# Patient Record
Sex: MALE | Race: WHITE | NOT HISPANIC OR LATINO | URBAN - METROPOLITAN AREA
[De-identification: names, ages, dates, MRNs, and addresses within clinical notes are randomized per-mention and may not be internally consistent; named-entity substitution may affect disease eponyms.]

---

## 2018-05-14 ENCOUNTER — EMERGENCY (EMERGENCY)
Facility: HOSPITAL | Age: 73
LOS: 1 days | Discharge: HOME | End: 2018-05-14
Attending: EMERGENCY MEDICINE | Admitting: EMERGENCY MEDICINE
Payer: MEDICARE

## 2018-05-14 VITALS
TEMPERATURE: 98 F | SYSTOLIC BLOOD PRESSURE: 163 MMHG | OXYGEN SATURATION: 96 % | DIASTOLIC BLOOD PRESSURE: 82 MMHG | WEIGHT: 156.09 LBS | RESPIRATION RATE: 18 BRPM | HEART RATE: 96 BPM

## 2018-05-14 VITALS
HEART RATE: 87 BPM | RESPIRATION RATE: 18 BRPM | OXYGEN SATURATION: 96 % | SYSTOLIC BLOOD PRESSURE: 144 MMHG | DIASTOLIC BLOOD PRESSURE: 81 MMHG | TEMPERATURE: 98 F

## 2018-05-14 DIAGNOSIS — Z90.49 ACQUIRED ABSENCE OF OTHER SPECIFIED PARTS OF DIGESTIVE TRACT: Chronic | ICD-10-CM

## 2018-05-14 LAB
ALBUMIN SERPL ELPH-MCNC: 3.4 G/DL — SIGNIFICANT CHANGE UP (ref 3.4–5)
ALP SERPL-CCNC: 74 U/L — SIGNIFICANT CHANGE UP (ref 40–120)
ALT FLD-CCNC: 19 U/L — SIGNIFICANT CHANGE UP (ref 12–42)
ANION GAP SERPL CALC-SCNC: 8 MMOL/L — LOW (ref 9–16)
APPEARANCE UR: CLEAR — SIGNIFICANT CHANGE UP
AST SERPL-CCNC: 18 U/L — SIGNIFICANT CHANGE UP (ref 15–37)
BASOPHILS NFR BLD AUTO: 0.2 % — SIGNIFICANT CHANGE UP (ref 0–2)
BILIRUB SERPL-MCNC: 1.1 MG/DL — SIGNIFICANT CHANGE UP (ref 0.2–1.2)
BILIRUB UR-MCNC: (no result)
BUN SERPL-MCNC: 20 MG/DL — SIGNIFICANT CHANGE UP (ref 7–23)
CALCIUM SERPL-MCNC: 9.4 MG/DL — SIGNIFICANT CHANGE UP (ref 8.5–10.5)
CHLORIDE SERPL-SCNC: 99 MMOL/L — SIGNIFICANT CHANGE UP (ref 96–108)
CO2 SERPL-SCNC: 26 MMOL/L — SIGNIFICANT CHANGE UP (ref 22–31)
COLOR SPEC: YELLOW — SIGNIFICANT CHANGE UP
CREAT SERPL-MCNC: 1.35 MG/DL — HIGH (ref 0.5–1.3)
DIFF PNL FLD: (no result)
EOSINOPHIL NFR BLD AUTO: 0.3 % — SIGNIFICANT CHANGE UP (ref 0–6)
GLUCOSE SERPL-MCNC: 118 MG/DL — HIGH (ref 70–99)
GLUCOSE UR QL: NEGATIVE — SIGNIFICANT CHANGE UP
HCT VFR BLD CALC: 41.2 % — SIGNIFICANT CHANGE UP (ref 39–50)
HGB BLD-MCNC: 14.4 G/DL — SIGNIFICANT CHANGE UP (ref 13–17)
IMM GRANULOCYTES NFR BLD AUTO: 0.3 % — SIGNIFICANT CHANGE UP (ref 0–1.5)
KETONES UR-MCNC: 15 MG/DL
LEUKOCYTE ESTERASE UR-ACNC: (no result)
LYMPHOCYTES # BLD AUTO: 11.6 % — LOW (ref 13–44)
MCHC RBC-ENTMCNC: 32.8 PG — SIGNIFICANT CHANGE UP (ref 27–34)
MCHC RBC-ENTMCNC: 35 G/DL — SIGNIFICANT CHANGE UP (ref 32–36)
MCV RBC AUTO: 93.8 FL — SIGNIFICANT CHANGE UP (ref 80–100)
MONOCYTES NFR BLD AUTO: 10 % — SIGNIFICANT CHANGE UP (ref 2–14)
NEUTROPHILS NFR BLD AUTO: 77.6 % — HIGH (ref 43–77)
NITRITE UR-MCNC: NEGATIVE — SIGNIFICANT CHANGE UP
PH UR: 5.5 — SIGNIFICANT CHANGE UP (ref 5–8)
PLATELET # BLD AUTO: 156 K/UL — SIGNIFICANT CHANGE UP (ref 150–400)
POTASSIUM SERPL-MCNC: 4.1 MMOL/L — SIGNIFICANT CHANGE UP (ref 3.5–5.3)
POTASSIUM SERPL-SCNC: 4.1 MMOL/L — SIGNIFICANT CHANGE UP (ref 3.5–5.3)
PROT SERPL-MCNC: 7.4 G/DL — SIGNIFICANT CHANGE UP (ref 6.4–8.2)
PROT UR-MCNC: 30 MG/DL
RBC # BLD: 4.39 M/UL — SIGNIFICANT CHANGE UP (ref 4.2–5.8)
RBC # FLD: 12.7 % — SIGNIFICANT CHANGE UP (ref 10.3–16.9)
SODIUM SERPL-SCNC: 133 MMOL/L — SIGNIFICANT CHANGE UP (ref 132–145)
SP GR SPEC: 1.02 — SIGNIFICANT CHANGE UP (ref 1–1.03)
UROBILINOGEN FLD QL: 0.2 E.U./DL — SIGNIFICANT CHANGE UP
WBC # BLD: 10.8 K/UL — HIGH (ref 3.8–10.5)
WBC # FLD AUTO: 10.8 K/UL — HIGH (ref 3.8–10.5)

## 2018-05-14 PROCEDURE — 76775 US EXAM ABDO BACK WALL LIM: CPT | Mod: 26

## 2018-05-14 PROCEDURE — 99284 EMERGENCY DEPT VISIT MOD MDM: CPT

## 2018-05-14 PROCEDURE — 76870 US EXAM SCROTUM: CPT | Mod: 26

## 2018-05-14 RX ORDER — SODIUM CHLORIDE 9 MG/ML
1000 INJECTION INTRAMUSCULAR; INTRAVENOUS; SUBCUTANEOUS ONCE
Qty: 0 | Refills: 0 | Status: COMPLETED | OUTPATIENT
Start: 2018-05-14 | End: 2018-05-14

## 2018-05-14 RX ORDER — ACETAMINOPHEN 500 MG
650 TABLET ORAL ONCE
Qty: 0 | Refills: 0 | Status: COMPLETED | OUTPATIENT
Start: 2018-05-14 | End: 2018-05-14

## 2018-05-14 RX ORDER — TRAMADOL HYDROCHLORIDE 50 MG/1
1 TABLET ORAL
Qty: 8 | Refills: 0 | OUTPATIENT
Start: 2018-05-14 | End: 2018-05-15

## 2018-05-14 RX ORDER — TAMSULOSIN HYDROCHLORIDE 0.4 MG/1
0.4 CAPSULE ORAL ONCE
Qty: 0 | Refills: 0 | Status: COMPLETED | OUTPATIENT
Start: 2018-05-14 | End: 2018-05-14

## 2018-05-14 RX ORDER — ONDANSETRON 8 MG/1
4 TABLET, FILM COATED ORAL ONCE
Qty: 0 | Refills: 0 | Status: COMPLETED | OUTPATIENT
Start: 2018-05-14 | End: 2018-05-14

## 2018-05-14 RX ORDER — KETOROLAC TROMETHAMINE 30 MG/ML
15 SYRINGE (ML) INJECTION ONCE
Qty: 0 | Refills: 0 | Status: DISCONTINUED | OUTPATIENT
Start: 2018-05-14 | End: 2018-05-14

## 2018-05-14 RX ADMIN — SODIUM CHLORIDE 1000 MILLILITER(S): 9 INJECTION INTRAMUSCULAR; INTRAVENOUS; SUBCUTANEOUS at 20:47

## 2018-05-14 RX ADMIN — Medication 650 MILLIGRAM(S): at 19:21

## 2018-05-14 RX ADMIN — ONDANSETRON 4 MILLIGRAM(S): 8 TABLET, FILM COATED ORAL at 20:47

## 2018-05-14 RX ADMIN — Medication 15 MILLIGRAM(S): at 20:47

## 2018-05-14 NOTE — ED PROVIDER NOTE - SKIN, MLM
No pertinent past medical history Skin normal color for race, warm, dry and intact. No evidence of rash.

## 2018-05-14 NOTE — ED PROVIDER NOTE - MEDICAL DECISION MAKING DETAILS
Pt with hematuria and R flank pain c/w kidney stone. Will check labs, UA, renal US, give fluids/pain meds. Pt with high-riding R testicle, no swelling/tenderness, very low suspicion for testicular torsion/infection but will get US to confirm. Pt with hematuria and R flank pain c/w kidney stone. Will check labs, UA, renal US, give fluids/pain meds. Pt with high-riding R testicle, no swelling/tenderness, very low index of suspicion for testicular torsion but will get US to confirm.

## 2018-05-14 NOTE — ED PROVIDER NOTE - CARE PLAN
Principal Discharge DX:	Kidney stone  Assessment and plan of treatment:	-- Follow up with your urologist in 2 days as scheduled.   -- Take flomax as prescribed by your primary doctor. Keep in mind that flomax can sometimes make you feel lightheaded so don't stand up suddenly.   -- Take tylenol 650mg every 4-6 hours for mild to moderate pain (maximum 3,000mg/day). Take tramadol for severe pain not responsive to tylenol. Do not drive, operate machinery, climb ladders or take part in any other high risk activities after taking this sedating medication.   -- Return to ER immediately for new or worsening symptoms (including but not limited to: fevers, inability to urine, severe pain not responsive to tramadol) or for any concerns.

## 2018-05-14 NOTE — ED ADULT NURSE NOTE - OBJECTIVE STATEMENT
Pt is a 73y male complaining of right flank pain 9/10. Pt states " I went to  today and they tested my urine and told me that I have a kidney stone. In january I had a scan that showed that I had a stone located in the Right Kidney. They scheduled an appointment with a urologist for me but it is not until Wed. I am in pain, cant eat or sleep." Pt states that he is also nauseas, dizzy and has chills. Pt states that he sometimes has burning upon urination. Pt denies sob, chest pain, fever. Pt has right sided CVA tenderness.

## 2018-05-14 NOTE — ED PROVIDER NOTE - PLAN OF CARE
-- Follow up with your urologist in 2 days as scheduled.   -- Take flomax as prescribed by your primary doctor. Keep in mind that flomax can sometimes make you feel lightheaded so don't stand up suddenly.   -- Take tylenol 650mg every 4-6 hours for mild to moderate pain (maximum 3,000mg/day). Take tramadol for severe pain not responsive to tylenol. Do not drive, operate machinery, climb ladders or take part in any other high risk activities after taking this sedating medication.   -- Return to ER immediately for new or worsening symptoms (including but not limited to: fevers, inability to urine, severe pain not responsive to tramadol) or for any concerns.

## 2018-05-14 NOTE — ED PROVIDER NOTE - ATTENDING CONTRIBUTION TO CARE
Patient presenting with R flank pain, hx kidneys stones. Pain radiates to R testicle. Seen by outpt MD and given Flomax Rx. VSS. + R CVAT, + RLQ ttp. Sono and labs/ua ordered. Pain meds/Zofran.

## 2018-05-14 NOTE — ED PROVIDER NOTE - OBJECTIVE STATEMENT
73M h/o HTN and kidney stones p/w R flank pain radiating to R inguinal area, onset 3 days ago, was intermittent now constant, a/w hematuria, dysuria, and nausea. No fevers, vomiting, abd pain, or testicular pain. Pt saw his PMD earlier today, given flomax and referred to urology (has appt with Dr Jus Choudhary in 2 days). Pt presents to the ED due to pain, took ASA 162mg earlier today without improvement in pain.

## 2018-05-14 NOTE — ED PROVIDER NOTE - NEUROLOGICAL, MLM
Alert and oriented x3, follows commands, moving all extremities spontaneously. Speech is clear, fluent, and appropriate.

## 2018-05-14 NOTE — ED PROVIDER NOTE - GENITOURINARY TESTICULAR EXAM, RIGHT
chaperone: Collette Alatorre. High-riding righ testicle. No tenderness or swelling. Unable to elicit cremasteric reflex b/l./ABNORMAL LIE chaperone: Collette Alatorre. High-riding R testicle. No tenderness or swelling. Unable to elicit cremasteric reflex b/l./ABNORMAL LIE

## 2018-05-14 NOTE — ED PROVIDER NOTE - PROGRESS NOTE DETAILS
Gollogly: pt feels much better after pain meds. Discussed lab/radiology results and need to follow up with urology. Gave return precautions; pt verbalized understanding.

## 2018-05-15 LAB
CULTURE RESULTS: NO GROWTH — SIGNIFICANT CHANGE UP
SPECIMEN SOURCE: SIGNIFICANT CHANGE UP

## 2018-05-15 RX ORDER — LISINOPRIL 2.5 MG/1
0 TABLET ORAL
Qty: 0 | Refills: 0 | COMMUNITY

## 2018-05-15 RX ORDER — AMLODIPINE BESYLATE 2.5 MG/1
0 TABLET ORAL
Qty: 0 | Refills: 0 | COMMUNITY

## 2018-05-15 RX ORDER — ATORVASTATIN CALCIUM 80 MG/1
0 TABLET, FILM COATED ORAL
Qty: 0 | Refills: 0 | COMMUNITY

## 2018-05-15 RX ORDER — POTASSIUM ACETATE 196 MG/ML
0 INJECTION, SOLUTION INTRAVENOUS
Qty: 0 | Refills: 0 | COMMUNITY

## 2018-05-18 DIAGNOSIS — R10.9 UNSPECIFIED ABDOMINAL PAIN: ICD-10-CM

## 2018-05-18 DIAGNOSIS — N20.0 CALCULUS OF KIDNEY: ICD-10-CM

## 2018-05-18 DIAGNOSIS — Z88.0 ALLERGY STATUS TO PENICILLIN: ICD-10-CM

## 2018-05-18 DIAGNOSIS — I10 ESSENTIAL (PRIMARY) HYPERTENSION: ICD-10-CM

## 2022-01-19 ENCOUNTER — RESULT REVIEW (OUTPATIENT)
Age: 77
End: 2022-01-19

## 2024-01-12 ENCOUNTER — EMERGENCY (EMERGENCY)
Facility: HOSPITAL | Age: 79
LOS: 1 days | Discharge: LEFT BEFORE DISCHARGE | End: 2024-01-12
Attending: EMERGENCY MEDICINE | Admitting: EMERGENCY MEDICINE
Payer: MEDICARE

## 2024-01-12 VITALS
OXYGEN SATURATION: 94 % | TEMPERATURE: 97 F | HEART RATE: 98 BPM | DIASTOLIC BLOOD PRESSURE: 85 MMHG | SYSTOLIC BLOOD PRESSURE: 152 MMHG | RESPIRATION RATE: 18 BRPM

## 2024-01-12 VITALS
SYSTOLIC BLOOD PRESSURE: 154 MMHG | WEIGHT: 175.05 LBS | HEART RATE: 111 BPM | HEIGHT: 69 IN | DIASTOLIC BLOOD PRESSURE: 90 MMHG | OXYGEN SATURATION: 89 % | RESPIRATION RATE: 26 BRPM | TEMPERATURE: 97 F

## 2024-01-12 DIAGNOSIS — R22.2 LOCALIZED SWELLING, MASS AND LUMP, TRUNK: ICD-10-CM

## 2024-01-12 DIAGNOSIS — Z20.822 CONTACT WITH AND (SUSPECTED) EXPOSURE TO COVID-19: ICD-10-CM

## 2024-01-12 DIAGNOSIS — R05.8 OTHER SPECIFIED COUGH: ICD-10-CM

## 2024-01-12 DIAGNOSIS — R06.02 SHORTNESS OF BREATH: ICD-10-CM

## 2024-01-12 DIAGNOSIS — J44.9 CHRONIC OBSTRUCTIVE PULMONARY DISEASE, UNSPECIFIED: ICD-10-CM

## 2024-01-12 DIAGNOSIS — Z88.0 ALLERGY STATUS TO PENICILLIN: ICD-10-CM

## 2024-01-12 DIAGNOSIS — R00.0 TACHYCARDIA, UNSPECIFIED: ICD-10-CM

## 2024-01-12 DIAGNOSIS — I45.10 UNSPECIFIED RIGHT BUNDLE-BRANCH BLOCK: ICD-10-CM

## 2024-01-12 DIAGNOSIS — Z90.49 ACQUIRED ABSENCE OF OTHER SPECIFIED PARTS OF DIGESTIVE TRACT: Chronic | ICD-10-CM

## 2024-01-12 DIAGNOSIS — Z87.891 PERSONAL HISTORY OF NICOTINE DEPENDENCE: ICD-10-CM

## 2024-01-12 DIAGNOSIS — Z53.29 PROCEDURE AND TREATMENT NOT CARRIED OUT BECAUSE OF PATIENT'S DECISION FOR OTHER REASONS: ICD-10-CM

## 2024-01-12 PROBLEM — I10 ESSENTIAL (PRIMARY) HYPERTENSION: Chronic | Status: ACTIVE | Noted: 2018-05-14

## 2024-01-12 PROBLEM — N20.0 CALCULUS OF KIDNEY: Chronic | Status: ACTIVE | Noted: 2018-05-14

## 2024-01-12 LAB
ALBUMIN SERPL ELPH-MCNC: 2 G/DL — LOW (ref 3.4–5)
ALBUMIN SERPL ELPH-MCNC: 2 G/DL — LOW (ref 3.4–5)
ALP SERPL-CCNC: 86 U/L — SIGNIFICANT CHANGE UP (ref 40–120)
ALP SERPL-CCNC: 86 U/L — SIGNIFICANT CHANGE UP (ref 40–120)
ALT FLD-CCNC: 53 U/L — HIGH (ref 12–42)
ALT FLD-CCNC: 53 U/L — HIGH (ref 12–42)
ANION GAP SERPL CALC-SCNC: 8 MMOL/L — LOW (ref 9–16)
ANION GAP SERPL CALC-SCNC: 8 MMOL/L — LOW (ref 9–16)
AST SERPL-CCNC: 40 U/L — HIGH (ref 15–37)
AST SERPL-CCNC: 40 U/L — HIGH (ref 15–37)
BASOPHILS # BLD AUTO: 0.03 K/UL — SIGNIFICANT CHANGE UP (ref 0–0.2)
BASOPHILS # BLD AUTO: 0.03 K/UL — SIGNIFICANT CHANGE UP (ref 0–0.2)
BASOPHILS NFR BLD AUTO: 0.3 % — SIGNIFICANT CHANGE UP (ref 0–2)
BASOPHILS NFR BLD AUTO: 0.3 % — SIGNIFICANT CHANGE UP (ref 0–2)
BILIRUB SERPL-MCNC: 0.8 MG/DL — SIGNIFICANT CHANGE UP (ref 0.2–1.2)
BILIRUB SERPL-MCNC: 0.8 MG/DL — SIGNIFICANT CHANGE UP (ref 0.2–1.2)
BUN SERPL-MCNC: 18 MG/DL — SIGNIFICANT CHANGE UP (ref 7–23)
BUN SERPL-MCNC: 18 MG/DL — SIGNIFICANT CHANGE UP (ref 7–23)
CALCIUM SERPL-MCNC: 8.9 MG/DL — SIGNIFICANT CHANGE UP (ref 8.5–10.5)
CALCIUM SERPL-MCNC: 8.9 MG/DL — SIGNIFICANT CHANGE UP (ref 8.5–10.5)
CHLORIDE SERPL-SCNC: 99 MMOL/L — SIGNIFICANT CHANGE UP (ref 96–108)
CHLORIDE SERPL-SCNC: 99 MMOL/L — SIGNIFICANT CHANGE UP (ref 96–108)
CO2 SERPL-SCNC: 27 MMOL/L — SIGNIFICANT CHANGE UP (ref 22–31)
CO2 SERPL-SCNC: 27 MMOL/L — SIGNIFICANT CHANGE UP (ref 22–31)
CREAT SERPL-MCNC: 1.35 MG/DL — HIGH (ref 0.5–1.3)
CREAT SERPL-MCNC: 1.35 MG/DL — HIGH (ref 0.5–1.3)
D DIMER BLD IA.RAPID-MCNC: 6889 NG/ML DDU — HIGH
D DIMER BLD IA.RAPID-MCNC: 6889 NG/ML DDU — HIGH
EGFR: 53 ML/MIN/1.73M2 — LOW
EGFR: 53 ML/MIN/1.73M2 — LOW
EOSINOPHIL # BLD AUTO: 0.14 K/UL — SIGNIFICANT CHANGE UP (ref 0–0.5)
EOSINOPHIL # BLD AUTO: 0.14 K/UL — SIGNIFICANT CHANGE UP (ref 0–0.5)
EOSINOPHIL NFR BLD AUTO: 1.5 % — SIGNIFICANT CHANGE UP (ref 0–6)
EOSINOPHIL NFR BLD AUTO: 1.5 % — SIGNIFICANT CHANGE UP (ref 0–6)
FLUAV AG NPH QL: SIGNIFICANT CHANGE UP
FLUAV AG NPH QL: SIGNIFICANT CHANGE UP
FLUBV AG NPH QL: SIGNIFICANT CHANGE UP
FLUBV AG NPH QL: SIGNIFICANT CHANGE UP
GLUCOSE SERPL-MCNC: 123 MG/DL — HIGH (ref 70–99)
GLUCOSE SERPL-MCNC: 123 MG/DL — HIGH (ref 70–99)
HCT VFR BLD CALC: 33.9 % — LOW (ref 39–50)
HCT VFR BLD CALC: 33.9 % — LOW (ref 39–50)
HGB BLD-MCNC: 10.9 G/DL — LOW (ref 13–17)
HGB BLD-MCNC: 10.9 G/DL — LOW (ref 13–17)
IMM GRANULOCYTES NFR BLD AUTO: 0.6 % — SIGNIFICANT CHANGE UP (ref 0–0.9)
IMM GRANULOCYTES NFR BLD AUTO: 0.6 % — SIGNIFICANT CHANGE UP (ref 0–0.9)
LIDOCAIN IGE QN: 28 U/L — SIGNIFICANT CHANGE UP (ref 16–77)
LIDOCAIN IGE QN: 28 U/L — SIGNIFICANT CHANGE UP (ref 16–77)
LYMPHOCYTES # BLD AUTO: 1.77 K/UL — SIGNIFICANT CHANGE UP (ref 1–3.3)
LYMPHOCYTES # BLD AUTO: 1.77 K/UL — SIGNIFICANT CHANGE UP (ref 1–3.3)
LYMPHOCYTES # BLD AUTO: 19.1 % — SIGNIFICANT CHANGE UP (ref 13–44)
LYMPHOCYTES # BLD AUTO: 19.1 % — SIGNIFICANT CHANGE UP (ref 13–44)
MAGNESIUM SERPL-MCNC: 1.8 MG/DL — SIGNIFICANT CHANGE UP (ref 1.6–2.6)
MAGNESIUM SERPL-MCNC: 1.8 MG/DL — SIGNIFICANT CHANGE UP (ref 1.6–2.6)
MCHC RBC-ENTMCNC: 31.5 PG — SIGNIFICANT CHANGE UP (ref 27–34)
MCHC RBC-ENTMCNC: 31.5 PG — SIGNIFICANT CHANGE UP (ref 27–34)
MCHC RBC-ENTMCNC: 32.2 GM/DL — SIGNIFICANT CHANGE UP (ref 32–36)
MCHC RBC-ENTMCNC: 32.2 GM/DL — SIGNIFICANT CHANGE UP (ref 32–36)
MCV RBC AUTO: 98 FL — SIGNIFICANT CHANGE UP (ref 80–100)
MCV RBC AUTO: 98 FL — SIGNIFICANT CHANGE UP (ref 80–100)
MONOCYTES # BLD AUTO: 0.77 K/UL — SIGNIFICANT CHANGE UP (ref 0–0.9)
MONOCYTES # BLD AUTO: 0.77 K/UL — SIGNIFICANT CHANGE UP (ref 0–0.9)
MONOCYTES NFR BLD AUTO: 8.3 % — SIGNIFICANT CHANGE UP (ref 2–14)
MONOCYTES NFR BLD AUTO: 8.3 % — SIGNIFICANT CHANGE UP (ref 2–14)
NEUTROPHILS # BLD AUTO: 6.49 K/UL — SIGNIFICANT CHANGE UP (ref 1.8–7.4)
NEUTROPHILS # BLD AUTO: 6.49 K/UL — SIGNIFICANT CHANGE UP (ref 1.8–7.4)
NEUTROPHILS NFR BLD AUTO: 70.2 % — SIGNIFICANT CHANGE UP (ref 43–77)
NEUTROPHILS NFR BLD AUTO: 70.2 % — SIGNIFICANT CHANGE UP (ref 43–77)
NRBC # BLD: 0 /100 WBCS — SIGNIFICANT CHANGE UP (ref 0–0)
NRBC # BLD: 0 /100 WBCS — SIGNIFICANT CHANGE UP (ref 0–0)
NT-PROBNP SERPL-SCNC: 736 PG/ML — HIGH
NT-PROBNP SERPL-SCNC: 736 PG/ML — HIGH
PLATELET # BLD AUTO: 252 K/UL — SIGNIFICANT CHANGE UP (ref 150–400)
PLATELET # BLD AUTO: 252 K/UL — SIGNIFICANT CHANGE UP (ref 150–400)
POTASSIUM SERPL-MCNC: 3.5 MMOL/L — SIGNIFICANT CHANGE UP (ref 3.5–5.3)
POTASSIUM SERPL-MCNC: 3.5 MMOL/L — SIGNIFICANT CHANGE UP (ref 3.5–5.3)
POTASSIUM SERPL-SCNC: 3.5 MMOL/L — SIGNIFICANT CHANGE UP (ref 3.5–5.3)
POTASSIUM SERPL-SCNC: 3.5 MMOL/L — SIGNIFICANT CHANGE UP (ref 3.5–5.3)
PROT SERPL-MCNC: 7.8 G/DL — SIGNIFICANT CHANGE UP (ref 6.4–8.2)
PROT SERPL-MCNC: 7.8 G/DL — SIGNIFICANT CHANGE UP (ref 6.4–8.2)
RBC # BLD: 3.46 M/UL — LOW (ref 4.2–5.8)
RBC # BLD: 3.46 M/UL — LOW (ref 4.2–5.8)
RBC # FLD: 14.2 % — SIGNIFICANT CHANGE UP (ref 10.3–14.5)
RBC # FLD: 14.2 % — SIGNIFICANT CHANGE UP (ref 10.3–14.5)
RSV RNA NPH QL NAA+NON-PROBE: SIGNIFICANT CHANGE UP
RSV RNA NPH QL NAA+NON-PROBE: SIGNIFICANT CHANGE UP
SARS-COV-2 RNA SPEC QL NAA+PROBE: SIGNIFICANT CHANGE UP
SARS-COV-2 RNA SPEC QL NAA+PROBE: SIGNIFICANT CHANGE UP
SODIUM SERPL-SCNC: 134 MMOL/L — SIGNIFICANT CHANGE UP (ref 132–145)
SODIUM SERPL-SCNC: 134 MMOL/L — SIGNIFICANT CHANGE UP (ref 132–145)
TROPONIN I, HIGH SENSITIVITY RESULT: 26.1 NG/L — SIGNIFICANT CHANGE UP
TROPONIN I, HIGH SENSITIVITY RESULT: 26.1 NG/L — SIGNIFICANT CHANGE UP
WBC # BLD: 9.26 K/UL — SIGNIFICANT CHANGE UP (ref 3.8–10.5)
WBC # BLD: 9.26 K/UL — SIGNIFICANT CHANGE UP (ref 3.8–10.5)
WBC # FLD AUTO: 9.26 K/UL — SIGNIFICANT CHANGE UP (ref 3.8–10.5)
WBC # FLD AUTO: 9.26 K/UL — SIGNIFICANT CHANGE UP (ref 3.8–10.5)

## 2024-01-12 PROCEDURE — 99285 EMERGENCY DEPT VISIT HI MDM: CPT | Mod: FS

## 2024-01-12 PROCEDURE — 71275 CT ANGIOGRAPHY CHEST: CPT | Mod: 26,MH

## 2024-01-12 PROCEDURE — 71045 X-RAY EXAM CHEST 1 VIEW: CPT | Mod: 26

## 2024-01-12 RX ORDER — DEXAMETHASONE 0.5 MG/5ML
10 ELIXIR ORAL ONCE
Refills: 0 | Status: COMPLETED | OUTPATIENT
Start: 2024-01-12 | End: 2024-01-12

## 2024-01-12 RX ORDER — IPRATROPIUM/ALBUTEROL SULFATE 18-103MCG
3 AEROSOL WITH ADAPTER (GRAM) INHALATION ONCE
Refills: 0 | Status: COMPLETED | OUTPATIENT
Start: 2024-01-12 | End: 2024-01-12

## 2024-01-12 RX ADMIN — Medication 3 MILLILITER(S): at 09:09

## 2024-01-12 RX ADMIN — Medication 102 MILLIGRAM(S): at 09:09

## 2024-01-12 NOTE — ED ADULT NURSE NOTE - NSFALLRISKINTERV_ED_ALL_ED
Assistance OOB with selected safe patient handling equipment if applicable/Assistance with ambulation/Communicate fall risk and risk factors to all staff, patient, and family/Provide visual cue: yellow wristband, yellow gown, etc/Reinforce activity limits and safety measures with patient and family/Call bell, personal items and telephone in reach/Instruct patient to call for assistance before getting out of bed/chair/stretcher/Non-slip footwear applied when patient is off stretcher/[place] to call system/Physically safe environment - no spills, clutter or unnecessary equipment/Purposeful Proactive Rounding/Room/bathroom lighting operational, light cord in reach Assistance OOB with selected safe patient handling equipment if applicable/Assistance with ambulation/Communicate fall risk and risk factors to all staff, patient, and family/Provide visual cue: yellow wristband, yellow gown, etc/Reinforce activity limits and safety measures with patient and family/Call bell, personal items and telephone in reach/Instruct patient to call for assistance before getting out of bed/chair/stretcher/Non-slip footwear applied when patient is off stretcher/[place] to call system/Physically safe environment - no spills, clutter or unnecessary equipment/Purposeful Proactive Rounding/Room/bathroom lighting operational, light cord in reach

## 2024-01-12 NOTE — ED PROVIDER NOTE - PATIENT PORTAL LINK FT
You can access the FollowMyHealth Patient Portal offered by [facility] by registering at the following website: [email or web address]. By joining [facility]’s FollowMyHealth portal, you will also be able to view your health information using other applications (apps) compatible with our system. You can access the FollowMyHealth Patient Portal offered by [facility] by registering at the following website: [email or web address]. By joining [facility]’s FollowMyHealth portal, you will also be able to view your health information using other applications (apps) compatible with our system.

## 2024-01-12 NOTE — ED PROVIDER NOTE - PHYSICAL EXAMINATION
VITAL SIGNS: I have reviewed nursing notes and confirm.  CONSTITUTIONAL: Well-developed; well-nourished; in no acute distress.  SKIN: Skin is warm and dry, no acute rash.  HEAD: Normocephalic; atraumatic.  NECK: Supple; non tender.  CARD: S1, S2 normal; no murmurs, gallops, or rubs. Regular rate and rhythm.  RESP: Dec BS dimitri. No wheezes, rales or rhonchi. No stridor or accessory muscle use.  ABD: Soft; non-distended; non-tender; no rebound or guarding.  EXT: Normal ROM. No clubbing, cyanosis or edema.  NEURO: Alert, oriented. Grossly unremarkable. LR, normal tone, no gross motor or sensory changes. Fluent speech.   PSYCH: Cooperative, appropriate. Mood and affect wnl.

## 2024-01-12 NOTE — ED PROVIDER NOTE - NS ED ATTENDING STATEMENT MOD
I have seen and examined this patient and fully participated in the care of this patient as the teaching attending.  The service was shared with the SILVIO.  I reviewed and verified the documentation.

## 2024-01-12 NOTE — ED PROVIDER NOTE - CLINICAL SUMMARY MEDICAL DECISION MAKING FREE TEXT BOX
79-year-old male, past medical history esophageal mass, COPD, presenting to the emergency room complaining of worsening shortness of breath today. Patient noted to have a pulse ox of 82% upon arriving to the ED.  He was initially placed on 4 L and started to sat at 100%.  Patient was then downgraded to 3 L with a comfortable 97% on room air.  He endorses feeling improved with the oxygen.  No distinctive wheezing on exam, however concern for possible COPD exacerbation versus worsening esophageal mass.  Will plan to obtain baseline x-ray, give DuoNebs and steroids, and will obtain EKG and medical labs.  Will also plan to obtain CT angiogram chest to further investigate for any acute findings.  Will reassess and dispo pending medical workup.

## 2024-01-12 NOTE — ED PROVIDER NOTE - PROGRESS NOTE DETAILS
CT shows findings concerning for mass involving from the esophagus or from the lung.  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  I reviewed all results with patient and explained he will need to be admitted due to his acute hypoxia and acute findings.  Patient states he feels a lot better after receiving the medications and continuous oxygen for a while.  He states he would like to go home to square away a few lasting and then will return to the ER to be admitted. Pt wants to leave AMA. Patient demonstrated capacity to make decisions. Discussed risks of leaving against medical advice, which includes worsening of current medical condition, up to and including death. Patient understands risks and still wishes to leave. AMA paperwork signed and placed in chart.

## 2024-01-12 NOTE — ED ADULT NURSE NOTE - OBJECTIVE STATEMENT
Pt walked in with c/o SOB. Reports being short of breath x several weeks but experienced significant worsening this morning.

## 2024-01-12 NOTE — ED PROVIDER NOTE - OBJECTIVE STATEMENT
79-year-old male, past medical history esophageal mass, COPD, presenting to the emergency room complaining of worsening shortness of breath today.  Patient states that shortness of breath initially began 6 weeks ago, intermittently.  Recently and has been increasing to the point where he came to the emergency room.  Patient also reports he had a scheduled appointment to see a gastroenterologist at [place] today.  The reason for the evaluation was due to an incidental finding of an esophageal mass that occurred a few weeks ago.  Patient states he was feeling short of breath at that time as well; the symptoms today feel similar to those symptoms he experienced prior.  He denies any active chest pain, headache, dizziness, nausea, vomiting, fevers or chills.  Patient also endorses a cough with productive clear sputum.  He has been using his Ellipta at home as well as albuterol with relief in his shortness of breath symptoms. Patient was noted to be hypoxic when arriving to the ED with a pulse ox of 82% on room air. 79-year-old male, past medical history esophageal mass, COPD, presenting to the emergency room complaining of worsening shortness of breath today.  Patient states that shortness of breath initially began 6 weeks ago, intermittently.  Recently and has been increasing to the point where he came to the emergency room.  Patient also reports he had a scheduled appointment to see a gastroenterologist at [place] today.  The reason for the evaluation was due to an incidental finding of an esophageal mass that occurred a few weeks ago.  Patient states he was feeling short of breath at that time as well; the symptoms today feel similar to those symptoms he experienced prior.  He denies any active chest pain, headache, dizziness, nausea, vomiting, fevers or chills.  Patient also endorses a cough with productive clear sputum.  He has been using his Ellipta at home as well as albuterol with relief in his shortness of breath symptoms. Patient was noted to be hypoxic when arriving to the ED with a pulse ox of 82% on room air.

## 2024-01-12 NOTE — ED PROVIDER NOTE - NS ED ROS FT
+SOB, cough  Denies fevers, chills, nausea, vomiting, diarrhea, constipation, abdominal pain, urinary symptoms, chest pain, palpitations, syncope/near syncope, headache, weakness, numbness, focal deficits, visual changes, gait or balance changes, dizziness

## 2024-01-12 NOTE — ED PROVIDER NOTE - ATTENDING CONTRIBUTION TO CARE
Patient is an 79-year-old male with a past medical history of an esophageal mass, COPD, and is presenting with shortness of breath for the past day.  Patient reports this issue has been ongoing for the past several weeks but acutely worsened today.  Review of systems as above.  Physical exam: Agree with PA exam as above.  Assessment and plan:  This is an 79-year-old male with a history of COPD and esophageal mass who is presenting with shortness of breath and hypoxia chest x-ray shows a hilar mass.  CT scan performed to rule out PE and is significant for a mass in the right lung concerning for carcinoma.  Results discussed with patient.  He understands that this requires admission and further workup, however he would like to sign out AGAINST MEDICAL ADVICE takes care of some personal matters and then present directly to [facility] for admission.  He understands the diagnosis of cancer and need for further treatment.    The patient wishes to leave against medical advice.  I have discussed the risks, benefits and alternatives (including the possibility of worsening of disease, pain, permanent disability, and/or death) with the patient and his/her family (if available).  The patient voices understanding of these risks, benefits, and alternatives and still wishes to sign out against medical advice.  The patient is awake, alert, oriented  x 3 and has demonstrated capacity to refuse/direct care.  I have advised the patient that they can and should return immediately should they develop any worse/different/additional symptoms, or if they change their mind and want to continue their care. Patient is an 79-year-old male with a past medical history of an esophageal mass, COPD, and is presenting with shortness of breath for the past day.  Patient reports this issue has been ongoing for the past several weeks but acutely worsened today.  Review of systems as above.  Physical exam: Agree with PA exam as above.  Assessment and plan:  This is an 79-year-old male with a history of COPD and esophageal mass who is presenting with shortness of breath and hypoxia chest x-ray shows a hilar mass.  CT scan performed to rule out PE and is significant for a mass in the right lung concerning for carcinoma.  Results discussed with patient.  He understands that this requires admission and further workup, however he would like to sign out AGAINST MEDICAL ADVICE takes care of some personal matters and then present directly to [facility] for admission.  He understands the diagnosis of cancer and need for further treatment.    The patient wishes to leave against medical advice.  I have discussed the risks, benefits and alternatives (including the possibility of worsening of disease, pain, permanent disability, and/or death) with the patient and his/her family (if available).  The patient voices understanding of these risks, benefits, and alternatives and still wishes to sign out against medical advice.  The patient is awake, alert, oriented  x 3 and has demonstrated capacity to refuse/direct care.  I have advised the patient that they can and should return immediately should they develop any worse/different/additional symptoms, or if they change their mind and want to continue their care.

## 2024-01-12 NOTE — ED PROVIDER NOTE - CHIEF COMPLAINT
Dr Jw Wolf notified of patient's elevated BP  Dr Edwin Good gave verbal order to administer patient's 4 antihypertensive medications that he takes each morning  Will administer and continue to monitor  The patient is a 79y Male complaining of shortness of breath.

## 2024-01-12 NOTE — ED ADULT TRIAGE NOTE - CHIEF COMPLAINT QUOTE
Pt walks in c/o shortness of breath for several weeks, worsening this AM. PMH of COPD. Pt unable to give additional PMH due to SOB. When pt first sat down from walking in, SpO2 82% on RA. After a few minutes up to 89%.

## 2024-01-14 ENCOUNTER — INPATIENT (INPATIENT)
Facility: HOSPITAL | Age: 79
LOS: 3 days | Discharge: LEFT BEFORE DISCHARGE | DRG: 196 | End: 2024-01-18
Attending: STUDENT IN AN ORGANIZED HEALTH CARE EDUCATION/TRAINING PROGRAM | Admitting: STUDENT IN AN ORGANIZED HEALTH CARE EDUCATION/TRAINING PROGRAM
Payer: MEDICARE

## 2024-01-14 VITALS
DIASTOLIC BLOOD PRESSURE: 82 MMHG | HEART RATE: 102 BPM | TEMPERATURE: 98 F | HEIGHT: 69 IN | OXYGEN SATURATION: 94 % | WEIGHT: 175.05 LBS | RESPIRATION RATE: 24 BRPM | SYSTOLIC BLOOD PRESSURE: 147 MMHG

## 2024-01-14 DIAGNOSIS — D50.9 IRON DEFICIENCY ANEMIA, UNSPECIFIED: ICD-10-CM

## 2024-01-14 DIAGNOSIS — Z99.81 DEPENDENCE ON SUPPLEMENTAL OXYGEN: ICD-10-CM

## 2024-01-14 DIAGNOSIS — Z87.09 PERSONAL HISTORY OF OTHER DISEASES OF THE RESPIRATORY SYSTEM: ICD-10-CM

## 2024-01-14 DIAGNOSIS — N17.9 ACUTE KIDNEY FAILURE, UNSPECIFIED: ICD-10-CM

## 2024-01-14 DIAGNOSIS — D63.8 ANEMIA IN OTHER CHRONIC DISEASES CLASSIFIED ELSEWHERE: ICD-10-CM

## 2024-01-14 DIAGNOSIS — J43.2 CENTRILOBULAR EMPHYSEMA: ICD-10-CM

## 2024-01-14 DIAGNOSIS — J84.10 PULMONARY FIBROSIS, UNSPECIFIED: ICD-10-CM

## 2024-01-14 DIAGNOSIS — K59.00 CONSTIPATION, UNSPECIFIED: ICD-10-CM

## 2024-01-14 DIAGNOSIS — Z91.198 PATIENT'S NONCOMPLIANCE WITH OTHER MEDICAL TREATMENT AND REGIMEN FOR OTHER REASON: ICD-10-CM

## 2024-01-14 DIAGNOSIS — Z87.891 PERSONAL HISTORY OF NICOTINE DEPENDENCE: ICD-10-CM

## 2024-01-14 DIAGNOSIS — Z87.438 PERSONAL HISTORY OF OTHER DISEASES OF MALE GENITAL ORGANS: ICD-10-CM

## 2024-01-14 DIAGNOSIS — J96.01 ACUTE RESPIRATORY FAILURE WITH HYPOXIA: ICD-10-CM

## 2024-01-14 DIAGNOSIS — I49.1 ATRIAL PREMATURE DEPOLARIZATION: ICD-10-CM

## 2024-01-14 DIAGNOSIS — R91.8 OTHER NONSPECIFIC ABNORMAL FINDING OF LUNG FIELD: ICD-10-CM

## 2024-01-14 DIAGNOSIS — Z29.9 ENCOUNTER FOR PROPHYLACTIC MEASURES, UNSPECIFIED: ICD-10-CM

## 2024-01-14 DIAGNOSIS — I10 ESSENTIAL (PRIMARY) HYPERTENSION: ICD-10-CM

## 2024-01-14 DIAGNOSIS — N40.0 BENIGN PROSTATIC HYPERPLASIA WITHOUT LOWER URINARY TRACT SYMPTOMS: ICD-10-CM

## 2024-01-14 DIAGNOSIS — Z87.442 PERSONAL HISTORY OF URINARY CALCULI: ICD-10-CM

## 2024-01-14 DIAGNOSIS — Z90.49 ACQUIRED ABSENCE OF OTHER SPECIFIED PARTS OF DIGESTIVE TRACT: Chronic | ICD-10-CM

## 2024-01-14 DIAGNOSIS — I45.10 UNSPECIFIED RIGHT BUNDLE-BRANCH BLOCK: ICD-10-CM

## 2024-01-14 DIAGNOSIS — Z88.0 ALLERGY STATUS TO PENICILLIN: ICD-10-CM

## 2024-01-14 DIAGNOSIS — Z90.49 ACQUIRED ABSENCE OF OTHER SPECIFIED PARTS OF DIGESTIVE TRACT: ICD-10-CM

## 2024-01-14 DIAGNOSIS — R59.0 LOCALIZED ENLARGED LYMPH NODES: ICD-10-CM

## 2024-01-14 DIAGNOSIS — Z91.81 HISTORY OF FALLING: ICD-10-CM

## 2024-01-14 DIAGNOSIS — R94.31 ABNORMAL ELECTROCARDIOGRAM [ECG] [EKG]: ICD-10-CM

## 2024-01-14 DIAGNOSIS — D64.9 ANEMIA, UNSPECIFIED: ICD-10-CM

## 2024-01-14 LAB
ANION GAP SERPL CALC-SCNC: 9 MMOL/L — SIGNIFICANT CHANGE UP (ref 5–17)
ANION GAP SERPL CALC-SCNC: 9 MMOL/L — SIGNIFICANT CHANGE UP (ref 5–17)
APTT BLD: 25.3 SEC — SIGNIFICANT CHANGE UP (ref 24.5–35.6)
APTT BLD: 25.3 SEC — SIGNIFICANT CHANGE UP (ref 24.5–35.6)
BASE EXCESS BLDCOV CALC-SCNC: 2.5 MMOL/L — HIGH (ref -9.3–0.3)
BASE EXCESS BLDCOV CALC-SCNC: 2.5 MMOL/L — HIGH (ref -9.3–0.3)
BASOPHILS # BLD AUTO: 0.03 K/UL — SIGNIFICANT CHANGE UP (ref 0–0.2)
BASOPHILS # BLD AUTO: 0.03 K/UL — SIGNIFICANT CHANGE UP (ref 0–0.2)
BASOPHILS NFR BLD AUTO: 0.3 % — SIGNIFICANT CHANGE UP (ref 0–2)
BASOPHILS NFR BLD AUTO: 0.3 % — SIGNIFICANT CHANGE UP (ref 0–2)
BLD GP AB SCN SERPL QL: NEGATIVE — SIGNIFICANT CHANGE UP
BLD GP AB SCN SERPL QL: NEGATIVE — SIGNIFICANT CHANGE UP
BUN SERPL-MCNC: 20 MG/DL — SIGNIFICANT CHANGE UP (ref 7–23)
BUN SERPL-MCNC: 20 MG/DL — SIGNIFICANT CHANGE UP (ref 7–23)
CALCIUM SERPL-MCNC: 9.2 MG/DL — SIGNIFICANT CHANGE UP (ref 8.4–10.5)
CALCIUM SERPL-MCNC: 9.2 MG/DL — SIGNIFICANT CHANGE UP (ref 8.4–10.5)
CHLORIDE SERPL-SCNC: 102 MMOL/L — SIGNIFICANT CHANGE UP (ref 96–108)
CHLORIDE SERPL-SCNC: 102 MMOL/L — SIGNIFICANT CHANGE UP (ref 96–108)
CO2 BLDCOV-SCNC: 30 MMOL/L — SIGNIFICANT CHANGE UP
CO2 BLDCOV-SCNC: 30 MMOL/L — SIGNIFICANT CHANGE UP
CO2 SERPL-SCNC: 26 MMOL/L — SIGNIFICANT CHANGE UP (ref 22–31)
CO2 SERPL-SCNC: 26 MMOL/L — SIGNIFICANT CHANGE UP (ref 22–31)
CREAT SERPL-MCNC: 1.18 MG/DL — SIGNIFICANT CHANGE UP (ref 0.5–1.3)
CREAT SERPL-MCNC: 1.18 MG/DL — SIGNIFICANT CHANGE UP (ref 0.5–1.3)
EGFR: 62 ML/MIN/1.73M2 — SIGNIFICANT CHANGE UP
EGFR: 62 ML/MIN/1.73M2 — SIGNIFICANT CHANGE UP
EOSINOPHIL # BLD AUTO: 0.12 K/UL — SIGNIFICANT CHANGE UP (ref 0–0.5)
EOSINOPHIL # BLD AUTO: 0.12 K/UL — SIGNIFICANT CHANGE UP (ref 0–0.5)
EOSINOPHIL NFR BLD AUTO: 1.2 % — SIGNIFICANT CHANGE UP (ref 0–6)
EOSINOPHIL NFR BLD AUTO: 1.2 % — SIGNIFICANT CHANGE UP (ref 0–6)
GAS PNL BLDCOV: 7.38 — SIGNIFICANT CHANGE UP (ref 7.25–7.45)
GAS PNL BLDCOV: 7.38 — SIGNIFICANT CHANGE UP (ref 7.25–7.45)
GAS PNL BLDCOV: SIGNIFICANT CHANGE UP
GAS PNL BLDCOV: SIGNIFICANT CHANGE UP
GLUCOSE SERPL-MCNC: 101 MG/DL — HIGH (ref 70–99)
GLUCOSE SERPL-MCNC: 101 MG/DL — HIGH (ref 70–99)
HCO3 BLDCOV-SCNC: 28 MMOL/L — SIGNIFICANT CHANGE UP
HCO3 BLDCOV-SCNC: 28 MMOL/L — SIGNIFICANT CHANGE UP
HCT VFR BLD CALC: 32.9 % — LOW (ref 39–50)
HCT VFR BLD CALC: 32.9 % — LOW (ref 39–50)
HGB BLD-MCNC: 11 G/DL — LOW (ref 13–17)
HGB BLD-MCNC: 11 G/DL — LOW (ref 13–17)
IMM GRANULOCYTES NFR BLD AUTO: 0.5 % — SIGNIFICANT CHANGE UP (ref 0–0.9)
IMM GRANULOCYTES NFR BLD AUTO: 0.5 % — SIGNIFICANT CHANGE UP (ref 0–0.9)
INR BLD: 1.09 — SIGNIFICANT CHANGE UP (ref 0.85–1.18)
INR BLD: 1.09 — SIGNIFICANT CHANGE UP (ref 0.85–1.18)
LYMPHOCYTES # BLD AUTO: 1.88 K/UL — SIGNIFICANT CHANGE UP (ref 1–3.3)
LYMPHOCYTES # BLD AUTO: 1.88 K/UL — SIGNIFICANT CHANGE UP (ref 1–3.3)
LYMPHOCYTES # BLD AUTO: 18.2 % — SIGNIFICANT CHANGE UP (ref 13–44)
LYMPHOCYTES # BLD AUTO: 18.2 % — SIGNIFICANT CHANGE UP (ref 13–44)
MCHC RBC-ENTMCNC: 32 PG — SIGNIFICANT CHANGE UP (ref 27–34)
MCHC RBC-ENTMCNC: 32 PG — SIGNIFICANT CHANGE UP (ref 27–34)
MCHC RBC-ENTMCNC: 33.4 GM/DL — SIGNIFICANT CHANGE UP (ref 32–36)
MCHC RBC-ENTMCNC: 33.4 GM/DL — SIGNIFICANT CHANGE UP (ref 32–36)
MCV RBC AUTO: 95.6 FL — SIGNIFICANT CHANGE UP (ref 80–100)
MCV RBC AUTO: 95.6 FL — SIGNIFICANT CHANGE UP (ref 80–100)
MONOCYTES # BLD AUTO: 0.96 K/UL — HIGH (ref 0–0.9)
MONOCYTES # BLD AUTO: 0.96 K/UL — HIGH (ref 0–0.9)
MONOCYTES NFR BLD AUTO: 9.3 % — SIGNIFICANT CHANGE UP (ref 2–14)
MONOCYTES NFR BLD AUTO: 9.3 % — SIGNIFICANT CHANGE UP (ref 2–14)
NEUTROPHILS # BLD AUTO: 7.31 K/UL — SIGNIFICANT CHANGE UP (ref 1.8–7.4)
NEUTROPHILS # BLD AUTO: 7.31 K/UL — SIGNIFICANT CHANGE UP (ref 1.8–7.4)
NEUTROPHILS NFR BLD AUTO: 70.5 % — SIGNIFICANT CHANGE UP (ref 43–77)
NEUTROPHILS NFR BLD AUTO: 70.5 % — SIGNIFICANT CHANGE UP (ref 43–77)
NRBC # BLD: 0 /100 WBCS — SIGNIFICANT CHANGE UP (ref 0–0)
NRBC # BLD: 0 /100 WBCS — SIGNIFICANT CHANGE UP (ref 0–0)
NT-PROBNP SERPL-SCNC: 768 PG/ML — HIGH (ref 0–300)
NT-PROBNP SERPL-SCNC: 768 PG/ML — HIGH (ref 0–300)
PCO2 BLDCOV: 48 MMHG — SIGNIFICANT CHANGE UP (ref 27–49)
PCO2 BLDCOV: 48 MMHG — SIGNIFICANT CHANGE UP (ref 27–49)
PLATELET # BLD AUTO: 222 K/UL — SIGNIFICANT CHANGE UP (ref 150–400)
PLATELET # BLD AUTO: 222 K/UL — SIGNIFICANT CHANGE UP (ref 150–400)
PO2 BLDCOA: 34 MMHG — SIGNIFICANT CHANGE UP (ref 17–41)
PO2 BLDCOA: 34 MMHG — SIGNIFICANT CHANGE UP (ref 17–41)
POTASSIUM SERPL-MCNC: 4.4 MMOL/L — SIGNIFICANT CHANGE UP (ref 3.5–5.3)
POTASSIUM SERPL-MCNC: 4.4 MMOL/L — SIGNIFICANT CHANGE UP (ref 3.5–5.3)
POTASSIUM SERPL-SCNC: 4.4 MMOL/L — SIGNIFICANT CHANGE UP (ref 3.5–5.3)
POTASSIUM SERPL-SCNC: 4.4 MMOL/L — SIGNIFICANT CHANGE UP (ref 3.5–5.3)
PROTHROM AB SERPL-ACNC: 12.4 SEC — SIGNIFICANT CHANGE UP (ref 9.5–13)
PROTHROM AB SERPL-ACNC: 12.4 SEC — SIGNIFICANT CHANGE UP (ref 9.5–13)
RBC # BLD: 3.44 M/UL — LOW (ref 4.2–5.8)
RBC # BLD: 3.44 M/UL — LOW (ref 4.2–5.8)
RBC # FLD: 14.6 % — HIGH (ref 10.3–14.5)
RBC # FLD: 14.6 % — HIGH (ref 10.3–14.5)
RH IG SCN BLD-IMP: POSITIVE — SIGNIFICANT CHANGE UP
RH IG SCN BLD-IMP: POSITIVE — SIGNIFICANT CHANGE UP
SAO2 % BLDCOV: 41.8 % — SIGNIFICANT CHANGE UP
SAO2 % BLDCOV: 41.8 % — SIGNIFICANT CHANGE UP
SODIUM SERPL-SCNC: 137 MMOL/L — SIGNIFICANT CHANGE UP (ref 135–145)
SODIUM SERPL-SCNC: 137 MMOL/L — SIGNIFICANT CHANGE UP (ref 135–145)
WBC # BLD: 10.35 K/UL — SIGNIFICANT CHANGE UP (ref 3.8–10.5)
WBC # BLD: 10.35 K/UL — SIGNIFICANT CHANGE UP (ref 3.8–10.5)
WBC # FLD AUTO: 10.35 K/UL — SIGNIFICANT CHANGE UP (ref 3.8–10.5)
WBC # FLD AUTO: 10.35 K/UL — SIGNIFICANT CHANGE UP (ref 3.8–10.5)

## 2024-01-14 PROCEDURE — 99223 1ST HOSP IP/OBS HIGH 75: CPT

## 2024-01-14 PROCEDURE — 71045 X-RAY EXAM CHEST 1 VIEW: CPT | Mod: 26

## 2024-01-14 PROCEDURE — 99285 EMERGENCY DEPT VISIT HI MDM: CPT

## 2024-01-14 RX ORDER — IPRATROPIUM/ALBUTEROL SULFATE 18-103MCG
3 AEROSOL WITH ADAPTER (GRAM) INHALATION EVERY 6 HOURS
Refills: 0 | Status: DISCONTINUED | OUTPATIENT
Start: 2024-01-14 | End: 2024-01-18

## 2024-01-14 RX ORDER — IPRATROPIUM/ALBUTEROL SULFATE 18-103MCG
3 AEROSOL WITH ADAPTER (GRAM) INHALATION ONCE
Refills: 0 | Status: COMPLETED | OUTPATIENT
Start: 2024-01-14 | End: 2024-01-14

## 2024-01-14 RX ORDER — ALBUTEROL 90 UG/1
2 AEROSOL, METERED ORAL EVERY 6 HOURS
Refills: 0 | Status: DISCONTINUED | OUTPATIENT
Start: 2024-01-14 | End: 2024-01-14

## 2024-01-14 RX ORDER — BUPROPION HYDROCHLORIDE 150 MG/1
150 TABLET, EXTENDED RELEASE ORAL DAILY
Refills: 0 | Status: DISCONTINUED | OUTPATIENT
Start: 2024-01-15 | End: 2024-01-18

## 2024-01-14 RX ORDER — ENOXAPARIN SODIUM 100 MG/ML
40 INJECTION SUBCUTANEOUS EVERY 24 HOURS
Refills: 0 | Status: DISCONTINUED | OUTPATIENT
Start: 2024-01-14 | End: 2024-01-15

## 2024-01-14 RX ORDER — FINASTERIDE 5 MG/1
5 TABLET, FILM COATED ORAL DAILY
Refills: 0 | Status: DISCONTINUED | OUTPATIENT
Start: 2024-01-14 | End: 2024-01-18

## 2024-01-14 RX ORDER — BUDESONIDE AND FORMOTEROL FUMARATE DIHYDRATE 160; 4.5 UG/1; UG/1
2 AEROSOL RESPIRATORY (INHALATION)
Refills: 0 | Status: DISCONTINUED | OUTPATIENT
Start: 2024-01-14 | End: 2024-01-18

## 2024-01-14 RX ORDER — LANOLIN ALCOHOL/MO/W.PET/CERES
3 CREAM (GRAM) TOPICAL AT BEDTIME
Refills: 0 | Status: DISCONTINUED | OUTPATIENT
Start: 2024-01-14 | End: 2024-01-18

## 2024-01-14 RX ADMIN — FINASTERIDE 5 MILLIGRAM(S): 5 TABLET, FILM COATED ORAL at 18:49

## 2024-01-14 RX ADMIN — Medication 3 MILLIGRAM(S): at 21:35

## 2024-01-14 RX ADMIN — ENOXAPARIN SODIUM 40 MILLIGRAM(S): 100 INJECTION SUBCUTANEOUS at 18:49

## 2024-01-14 RX ADMIN — Medication 3 MILLILITER(S): at 13:32

## 2024-01-14 RX ADMIN — Medication 3 MILLILITER(S): at 21:35

## 2024-01-14 NOTE — ED PROVIDER NOTE - OBJECTIVE STATEMENT
79-year-old male with history of COPD, recently diagnosed hilar mass concerning for possible esophageal cancer versus primary lung malignancy presenting requesting admission.  Patient lives alone except manage evaluation 2 days ago with shortness of breath, had labs done, had a CTA done without pulmonary embolism but with hilar mass concerning for esophageal carcinoma versus primary lung malignancy, was advised for admission but patient had to do a few things at home and left AGAINST MEDICAL ADVICE, here for admission.  Patient with intermittent shortness of breath, felt short of breath this morning, denies fever or chills, no chest pain, no nausea or vomiting, no leg pain or leg swelling, no other acute complaints.  ROS as above.

## 2024-01-14 NOTE — ED ADULT NURSE NOTE - NSFALLUNIVINTERV_ED_ALL_ED
Bed/Stretcher in lowest position, wheels locked, appropriate side rails in place/Call bell, personal items and telephone in reach/Instruct patient to call for assistance before getting out of bed/chair/stretcher/Non-slip footwear applied when patient is off stretcher/[place] to call system/Physically safe environment - no spills, clutter or unnecessary equipment/Purposeful proactive rounding/Room/bathroom lighting operational, light cord in reach Bed/Stretcher in lowest position, wheels locked, appropriate side rails in place/Call bell, personal items and telephone in reach/Instruct patient to call for assistance before getting out of bed/chair/stretcher/Non-slip footwear applied when patient is off stretcher/[place] to call system/Physically safe environment - no spills, clutter or unnecessary equipment/Purposeful proactive rounding/Room/bathroom lighting operational, light cord in reach

## 2024-01-14 NOTE — ED PROVIDER NOTE - WR ORDER NAME 1
[Today's Date] : [unfilled] [Name] : Name: [unfilled] [] : : ~~ [Today's Date:] : [unfilled] [Dear  ___:] : Dear Dr. [unfilled]: [Consult] : I had the pleasure of evaluating your patient, [unfilled]. My full evaluation follows. [Sincerely,] : Sincerely, [Consult - Single Provider] : Thank you very much for allowing me to participate in the care of this patient. If you have any questions, please do not hesitate to contact me. [DrChela  ___] : Dr. MARADIAGA [FreeTextEntry5] : [address] [facility] [address] [FreeTextEntry4] : Dustin Hoover MD [FreeTextEntry6] : [place] [address] [de-identified] : Rose Aguilar MD\par Pediatric Cardiologist\par [facility]\par [address]\[place] [address]\par [phone]\par  Xray Chest 1 View AP/PA

## 2024-01-14 NOTE — ED ADULT NURSE NOTE - GASTROINTESTINAL ASSESSMENT
- - - Over the last 2 weeks, how often have you been bothered by the following problems?        PHQ2 Score: 2  PHQ2 Score Interpretation: No further screening needed  1. Feeling down, depressed, irritable or hopeless?: 2  2. Little interest or pleasure in activity?: 0     PHQ9 Score: 4  PHQ9 Score Interpretation: Minimal Depression  3.Trouble falling, staying asleep or sleeping too much?: 0  4. Poor appetite, weight loss, or overeating?: 0  5. Feeling tired or having little energy?: 1  6. Feeling bad about yourself--or feeling that you are a failure or that you have let yourself or your family down?: 0  7. Trouble concentrating on things like as school work, reading or watching TV?: 0  8. Moving or speaking so slowly that other people could have noticed? Or the opposite - being so fidgety or restless that you were moving around a lot more than usual?: 1  9. Thoughts that you would be better off dead, or of hurting yourself in some way?: 0

## 2024-01-14 NOTE — H&P ADULT - NSHPLABSRESULTS_GEN_ALL_CORE
LABS:                         11.0   10.35 )-----------( 222      ( 14 Jan 2024 11:47 )             32.9     01-14    137  |  102  |  20  ----------------------------<  101<H>  4.4   |  26  |  1.18    Ca    9.2      14 Jan 2024 11:47      PT/INR - ( 14 Jan 2024 11:47 )   PT: 12.4 sec;   INR: 1.09          PTT - ( 14 Jan 2024 11:47 )  PTT:25.3 sec  Urinalysis Basic - ( 14 Jan 2024 11:47 )    Color: x / Appearance: x / SG: x / pH: x  Gluc: 101 mg/dL / Ketone: x  / Bili: x / Urobili: x   Blood: x / Protein: x / Nitrite: x   Leuk Esterase: x / RBC: x / WBC x   Sq Epi: x / Non Sq Epi: x / Bacteria: x                RADIOLOGY, EKG & ADDITIONAL TESTS: Reviewed.

## 2024-01-14 NOTE — ED PROVIDER NOTE - NS ED ROS FT
constitutional: No fever or chills  Eyes: No discharge or drainage  Ears, Nose, Mouth, Throat: No nasal discharge, no sore throat  Cardiovascular: No chest pain, no palpitations  Respiratory: +shortness of breath, no cough  Gastrointestinal: No nausea or vomiting, no abdominal pain, no diarrhea or constipation  Musculoskeletal: No joint pain, no swelling  Skin: No rashes or lesions  Neurological: No numbness, weakness, tingling, no headache  Psychiatric: No depression

## 2024-01-14 NOTE — H&P ADULT - TIME BILLING
review of laboratory data, radiology results, consultants' recommendations, documentation in [place], discussion with patient/ACP and interdisciplinary staff (such as , social workers, etc). Interventions were performed as documented above. review of laboratory data, radiology results, consultants' recommendations, documentation in [place], discussion with patient/ACP and interdisciplinary staff (such as , social workers, etc). Interventions were performed as documented above.

## 2024-01-14 NOTE — H&P ADULT - PROBLEM SELECTOR PLAN 6
- c home varenicline 1mg  - also takes buproprion 150mg, unclear if for depression or smoking cessation, c home med. - takes varenicline at home though not available in house  - also takes buproprion 150mg, unclear if for depression or smoking cessation, c home med.

## 2024-01-14 NOTE — H&P ADULT - PROBLEM SELECTOR PLAN 4
F: None   E: Replete as necessary K>4 Mg>2  N: DASH diet     DVT Prophylaxis: Lovenox 40mg daily   GI prophylaxis: None   CODE STATUS: FULL P/w Hgb 11, normal MCV. No concern for acute bleed.     -CTM

## 2024-01-14 NOTE — H&P ADULT - ASSESSMENT
78 YO male w/ a PMHx of COPD and hypertension who presented to the ED w/ SOB and an abnormal CT scan performed 2 days ago at [facility] concerning for malignancy.   80 YO male w/ a PMHx of COPD and hypertension who presented to the ED w/ SOB and an abnormal CT scan performed 2 days ago at [facility] concerning for malignancy.

## 2024-01-14 NOTE — ED PROVIDER NOTE - CLINICAL SUMMARY MEDICAL DECISION MAKING FREE TEXT BOX
80 yo presenting for admission, mild hypoxia, mild wheezing, will check labs, cxr, admit. 78 yo presenting for admission, mild hypoxia, mild wheezing, will check labs, cxr, admit.

## 2024-01-14 NOTE — H&P ADULT - PROBLEM SELECTOR PLAN 3
Pt states that he has a hx of hypertension though is unsure of his home medications. Follows w/ PCP Dr. Jose Worley    - Hold starting any new BP meds today as pt normotensive  - Will require formal med rec to restart meds Pt states that he has a hx of hypertension though is unsure of his home medications. Follows w/ PCP Dr. Jose Worley    - c home HCTZ 12.5  - Will require formal med rec to restart meds Pt states that he has a hx of hypertension though is unsure of his home medications. Follows w/ PCP Dr. Jose souza home HCTZ 12.5 per sure scripts Pt states that he has a hx of hypertension though is unsure of his home medications. Follows w/ PCP Dr. Jose souza home HCTZ 12.5 per anny

## 2024-01-14 NOTE — H&P ADULT - PROBLEM SELECTOR PLAN 7
F: None   E: Replete as necessary K>4 Mg>2  N: DASH diet     DVT Prophylaxis: Lovenox 40mg daily   GI prophylaxis: None   CODE STATUS: FULL

## 2024-01-14 NOTE — H&P ADULT - NSHPSOCIALHISTORY_GEN_ALL_CORE
lives in apartment  independent  denies toxic substances -- former tobacco use 1ppd x 49y, quit at 69 yoa

## 2024-01-14 NOTE — H&P ADULT - ATTENDING COMMENTS
Pt is an 80 yo M with PMH COPD (no O2), former TUD, esophageal mass, and recently dx hilar mass p/w SOB and pain x 1-2mo. Was initially evaluated at OSH for symptoms, found to have esophageal mass, and was referred to [facility] for further evaluation which was scheduled for 1/12. He presented to [facility] ER 1/12 AM d/t progression of SOB and was found to have a new hilar mass and was recommended for inpt admission, however, left AMA d/t needing to sort things out at home with plan for return to ER. Now returns to ER to complete evaluation. Understanding of findings and c/f high likelihood of malignancy. On arrival, hemodynamically stable with RA sat 90%, started on 2L NC with improvement. EKG with ST and RBBB, QTc 556. Labs showing Hb 11, MCV 95, Cr 1.18, and .     1/12 ER w/u:   d-dimer 6889, Cr 1.35, AST 40, ALT 53  CXR: R hilar mass with peripheral KATARZYNA 2cm nodule  CTA chest: neg PE but with --- masslike confluent LAD involving lower R paratracheal region 16mm/subcarinal region 2.3cm, masslike encasement of R hilum 7.9x4cm, multiple nonenlarged calcified L/infrahilar LNs, mass encases R and anterior aspect of esophagus with ?mild proximal esophageal lumen obstruction and proximal dilation with mural thickening involving the prox 1/3 esophagus c/f esophageal carcinoma with extensive regional spread vs. primary lung carcinoma; confluent centrilobular emphysema, interlobular septal thickening within RUL most pronounced at ant aspect R perihilar region with ant segment of KATARZYNA extending to med RML encasing RUL/RML bronchus and narrowing without obstruction c/f developing lymphangitic carcinomatosis, 10mm nodule ?large fissural LN in R midlung zone; and mult calcifications in the spleen and liver c/f remote granulomatous disease ?histoplasmosis.    Pt is aware of above findings and I personally reviewed these findings with him. I am concerned that pt is risk for leaving the hospital against medical advice and being lost to follow-up. I am not sure that he understands the severity of these findings and feel that it is reasonable to remain inpt for further evaluation and work-up of presumed malignancy, especially in a pt with hx significant tobacco use.     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 lymphangitic carcinomatosis -- imaging as above; pt aware of findings  - ultimately need tissue bx to diagnose malignancy/primary --> consult IR, pulm, and GI to evaluate for feasible bx site  - onc consult for evaluation -- understand that dx can not be made until tissue obtained, however, given risk of pt AMA vs. loss to f/u, feel it is reasonable to discuss findings and further explain c/f malignancy and urgent need for further w/u  - S+S consult and XR cine to evaluate for esophageal obstruction -- pt denies dysphagia or hx choking sensation   - obtain CT brain and a/p to further evaluate for metastatic disease   - ?role for PET scan -- will inquire with onc    #AHRF 2/2 hilar mass -- imaging as above, PE study neg, mass likely contributing to hypoxia  - RA sat 90% currently without s/s respiratory distress, on 1L NC; given hx COPD (not in acute exacerbation) will start duonebs q6h and wean O2  - will need ambulatory sat as appropriate; start incentive spirometry     #Prolonged QTc -- QTc 556, likely in setting of RBBB  - repeat EKG in AM    #Elevated Cr -- 1/12 Cr 1.35, now 1.18  - likely pre-renal etiology from poor PO intake; continue to trend as may increase post contrast administration     #Elevated d-dimer -- 1/12 d-dimer 6889 with CTA chest neg PE  - will obtain LE dopplers and f/u AM d-dimer; start DVT ppx for now    # ?Histoplasma / granulomatous disease -- imaging as above, likely incidental findings; prioritize malignancy w/u as above    #RV dysfunction -- imaging with prominent R cardiac chambers c/f dysfunction  - check TTE    #COPD -- as above; does not appear to be in acute exacerbation; resumte home anoro-ellipta/therapeutic exchange with budesonide while inpt    Discussed with HS, rest as outlined above. Meds pulled from [employer]; pending official med rec with pharmacy. Pt is an 78 yo M with PMH COPD (no O2), former TUD, esophageal mass, and recently dx hilar mass p/w SOB and pain x 1-2mo. Was initially evaluated at OSH for symptoms, found to have esophageal mass, and was referred to [facility] for further evaluation which was scheduled for 1/12. He presented to [facility] ER 1/12 AM d/t progression of SOB and was found to have a new hilar mass and was recommended for inpt admission, however, left AMA d/t needing to sort things out at home with plan for return to ER. Now returns to ER to complete evaluation. Understanding of findings and c/f high likelihood of malignancy. On arrival, hemodynamically stable with RA sat 90%, started on 2L NC with improvement. EKG with ST and RBBB, QTc 556. Labs showing Hb 11, MCV 95, Cr 1.18, and .     1/12 ER w/u:   d-dimer 6889, Cr 1.35, AST 40, ALT 53  CXR: R hilar mass with peripheral KATARZYNA 2cm nodule  CTA chest: neg PE but with --- masslike confluent LAD involving lower R paratracheal region 16mm/subcarinal region 2.3cm, masslike encasement of R hilum 7.9x4cm, multiple nonenlarged calcified L/infrahilar LNs, mass encases R and anterior aspect of esophagus with ?mild proximal esophageal lumen obstruction and proximal dilation with mural thickening involving the prox 1/3 esophagus c/f esophageal carcinoma with extensive regional spread vs. primary lung carcinoma; confluent centrilobular emphysema, interlobular septal thickening within RUL most pronounced at ant aspect R perihilar region with ant segment of KATARZYNA extending to med RML encasing RUL/RML bronchus and narrowing without obstruction c/f developing lymphangitic carcinomatosis, 10mm nodule ?large fissural LN in R midlung zone; and mult calcifications in the spleen and liver c/f remote granulomatous disease ?histoplasmosis.    Pt is aware of above findings and I personally reviewed these findings with him. I am concerned that pt is risk for leaving the hospital against medical advice and being lost to follow-up. I am not sure that he understands the severity of these findings and feel that it is reasonable to remain inpt for further evaluation and work-up of presumed malignancy, especially in a pt with hx significant tobacco use.     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 lymphangitic carcinomatosis -- imaging as above; pt aware of findings  - ultimately need tissue bx to diagnose malignancy/primary --> consult IR, pulm, and GI to evaluate for feasible bx site  - onc consult for evaluation -- understand that dx can not be made until tissue obtained, however, given risk of pt AMA vs. loss to f/u, feel it is reasonable to discuss findings and further explain c/f malignancy and urgent need for further w/u  - S+S consult and XR cine to evaluate for esophageal obstruction -- pt denies dysphagia or hx choking sensation   - obtain CT brain and a/p to further evaluate for metastatic disease   - ?role for PET scan -- will inquire with onc    #AHRF 2/2 hilar mass -- imaging as above, PE study neg, mass likely contributing to hypoxia  - RA sat 90% currently without s/s respiratory distress, on 1L NC; given hx COPD (not in acute exacerbation) will start duonebs q6h and wean O2  - will need ambulatory sat as appropriate; start incentive spirometry     #Prolonged QTc -- QTc 556, likely in setting of RBBB  - repeat EKG in AM    #Elevated Cr -- 1/12 Cr 1.35, now 1.18  - likely pre-renal etiology from poor PO intake; continue to trend as may increase post contrast administration     #Elevated d-dimer -- 1/12 d-dimer 6889 with CTA chest neg PE  - will obtain LE dopplers and f/u AM d-dimer; start DVT ppx for now    # ?Histoplasma / granulomatous disease -- imaging as above, likely incidental findings; prioritize malignancy w/u as above    #RV dysfunction -- imaging with prominent R cardiac chambers c/f dysfunction  - check TTE    #COPD -- as above; does not appear to be in acute exacerbation; resumte home anoro-ellipta/therapeutic exchange with budesonide while inpt    Discussed with HS, rest as outlined above. Meds pulled from [employer]; pending official med rec with pharmacy.

## 2024-01-14 NOTE — ED ADULT NURSE NOTE - NSICDXPASTMEDICALHX_GEN_ALL_CORE_FT
PAST MEDICAL HISTORY:  COPD (chronic obstructive pulmonary disease)     HTN (hypertension)     Kidney stone

## 2024-01-14 NOTE — ED PROVIDER NOTE - PHYSICAL EXAMINATION
general: Well appearing, in no acute distress  HEENT: Normocephalic, atraumatic, extraocular movements intact  CV: Regular rate  Pulm: No respiratory distress, no tachypnea, mild wheezing  Abd: Flat, no gross distension  Ext: warm and well perfused  Skin: No gross rashes or lesions  Neuro: Alert and oriented, moving all extremities

## 2024-01-14 NOTE — H&P ADULT - NSHPPHYSICALEXAM_GEN_ALL_CORE
PHYSICAL EXAM:    GENERAL: resting comfortably in bed; NAD  HEAD:  Atraumatic, Normocephalic  EYES: EOMI, PERRLA, conjunctiva and sclera clear   NECK: Supple, No JVD, Normal thyroid, no enlarged nodes   NERVOUS SYSTEM:  AAOx3; CNII-XII grossly intact; no focal deficits  CHEST/LUNG: CTA B/L; no W/R/R, no retractions  HEART: S1/S2 normal, no S3, Regular rate and rhythm; No murmurs   ABDOMEN: Soft, Nontender, Nondistended; Bowel sounds present   EXTREMITIES:  2+ Peripheral Pulses, No clubbing, cyanosis. Trace lower extremity edema on b/l LE  LYMPH: No lymphadenopathy noted   SKIN: No rashes or lesions  PSYCHIATRIC: affect and characteristics of appearance, verbalizations, behaviors are appropriate

## 2024-01-14 NOTE — H&P ADULT - PROBLEM SELECTOR PLAN 2
Pt w/ hx of COPD. Home meds: anora-elipta qd, albuterol.  States he has been noticing that he has been using his albuterol more often recently, sometimes 1-2x/day w/ relief of SOB.     - c home meds  - formal med rec Pt w/ hx of COPD. Home meds: anoro-ellipta qd, albuterol.  States he has been noticing that he has been using his albuterol more often recently, sometimes 1-2x/day w/ relief of SOB.     - anoro-ellipta not available   - start symbicort  - c albuterol PRN Pt w/ hx of COPD. Home meds: anoro-ellipta qd, albuterol.  States he has been noticing that he has been using his albuterol more often recently, sometimes 1-2x/day w/ relief of SOB.     - anoro-ellipta not available   - start symbicort  - c w dennis PRN

## 2024-01-14 NOTE — PATIENT PROFILE ADULT - FUNCTIONAL ASSESSMENT - BASIC MOBILITY 6.
3-calculated by average/Not able to assess (calculate score using [facility] averaging method)  3-calculated by average/Not able to assess (calculate score using [facility] averaging method)

## 2024-01-14 NOTE — ED ADULT TRIAGE NOTE - CHIEF COMPLAINT QUOTE
pt stated " he was sent to be admitted for esophageal surgery tomorrow," c/o Sob since this morning, sat 90 % RA, oxygen provided via NRB 15L, sat 94 %. ekg done

## 2024-01-14 NOTE — H&P ADULT - NSHPREVIEWOFSYSTEMS_GEN_ALL_CORE
Does not feel SOB now  Productive cough with yellow sputum that sometimes has blood for the past 9 months  Otherwise negative Does not feel SOB now  Productive cough with yellow sputum that sometimes has blood for the past 9 months  Endorses decrease in appetite recently  Ensorses 10lb weight loss over the past 2-3 months  Otherwise negative

## 2024-01-14 NOTE — H&P ADULT - HISTORY OF PRESENT ILLNESS
HPI:  80 YO male w/ a PMHx of COPD and hypertension coming in after a CT scan 2 days ago at [facility] revealed abnormal results. The patient has been experiencing gradually worsening SOB for the past 6 months, and on 1/12, as he was leaving his apartment, he was so SOB that he felt like he was going to collapse. From there he called a cab that brought him to [facility]. At [facility], a CTPE was performed  that showed mass-like encasement of the right hilum. The patient was going to be admitted but decided to leave AMA to "get some things in order". He is presenting today to have the mass evaluated and "have surgery". Of note, the patient had a CT performed in an outpatient setting in "early 2023" that did not show any abnormal results, though he does not know why he got a CT.     ED vitals: T 97.6     RR 22-24  /82  SpO2 94 % on NRB, now 97% on RA  Labs signficant: hgb 11, pBNP 768  Imaging/EKG:   CTPE 1/12:  Masslike encasement of the right hilum with ipsilateral subcarinal and   right paratracheal lymphadenopathy which may also involve the   aorticopulmonary window. This mass extends to the region of the right   anterior and lateral wall of the esophagus and esophageal involvement   cannot be excluded. Review differential includes esophageal carcinoma   with extensive regional spread versus a primary lung carcinoma.      Mild circumferential mural thickening involving the proximal one third   the esophagus and mild gaseous distention of the small esophageal lumen.   A component of incomplete proximal esophageal obstruction cannot be   excluded.    Interventions: Duoneb x1    PAST MEDICAL & SURGICAL HISTORY:  Kidney stone      HTN (hypertension)      COPD (chronic obstructive pulmonary disease)      History of cholecystectomy      :    FAMILY HISTORY:  None    Allergies    penicillin (rash as a child)    Intolerances    :  Home Medications:  amLODIPine:  (15 May 2018 06:56)  atorvastatin:  (15 May 2018 06:56)  lisinopril:  (15 May 2018 06:56)  potassium acetate:  (15 May 2018 06:56)  :    SOCIAL HX:     Smoking     former smoker from age [age] about 1 ppd  ETOH/Illicit drugs        1-2 glasses of wine daily   HPI:  78 YO male w/ a PMHx of COPD and hypertension coming in after a CT scan 2 days ago at [facility] revealed abnormal results. The patient has been experiencing gradually worsening SOB for the past 6 months, and on 1/12, as he was leaving his apartment, he was so SOB that he felt like he was going to collapse. From there he called a cab that brought him to [facility]. At [facility], a CTPE was performed  that showed mass-like encasement of the right hilum. The patient was going to be admitted but decided to leave AMA to "get some things in order". He is presenting today to have the mass evaluated and "have surgery". Of note, the patient had a CT performed in an outpatient setting in "early 2023" that did not show any abnormal results, though he does not know why he got a CT.     ED vitals: T 97.6     RR 22-24  /82  SpO2 94 % on NRB, now 97% on RA  Labs signficant: hgb 11, pBNP 768  Imaging/EKG:   CTPE 1/12:  Masslike encasement of the right hilum with ipsilateral subcarinal and   right paratracheal lymphadenopathy which may also involve the   aorticopulmonary window. This mass extends to the region of the right   anterior and lateral wall of the esophagus and esophageal involvement   cannot be excluded. Review differential includes esophageal carcinoma   with extensive regional spread versus a primary lung carcinoma.      Mild circumferential mural thickening involving the proximal one third   the esophagus and mild gaseous distention of the small esophageal lumen.   A component of incomplete proximal esophageal obstruction cannot be   excluded.    Interventions: Duoneb x1    PAST MEDICAL & SURGICAL HISTORY:  Kidney stone      HTN (hypertension)      COPD (chronic obstructive pulmonary disease)      History of cholecystectomy      :    FAMILY HISTORY:  None    Allergies    penicillin (rash as a child)    Intolerances    :  Home Medications:  amLODIPine:  (15 May 2018 06:56)  atorvastatin:  (15 May 2018 06:56)  lisinopril:  (15 May 2018 06:56)  potassium acetate:  (15 May 2018 06:56)  :    SOCIAL HX:     Smoking     former smoker from age [age] about 1 ppd  ETOH/Illicit drugs        1-2 glasses of wine daily

## 2024-01-14 NOTE — ED ADULT NURSE NOTE - OBJECTIVE STATEMENT
Presents for admission for esophageal mass removal tomorrow. Notes +sob today, intermittent sob x weeks, denies fevers/chills/congestion.     On assessment- AOx4, breathing even and unlabored on 2L NC, no apparent distress, VSS in triage, able to speak in clear coherent sentences, steady gait unassisted, neuro intact with no apparent facial asymmetry, PERRLA.

## 2024-01-14 NOTE — H&P ADULT - PROBLEM SELECTOR PLAN 1
Pt w/ presented w/ gradually worsening SOB over the past 6 months & productive cough w/ yellow sputum that is mixed with blood at times.    CTPE on 11/12:  Masslike encasement of the right hilum with ipsilateral subcarinal and   right paratracheal lymphadenopathy, Mild circumferential mural thickening involving the proximal one third   the esophagus and mild gaseous distention of the small esophageal lumen. 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 Follows w/ PCP Dr. Jose kyle consult  - heme/onc consult  - breathing comfortably on RA  - speech and swallow assessment Pt w/ presented w/ gradually worsening SOB over the past 6 months & productive cough w/ yellow sputum that is mixed with blood at times. DDx  includes esopogeal carcinoma vs primary lung carcinoma vs infectious agent (histoplasma)    CTPE on 11/12:  Masslike encasement of the right hilum with ipsilateral subcarinal and   right paratracheal lymphadenopathy, Mild circumferential mural thickening involving the proximal one third   the esophagus and mild gaseous distention of the small esophageal lumen. 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    Of note, pt states that he had an outpatient CT performed in "early 2023" with no concerning findings. Follows w/ PCP Dr. Jose kyle consulted, likely plan for EBUS early this week  - heme/onc made aware of patient, recommend completing staging w/ CT abd/pelv  - breathing comfortably on RA  - speech and swallow assessment Pt w/ presented w/ gradually worsening SOB over the past 6 months & productive cough w/ yellow sputum that is mixed with blood at times. DDx  includes esopogeal carcinoma vs primary lung carcinoma vs infectious agent (histoplasma)    CTPE on 11/12:  Masslike encasement of the right hilum with ipsilateral subcarinal and   right paratracheal lymphadenopathy, Mild circumferential mural thickening involving the proximal one third   the esophagus and mild gaseous distention of the small esophageal lumen. 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    Of note, pt states that he had an outpatient CT performed in "early 2023" with no concerning findings. Follows w/ PCP Dr. Jose kyle consulted, likely plan for EBUS early this week  - heme/onc made aware of patient, recommend completing staging w/ CT abd/pelv  - f/u TTE  - breathing comfortably on RA  - speech and swallow assessment

## 2024-01-14 NOTE — PATIENT PROFILE ADULT - FALL HARM RISK - HARM RISK INTERVENTIONS
Communicate Risk of Fall with Harm to all staff/Reinforce activity limits and safety measures with patient and family/Tailored Fall Risk Interventions/Visual Cue: Yellow wristband and red socks/Bed in lowest position, wheels locked, appropriate side rails in place/Call bell, personal items and telephone in reach/Instruct patient to call for assistance before getting out of bed or chair/Non-slip footwear when patient is out of bed/[place] to call system/Physically safe environment - no spills, clutter or unnecessary equipment/Purposeful Proactive Rounding/Room/bathroom lighting operational, light cord in reach Communicate Risk of Fall with Harm to all staff/Reinforce activity limits and safety measures with patient and family/Tailored Fall Risk Interventions/Visual Cue: Yellow wristband and red socks/Bed in lowest position, wheels locked, appropriate side rails in place/Call bell, personal items and telephone in reach/Instruct patient to call for assistance before getting out of bed or chair/Non-slip footwear when patient is out of bed/[place]anna to call system/Physically safe environment - no spills, clutter or unnecessary equipment/Purposeful Proactive Rounding/Room/bathroom lighting operational, light cord in reach

## 2024-01-15 LAB
ANION GAP SERPL CALC-SCNC: 9 MMOL/L — SIGNIFICANT CHANGE UP (ref 5–17)
ANION GAP SERPL CALC-SCNC: 9 MMOL/L — SIGNIFICANT CHANGE UP (ref 5–17)
BUN SERPL-MCNC: 18 MG/DL — SIGNIFICANT CHANGE UP (ref 7–23)
BUN SERPL-MCNC: 18 MG/DL — SIGNIFICANT CHANGE UP (ref 7–23)
CALCIUM SERPL-MCNC: 8.8 MG/DL — SIGNIFICANT CHANGE UP (ref 8.4–10.5)
CALCIUM SERPL-MCNC: 8.8 MG/DL — SIGNIFICANT CHANGE UP (ref 8.4–10.5)
CHLORIDE SERPL-SCNC: 103 MMOL/L — SIGNIFICANT CHANGE UP (ref 96–108)
CHLORIDE SERPL-SCNC: 103 MMOL/L — SIGNIFICANT CHANGE UP (ref 96–108)
CO2 SERPL-SCNC: 26 MMOL/L — SIGNIFICANT CHANGE UP (ref 22–31)
CO2 SERPL-SCNC: 26 MMOL/L — SIGNIFICANT CHANGE UP (ref 22–31)
CREAT SERPL-MCNC: 1.13 MG/DL — SIGNIFICANT CHANGE UP (ref 0.5–1.3)
CREAT SERPL-MCNC: 1.13 MG/DL — SIGNIFICANT CHANGE UP (ref 0.5–1.3)
D DIMER BLD IA.RAPID-MCNC: 5336 NG/ML DDU — HIGH
D DIMER BLD IA.RAPID-MCNC: 5336 NG/ML DDU — HIGH
EGFR: 66 ML/MIN/1.73M2 — SIGNIFICANT CHANGE UP
EGFR: 66 ML/MIN/1.73M2 — SIGNIFICANT CHANGE UP
GLUCOSE SERPL-MCNC: 102 MG/DL — HIGH (ref 70–99)
GLUCOSE SERPL-MCNC: 102 MG/DL — HIGH (ref 70–99)
HCT VFR BLD CALC: 31.6 % — LOW (ref 39–50)
HCT VFR BLD CALC: 31.6 % — LOW (ref 39–50)
HGB BLD-MCNC: 10.2 G/DL — LOW (ref 13–17)
HGB BLD-MCNC: 10.2 G/DL — LOW (ref 13–17)
MAGNESIUM SERPL-MCNC: 1.8 MG/DL — SIGNIFICANT CHANGE UP (ref 1.6–2.6)
MAGNESIUM SERPL-MCNC: 1.8 MG/DL — SIGNIFICANT CHANGE UP (ref 1.6–2.6)
MCHC RBC-ENTMCNC: 31.4 PG — SIGNIFICANT CHANGE UP (ref 27–34)
MCHC RBC-ENTMCNC: 31.4 PG — SIGNIFICANT CHANGE UP (ref 27–34)
MCHC RBC-ENTMCNC: 32.3 GM/DL — SIGNIFICANT CHANGE UP (ref 32–36)
MCHC RBC-ENTMCNC: 32.3 GM/DL — SIGNIFICANT CHANGE UP (ref 32–36)
MCV RBC AUTO: 97.2 FL — SIGNIFICANT CHANGE UP (ref 80–100)
MCV RBC AUTO: 97.2 FL — SIGNIFICANT CHANGE UP (ref 80–100)
NRBC # BLD: 0 /100 WBCS — SIGNIFICANT CHANGE UP (ref 0–0)
NRBC # BLD: 0 /100 WBCS — SIGNIFICANT CHANGE UP (ref 0–0)
PHOSPHATE SERPL-MCNC: 3.5 MG/DL — SIGNIFICANT CHANGE UP (ref 2.5–4.5)
PHOSPHATE SERPL-MCNC: 3.5 MG/DL — SIGNIFICANT CHANGE UP (ref 2.5–4.5)
PLATELET # BLD AUTO: 236 K/UL — SIGNIFICANT CHANGE UP (ref 150–400)
PLATELET # BLD AUTO: 236 K/UL — SIGNIFICANT CHANGE UP (ref 150–400)
POTASSIUM SERPL-MCNC: 4.2 MMOL/L — SIGNIFICANT CHANGE UP (ref 3.5–5.3)
POTASSIUM SERPL-MCNC: 4.2 MMOL/L — SIGNIFICANT CHANGE UP (ref 3.5–5.3)
POTASSIUM SERPL-SCNC: 4.2 MMOL/L — SIGNIFICANT CHANGE UP (ref 3.5–5.3)
POTASSIUM SERPL-SCNC: 4.2 MMOL/L — SIGNIFICANT CHANGE UP (ref 3.5–5.3)
RBC # BLD: 3.25 M/UL — LOW (ref 4.2–5.8)
RBC # BLD: 3.25 M/UL — LOW (ref 4.2–5.8)
RBC # FLD: 14.6 % — HIGH (ref 10.3–14.5)
RBC # FLD: 14.6 % — HIGH (ref 10.3–14.5)
SODIUM SERPL-SCNC: 138 MMOL/L — SIGNIFICANT CHANGE UP (ref 135–145)
SODIUM SERPL-SCNC: 138 MMOL/L — SIGNIFICANT CHANGE UP (ref 135–145)
WBC # BLD: 10.91 K/UL — HIGH (ref 3.8–10.5)
WBC # BLD: 10.91 K/UL — HIGH (ref 3.8–10.5)
WBC # FLD AUTO: 10.91 K/UL — HIGH (ref 3.8–10.5)
WBC # FLD AUTO: 10.91 K/UL — HIGH (ref 3.8–10.5)

## 2024-01-15 PROCEDURE — 99233 SBSQ HOSP IP/OBS HIGH 50: CPT | Mod: GC

## 2024-01-15 PROCEDURE — 93010 ELECTROCARDIOGRAM REPORT: CPT

## 2024-01-15 PROCEDURE — 99221 1ST HOSP IP/OBS SF/LOW 40: CPT

## 2024-01-15 RX ORDER — IOHEXOL 300 MG/ML
30 INJECTION, SOLUTION INTRAVENOUS ONCE
Refills: 0 | Status: COMPLETED | OUTPATIENT
Start: 2024-01-15 | End: 2024-01-15

## 2024-01-15 RX ADMIN — Medication 3 MILLIGRAM(S): at 22:01

## 2024-01-15 RX ADMIN — IOHEXOL 30 MILLILITER(S): 300 INJECTION, SOLUTION INTRAVENOUS at 13:36

## 2024-01-15 RX ADMIN — FINASTERIDE 5 MILLIGRAM(S): 5 TABLET, FILM COATED ORAL at 11:32

## 2024-01-15 RX ADMIN — Medication 3 MILLILITER(S): at 10:17

## 2024-01-15 RX ADMIN — BUPROPION HYDROCHLORIDE 150 MILLIGRAM(S): 150 TABLET, EXTENDED RELEASE ORAL at 11:33

## 2024-01-15 RX ADMIN — BUDESONIDE AND FORMOTEROL FUMARATE DIHYDRATE 2 PUFF(S): 160; 4.5 AEROSOL RESPIRATORY (INHALATION) at 17:42

## 2024-01-15 RX ADMIN — Medication 3 MILLILITER(S): at 16:46

## 2024-01-15 RX ADMIN — BUDESONIDE AND FORMOTEROL FUMARATE DIHYDRATE 2 PUFF(S): 160; 4.5 AEROSOL RESPIRATORY (INHALATION) at 06:56

## 2024-01-15 NOTE — PROGRESS NOTE ADULT - ASSESSMENT
80 YO male w/ a PMHx of COPD (former smoker), and hypertension who presented to the ED w/ SOB and an abnormal CT scan performed 2 days ago at [facility], found to have ?esophageal mass, concerning for malignancy. 78 YO male w/ a PMHx of COPD (former smoker), and hypertension who presented to the ED w/ SOB and an abnormal CT scan performed 2 days ago at [facility], found to have ?esophageal mass, concerning for malignancy. 78 YO male w/ a PMHx of COPD (former smoker), and hypertension who presented to the ED w/ SOB and an abnormal CT scan performed 2 days ago at [facility], found to have ?esophageal mass, concerning for malignancy.

## 2024-01-15 NOTE — PROGRESS NOTE ADULT - PROBLEM SELECTOR PLAN 1
Pt w/ presented w/ gradually worsening SOB over the past 6 months & productive cough w/ yellow sputum that is mixed with blood at times.   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  Pt states outpatient CT performed in "early 2023" with no concerning findings. Follows w/ PCP Dr. Jose Worley  DDx includes esophogeal carcinoma vs primary lung carcinoma vs infectious agent (histoplasma), favor malignancy as most likely.  - pulm consulted, likely plan for EBUS early this week (tuesday) for biopsy  - heme/onc made aware of patient, recommend completing staging w/ CT abd/pelv (adjusted orders to include contrast)  - f/u TTE  - speech and swallow assessment

## 2024-01-15 NOTE — PROGRESS NOTE ADULT - PROBLEM SELECTOR PLAN 2
Pt w/ hx of COPD. Home meds: anoro-ellipta qd, albuterol.  States he has been noticing that he has been using his albuterol more often recently, sometimes 1-2x/day w/ relief of SOB.   - anoro-ellipta not available   - c symbicort  - c w dennis PRN

## 2024-01-15 NOTE — PROGRESS NOTE ADULT - PROBLEM SELECTOR PLAN 3
Pt states that he has a hx of hypertension though is unsure of his home medications. Follows w/ PCP Dr. Jose souza home HCTZ 12.5 per anny

## 2024-01-15 NOTE — PROGRESS NOTE ADULT - ATTENDING COMMENTS
80 YO male w/ a PMHx of COPD (former smoker), and hypertension who presented to the ED w/ SOB and an abnormal CT scan performed 2 days ago at [facility], found to have ?esophageal mass, concerning for malignancy.    Labs and imaging reviewed    Problem List  #Mediastinal mass - pending bronch with ebus for bx, npo midnight, looking at poss staging issues    Rest as above 78 YO male w/ a PMHx of COPD (former smoker), and hypertension who presented to the ED w/ SOB and an abnormal CT scan performed 2 days ago at [facility], found to have ?esophageal mass, concerning for malignancy.    Labs and imaging reviewed    Problem List  #Mediastinal mass - pending bronch with ebus for bx, npo midnight, looking at poss staging issues    Rest as above 80 YO male w/ a PMHx of COPD (former smoker), and hypertension who presented to the ED w/ SOB and an abnormal CT scan performed 2 days ago at [facility], found to have ?esophageal mass, concerning for malignancy.    Labs and imaging reviewed    Problem List  #Mediastinal mass - pending bronch with ebus for bx, npo midnight, looking at poss staging issues    Rest as above 78 YO male w/ a PMHx of COPD (former smoker), and hypertension who presented to the ED w/ SOB and an abnormal CT scan performed 2 days ago at [facility], found to have ?esophageal mass, concerning for malignancy.    Labs and imaging reviewed    Problem List  #Hilar mass - pending bronch with ebus for bx, npo midnight, looking at poss staging issues    Rest as above 80 YO male w/ a PMHx of COPD (former smoker), and hypertension who presented to the ED w/ SOB and an abnormal CT scan performed 2 days ago at [facility], found to have ?esophageal mass, concerning for malignancy.    Labs and imaging reviewed    Problem List  #Hilar mass - pending bronch with ebus for bx, npo midnight, looking at poss staging issues    Rest as above 78 YO male w/ a PMHx of COPD (former smoker), and hypertension who presented to the ED w/ SOB and an abnormal CT scan performed 2 days ago at [facility], found to have ?esophageal mass, concerning for malignancy.    Labs and imaging reviewed    Problem List  #Hilar mass - pending bronch with ebus for bx, npo midnight, looking at poss staging issues    Rest as above

## 2024-01-15 NOTE — CONSULT NOTE ADULT - ATTENDING COMMENTS
Patient is an 78 Yo Male with PMHx of COPD and HTN who presented to [facility] with worsening SOB over the past year, found to have mass-like encasement of the right hilum with extension into esophagus and lymphadenopathy, Planned for EBUS. Cardiology consulted for preoperative Cardiovascular risk stratification and assessment.    Review of Studies:  - EKG 1/15/24: Sinus tachycardia with RBBB and PACs  - CTPE 1/12: Masslike encasement of the right hilum with ipsilateral subcarinal and right paratracheal lymphadenopathy which may also involve the  aorticopulmonary window. This mass extends to the region of the right anterior and lateral wall of the esophagus and esophageal involvement cannot be excluded. Cardiomegaly. Severe pulmonary emphysema     #Pre-Operative CV assessment  - Patient with good functional capacity, admitted with worsening PENNINGTON that has been ongoing for a year  - ECG from admission reviewed showing ST with RBBB without ischemic changes  - No Echo available for review  - There are no active CV contraindications to proceeding with EBUS. Patient is considered at low risk for low risk procedure  - No further CV testing required prior to intervention    #HTN  - Patient reports being on HCTZ and amlodipine at home  - Would favor BP control with Amlodipine over HCTZ given age Patient is an 80 Yo Male with PMHx of COPD and HTN who presented to [facility] with worsening SOB over the past year, found to have mass-like encasement of the right hilum with extension into esophagus and lymphadenopathy, Planned for EBUS. Cardiology consulted for preoperative Cardiovascular risk stratification and assessment.    Review of Studies:  - EKG 1/15/24: Sinus tachycardia with RBBB and PACs  - CTPE 1/12: Masslike encasement of the right hilum with ipsilateral subcarinal and right paratracheal lymphadenopathy which may also involve the  aorticopulmonary window. This mass extends to the region of the right anterior and lateral wall of the esophagus and esophageal involvement cannot be excluded. Cardiomegaly. Severe pulmonary emphysema     #Pre-Operative CV assessment  - Patient with good functional capacity, admitted with worsening PENNINGTON that has been ongoing for a year  - ECG from admission reviewed showing ST with RBBB without ischemic changes  - No Echo available for review  - There are no active CV contraindications to proceeding with EBUS. Patient is considered at low risk for low risk procedure  - No further CV testing required prior to intervention    #HTN  - Patient reports being on HCTZ and amlodipine at home  - Would favor BP control with Amlodipine over HCTZ given age

## 2024-01-15 NOTE — PROGRESS NOTE ADULT - PROBLEM SELECTOR PLAN 4
P/w Hgb 11, normal MCV. No concern for acute bleed.   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 Cannot r/o alcohol related etiology.   - iron panel  - B12/folate on am labs

## 2024-01-15 NOTE — CONSULT NOTE ADULT - SUBJECTIVE AND OBJECTIVE BOX
incomplete note. PULMONARY SERVICE INITIAL CONSULT NOTE    HPI:  This is an 79 year old man with COPD who presents for SOB which is progressive and chronic. The patient presented to [facility] 2 days ago for this symptom but left to take care of some things and now returns. He has had shortness of breath for approximately 3 years but which has progressed in the last 6 months. He lives in a 3rd floor walk up- used to go up 3 flights without stopping 2 years ago. For the last few months, takes multiple breaks during each flight. Can walk 5-6 blocks only. He was born and lived most of his life in [place]. He has been to [place] remotely, but mostly has been to [place] and [place]. He was in [place] about several years ago. No other extreme travel, abnormal hobbies such as spelunking or cave diving, no environmental exposures. 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 Never incarcerated. Former smoker, quit 3 years ago, was a PPD smoker for about 60 years. Family history of lung cancer in father.    REVIEW OF SYSTEMS:  Constitutional: No fever, weight loss or fatigue  Eyes: No eye pain, visual disturbances, or discharge  ENMT:  No difficulty hearing, tinnitus, vertigo; No sinus or throat pain  Neck: No pain, stiffness or neck swelling  Respiratory: see HPI  Cardiovascular: No chest pain, palpitations, dizziness or leg swelling  Gastrointestinal: No abdominal or epigastric pain. No nausea, vomiting or hematemesis; No diarrhea or constipation. No melena or hematochezia.  Genitourinary: No dysuria, frequency, hematuria or incontinence  Neurological: No headaches, memory loss, loss of strength, numbness or tremors  Skin: No itching, burning, rashes or lesions   Lymph Nodes: No enlarged glands  Endocrine: No heat or cold intolerance; No hair loss  Musculoskeletal: No joint pain or swelling; No muscle, back or extremity pain  Psychiatric: No depression, anxiety, mood swings or difficulty sleeping  Heme/Lymph: No easy bruising or bleeding gums  Allergy and Immunologic: No hives or eczema    PAST MEDICAL & SURGICAL HISTORY:  Kidney stone  HTN (hypertension)  COPD (chronic obstructive pulmonary disease)  History of cholecystectomy    FAMILY HISTORY:  Lung cancer- father    Allergies  penicillin (rash as a child)    SOCIAL HX:     Smoking     former smoker from age [age] about 1 ppd  ETOH/Illicit drugs        1-2 glasses of wine daily   (14 Jan 2024 15:44)    MEDICATIONS:  Pulmonary:  albuterol/ipratropium for Nebulization 3 milliLiter(s) Nebulizer every 6 hours  budesonide  80 MICROgram(s)/formoterol 4.5 MICROgram(s) Inhaler 2 Puff(s) Inhalation two times a day    Antimicrobials:    Anticoagulants:  enoxaparin Injectable 40 milliGRAM(s) SubCutaneous every 24 hours    Endocrine:  finasteride 5 milliGRAM(s) Oral daily    Cardiac:  hydrochlorothiazide 12.5 milliGRAM(s) Oral daily    Other Medications:  buPROPion XL (24-Hour) . 150 milliGRAM(s) Oral daily  melatonin 3 milliGRAM(s) Oral at bedtime    Allergies  penicillin (Unknown)    Vital Signs Last 24 Hrs  T(C): 36.9 (15 Ramírez 2024 06:09), Max: 36.9 (14 Jan 2024 20:52)  T(F): 98.4 (15 Ramírez 2024 06:09), Max: 98.5 (14 Jan 2024 20:52)  HR: 93 (15 Ramírez 2024 06:09) (84 - 98)  BP: 129/67 (15 Ramírez 2024 06:09) (116/69 - 129/73)  RR: 19 (15 Ramírez 2024 06:09) (15 - 19)  SpO2: 93% (15 Ramírez 2024 06:09) (93% - 93%)    Parameters below as of 15 Ramírez 2024 06:09  Patient On (Oxygen Delivery Method): room air    PHYSICAL EXAM:  Constitutional: WD, comfortable, no acute distress  EENT: anicteric sclera; oropharynx clear, MMM  Neck: supple, no lymphadenopathy  Respiratory: CTA B/L; no W/R/R  Cardiovascular: +S1/S2, RRR  Gastrointestinal: soft, NT/ND  Extremities: WWP; no clubbing or cyanosis; no edema  Vascular: 2+ radial and pedal pulses  Neurological: alert, oriented    LABS:  CBC Full  -  ( 15 Ramírez 2024 10:02 )  WBC Count : 10.91 K/uL  RBC Count : 3.25 M/uL  Hemoglobin : 10.2 g/dL  Hematocrit : 31.6 %  Platelet Count - Automated : 236 K/uL  Mean Cell Volume : 97.2 fl  Mean Cell Hemoglobin : 31.4 pg  Mean Cell Hemoglobin Concentration : 32.3 gm/dL  Auto Neutrophil # : x  Auto Lymphocyte # : x  Auto Monocyte # : x  Auto Eosinophil # : x  Auto Basophil # : x  Auto Neutrophil % : x  Auto Lymphocyte % : x  Auto Monocyte % : x  Auto Eosinophil % : x  Auto Basophil % : x    01-15    138  |  103  |  18  ----------------------------<  102<H>  4.2   |  26  |  1.13    Ca    8.8      15 Ramírez 2024 10:02  Phos  3.5     01-15  Mg     1.8     01-15    PT/INR - ( 14 Jan 2024 11:47 )   PT: 12.4 sec;   INR: 1.09     PTT - ( 14 Jan 2024 11:47 )  PTT:25.3 sec    Urinalysis Basic - ( 15 Ramírez 2024 10:02 )    Color: x / Appearance: x / SG: x / pH: x  Gluc: 102 mg/dL / Ketone: x  / Bili: x / Urobili: x   Blood: x / Protein: x / Nitrite: x   Leuk Esterase: x / RBC: x / WBC x   Sq Epi: x / Non Sq Epi: x / Bacteria: x    RADIOLOGY & ADDITIONAL STUDIES: Personally reviewed.  < from: CT Angio Chest PE Protocol w/ IV Cont (01.12.24 @ 11:20) >  IMPRESSION:      1. No pulmonary artery embolism.  2. Masslike encasement of the right hilum with ipsilateral subcarinal and   right paratracheal lymphadenopathy which may also involve the   aorticopulmonary window. This mass extends to the region of the right   anterior and lateral wall of the esophagus and esophageal involvement   cannot be excluded. Review differential includes esophageal carcinoma   with extensive regional spread versus a primary lung carcinoma.   Whole-body PET/CT and histological sampling is suggested.  2. Mild circumferential mural thickening involving the proximal one third   the esophagus and mild gaseous distention of the small esophageal lumen.   A component of incomplete proximal esophageal obstruction cannot be   excluded.  3. 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 Small right-sided pleural effusion  5. Severe pulmonary emphysema.    < end of copied text >   PULMONARY SERVICE INITIAL CONSULT NOTE    HPI:  This is an 79 year old man with COPD who presents for SOB which is progressive and chronic. The patient presented to [facility] 2 days ago for this symptom but left to take care of some things and now returns. He has had shortness of breath for approximately 3 years but which has progressed in the last 6 months. He lives in a 3rd floor walk up- used to go up 3 flights without stopping 2 years ago. For the last few months, takes multiple breaks during each flight. Can walk 5-6 blocks only. He was born and lived most of his life in [place]. He has been to [place] remotely, but mostly has been to [place] and [place]. He was in [place] about several years ago. No other extreme travel, abnormal hobbies such as spelunking or cave diving, no environmental exposures. 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 Never incarcerated. Former smoker, quit 3 years ago, was a PPD smoker for about 60 years. Family history of lung cancer in father.    REVIEW OF SYSTEMS:  Constitutional: No fever, weight loss or fatigue  Eyes: No eye pain, visual disturbances, or discharge  ENMT:  No difficulty hearing, tinnitus, vertigo; No sinus or throat pain  Neck: No pain, stiffness or neck swelling  Respiratory: see HPI  Cardiovascular: No chest pain, palpitations, dizziness or leg swelling  Gastrointestinal: No abdominal or epigastric pain. No nausea, vomiting or hematemesis; No diarrhea or constipation. No melena or hematochezia.  Genitourinary: No dysuria, frequency, hematuria or incontinence  Neurological: No headaches, memory loss, loss of strength, numbness or tremors  Skin: No itching, burning, rashes or lesions   Lymph Nodes: No enlarged glands  Endocrine: No heat or cold intolerance; No hair loss  Musculoskeletal: No joint pain or swelling; No muscle, back or extremity pain  Psychiatric: No depression, anxiety, mood swings or difficulty sleeping  Heme/Lymph: No easy bruising or bleeding gums  Allergy and Immunologic: No hives or eczema    PAST MEDICAL & SURGICAL HISTORY:  Kidney stone  HTN (hypertension)  COPD (chronic obstructive pulmonary disease)  History of cholecystectomy    FAMILY HISTORY:  Lung cancer- father    Allergies  penicillin (rash as a child)    SOCIAL HX:     Smoking     former smoker from age [age] about 1 ppd  ETOH/Illicit drugs        1-2 glasses of wine daily   (14 Jan 2024 15:44)    MEDICATIONS:  Pulmonary:  albuterol/ipratropium for Nebulization 3 milliLiter(s) Nebulizer every 6 hours  budesonide  80 MICROgram(s)/formoterol 4.5 MICROgram(s) Inhaler 2 Puff(s) Inhalation two times a day    Antimicrobials:    Anticoagulants:  enoxaparin Injectable 40 milliGRAM(s) SubCutaneous every 24 hours    Endocrine:  finasteride 5 milliGRAM(s) Oral daily    Cardiac:  hydrochlorothiazide 12.5 milliGRAM(s) Oral daily    Other Medications:  buPROPion XL (24-Hour) . 150 milliGRAM(s) Oral daily  melatonin 3 milliGRAM(s) Oral at bedtime    Allergies  penicillin (Unknown)    Vital Signs Last 24 Hrs  T(C): 36.9 (15 Ramírez 2024 06:09), Max: 36.9 (14 Jan 2024 20:52)  T(F): 98.4 (15 Ramírez 2024 06:09), Max: 98.5 (14 Jan 2024 20:52)  HR: 93 (15 Ramírez 2024 06:09) (84 - 98)  BP: 129/67 (15 Ramírez 2024 06:09) (116/69 - 129/73)  RR: 19 (15 Ramírez 2024 06:09) (15 - 19)  SpO2: 93% (15 Ramírez 2024 06:09) (93% - 93%)    Parameters below as of 15 Ramírez 2024 06:09  Patient On (Oxygen Delivery Method): room air    PHYSICAL EXAM:  Constitutional: WD, comfortable, no acute distress  EENT: anicteric sclera; oropharynx clear, MMM  Neck: supple, no lymphadenopathy  Respiratory: CTA B/L; no W/R/R  Cardiovascular: +S1/S2, RRR  Gastrointestinal: soft, NT/ND  Extremities: WWP; no clubbing or cyanosis; no edema  Vascular: 2+ radial and pedal pulses  Neurological: alert, oriented    LABS:  CBC Full  -  ( 15 Ramírez 2024 10:02 )  WBC Count : 10.91 K/uL  RBC Count : 3.25 M/uL  Hemoglobin : 10.2 g/dL  Hematocrit : 31.6 %  Platelet Count - Automated : 236 K/uL  Mean Cell Volume : 97.2 fl  Mean Cell Hemoglobin : 31.4 pg  Mean Cell Hemoglobin Concentration : 32.3 gm/dL  Auto Neutrophil # : x  Auto Lymphocyte # : x  Auto Monocyte # : x  Auto Eosinophil # : x  Auto Basophil # : x  Auto Neutrophil % : x  Auto Lymphocyte % : x  Auto Monocyte % : x  Auto Eosinophil % : x  Auto Basophil % : x    01-15    138  |  103  |  18  ----------------------------<  102<H>  4.2   |  26  |  1.13    Ca    8.8      15 Ramírez 2024 10:02  Phos  3.5     01-15  Mg     1.8     01-15    PT/INR - ( 14 Jan 2024 11:47 )   PT: 12.4 sec;   INR: 1.09     PTT - ( 14 Jan 2024 11:47 )  PTT:25.3 sec    Urinalysis Basic - ( 15 Ramírez 2024 10:02 )    Color: x / Appearance: x / SG: x / pH: x  Gluc: 102 mg/dL / Ketone: x  / Bili: x / Urobili: x   Blood: x / Protein: x / Nitrite: x   Leuk Esterase: x / RBC: x / WBC x   Sq Epi: x / Non Sq Epi: x / Bacteria: x    RADIOLOGY & ADDITIONAL STUDIES: Personally reviewed.  < from: CT Angio Chest PE Protocol w/ IV Cont (01.12.24 @ 11:20) >  IMPRESSION:      1. No pulmonary artery embolism.  2. Masslike encasement of the right hilum with ipsilateral subcarinal and   right paratracheal lymphadenopathy which may also involve the   aorticopulmonary window. This mass extends to the region of the right   anterior and lateral wall of the esophagus and esophageal involvement   cannot be excluded. Review differential includes esophageal carcinoma   with extensive regional spread versus a primary lung carcinoma.   Whole-body PET/CT and histological sampling is suggested.  2. Mild circumferential mural thickening involving the proximal one third   the esophagus and mild gaseous distention of the small esophageal lumen.   A component of incomplete proximal esophageal obstruction cannot be   excluded.  3. 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 Small right-sided pleural effusion  5. Severe pulmonary emphysema.    < end of copied text >   PULMONARY SERVICE INITIAL CONSULT NOTE    HPI:  This is an 79 year old man with COPD who presents for SOB which is progressive and chronic. The patient presented to [facility] 2 days ago for this symptom but left to take care of some things and now returns. He has had shortness of breath for approximately 3 years but which has progressed in the last 6 months. He lives in a 3rd floor walk up- used to go up 3 flights without stopping 2 years ago. For the last few months, takes multiple breaks during each flight. Can walk 5-6 blocks only. He was born and lived most of his life in [place]. He has been to [place] remotely, but mostly has been to [place] and [place]. He was in [place] about several years ago. No other extreme travel, abnormal hobbies such as spelunking or cave diving, no environmental exposures. 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 Never incarcerated. Former smoker, quit 3 years ago, was a PPD smoker for about 60 years. Family history of lung cancer in father.    REVIEW OF SYSTEMS:  Constitutional: No fever, weight loss or fatigue  Eyes: No eye pain, visual disturbances, or discharge  ENMT:  No difficulty hearing, tinnitus, vertigo; No sinus or throat pain  Neck: No pain, stiffness or neck swelling  Respiratory: see HPI  Cardiovascular: No chest pain, palpitations, dizziness or leg swelling  Gastrointestinal: No abdominal or epigastric pain. No nausea, vomiting or hematemesis; No diarrhea or constipation. No melena or hematochezia.  Genitourinary: No dysuria, frequency, hematuria or incontinence  Neurological: No headaches, memory loss, loss of strength, numbness or tremors  Skin: No itching, burning, rashes or lesions   Lymph Nodes: No enlarged glands  Endocrine: No heat or cold intolerance; No hair loss  Musculoskeletal: No joint pain or swelling; No muscle, back or extremity pain  Psychiatric: No depression, anxiety, mood swings or difficulty sleeping  Heme/Lymph: No easy bruising or bleeding gums  Allergy and Immunologic: No hives or eczema    PAST MEDICAL & SURGICAL HISTORY:  Kidney stone  HTN (hypertension)  COPD (chronic obstructive pulmonary disease)  History of cholecystectomy    FAMILY HISTORY:  Lung cancer- father    Allergies  penicillin (rash as a child)    SOCIAL HX:     Smoking     former smoker from age [age] about 1 ppd  ETOH/Illicit drugs        1-2 glasses of wine daily   (14 Jan 2024 15:44)    MEDICATIONS:  Pulmonary:  albuterol/ipratropium for Nebulization 3 milliLiter(s) Nebulizer every 6 hours  budesonide  80 MICROgram(s)/formoterol 4.5 MICROgram(s) Inhaler 2 Puff(s) Inhalation two times a day    Antimicrobials:    Anticoagulants:  enoxaparin Injectable 40 milliGRAM(s) SubCutaneous every 24 hours    Endocrine:  finasteride 5 milliGRAM(s) Oral daily    Cardiac:  hydrochlorothiazide 12.5 milliGRAM(s) Oral daily    Other Medications:  buPROPion XL (24-Hour) . 150 milliGRAM(s) Oral daily  melatonin 3 milliGRAM(s) Oral at bedtime    Allergies  penicillin (Unknown)    Vital Signs Last 24 Hrs  T(C): 36.9 (15 Ramírez 2024 06:09), Max: 36.9 (14 Jan 2024 20:52)  T(F): 98.4 (15 Ramírez 2024 06:09), Max: 98.5 (14 Jan 2024 20:52)  HR: 93 (15 Ramírez 2024 06:09) (84 - 98)  BP: 129/67 (15 Ramírez 2024 06:09) (116/69 - 129/73)  RR: 19 (15 Ramírez 2024 06:09) (15 - 19)  SpO2: 93% (15 Ramírez 2024 06:09) (93% - 93%)    Parameters below as of 15 Ramírez 2024 06:09  Patient On (Oxygen Delivery Method): room air    PHYSICAL EXAM:  Constitutional: WD, comfortable, no acute distress  EENT: anicteric sclera; oropharynx clear, MMM  Neck: supple, no lymphadenopathy  Respiratory: CTA B/L; no W/R/R  Cardiovascular: +S1/S2, RRR  Gastrointestinal: soft, NT/ND  Extremities: WWP; no clubbing or cyanosis; no edema  Vascular: 2+ radial and pedal pulses  Neurological: alert, oriented    LABS:  CBC Full  -  ( 15 Ramírez 2024 10:02 )  WBC Count : 10.91 K/uL  RBC Count : 3.25 M/uL  Hemoglobin : 10.2 g/dL  Hematocrit : 31.6 %  Platelet Count - Automated : 236 K/uL  Mean Cell Volume : 97.2 fl  Mean Cell Hemoglobin : 31.4 pg  Mean Cell Hemoglobin Concentration : 32.3 gm/dL  Auto Neutrophil # : x  Auto Lymphocyte # : x  Auto Monocyte # : x  Auto Eosinophil # : x  Auto Basophil # : x  Auto Neutrophil % : x  Auto Lymphocyte % : x  Auto Monocyte % : x  Auto Eosinophil % : x  Auto Basophil % : x    01-15    138  |  103  |  18  ----------------------------<  102<H>  4.2   |  26  |  1.13    Ca    8.8      15 Ramírez 2024 10:02  Phos  3.5     01-15  Mg     1.8     01-15    PT/INR - ( 14 Jan 2024 11:47 )   PT: 12.4 sec;   INR: 1.09     PTT - ( 14 Jan 2024 11:47 )  PTT:25.3 sec    Urinalysis Basic - ( 15 Ramírez 2024 10:02 )    Color: x / Appearance: x / SG: x / pH: x  Gluc: 102 mg/dL / Ketone: x  / Bili: x / Urobili: x   Blood: x / Protein: x / Nitrite: x   Leuk Esterase: x / RBC: x / WBC x   Sq Epi: x / Non Sq Epi: x / Bacteria: x    RADIOLOGY & ADDITIONAL STUDIES: Personally reviewed.  < from: CT Angio Chest PE Protocol w/ IV Cont (01.12.24 @ 11:20) >  IMPRESSION:      1. No pulmonary artery embolism.  2. Masslike encasement of the right hilum with ipsilateral subcarinal and   right paratracheal lymphadenopathy which may also involve the   aorticopulmonary window. This mass extends to the region of the right   anterior and lateral wall of the esophagus and esophageal involvement   cannot be excluded. Review differential includes esophageal carcinoma   with extensive regional spread versus a primary lung carcinoma.   Whole-body PET/CT and histological sampling is suggested.  2. Mild circumferential mural thickening involving the proximal one third   the esophagus and mild gaseous distention of the small esophageal lumen.   A component of incomplete proximal esophageal obstruction cannot be   excluded.  3. 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 Small right-sided pleural effusion  5. Severe pulmonary emphysema.    < end of copied text >

## 2024-01-15 NOTE — CONSULT NOTE ADULT - ASSESSMENT
80 YO male w/ a PMHx of COPD and HTN here for SOB that has been ongoing for past year, with CTPE showing mass-like encasement of the right hilum with extension into esophagus and lymphadenopathy. Planned for EBUS and cardiology consulted for preop risk stratification.    Review of Studies:  CTPE 1/12:   Masslike encasement of the right hilum with ipsilateral subcarinal and right paratracheal lymphadenopathy which may also involve the  aorticopulmonary window. This mass extends to the region of the right anterior and lateral wall of the esophagus and esophageal involvement cannot be excluded. Cardiomegaly. Severe pulmonary emphysema   EKG 1/15/24: Sinus tachycardia with RBBB and PACs    Recommendations:  #Pre-procedure evaluation  - EKG without any acute ischemic changes  - Euvolemic on exam without signs of active ACS or decompensated HF  - RCRI: 0, Class I risk - 3.9% 30-day risk of death, MI, or cardiac arrest  - METs: >4, good functional capacity  - No further cardiac testing needed, pt can proceed with procedure at this time  - Pt is low risk for low risk procedure    #HTN  - pt reports being on HCTZ and amlodipine at home  - BP well controlled here on HCTZ 12.5 qD      Recommendations above are preliminary pending discussion with an attending cardiologist  Plan was discussed with primary team  We'll continue to follow, thank you for the consultation      Anna Kelley   Cardiovascular Disease Fellow  Consult Pager: [phone]         80 YO male w/ a PMHx of COPD and HTN here for SOB that has been ongoing for past year, with CTPE showing mass-like encasement of the right hilum with extension into esophagus and lymphadenopathy. Planned for EBUS and cardiology consulted for preop risk stratification.    Review of Studies:  CTPE 1/12:   Masslike encasement of the right hilum with ipsilateral subcarinal and right paratracheal lymphadenopathy which may also involve the  aorticopulmonary window. This mass extends to the region of the right anterior and lateral wall of the esophagus and esophageal involvement cannot be excluded. Cardiomegaly. Severe pulmonary emphysema   EKG 1/15/24: Sinus tachycardia with RBBB and PACs    Recommendations:  #Pre-procedure evaluation  - EKG without any acute ischemic changes  - Euvolemic on exam without signs of active ACS or decompensated HF  - RCRI: 0, Class I risk - 3.9% 30-day risk of death, MI, or cardiac arrest  - METs: >4, good functional capacity  - No further cardiac testing needed, pt can proceed with procedure at this time  - Pt is low risk for low risk procedure    #HTN  - pt reports being on HCTZ and amlodipine at home  - BP well controlled here on HCTZ 12.5 qD      Recommendations above are preliminary pending discussion with an attending cardiologist  Plan was discussed with primary team  We'll continue to follow, thank you for the consultation      Anna Kelley   Cardiovascular Disease Fellow  Consult Pager: [phone]

## 2024-01-15 NOTE — CONSULT NOTE ADULT - SUBJECTIVE AND OBJECTIVE BOX
HPI:  78 YO male w/ a PMHx of COPD and HTN here for SOB that has been ongoing for past year. Felt like he was going to collapse on 1/12 so presented to [facility]. CTPE there showed mass-like encasement of the right hilum with extension into esophagus and LAD. Admitted to medicine for further workup of mass and plan for EBUS with biopsy per pulmonary this week. VSS and labs significant for WBC 10, Hgb 10, pro-.     Pt reports good functional capacity at baseline - walks without any assistive devices and can walk several blocks without any chest pain or PENN[place]. Denies any LE swelling, palpitations, lightheadedness. Has never had a stress test or cardiac cath. Denies any family history of CAD. Is a former smoker (60 pack yrs), drinks 1-2 glasses of wine daily.    ROS: Per HPI    PAST MEDICAL & SURGICAL HISTORY:  Kidney stone      HTN (hypertension)      COPD (chronic obstructive pulmonary disease)      History of cholecystectomy      SOCIAL HISTORY:  FAMILY HISTORY:  No pertinent family history in first degree relatives      ALLERGIES: 	  penicillin (Unknown)          MEDICATIONS:  albuterol/ipratropium for Nebulization 3 milliLiter(s) Nebulizer every 6 hours  budesonide  80 MICROgram(s)/formoterol 4.5 MICROgram(s) Inhaler 2 Puff(s) Inhalation two times a day  buPROPion XL (24-Hour) . 150 milliGRAM(s) Oral daily  finasteride 5 milliGRAM(s) Oral daily  hydrochlorothiazide 12.5 milliGRAM(s) Oral daily  melatonin 3 milliGRAM(s) Oral at bedtime      T(C): 36.8 (01-15-24 @ 20:20), Max: 36.9 (01-15-24 @ 06:09)  HR: 96 (01-15-24 @ 20:20) (93 - 99)  BP: 117/64 (01-15-24 @ 20:20) (117/64 - 141/82)  RR: 20 (01-15-24 @ 20:20) (19 - 20)  SpO2: 94% (01-15-24 @ 20:20) (92% - 94%)      PHYSICAL EXAM:    Constitutional: resting comfortably in bed; NAD  HEENT: NC/AT,  MMM, no JVD  Respiratory: CTA B/L; no W/R/R  Cardiac: +S1/S2; RRR; no M/R/G  Gastrointestinal: soft, NT/ND  Extremities: no clubbing or cyanosis; no peripheral edema  Musculoskeletal: no joint swelling, tenderness or erythema  Dermatologic: skin warm, dry and intact; no rashes, wounds, or scars  Neurologic: AAOx3; CNII-XII grossly intact; no focal deficits        I&O's Summary      LABS:	 	                        10.2   10.91 )-----------( 236      ( 15 Ramírez 2024 10:02 )             31.6     01-15    138  |  103  |  18  ----------------------------<  102<H>  4.2   |  26  |  1.13    Ca    8.8      15 Ramírez 2024 10:02  Phos  3.5     01-15  Mg     1.8     01-15     HPI:  80 YO male w/ a PMHx of COPD and HTN here for SOB that has been ongoing for past year. Felt like he was going to collapse on 1/12 so presented to [facility]. CTPE there showed mass-like encasement of the right hilum with extension into esophagus and LAD. Admitted to medicine for further workup of mass and plan for EBUS with biopsy per pulmonary this week. VSS and labs significant for WBC 10, Hgb 10, pro-.     Pt reports good functional capacity at baseline - walks without any assistive devices and can walk several blocks without any chest pain or P[place]. Denies any LE swelling, palpitations, lightheadedness. Has never had a stress test or cardiac cath. Denies any family history of CAD. Is a former smoker (60 pack yrs), drinks 1-2 glasses of wine daily.    ROS: Per HPI    PAST MEDICAL & SURGICAL HISTORY:  Kidney stone      HTN (hypertension)      COPD (chronic obstructive pulmonary disease)      History of cholecystectomy      SOCIAL HISTORY:  FAMILY HISTORY:  No pertinent family history in first degree relatives      ALLERGIES: 	  penicillin (Unknown)          MEDICATIONS:  albuterol/ipratropium for Nebulization 3 milliLiter(s) Nebulizer every 6 hours  budesonide  80 MICROgram(s)/formoterol 4.5 MICROgram(s) Inhaler 2 Puff(s) Inhalation two times a day  buPROPion XL (24-Hour) . 150 milliGRAM(s) Oral daily  finasteride 5 milliGRAM(s) Oral daily  hydrochlorothiazide 12.5 milliGRAM(s) Oral daily  melatonin 3 milliGRAM(s) Oral at bedtime      T(C): 36.8 (01-15-24 @ 20:20), Max: 36.9 (01-15-24 @ 06:09)  HR: 96 (01-15-24 @ 20:20) (93 - 99)  BP: 117/64 (01-15-24 @ 20:20) (117/64 - 141/82)  RR: 20 (01-15-24 @ 20:20) (19 - 20)  SpO2: 94% (01-15-24 @ 20:20) (92% - 94%)      PHYSICAL EXAM:    Constitutional: resting comfortably in bed; NAD  HEENT: NC/AT,  MMM, no JVD  Respiratory: CTA B/L; no W/R/R  Cardiac: +S1/S2; RRR; no M/R/G  Gastrointestinal: soft, NT/ND  Extremities: no clubbing or cyanosis; no peripheral edema  Musculoskeletal: no joint swelling, tenderness or erythema  Dermatologic: skin warm, dry and intact; no rashes, wounds, or scars  Neurologic: AAOx3; CNII-XII grossly intact; no focal deficits        I&O's Summary      LABS:	 	                        10.2   10.91 )-----------( 236      ( 15 Ramírez 2024 10:02 )             31.6     01-15    138  |  103  |  18  ----------------------------<  102<H>  4.2   |  26  |  1.13    Ca    8.8      15 Ramírez 2024 10:02  Phos  3.5     01-15  Mg     1.8     01-15

## 2024-01-15 NOTE — PROGRESS NOTE ADULT - SUBJECTIVE AND OBJECTIVE BOX
OVERNIGHT EVENTS:   - None    SUBJECTIVE:   - Patient seen and examined at bedside, comfortable, NAD. Denied fever, chest pain, dyspnea, abdominal pain.     Vital Signs Last 12 Hrs  T(F): 98.4 (01-15-24 @ 06:09), Max: 98.4 (01-15-24 @ 06:09)  HR: 93 (01-15-24 @ 06:09) (93 - 93)  BP: 129/67 (01-15-24 @ 06:09) (129/67 - 129/67)  BP(mean): --  RR: 19 (01-15-24 @ 06:09) (19 - 19)  SpO2: 93% (01-15-24 @ 06:09) (93% - 93%)    PHYSICAL EXAM:  GENERAL: resting comfortably in bed; NAD  HEAD:  Atraumatic, Normocephalic  EYES: EOMI, PERRLA, conjunctiva and sclera clear   NECK: Supple, No JVD, Normal thyroid, no enlarged nodes   NERVOUS SYSTEM:  AAOx3; CNII-XII grossly intact; no focal deficits  CHEST/LUNG: CTA B/L; no W/R/R, no retractions  HEART: S1/S2 normal, no S3, Regular rate and rhythm; No murmurs   ABDOMEN: Soft, Nontender, Nondistended; Bowel sounds present   EXTREMITIES:  2+ Peripheral Pulses, No clubbing, cyanosis. Trace lower extremity edema on b/l LE  LYMPH: No lymphadenopathy noted   SKIN: No rashes or lesions  PSYCHIATRIC: affect and characteristics of appearance, verbalizations, behaviors are appropriate    LABS:                        11.0   10.35 )-----------( 222      ( 14 Jan 2024 11:47 )             32.9     01-14    137  |  102  |  20  ----------------------------<  101<H>  4.4   |  26  |  1.18    Ca    9.2      14 Jan 2024 11:47      PT/INR - ( 14 Jan 2024 11:47 )   PT: 12.4 sec;   INR: 1.09          PTT - ( 14 Jan 2024 11:47 )  PTT:25.3 sec  Urinalysis Basic - ( 14 Jan 2024 11:47 )    Color: x / Appearance: x / SG: x / pH: x  Gluc: 101 mg/dL / Ketone: x  / Bili: x / Urobili: x   Blood: x / Protein: x / Nitrite: x   Leuk Esterase: x / RBC: x / WBC x   Sq Epi: x / Non Sq Epi: x / Bacteria: x    RADIOLOGY & ADDITIONAL TESTS:  - reviewed    MEDICATIONS  (STANDING):  albuterol/ipratropium for Nebulization 3 milliLiter(s) Nebulizer every 6 hours  budesonide  80 MICROgram(s)/formoterol 4.5 MICROgram(s) Inhaler 2 Puff(s) Inhalation two times a day  buPROPion XL (24-Hour) . 150 milliGRAM(s) Oral daily  enoxaparin Injectable 40 milliGRAM(s) SubCutaneous every 24 hours  finasteride 5 milliGRAM(s) Oral daily  hydrochlorothiazide 12.5 milliGRAM(s) Oral daily  melatonin 3 milliGRAM(s) Oral at bedtime

## 2024-01-15 NOTE — CONSULT NOTE ADULT - ASSESSMENT
This is an 79M who presents for progressive shortness of breath over months to years found to have hilar and mediastinal lymphadenopathy with masslike encasement, for which pulmonology has been consulted.     #Mediastinal and hilar lymphadenopathy  #Shortness of breath    The patient has shortness of breath likely from the abnormalities noted on the CT chest. The CT demonstrated large lymphadenopathy in the right paratracheal and subcarinal area. Also with extension to the esophageal wall. Lung parenchymal with smooth interlobar septal thickening which can be due to fluid overload or due to lymphangitic spread.    Plan:  - Please obtain cardiology optimization in anticipation of possible bronchoscopy with biopsy  - Check coags today  - Will discuss with interventional pulmonology timing of procedure    Plan discussed with attending. This is an 79M who presents for progressive shortness of breath over months to years found to have hilar and mediastinal lymphadenopathy with masslike encasement, for which pulmonology has been consulted.     #Mediastinal and hilar lymphadenopathy  #Shortness of breath    The patient has shortness of breath likely from the abnormalities noted on the CT chest. The CT demonstrated large lymphadenopathy in the right paratracheal and subcarinal area. Also with extension to the esophageal wall. Lung parenchymal with smooth interlobar septal thickening which can be due to fluid overload or due to lymphangitic spread.    Plan:  - Please obtain cardiology optimization in anticipation of possible bronchoscopy with biopsy  - Check coags today  - Will discuss with interventional pulmonology timing of procedure  - Please keep NPO after MN and hold Lovenox- patient is aware the plan is TENTATIVELY for bronch/EBUS tomorrow but he is aware this is not finalized until endoscopy can open and schedule patients    Plan discussed with attending.

## 2024-01-15 NOTE — PROGRESS NOTE ADULT - PROBLEM SELECTOR PLAN 6
- takes varenicline at home though not available in house  - also takes buproprion 150mg, unclear if for depression or smoking cessation, c home med.

## 2024-01-16 ENCOUNTER — RESULT REVIEW (OUTPATIENT)
Age: 79
End: 2024-01-16

## 2024-01-16 ENCOUNTER — TRANSCRIPTION ENCOUNTER (OUTPATIENT)
Age: 79
End: 2024-01-16

## 2024-01-16 LAB
ALBUMIN SERPL ELPH-MCNC: 2.6 G/DL — LOW (ref 3.3–5)
ALP SERPL-CCNC: 78 U/L — SIGNIFICANT CHANGE UP (ref 40–120)
ALT FLD-CCNC: 29 U/L — SIGNIFICANT CHANGE UP (ref 10–45)
ANION GAP SERPL CALC-SCNC: 12 MMOL/L — SIGNIFICANT CHANGE UP (ref 5–17)
APTT BLD: 26.8 SEC — SIGNIFICANT CHANGE UP (ref 24.5–35.6)
AST SERPL-CCNC: 19 U/L — SIGNIFICANT CHANGE UP (ref 10–40)
BASE EXCESS BLDA CALC-SCNC: -2.1 MMOL/L — LOW (ref -2–3)
BASOPHILS # BLD AUTO: 0.02 K/UL — SIGNIFICANT CHANGE UP (ref 0–0.2)
BASOPHILS NFR BLD AUTO: 0.2 % — SIGNIFICANT CHANGE UP (ref 0–2)
BILIRUB SERPL-MCNC: 0.7 MG/DL — SIGNIFICANT CHANGE UP (ref 0.2–1.2)
BUN SERPL-MCNC: 14 MG/DL — SIGNIFICANT CHANGE UP (ref 7–23)
CALCIUM SERPL-MCNC: 8.7 MG/DL — SIGNIFICANT CHANGE UP (ref 8.4–10.5)
CHLORIDE SERPL-SCNC: 100 MMOL/L — SIGNIFICANT CHANGE UP (ref 96–108)
CO2 BLDA-SCNC: 26 MMOL/L — HIGH (ref 19–24)
CO2 SERPL-SCNC: 24 MMOL/L — SIGNIFICANT CHANGE UP (ref 22–31)
CREAT SERPL-MCNC: 1.1 MG/DL — SIGNIFICANT CHANGE UP (ref 0.5–1.3)
EGFR: 68 ML/MIN/1.73M2 — SIGNIFICANT CHANGE UP
EOSINOPHIL # BLD AUTO: 0.08 K/UL — SIGNIFICANT CHANGE UP (ref 0–0.5)
EOSINOPHIL NFR BLD AUTO: 0.7 % — SIGNIFICANT CHANGE UP (ref 0–6)
FERRITIN SERPL-MCNC: 241 NG/ML — SIGNIFICANT CHANGE UP (ref 30–400)
FOLATE SERPL-MCNC: 18.5 NG/ML — SIGNIFICANT CHANGE UP
GLUCOSE SERPL-MCNC: 102 MG/DL — HIGH (ref 70–99)
GRAM STN FLD: SIGNIFICANT CHANGE UP
GRAM STN FLD: SIGNIFICANT CHANGE UP
HCO3 BLDA-SCNC: 25 MMOL/L — SIGNIFICANT CHANGE UP (ref 21–28)
HCT VFR BLD CALC: 29.7 % — LOW (ref 39–50)
HGB BLD-MCNC: 9.7 G/DL — LOW (ref 13–17)
HOROWITZ INDEX BLDA+IHG-RTO: 21 — SIGNIFICANT CHANGE UP
IMM GRANULOCYTES NFR BLD AUTO: 0.6 % — SIGNIFICANT CHANGE UP (ref 0–0.9)
INR BLD: 1.17 — SIGNIFICANT CHANGE UP (ref 0.85–1.18)
IRON SATN MFR SERPL: 15 % — LOW (ref 16–55)
IRON SATN MFR SERPL: 21 UG/DL — LOW (ref 45–165)
LYMPHOCYTES # BLD AUTO: 1.58 K/UL — SIGNIFICANT CHANGE UP (ref 1–3.3)
LYMPHOCYTES # BLD AUTO: 13.6 % — SIGNIFICANT CHANGE UP (ref 13–44)
MAGNESIUM SERPL-MCNC: 1.7 MG/DL — SIGNIFICANT CHANGE UP (ref 1.6–2.6)
MCHC RBC-ENTMCNC: 31.6 PG — SIGNIFICANT CHANGE UP (ref 27–34)
MCHC RBC-ENTMCNC: 32.7 GM/DL — SIGNIFICANT CHANGE UP (ref 32–36)
MCV RBC AUTO: 96.7 FL — SIGNIFICANT CHANGE UP (ref 80–100)
MONOCYTES # BLD AUTO: 0.92 K/UL — HIGH (ref 0–0.9)
MONOCYTES NFR BLD AUTO: 7.9 % — SIGNIFICANT CHANGE UP (ref 2–14)
NEUTROPHILS # BLD AUTO: 8.91 K/UL — HIGH (ref 1.8–7.4)
NEUTROPHILS NFR BLD AUTO: 77 % — SIGNIFICANT CHANGE UP (ref 43–77)
NRBC # BLD: 0 /100 WBCS — SIGNIFICANT CHANGE UP (ref 0–0)
PCO2 BLDA: 49 MMHG — HIGH (ref 35–48)
PH BLDA: 7.31 — LOW (ref 7.35–7.45)
PHOSPHATE SERPL-MCNC: 3.5 MG/DL — SIGNIFICANT CHANGE UP (ref 2.5–4.5)
PLATELET # BLD AUTO: 213 K/UL — SIGNIFICANT CHANGE UP (ref 150–400)
PO2 BLDA: 105 MMHG — SIGNIFICANT CHANGE UP (ref 83–108)
POTASSIUM SERPL-MCNC: 3.9 MMOL/L — SIGNIFICANT CHANGE UP (ref 3.5–5.3)
POTASSIUM SERPL-SCNC: 3.9 MMOL/L — SIGNIFICANT CHANGE UP (ref 3.5–5.3)
PROT SERPL-MCNC: 6.3 G/DL — SIGNIFICANT CHANGE UP (ref 6–8.3)
PROTHROM AB SERPL-ACNC: 13.3 SEC — HIGH (ref 9.5–13)
RBC # BLD: 3.07 M/UL — LOW (ref 4.2–5.8)
RBC # FLD: 14.4 % — SIGNIFICANT CHANGE UP (ref 10.3–14.5)
SAO2 % BLDA: 98.4 % — HIGH (ref 94–98)
SODIUM SERPL-SCNC: 136 MMOL/L — SIGNIFICANT CHANGE UP (ref 135–145)
SPECIMEN SOURCE: SIGNIFICANT CHANGE UP
SPECIMEN SOURCE: SIGNIFICANT CHANGE UP
TIBC SERPL-MCNC: 141 UG/DL — LOW (ref 220–430)
TRANSFERRIN SERPL-MCNC: 119 MG/DL — LOW (ref 200–360)
UIBC SERPL-MCNC: 120 UG/DL — SIGNIFICANT CHANGE UP (ref 110–370)
VIT B12 SERPL-MCNC: 707 PG/ML — SIGNIFICANT CHANGE UP (ref 232–1245)
WBC # BLD: 11.58 K/UL — HIGH (ref 3.8–10.5)
WBC # FLD AUTO: 11.58 K/UL — HIGH (ref 3.8–10.5)

## 2024-01-16 PROCEDURE — 99233 SBSQ HOSP IP/OBS HIGH 50: CPT | Mod: GC

## 2024-01-16 PROCEDURE — 31624 DX BRONCHOSCOPE/LAVAGE: CPT | Mod: GC

## 2024-01-16 PROCEDURE — 31625 BRONCHOSCOPY W/BIOPSY(S): CPT | Mod: GC

## 2024-01-16 PROCEDURE — 71045 X-RAY EXAM CHEST 1 VIEW: CPT | Mod: 26

## 2024-01-16 PROCEDURE — 88312 SPECIAL STAINS GROUP 1: CPT | Mod: 26

## 2024-01-16 PROCEDURE — 31645 BRNCHSC W/THER ASPIR 1ST: CPT | Mod: GC

## 2024-01-16 PROCEDURE — 88305 TISSUE EXAM BY PATHOLOGIST: CPT | Mod: 26

## 2024-01-16 PROCEDURE — 93970 EXTREMITY STUDY: CPT | Mod: 26

## 2024-01-16 PROCEDURE — 88173 CYTOPATH EVAL FNA REPORT: CPT | Mod: 26

## 2024-01-16 PROCEDURE — 31653 BRONCH EBUS SAMPLNG 3/> NODE: CPT | Mod: GC

## 2024-01-16 PROCEDURE — 88112 CYTOPATH CELL ENHANCE TECH: CPT | Mod: 26,59

## 2024-01-16 RX ORDER — ENOXAPARIN SODIUM 100 MG/ML
40 INJECTION SUBCUTANEOUS EVERY 24 HOURS
Refills: 0 | Status: DISCONTINUED | OUTPATIENT
Start: 2024-01-16 | End: 2024-01-18

## 2024-01-16 RX ADMIN — BUDESONIDE AND FORMOTEROL FUMARATE DIHYDRATE 2 PUFF(S): 160; 4.5 AEROSOL RESPIRATORY (INHALATION) at 18:49

## 2024-01-16 RX ADMIN — FINASTERIDE 5 MILLIGRAM(S): 5 TABLET, FILM COATED ORAL at 16:00

## 2024-01-16 RX ADMIN — Medication 3 MILLILITER(S): at 16:38

## 2024-01-16 RX ADMIN — BUPROPION HYDROCHLORIDE 150 MILLIGRAM(S): 150 TABLET, EXTENDED RELEASE ORAL at 16:00

## 2024-01-16 RX ADMIN — Medication 3 MILLIGRAM(S): at 22:48

## 2024-01-16 RX ADMIN — Medication 3 MILLILITER(S): at 22:48

## 2024-01-16 RX ADMIN — BUDESONIDE AND FORMOTEROL FUMARATE DIHYDRATE 2 PUFF(S): 160; 4.5 AEROSOL RESPIRATORY (INHALATION) at 06:33

## 2024-01-16 RX ADMIN — ENOXAPARIN SODIUM 40 MILLIGRAM(S): 100 INJECTION SUBCUTANEOUS at 16:00

## 2024-01-16 NOTE — PROVIDER CONTACT NOTE (FALL NOTIFICATION) - ASSESSMENT
Patient vitals stable. Patient did not sustain injuries from fall. Patient states he did not hit his head.

## 2024-01-16 NOTE — PROGRESS NOTE ADULT - PROBLEM SELECTOR PLAN 4
P/w Hgb 11, normal MCV. No concern for acute bleed.   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 Cannot r/o alcohol related etiology.   - iron panel, f/u labs  - B12/folate on am labs P/w Hgb 11, normal MCV. No concern for acute bleed.   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 Cannot r/o alcohol related etiology.   - iron panel   - B12/folate on am labs

## 2024-01-16 NOTE — PROGRESS NOTE ADULT - SUBJECTIVE AND OBJECTIVE BOX
PULMONARY CONSULT SERVICE FOLLOW-UP NOTE    INTERVAL HPI:  Reviewed chart and overnight events; patient seen and examined at bedside. Patient underwent bronchoscopy procedure today.    MEDICATIONS:  Pulmonary:  albuterol/ipratropium for Nebulization 3 milliLiter(s) Nebulizer every 6 hours  budesonide  80 MICROgram(s)/formoterol 4.5 MICROgram(s) Inhaler 2 Puff(s) Inhalation two times a day    Antimicrobials:    Anticoagulants:  enoxaparin Injectable 40 milliGRAM(s) SubCutaneous every 24 hours    Cardiac:  hydrochlorothiazide 12.5 milliGRAM(s) Oral daily      Allergies    penicillin (Unknown)    Intolerances        Vital Signs Last 24 Hrs  T(C): 36.3 (16 Jan 2024 07:59), Max: 36.8 (15 Ramírez 2024 20:20)  T(F): 97.4 (16 Jan 2024 05:56), Max: 98.2 (15 Ramírez 2024 20:20)  HR: 88 (16 Jan 2024 07:59) (88 - 105)  BP: 146/82 (16 Jan 2024 07:59) (117/64 - 146/82)  BP(mean): --  RR: 18 (16 Jan 2024 07:59) (18 - 20)  SpO2: 93% (16 Jan 2024 07:59) (93% - 94%)    Parameters below as of 16 Jan 2024 07:59    O2 Flow (L/min): 2          PHYSICAL EXAM:  ***************              LABS:  ABG - ( 16 Jan 2024 14:15 )  pH, Arterial: 7.31  pH, Blood: x     /  pCO2: 49    /  pO2: 105   / HCO3: 25    / Base Excess: -2.1  /  SaO2: 98.4                CBC Full  -  ( 16 Jan 2024 05:30 )  WBC Count : 11.58 K/uL  RBC Count : 3.07 M/uL  Hemoglobin : 9.7 g/dL  Hematocrit : 29.7 %  Platelet Count - Automated : 213 K/uL  Mean Cell Volume : 96.7 fl  Mean Cell Hemoglobin : 31.6 pg  Mean Cell Hemoglobin Concentration : 32.7 gm/dL  Auto Neutrophil # : 8.91 K/uL  Auto Lymphocyte # : 1.58 K/uL  Auto Monocyte # : 0.92 K/uL  Auto Eosinophil # : 0.08 K/uL  Auto Basophil # : 0.02 K/uL  Auto Neutrophil % : 77.0 %  Auto Lymphocyte % : 13.6 %  Auto Monocyte % : 7.9 %  Auto Eosinophil % : 0.7 %  Auto Basophil % : 0.2 %    01-16    136  |  100  |  14  ----------------------------<  102<H>  3.9   |  24  |  1.10    Ca    8.7      16 Jan 2024 05:30  Phos  3.5     01-16  Mg     1.7     01-16    TPro  6.3  /  Alb  2.6<L>  /  TBili  0.7  /  DBili  x   /  AST  19  /  ALT  29  /  AlkPhos  78  01-16    PT/INR - ( 16 Jan 2024 05:30 )   PT: 13.3 sec;   INR: 1.17          PTT - ( 16 Jan 2024 05:30 )  PTT:26.8 sec      Urinalysis Basic - ( 16 Jan 2024 05:30 )    Color: x / Appearance: x / SG: x / pH: x  Gluc: 102 mg/dL / Ketone: x  / Bili: x / Urobili: x   Blood: x / Protein: x / Nitrite: x   Leuk Esterase: x / RBC: x / WBC x   Sq Epi: x / Non Sq Epi: x / Bacteria: x                RADIOLOGY & ADDITIONAL STUDIES:  CLINICAL INFORMATION: 79-year-old male with shortness of   breath and history of esophageal mass.    COMPARISON: Chest radiograph dated 1/12/2024    CONTRAST/COMPLICATIONS:  IV Contrast: Omnipaque 350  80 cc administered   0 cc discarded  Oral Contrast: NONE  Complications: None reported at time of study completion    PROCEDURE:  CT Angiogram of the chestwas obtained with intravenous contrast. Three   dimensional maximum intensity projection (MIP) images were generated.    FINDINGS:    PULMONARY ANGIOGRAM: No pulmonary artery emboli. There is mild dilatation   of the main pulmonary artery measuring 3.2 cm. There is prominence of the   right cardiac chambers suggest a component of right ventricular   dysfunction. Echocardiographic correlation.    LYMPH NODES: There is masslike confluent lymphadenopathy involving the   lower right paratracheal region measuring 16 mm in short axis (2:52)   subcarinal region measuring 2.3 cm in short axis (2:68), there is   masslike encasement of the right hilum measuring approximately 7.9 x 4 cm   in AP by transverse dimensions (2:75). Multiple nonenlarged calcified   left the/infrahilar lymph nodes (2:34)    HEART/VASCULATURE: There is cardiomegaly. No significant pericardial   effusion. Mild calcified aortic mural plaque. Mild aneurysmal dilatation   of the ascending segment of the thoracic aorta measuring3.9 cm and the   mid descending thoracic aorta measuring 3.2 cm. This mass encases the   right and anterior aspect of the esophagus with a question of mild   proximal esophageal lumen obstruction with proximal dilatation and mural   thickening involving the proximal one third the esophagus.    AIRWAYS/LUNGS/PLEURA: There is confluent centrilobular emphysema   involving most prominently the upper lung zones. There is interlobular   septal thickening within the right upper lobe most pronounced within the   anterior aspect of the right perihilar region within the anterior segment   of the left upper lobe extending to involve the medial segment of the   right middle lobe with encasement of the right upper lobe and right   middle lobe bronchus andnarrowing without kim obstruction. Developing   lymphangitic carcinomatosis cannot be excluded. 10 mm nodule which may   represent a large fissural lymph node is noted in the right midlung zone   region (4:171, and 601:32). Additional calcified as well as solid   subcentimeter and micronodules are noted the largest within the   subpleural region of the posterior segment of the right upper lobe   (4:122).  There is a small ipsilateral right-sided pleural effusion with some   adjacent compressiveatelectasis.  UPPER ABDOMEN: Multiple calcifications are noted within the spleen and   liver compatible with sequela remote granulomatous disease possibly   remote histoplasmosis. Perinephric fat plane stranding sequela of prior   infection. Multipleminute nonobstructing parapelvic renal calculi are   noted bilaterally.    BONES/SOFT TISSUES: There is mild symmetric gynecomastia. Moderate   multilevel degenerative disc disease.    IMPRESSION:  1. No pulmonary artery embolism.  2. Masslike encasement of the right hilum with ipsilateral subcarinal and   right paratracheal lymphadenopathy which may also involve the   aorticopulmonary window. This mass extends to the region of the right   anterior and lateral wall of the esophagus and esophageal involvement   cannot be excluded. Review differential includes esophageal carcinoma   with extensive regional spread versus a primary lung carcinoma.   Whole-body PET/CT and histological sampling is suggested.  2. Mild circumferential mural thickening involving the proximal one third   the esophagus and mild gaseous distention of the small esophageal lumen.   A component of incomplete proximal esophageal obstruction cannot be   excluded.  3. 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 Small right-sided pleural effusion  5. Severe pulmonary emphysema.

## 2024-01-16 NOTE — PROVIDER CONTACT NOTE (FALL NOTIFICATION) - SITUATION
Patient fell while trying to ambulate to door. Patient states he wanted to look out door window. Patient educated on importance of using call bell.

## 2024-01-16 NOTE — PROGRESS NOTE ADULT - ASSESSMENT
80 YO male w/ a PMHx of COPD (former smoker), and hypertension who presented to the ED w/ SOB and an abnormal CT scan performed 2 days ago at [facility], found to have ?esophageal mass, concerning for malignancy going for EBUS likely on 1/16.  78 YO male w/ a PMHx of COPD (former smoker), and hypertension who presented to the ED w/ SOB and an abnormal CT scan performed 2 days ago at [facility], found to have ?esophageal mass, concerning for malignancy going for EBUS likely on 1/16.

## 2024-01-16 NOTE — PROGRESS NOTE ADULT - ATTENDING COMMENTS
80 YO male w/ a PMHx of COPD (former smoker), and hypertension who presented to the ED w/ SOB and an abnormal CT scan performed 2 days ago at [facility], found to have ?esophageal mass, concerning for malignancy vs infectious etiology        #Mediastinal and hilar lymphadenopathy  #shortness of breath   s/p bronch with ebus + bx 1/16 --   per pulm team concerning for TB - will put on isolation and fu cultures   appreciate cards consult for pre- op clearance  #andres resolved   #dvt ppx qd lovenox 78 YO male w/ a PMHx of COPD (former smoker), and hypertension who presented to the ED w/ SOB and an abnormal CT scan performed 2 days ago at [facility], found to have ?esophageal mass, concerning for malignancy vs infectious etiology        #Mediastinal and hilar lymphadenopathy  #shortness of breath   s/p bronch with ebus + bx 1/16 --   per pulm team concerning for TB - will put on isolation and fu cultures   appreciate cards consult for pre- op clearance  #andres resolved   #dvt ppx qd lovenox

## 2024-01-16 NOTE — PROGRESS NOTE ADULT - PROBLEM SELECTOR PLAN 1
Pt w/ presented w/ gradually worsening SOB over the past 6 months & productive cough w/ yellow sputum that is mixed with blood at times.   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  Pt states outpatient CT performed in "early 2023" with no concerning findings. Follows w/ PCP Dr. Jose Worley  DDx includes esophogeal carcinoma vs primary lung carcinoma vs less likely infectious agent (histoplasma), favor malignancy as most likely.  - pulm consulted, c to f/u their recs  - likely plan for EBUS 1/16 for biopsy  - heme/onc made aware of patient, recommend completing staging w/ CT abd/pelv   - f/u TTE  - speech and swallow assessment  - cardiology consulted on 1/15 for pre op  - PT INR, PTT ordered for 1/15 4pm, cancelled for unclear reason, and were rescheduled to 1/16 AM

## 2024-01-16 NOTE — PRE-ANESTHESIA EVALUATION ADULT - NSANTHPMHFT_GEN_ALL_CORE
78 YO male w/ a PMHx of COPD and HTN here for SOB that has been ongoing for past year. Felt like he was going to collapse on 1/12 so presented to [facility]. CTPE there showed mass-like encasement of the right hilum with extension into esophagus and LAD. Admitted to medicine for further workup of mass and plan for EBUS with biopsy per pulmonary this week. VSS and labs significant for WBC 10, Hgb 10, pro-.     Pt reports good functional capacity at baseline - walks without any assistive devices and can walk several blocks without any chest pain or [place]. Denies any LE swelling, palpitations, lightheadedness. Has never had a stress test or cardiac cath. Denies any family history of CAD. Is a former smoker (60 pack yrs), drinks 1-2 glasses of wine daily. 80 YO male w/ a PMHx of COPD and HTN here for SOB that has been ongoing for past year. Felt like he was going to collapse on 1/12 so presented to [facility]. CTPE there showed mass-like encasement of the right hilum with extension into esophagus and LAD. Admitted to medicine for further workup of mass and plan for EBUS with biopsy per pulmonary this week. VSS and labs significant for WBC 10, Hgb 10, pro-.     Pt reports good functional capacity at baseline - walks without any assistive devices and can walk several blocks without any chest pain or [place]. Denies any LE swelling, palpitations, lightheadedness. Has never had a stress test or cardiac cath. Denies any family history of CAD. Is a former smoker (60 pack yrs), drinks 1-2 glasses of wine daily.

## 2024-01-16 NOTE — PROVIDER CONTACT NOTE (FALL NOTIFICATION) - BACKGROUND
79 year old male with esophageal mass admitted for further work up. Multiple tests done and now admitted to [facility] for R/O TB.

## 2024-01-16 NOTE — PRE-ANESTHESIA EVALUATION ADULT - NSRADCARDRESULTSFT_GEN_ALL_CORE
CT Angio Chest PE Protocol w/ IV Cont (01.12.24 @ 11:20) >  IMPRESSION:      1. No pulmonary artery embolism.  2. Masslike encasement of the right hilum with ipsilateral subcarinal and   right paratracheal lymphadenopathy which may also involve the   aorticopulmonary window. This mass extends to the region of the right   anterior and lateral wall of the esophagus and esophageal involvement   cannot be excluded. Review differential includes esophageal carcinoma   with extensive regional spread versus a primary lung carcinoma.   Whole-body PET/CT and histological sampling is suggested.  2. Mild circumferential mural thickening involving the proximal one third   the esophagus and mild gaseous distention of the small esophageal lumen.   A component of incomplete proximal esophageal obstruction cannot be   excluded.  3. 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 Small right-sided pleural effusion  5. Severe pulmonary emphysema.

## 2024-01-16 NOTE — CHART NOTE - NSCHARTNOTEFT_GEN_A_CORE
Post-Fall Assessment    EVENT SUMMARY   Notified by RN for patient s/p unwitnessed fall at 6:10PM. Pt seen and examined at bedside, resting comfortably in bed. Patient states he was getting up to go to bathroom when he didn't realize he was connected to the bed and fell on his backside (unclear which side as patient can't remember). Patient is AAOx4 upon questioning. He has no complaints of pain, chest pain, dizziness, vision changes, nausea/vomiting, or related symptoms at this time. No ecchymosis or bleeding observed on site. Patient is normally independent and ambulatory at baseline. CTH and CT C-spine not indicated at this time. VSS reviewed. Case discussed with RNPARI.

## 2024-01-16 NOTE — PROGRESS NOTE ADULT - SUBJECTIVE AND OBJECTIVE BOX
OVERNIGHT EVENTS:   - NPO at MN, held AC    SUBJECTIVE:   - Patient seen and examined at bedside, comfortable, NAD. Denied fever, chest pain, dyspnea, abdominal pain.     Vital Signs Last 12 Hrs  T(F): 97.4 (01-16-24 @ 05:56), Max: 98.2 (01-15-24 @ 20:20)  HR: 88 (01-16-24 @ 07:41) (88 - 105)  BP: 146/82 (01-16-24 @ 07:41) (117/64 - 146/82)  BP(mean): --  RR: 18 (01-16-24 @ 05:56) (18 - 20)  SpO2: 93% (01-16-24 @ 05:56) (93% - 94%)    PHYSICAL EXAM:  GENERAL: resting comfortably in bed; NAD  HEAD:  Atraumatic, Normocephalic  EYES: EOMI, PERRLA, conjunctiva and sclera clear   NECK: Supple, No JVD, Normal thyroid, no enlarged nodes   NERVOUS SYSTEM:  AAOx3; CNII-XII grossly intact; no focal deficits  CHEST/LUNG: CTA B/L; no W/R/R  HEART: S1/S2 normal, Regular rate and rhythm; No murmurs / rubs/gallops  ABDOMEN: Soft, Nontender, Nondistended; Bowel sounds present   EXTREMITIES:  2+ Peripheral Pulses, No clubbing, cyanosis. Trace lower extremity edema on b/l LE  LYMPH: No lymphadenopathy noted   SKIN: No rashes or lesions  PSYCHIATRIC: affect and characteristics of appearance, verbalizations, behaviors are appropriate    LABS:                        10.2   10.91 )-----------( 236      ( 15 Ramírez 2024 10:02 )             31.6     01-15    138  |  103  |  18  ----------------------------<  102<H>  4.2   |  26  |  1.13    Ca    8.8      15 Ramírez 2024 10:02  Phos  3.5     01-15  Mg     1.8     01-15      PT/INR - ( 14 Jan 2024 11:47 )   PT: 12.4 sec;   INR: 1.09          PTT - ( 14 Jan 2024 11:47 )  PTT:25.3 sec  Urinalysis Basic - ( 15 Ramírez 2024 10:02 )    Color: x / Appearance: x / SG: x / pH: x  Gluc: 102 mg/dL / Ketone: x  / Bili: x / Urobili: x   Blood: x / Protein: x / Nitrite: x   Leuk Esterase: x / RBC: x / WBC x   Sq Epi: x / Non Sq Epi: x / Bacteria: x    RADIOLOGY & ADDITIONAL TESTS:  - reviewed    MEDICATIONS  (STANDING):  albuterol/ipratropium for Nebulization 3 milliLiter(s) Nebulizer every 6 hours  budesonide  80 MICROgram(s)/formoterol 4.5 MICROgram(s) Inhaler 2 Puff(s) Inhalation two times a day  buPROPion XL (24-Hour) . 150 milliGRAM(s) Oral daily  finasteride 5 milliGRAM(s) Oral daily  hydrochlorothiazide 12.5 milliGRAM(s) Oral daily  melatonin 3 milliGRAM(s) Oral at bedtime

## 2024-01-16 NOTE — PRE-ANESTHESIA EVALUATION ADULT - NSANTHOSAYNRD_GEN_A_CORE
No. JANY screening performed.  STOP BANG Legend: 0-2 = LOW Risk; 3-4 = INTERMEDIATE Risk; 5-8 = HIGH Risk

## 2024-01-16 NOTE — PROGRESS NOTE ADULT - PROBLEM SELECTOR PLAN 6
- takes varenicline at home though not available in house  - also takes buproprion 150mg, unclear if for depression or smoking cessation  - c home med of buproprion

## 2024-01-16 NOTE — PROGRESS NOTE ADULT - ATTENDING COMMENTS
Extensive smoking history, COPD, diffuse hilar and mediastinal lymphadenopathy s/p EBUS/TBNA/endobronchial bx. Obtain PET/CT. Follow up as outpt with Dr. Gill.

## 2024-01-16 NOTE — PROGRESS NOTE ADULT - ASSESSMENT
79M who presents for progressive shortness of breath over months to years found to have hilar and mediastinal lymphadenopathy with masslike encasement, for which pulmonology has been consulted.     #Mediastinal and hilar lymphadenopathy  #Heavy smoking history  #COPD  #Dyspnea    The patient has shortness of breath likely from the abnormalities noted on the CT chest. The CT demonstrated large lymphadenopathy in the right paratracheal and subcarinal area. Also with extension to the esophageal wall. Lung parenchymal with smooth interlobar septal thickening which can be due to fluid overload or due to lymphangitic spread.    Plan:  -IP performed a bronch with BAL, endobronchial biopsies, and EBUS w/ TBNA of stations 7, 4L, 4R, 11L and airway appeared infectious with thick white purulent secretions. Multiple specimens sent for testing.  -PET-CT   79M who presents for progressive shortness of breath over months to years found to have hilar and mediastinal lymphadenopathy with masslike encasement, for which pulmonology has been consulted.     #Mediastinal and hilar lymphadenopathy  #Heavy smoking history  #COPD  #Dyspnea    The patient has shortness of breath likely from the abnormalities noted on the CT chest. The CT demonstrated large lymphadenopathy in the right paratracheal and subcarinal area. Also with extension to the esophageal wall. Lung parenchymal with smooth interlobar septal thickening which can be due to fluid overload or due to lymphangitic spread.    Plan:  -IP performed a bronch with BAL, endobronchial biopsies, and EBUS w/ TBNA of stations 7, 4L, 4R, 11L and airway appeared infectious with thick white purulent secretions. Multiple specimens sent for testing.  -PET-CT    Patient s/e/d with Dr. Matos. 79M who presents for progressive shortness of breath over months to years found to have hilar and mediastinal lymphadenopathy with masslike encasement, for which pulmonology has been consulted.     #Mediastinal and hilar lymphadenopathy  #Heavy smoking history  #COPD  #Dyspnea    The patient has shortness of breath likely from the abnormalities noted on the CT chest. The CT demonstrated large lymphadenopathy in the right paratracheal and subcarinal area. Also with extension to the esophageal wall. Lung parenchymal with smooth interlobar septal thickening which can be due to fluid overload or due to lymphangitic spread.    Plan:  -IP performed a bronch with BAL, endobronchial biopsies, and EBUS w/ TBNA of stations 7, 4L, 4R, 11R and airway appeared infectious with thick white purulent secretions. Multiple specimens sent for testing.  -PET-CT    Patient s/e/d with Dr. Matos.

## 2024-01-16 NOTE — PROGRESS NOTE ADULT - PROBLEM SELECTOR PLAN 7
F: None   E: Replete as necessary K>4 Mg>2  N: Regular diet   DVT Prophylaxis: Lovenox 40mg daily (holding pre op)  GI prophylaxis: None   CODE STATUS: FULL

## 2024-01-17 DIAGNOSIS — R79.9 ABNORMAL FINDING OF BLOOD CHEMISTRY, UNSPECIFIED: ICD-10-CM

## 2024-01-17 LAB
ALBUMIN SERPL ELPH-MCNC: 2.5 G/DL — LOW (ref 3.3–5)
ALP SERPL-CCNC: 73 U/L — SIGNIFICANT CHANGE UP (ref 40–120)
ALT FLD-CCNC: 25 U/L — SIGNIFICANT CHANGE UP (ref 10–45)
ANION GAP SERPL CALC-SCNC: 9 MMOL/L — SIGNIFICANT CHANGE UP (ref 5–17)
AST SERPL-CCNC: 17 U/L — SIGNIFICANT CHANGE UP (ref 10–40)
BASOPHILS # BLD AUTO: 0.02 K/UL — SIGNIFICANT CHANGE UP (ref 0–0.2)
BASOPHILS NFR BLD AUTO: 0.2 % — SIGNIFICANT CHANGE UP (ref 0–2)
BILIRUB SERPL-MCNC: 0.4 MG/DL — SIGNIFICANT CHANGE UP (ref 0.2–1.2)
BLD GP AB SCN SERPL QL: NEGATIVE — SIGNIFICANT CHANGE UP
BUN SERPL-MCNC: 24 MG/DL — HIGH (ref 7–23)
CALCIUM SERPL-MCNC: 8.9 MG/DL — SIGNIFICANT CHANGE UP (ref 8.4–10.5)
CHLORIDE SERPL-SCNC: 103 MMOL/L — SIGNIFICANT CHANGE UP (ref 96–108)
CO2 SERPL-SCNC: 26 MMOL/L — SIGNIFICANT CHANGE UP (ref 22–31)
CREAT SERPL-MCNC: 1.32 MG/DL — HIGH (ref 0.5–1.3)
EGFR: 55 ML/MIN/1.73M2 — LOW
EOSINOPHIL # BLD AUTO: 0 K/UL — SIGNIFICANT CHANGE UP (ref 0–0.5)
EOSINOPHIL NFR BLD AUTO: 0 % — SIGNIFICANT CHANGE UP (ref 0–6)
GLUCOSE SERPL-MCNC: 130 MG/DL — HIGH (ref 70–99)
HCT VFR BLD CALC: 29.6 % — LOW (ref 39–50)
HGB BLD-MCNC: 9.5 G/DL — LOW (ref 13–17)
IMM GRANULOCYTES NFR BLD AUTO: 0.5 % — SIGNIFICANT CHANGE UP (ref 0–0.9)
LYMPHOCYTES # BLD AUTO: 1.39 K/UL — SIGNIFICANT CHANGE UP (ref 1–3.3)
LYMPHOCYTES # BLD AUTO: 12.3 % — LOW (ref 13–44)
MAGNESIUM SERPL-MCNC: 1.9 MG/DL — SIGNIFICANT CHANGE UP (ref 1.6–2.6)
MCHC RBC-ENTMCNC: 30.9 PG — SIGNIFICANT CHANGE UP (ref 27–34)
MCHC RBC-ENTMCNC: 32.1 GM/DL — SIGNIFICANT CHANGE UP (ref 32–36)
MCV RBC AUTO: 96.4 FL — SIGNIFICANT CHANGE UP (ref 80–100)
MONOCYTES # BLD AUTO: 0.66 K/UL — SIGNIFICANT CHANGE UP (ref 0–0.9)
MONOCYTES NFR BLD AUTO: 5.8 % — SIGNIFICANT CHANGE UP (ref 2–14)
NEUTROPHILS # BLD AUTO: 9.17 K/UL — HIGH (ref 1.8–7.4)
NEUTROPHILS NFR BLD AUTO: 81.2 % — HIGH (ref 43–77)
NIGHT BLUE STAIN TISS: SIGNIFICANT CHANGE UP
NIGHT BLUE STAIN TISS: SIGNIFICANT CHANGE UP
NON-GYNECOLOGICAL CYTOLOGY STUDY: SIGNIFICANT CHANGE UP
NRBC # BLD: 0 /100 WBCS — SIGNIFICANT CHANGE UP (ref 0–0)
PHOSPHATE SERPL-MCNC: 4.3 MG/DL — SIGNIFICANT CHANGE UP (ref 2.5–4.5)
PLATELET # BLD AUTO: 226 K/UL — SIGNIFICANT CHANGE UP (ref 150–400)
POTASSIUM SERPL-MCNC: 4.3 MMOL/L — SIGNIFICANT CHANGE UP (ref 3.5–5.3)
POTASSIUM SERPL-SCNC: 4.3 MMOL/L — SIGNIFICANT CHANGE UP (ref 3.5–5.3)
PROT SERPL-MCNC: 6.4 G/DL — SIGNIFICANT CHANGE UP (ref 6–8.3)
RBC # BLD: 3.07 M/UL — LOW (ref 4.2–5.8)
RBC # FLD: 14.1 % — SIGNIFICANT CHANGE UP (ref 10.3–14.5)
RH IG SCN BLD-IMP: POSITIVE — SIGNIFICANT CHANGE UP
SODIUM SERPL-SCNC: 138 MMOL/L — SIGNIFICANT CHANGE UP (ref 135–145)
SPECIMEN SOURCE: SIGNIFICANT CHANGE UP
SPECIMEN SOURCE: SIGNIFICANT CHANGE UP
WBC # BLD: 11.3 K/UL — HIGH (ref 3.8–10.5)
WBC # FLD AUTO: 11.3 K/UL — HIGH (ref 3.8–10.5)

## 2024-01-17 PROCEDURE — 99233 SBSQ HOSP IP/OBS HIGH 50: CPT | Mod: GC

## 2024-01-17 RX ORDER — SENNA PLUS 8.6 MG/1
2 TABLET ORAL AT BEDTIME
Refills: 0 | Status: DISCONTINUED | OUTPATIENT
Start: 2024-01-17 | End: 2024-01-18

## 2024-01-17 RX ORDER — POLYETHYLENE GLYCOL 3350 17 G/17G
17 POWDER, FOR SOLUTION ORAL
Refills: 0 | Status: DISCONTINUED | OUTPATIENT
Start: 2024-01-17 | End: 2024-01-18

## 2024-01-17 RX ORDER — SODIUM CHLORIDE 9 MG/ML
1000 INJECTION, SOLUTION INTRAVENOUS
Refills: 0 | Status: DISCONTINUED | OUTPATIENT
Start: 2024-01-17 | End: 2024-01-18

## 2024-01-17 RX ADMIN — Medication 3 MILLIGRAM(S): at 22:08

## 2024-01-17 RX ADMIN — Medication 3 MILLILITER(S): at 04:44

## 2024-01-17 RX ADMIN — SODIUM CHLORIDE 100 MILLILITER(S): 9 INJECTION, SOLUTION INTRAVENOUS at 11:09

## 2024-01-17 RX ADMIN — BUDESONIDE AND FORMOTEROL FUMARATE DIHYDRATE 2 PUFF(S): 160; 4.5 AEROSOL RESPIRATORY (INHALATION) at 06:30

## 2024-01-17 RX ADMIN — POLYETHYLENE GLYCOL 3350 17 GRAM(S): 17 POWDER, FOR SOLUTION ORAL at 18:33

## 2024-01-17 RX ADMIN — BUDESONIDE AND FORMOTEROL FUMARATE DIHYDRATE 2 PUFF(S): 160; 4.5 AEROSOL RESPIRATORY (INHALATION) at 18:33

## 2024-01-17 RX ADMIN — FINASTERIDE 5 MILLIGRAM(S): 5 TABLET, FILM COATED ORAL at 10:44

## 2024-01-17 RX ADMIN — Medication 3 MILLILITER(S): at 22:08

## 2024-01-17 RX ADMIN — SENNA PLUS 2 TABLET(S): 8.6 TABLET ORAL at 22:08

## 2024-01-17 RX ADMIN — ENOXAPARIN SODIUM 40 MILLIGRAM(S): 100 INJECTION SUBCUTANEOUS at 18:32

## 2024-01-17 RX ADMIN — POLYETHYLENE GLYCOL 3350 17 GRAM(S): 17 POWDER, FOR SOLUTION ORAL at 06:30

## 2024-01-17 RX ADMIN — BUPROPION HYDROCHLORIDE 150 MILLIGRAM(S): 150 TABLET, EXTENDED RELEASE ORAL at 10:44

## 2024-01-17 RX ADMIN — Medication 3 MILLILITER(S): at 10:44

## 2024-01-17 NOTE — PROGRESS NOTE ADULT - PROBLEM SELECTOR PLAN 8
F: None   E: Replete as necessary K>4 Mg>2  N: Regular diet   DVT Prophylaxis: Lovenox 40mg daily   GI prophylaxis: None   CODE STATUS: FULL

## 2024-01-17 NOTE — PROGRESS NOTE ADULT - PROBLEM SELECTOR PLAN 4
P/w Hgb 11, normal MCV. No concern for acute bleed.   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 Cannot r/o alcohol related etiology.   B12, folate WNL.  - CANDIDO  - CTM  - type and screen, maintain active Slightly elevated BUN and Cr on 1/17 AM labs. Likely 2/2 poor po intake of recent, pre renal in nature.  - LR 100cc/hr for 1 day  - Avoid nephrotoxic agents  - Adjust medication dosages for level of renal function

## 2024-01-17 NOTE — PROGRESS NOTE ADULT - SUBJECTIVE AND OBJECTIVE BOX
OVERNIGHT EVENTS:   - wanted laxative, started miralax/senna for now  - fell while trying to ambulate to door, did not hit head, no injuries, non witnessed    SUBJECTIVE:    - Patient seen and examined at bedside, comfortable, NAD. Denied fever, chest pain, dyspnea, abdominal pain.     Vital Signs Last 12 Hrs  T(F): 97.1 (01-17-24 @ 05:50), Max: 97.1 (01-17-24 @ 05:50)  HR: 79 (01-17-24 @ 05:50) (79 - 93)  BP: 122/69 (01-17-24 @ 05:50) (122/69 - 126/78)  BP(mean): --  RR: 19 (01-17-24 @ 05:50) (19 - 19)  SpO2: 96% (01-17-24 @ 05:50) (91% - 96%)    PHYSICAL EXAM:  GENERAL: resting comfortably in bed; NAD  HEAD:  Atraumatic, Normocephalic  EYES: EOMI, PERRLA, conjunctiva and sclera clear   NECK: Supple, No JVD, Normal thyroid, no enlarged nodes   NERVOUS SYSTEM:  AAOx3; CNII-XII grossly intact; no focal deficits  CHEST/LUNG: CTA B/L; no W/R/R  HEART: S1/S2 normal, Regular rate and rhythm; No murmurs / rubs/gallops  ABDOMEN: Soft, Nontender, Nondistended; Bowel sounds present   EXTREMITIES:  2+ Peripheral Pulses, No clubbing, cyanosis. Trace lower extremity edema on b/l LE  LYMPH: No lymphadenopathy noted   SKIN: No rashes or lesions  PSYCHIATRIC: affect and characteristics of appearance, verbalizations, behaviors are appropriate    LABS:                        9.7    11.58 )-----------( 213      ( 16 Jan 2024 05:30 )             29.7     01-16    136  |  100  |  14  ----------------------------<  102<H>  3.9   |  24  |  1.10    Ca    8.7      16 Jan 2024 05:30  Phos  3.5     01-16  Mg     1.7     01-16    TPro  6.3  /  Alb  2.6<L>  /  TBili  0.7  /  DBili  x   /  AST  19  /  ALT  29  /  AlkPhos  78  01-16    PT/INR - ( 16 Jan 2024 05:30 )   PT: 13.3 sec;   INR: 1.17          PTT - ( 16 Jan 2024 05:30 )  PTT:26.8 sec  Urinalysis Basic - ( 16 Jan 2024 05:30 )    Color: x / Appearance: x / SG: x / pH: x  Gluc: 102 mg/dL / Ketone: x  / Bili: x / Urobili: x   Blood: x / Protein: x / Nitrite: x   Leuk Esterase: x / RBC: x / WBC x   Sq Epi: x / Non Sq Epi: x / Bacteria: x    RADIOLOGY & ADDITIONAL TESTS:  - reviewed    MEDICATIONS  (STANDING):  albuterol/ipratropium for Nebulization 3 milliLiter(s) Nebulizer every 6 hours  budesonide  80 MICROgram(s)/formoterol 4.5 MICROgram(s) Inhaler 2 Puff(s) Inhalation two times a day  buPROPion XL (24-Hour) . 150 milliGRAM(s) Oral daily  enoxaparin Injectable 40 milliGRAM(s) SubCutaneous every 24 hours  finasteride 5 milliGRAM(s) Oral daily  hydrochlorothiazide 12.5 milliGRAM(s) Oral daily  melatonin 3 milliGRAM(s) Oral at bedtime  polyethylene glycol 3350 17 Gram(s) Oral two times a day  senna 2 Tablet(s) Oral at bedtime

## 2024-01-17 NOTE — PROGRESS NOTE ADULT - ASSESSMENT
78 YO male w/ a PMHx of COPD (former smoker), history of TB (1970s, recieved treatment from providers at [facility]) and hypertension who presented to the ED w/ SOB and an abnormal CT scan performed 2 days ago at [facility], found to have ?esophageal mass, concerning for malignancy now s/p EBUS 1/16, with findings concerning for R/O TB.

## 2024-01-17 NOTE — PROGRESS NOTE ADULT - PROBLEM SELECTOR PLAN 3
Pt states that he has a hx of hypertension. Follows w/ PCP Dr. Jose Worley. On medication of HCTZ 12.5.   - c home HCTZ 12.5  - CTM for CP/CT  - encourage low salt in diet Pt states that he has a hx of hypertension. Follows w/ PCP Dr. Jose Worley. Has not picked up medication of HCTZ 12.5 since May 2023.   - c HCTZ 12.5 at this time  - CTM for CP/CT  - encourage low salt in diet

## 2024-01-17 NOTE — PROGRESS NOTE ADULT - PROBLEM SELECTOR PLAN 6
Multi year smoking history.   - takes varenicline at home though not available in house  - also takes buproprion 150mg, unclear if for depression or smoking cessation  - c home med of buproprion Multi year history. Has not picked up finasteride since March 2023.   - c finasteride 5mg qd

## 2024-01-17 NOTE — PROGRESS NOTE ADULT - PROBLEM SELECTOR PLAN 2
Pt w/ hx of COPD. Home meds: anoro-ellipta qd, albuterol.  Has been using his albuterol more often recently, sometimes 1-2x/day w/ relief of SOB.   Anoro-ellipta not available   - c symbicort  - c w dennis PRN Pt w/ hx of COPD. Home meds: anoro-ellipta 62.5-25 qd, albuterol 1 puff q6hr prn  Has been using his albuterol more often recently, sometimes 1-2x/day w/ relief of SOB.   Anoro-ellipta not available   - c symbicort substitute   - c w duonebs PRN  - albuterol as needed

## 2024-01-17 NOTE — PROGRESS NOTE ADULT - PROBLEM SELECTOR PLAN 1
#R/o TB  Presented w/ worsening SOB over 6 months & productive cough w/ yellow sputum mixed w/ blood at times.   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  Pt states outpatient CT performed in "early 2023" with no concerning findings. Follows w/ PCP Dr. Jose Worley  DDx includes esophogeal carcinoma vs primary lung carcinoma vs infectious agent (TB vs histoplasma).  1/16 IP performed a bronch with BAL, endobronchial biopsies, and EBUS w/ TBNA of stations 7, 4L, 4R, 11R and airway appeared infectious with thick white purulent secretions. Multiple specimens sent for testing.  - pulm consulted, c to f/u their recs  - consider PET CT while inpatient vs outpatient  - AFB repeat sputum culture 1/17, HIV workup as well given c/f TB  - low threshold for LP if c/f tuberculous meningitis   - will need close f/u outpatient with pulm  - heme/onc made aware of patient, recommend completing staging w/ CT abd/pelv. Signed off in the setting of potential infectious process , can follow up outpatient   - f/u TTE, pending   - speech and swallow assessment  - ambulatory sats

## 2024-01-17 NOTE — SBIRT NOTE ADULT - NSSBIRTALCNOACTINTDET_GEN_A_CORE
Patient endorsed drinking one glass of wine "several times per week." Patient was not interested in resources.

## 2024-01-17 NOTE — PROGRESS NOTE ADULT - SUBJECTIVE AND OBJECTIVE BOX
PULMONARY CONSULT SERVICE FOLLOW-UP NOTE    INTERVAL HPI:  Reviewed chart and overnight events; patient seen and examined at bedside.    MEDICATIONS:  Pulmonary:  albuterol/ipratropium for Nebulization 3 milliLiter(s) Nebulizer every 6 hours  budesonide  80 MICROgram(s)/formoterol 4.5 MICROgram(s) Inhaler 2 Puff(s) Inhalation two times a day    Antimicrobials:    Anticoagulants:  enoxaparin Injectable 40 milliGRAM(s) SubCutaneous every 24 hours    Cardiac:  hydrochlorothiazide 12.5 milliGRAM(s) Oral daily      Allergies    penicillin (Unknown)    Intolerances        Vital Signs Last 24 Hrs  T(C): 36.4 (17 Jan 2024 09:25), Max: 36.4 (17 Jan 2024 09:25)  T(F): 97.6 (17 Jan 2024 09:25), Max: 97.6 (17 Jan 2024 09:25)  HR: 86 (17 Jan 2024 09:25) (79 - 101)  BP: 137/77 (17 Jan 2024 09:25) (122/69 - 144/88)  BP(mean): --  RR: 19 (17 Jan 2024 09:25) (18 - 19)  SpO2: 89% (17 Jan 2024 09:25) (89% - 96%)    Parameters below as of 17 Jan 2024 09:25  Patient On (Oxygen Delivery Method): room air            PHYSICAL EXAM:  Constitutional: WDWN  HEENT: NC/AT; PERRL, anicteric sclera; MMM  Neck: supple  Cardiovascular: +S1/S2, RRR  Respiratory: CTA B/L; no W/R/R  Gastrointestinal: soft, NT/ND; +BSx4  Extremities: WWP; no edema, clubbing or cyanosis  Vascular: 2+ radial, DP/PT pulses B/L  Neurological: AAOx3; no focal deficits    LABS:  ABG - ( 16 Jan 2024 14:15 )  pH, Arterial: 7.31  pH, Blood: x     /  pCO2: 49    /  pO2: 105   / HCO3: 25    / Base Excess: -2.1  /  SaO2: 98.4                CBC Full  -  ( 17 Jan 2024 05:30 )  WBC Count : 11.30 K/uL  RBC Count : 3.07 M/uL  Hemoglobin : 9.5 g/dL  Hematocrit : 29.6 %  Platelet Count - Automated : 226 K/uL  Mean Cell Volume : 96.4 fl  Mean Cell Hemoglobin : 30.9 pg  Mean Cell Hemoglobin Concentration : 32.1 gm/dL  Auto Neutrophil # : 9.17 K/uL  Auto Lymphocyte # : 1.39 K/uL  Auto Monocyte # : 0.66 K/uL  Auto Eosinophil # : 0.00 K/uL  Auto Basophil # : 0.02 K/uL  Auto Neutrophil % : 81.2 %  Auto Lymphocyte % : 12.3 %  Auto Monocyte % : 5.8 %  Auto Eosinophil % : 0.0 %  Auto Basophil % : 0.2 %    01-17    138  |  103  |  24<H>  ----------------------------<  130<H>  4.3   |  26  |  1.32<H>    Ca    8.9      17 Jan 2024 05:30  Phos  4.3     01-17  Mg     1.9     01-17    TPro  6.4  /  Alb  2.5<L>  /  TBili  0.4  /  DBili  x   /  AST  17  /  ALT  25  /  AlkPhos  73  01-17    PT/INR - ( 16 Jan 2024 05:30 )   PT: 13.3 sec;   INR: 1.17          PTT - ( 16 Jan 2024 05:30 )  PTT:26.8 sec      Urinalysis Basic - ( 17 Jan 2024 05:30 )    Color: x / Appearance: x / SG: x / pH: x  Gluc: 130 mg/dL / Ketone: x  / Bili: x / Urobili: x   Blood: x / Protein: x / Nitrite: x   Leuk Esterase: x / RBC: x / WBC x   Sq Epi: x / Non Sq Epi: x / Bacteria: x                RADIOLOGY & ADDITIONAL STUDIES:

## 2024-01-17 NOTE — PROGRESS NOTE ADULT - PROBLEM SELECTOR PLAN 7
F: None   E: Replete as necessary K>4 Mg>2  N: Regular diet   DVT Prophylaxis: Lovenox 40mg daily   GI prophylaxis: None   CODE STATUS: FULL Multi year smoking history. Takes varenicline 1mg bid and buproprion 150mg xl qam.  - varenicline not available in house  - c home med of buproprion

## 2024-01-17 NOTE — PROGRESS NOTE ADULT - ASSESSMENT
79M who presents for progressive shortness of breath over months to years found to have hilar and mediastinal lymphadenopathy with masslike encasement, for which pulmonology has been consulted.     #Mediastinal and hilar lymphadenopathy  #Heavy smoking history  #COPD  #Dyspnea    The patient has shortness of breath likely from the abnormalities noted on the CT chest. The CT demonstrated large lymphadenopathy in the right paratracheal and subcarinal area. Also with extension to the esophageal wall. Lung parenchymal with smooth interlobar septal thickening which can be due to fluid overload or due to lymphangitic spread.  Satting 93-94% on RA.    Plan:  -IP performed a bronch with BAL, endobronchial biopsies, and EBUS w/ TBNA of stations 7, 4L, 4R, 11R and airway appeared infectious with thick white purulent secretions. Multiple specimens sent for testing. AFB pending.  -Continue TB isolation precautions  -PET-CT 79M who presents for progressive shortness of breath over months to years found to have hilar and mediastinal lymphadenopathy with masslike encasement, for which pulmonology has been consulted.     #Mediastinal and hilar lymphadenopathy  #Heavy smoking history  #COPD  #Dyspnea    The patient has shortness of breath likely from the abnormalities noted on the CT chest. The CT demonstrated large lymphadenopathy in the right paratracheal and subcarinal area. Also with extension to the esophageal wall. Lung parenchymal with smooth interlobar septal thickening which can be due to fluid overload or due to lymphangitic spread.  Satting 93-94% on RA.    Plan:  -IP performed a bronch with BAL, endobronchial biopsies, and EBUS w/ TBNA of stations 7, 4L, 4R, 11R and airway appeared infectious with thick white purulent secretions. Multiple specimens sent for testing. AFB pending.  -Continue TB isolation precautions  -PET-CT    Patient s/e/d with Dr. Matos

## 2024-01-17 NOTE — PROGRESS NOTE ADULT - ATTENDING COMMENTS
80 YO male w/ a PMHx of COPD (former smoker), and hypertension who presented to the ED w/ SOB and an abnormal CT scan performed 2 days ago at [facility], found to have ?esophageal mass, concerning for malignancy vs infectious etiology        #Mediastinal and hilar lymphadenopathy  #shortness of breath   s/p  bronch with BAL, endobronchial biopsies, and EBUS w/ TBNA 1/16 --   per pulm team concerning for TB - put on isolation and fu cultures   appreciate cards consult    #andres again 1.3 - likely 2/2 poor po intake - will give ivf and fu repeat labs in am   #dvt ppx qd lovenox

## 2024-01-17 NOTE — PROGRESS NOTE ADULT - PROBLEM SELECTOR PLAN 5
Multi year history.   - c home finasteride 5mg qd P/w Hgb 11, normal MCV. No concern for acute bleed.   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 Cannot r/o alcohol related etiology.   B12, folate WNL.  - CANDIDO  - CTM  - type and screen, maintain active

## 2024-01-18 ENCOUNTER — NON-APPOINTMENT (OUTPATIENT)
Age: 79
End: 2024-01-18

## 2024-01-18 VITALS — OXYGEN SATURATION: 89 % | HEART RATE: 92 BPM | RESPIRATION RATE: 19 BRPM

## 2024-01-18 DIAGNOSIS — K59.00 CONSTIPATION, UNSPECIFIED: ICD-10-CM

## 2024-01-18 LAB
ALBUMIN SERPL ELPH-MCNC: 2.2 G/DL — LOW (ref 3.3–5)
ALP SERPL-CCNC: 73 U/L — SIGNIFICANT CHANGE UP (ref 40–120)
ALT FLD-CCNC: 40 U/L — SIGNIFICANT CHANGE UP (ref 10–45)
ANION GAP SERPL CALC-SCNC: 8 MMOL/L — SIGNIFICANT CHANGE UP (ref 5–17)
AST SERPL-CCNC: 32 U/L — SIGNIFICANT CHANGE UP (ref 10–40)
BASOPHILS # BLD AUTO: 0.01 K/UL — SIGNIFICANT CHANGE UP (ref 0–0.2)
BASOPHILS NFR BLD AUTO: 0.1 % — SIGNIFICANT CHANGE UP (ref 0–2)
BILIRUB SERPL-MCNC: 0.4 MG/DL — SIGNIFICANT CHANGE UP (ref 0.2–1.2)
BUN SERPL-MCNC: 22 MG/DL — SIGNIFICANT CHANGE UP (ref 7–23)
CALCIUM SERPL-MCNC: 8.5 MG/DL — SIGNIFICANT CHANGE UP (ref 8.4–10.5)
CHLORIDE SERPL-SCNC: 101 MMOL/L — SIGNIFICANT CHANGE UP (ref 96–108)
CO2 SERPL-SCNC: 27 MMOL/L — SIGNIFICANT CHANGE UP (ref 22–31)
CREAT SERPL-MCNC: 1.21 MG/DL — SIGNIFICANT CHANGE UP (ref 0.5–1.3)
CULTURE RESULTS: SIGNIFICANT CHANGE UP
EGFR: 61 ML/MIN/1.73M2 — SIGNIFICANT CHANGE UP
EOSINOPHIL # BLD AUTO: 0.1 K/UL — SIGNIFICANT CHANGE UP (ref 0–0.5)
EOSINOPHIL NFR BLD AUTO: 1 % — SIGNIFICANT CHANGE UP (ref 0–6)
GLUCOSE SERPL-MCNC: 98 MG/DL — SIGNIFICANT CHANGE UP (ref 70–99)
GRAM STN FLD: ABNORMAL
HCT VFR BLD CALC: 29.3 % — LOW (ref 39–50)
HGB BLD-MCNC: 9.6 G/DL — LOW (ref 13–17)
IMM GRANULOCYTES NFR BLD AUTO: 0.5 % — SIGNIFICANT CHANGE UP (ref 0–0.9)
LYMPHOCYTES # BLD AUTO: 1.77 K/UL — SIGNIFICANT CHANGE UP (ref 1–3.3)
LYMPHOCYTES # BLD AUTO: 17.2 % — SIGNIFICANT CHANGE UP (ref 13–44)
MAGNESIUM SERPL-MCNC: 1.9 MG/DL — SIGNIFICANT CHANGE UP (ref 1.6–2.6)
MCHC RBC-ENTMCNC: 31.5 PG — SIGNIFICANT CHANGE UP (ref 27–34)
MCHC RBC-ENTMCNC: 32.8 GM/DL — SIGNIFICANT CHANGE UP (ref 32–36)
MCV RBC AUTO: 96.1 FL — SIGNIFICANT CHANGE UP (ref 80–100)
MONOCYTES # BLD AUTO: 0.77 K/UL — SIGNIFICANT CHANGE UP (ref 0–0.9)
MONOCYTES NFR BLD AUTO: 7.5 % — SIGNIFICANT CHANGE UP (ref 2–14)
NEUTROPHILS # BLD AUTO: 7.6 K/UL — HIGH (ref 1.8–7.4)
NEUTROPHILS NFR BLD AUTO: 73.7 % — SIGNIFICANT CHANGE UP (ref 43–77)
NRBC # BLD: 0 /100 WBCS — SIGNIFICANT CHANGE UP (ref 0–0)
PHOSPHATE SERPL-MCNC: 3.4 MG/DL — SIGNIFICANT CHANGE UP (ref 2.5–4.5)
PLATELET # BLD AUTO: 246 K/UL — SIGNIFICANT CHANGE UP (ref 150–400)
POTASSIUM SERPL-MCNC: 3.6 MMOL/L — SIGNIFICANT CHANGE UP (ref 3.5–5.3)
POTASSIUM SERPL-SCNC: 3.6 MMOL/L — SIGNIFICANT CHANGE UP (ref 3.5–5.3)
PROT SERPL-MCNC: 6.1 G/DL — SIGNIFICANT CHANGE UP (ref 6–8.3)
RBC # BLD: 3.05 M/UL — LOW (ref 4.2–5.8)
RBC # FLD: 14.4 % — SIGNIFICANT CHANGE UP (ref 10.3–14.5)
SODIUM SERPL-SCNC: 136 MMOL/L — SIGNIFICANT CHANGE UP (ref 135–145)
SPECIMEN SOURCE: SIGNIFICANT CHANGE UP
WBC # BLD: 10.3 K/UL — SIGNIFICANT CHANGE UP (ref 3.8–10.5)
WBC # FLD AUTO: 10.3 K/UL — SIGNIFICANT CHANGE UP (ref 3.8–10.5)

## 2024-01-18 PROCEDURE — 85025 COMPLETE CBC W/AUTO DIFF WBC: CPT

## 2024-01-18 PROCEDURE — 82607 VITAMIN B-12: CPT

## 2024-01-18 PROCEDURE — 86900 BLOOD TYPING SEROLOGIC ABO: CPT

## 2024-01-18 PROCEDURE — 83735 ASSAY OF MAGNESIUM: CPT

## 2024-01-18 PROCEDURE — 88173 CYTOPATH EVAL FNA REPORT: CPT

## 2024-01-18 PROCEDURE — 82803 BLOOD GASES ANY COMBINATION: CPT

## 2024-01-18 PROCEDURE — 83540 ASSAY OF IRON: CPT

## 2024-01-18 PROCEDURE — 71275 CT ANGIOGRAPHY CHEST: CPT

## 2024-01-18 PROCEDURE — 86901 BLOOD TYPING SEROLOGIC RH(D): CPT

## 2024-01-18 PROCEDURE — 83550 IRON BINDING TEST: CPT

## 2024-01-18 PROCEDURE — 87070 CULTURE OTHR SPECIMN AEROBIC: CPT

## 2024-01-18 PROCEDURE — 87206 SMEAR FLUORESCENT/ACID STAI: CPT

## 2024-01-18 PROCEDURE — 85730 THROMBOPLASTIN TIME PARTIAL: CPT

## 2024-01-18 PROCEDURE — 36415 COLL VENOUS BLD VENIPUNCTURE: CPT

## 2024-01-18 PROCEDURE — 93970 EXTREMITY STUDY: CPT

## 2024-01-18 PROCEDURE — 99233 SBSQ HOSP IP/OBS HIGH 50: CPT | Mod: GC

## 2024-01-18 PROCEDURE — 82728 ASSAY OF FERRITIN: CPT

## 2024-01-18 PROCEDURE — 84466 ASSAY OF TRANSFERRIN: CPT

## 2024-01-18 PROCEDURE — 96374 THER/PROPH/DIAG INJ IV PUSH: CPT | Mod: XU

## 2024-01-18 PROCEDURE — 85610 PROTHROMBIN TIME: CPT

## 2024-01-18 PROCEDURE — 92526 ORAL FUNCTION THERAPY: CPT

## 2024-01-18 PROCEDURE — 88112 CYTOPATH CELL ENHANCE TECH: CPT

## 2024-01-18 PROCEDURE — 86850 RBC ANTIBODY SCREEN: CPT

## 2024-01-18 PROCEDURE — 84100 ASSAY OF PHOSPHORUS: CPT

## 2024-01-18 PROCEDURE — 87637 SARSCOV2&INF A&B&RSV AMP PRB: CPT

## 2024-01-18 PROCEDURE — 93005 ELECTROCARDIOGRAM TRACING: CPT

## 2024-01-18 PROCEDURE — 87075 CULTR BACTERIA EXCEPT BLOOD: CPT

## 2024-01-18 PROCEDURE — 87015 SPECIMEN INFECT AGNT CONCNTJ: CPT

## 2024-01-18 PROCEDURE — 87184 SC STD DISK METHOD PER PLATE: CPT

## 2024-01-18 PROCEDURE — 85379 FIBRIN DEGRADATION QUANT: CPT

## 2024-01-18 PROCEDURE — 94640 AIRWAY INHALATION TREATMENT: CPT

## 2024-01-18 PROCEDURE — 83880 ASSAY OF NATRIURETIC PEPTIDE: CPT

## 2024-01-18 PROCEDURE — 87116 MYCOBACTERIA CULTURE: CPT

## 2024-01-18 PROCEDURE — 83690 ASSAY OF LIPASE: CPT

## 2024-01-18 PROCEDURE — 85027 COMPLETE CBC AUTOMATED: CPT

## 2024-01-18 PROCEDURE — 99285 EMERGENCY DEPT VISIT HI MDM: CPT | Mod: 25

## 2024-01-18 PROCEDURE — 84484 ASSAY OF TROPONIN QUANT: CPT

## 2024-01-18 PROCEDURE — 71045 X-RAY EXAM CHEST 1 VIEW: CPT

## 2024-01-18 PROCEDURE — 87181 SC STD AGAR DILUTION PER AGT: CPT

## 2024-01-18 PROCEDURE — 80053 COMPREHEN METABOLIC PANEL: CPT

## 2024-01-18 PROCEDURE — 87102 FUNGUS ISOLATION CULTURE: CPT

## 2024-01-18 PROCEDURE — 80048 BASIC METABOLIC PNL TOTAL CA: CPT

## 2024-01-18 PROCEDURE — 82746 ASSAY OF FOLIC ACID SERUM: CPT

## 2024-01-18 PROCEDURE — 88305 TISSUE EXAM BY PATHOLOGIST: CPT

## 2024-01-18 PROCEDURE — 88312 SPECIAL STAINS GROUP 1: CPT

## 2024-01-18 RX ORDER — LEVOFLOXACIN 5 MG/ML
750 INJECTION, SOLUTION INTRAVENOUS EVERY 24 HOURS
Refills: 0 | Status: DISCONTINUED | OUTPATIENT
Start: 2024-01-18 | End: 2024-01-18

## 2024-01-18 RX ORDER — CEPHALEXIN 500 MG
1 CAPSULE ORAL
Qty: 28 | Refills: 0
Start: 2024-01-18 | End: 2024-01-24

## 2024-01-18 RX ORDER — IOHEXOL 300 MG/ML
30 INJECTION, SOLUTION INTRAVENOUS ONCE
Refills: 0 | Status: COMPLETED | OUTPATIENT
Start: 2024-01-18 | End: 2024-01-18

## 2024-01-18 RX ORDER — POTASSIUM CHLORIDE 20 MEQ
40 PACKET (EA) ORAL ONCE
Refills: 0 | Status: COMPLETED | OUTPATIENT
Start: 2024-01-18 | End: 2024-01-18

## 2024-01-18 RX ADMIN — BUPROPION HYDROCHLORIDE 150 MILLIGRAM(S): 150 TABLET, EXTENDED RELEASE ORAL at 11:43

## 2024-01-18 RX ADMIN — Medication 3 MILLILITER(S): at 09:19

## 2024-01-18 RX ADMIN — POLYETHYLENE GLYCOL 3350 17 GRAM(S): 17 POWDER, FOR SOLUTION ORAL at 06:22

## 2024-01-18 RX ADMIN — BUDESONIDE AND FORMOTEROL FUMARATE DIHYDRATE 2 PUFF(S): 160; 4.5 AEROSOL RESPIRATORY (INHALATION) at 06:22

## 2024-01-18 RX ADMIN — Medication 3 MILLILITER(S): at 04:19

## 2024-01-18 RX ADMIN — FINASTERIDE 5 MILLIGRAM(S): 5 TABLET, FILM COATED ORAL at 11:44

## 2024-01-18 RX ADMIN — ENOXAPARIN SODIUM 40 MILLIGRAM(S): 100 INJECTION SUBCUTANEOUS at 15:34

## 2024-01-18 RX ADMIN — Medication 10 MILLIGRAM(S): at 07:32

## 2024-01-18 RX ADMIN — Medication 40 MILLIEQUIVALENT(S): at 11:44

## 2024-01-18 NOTE — PROGRESS NOTE ADULT - PROVIDER SPECIALTY LIST ADULT
Internal Medicine
Pulmonology
No
Internal Medicine

## 2024-01-18 NOTE — PROGRESS NOTE ADULT - SUBJECTIVE AND OBJECTIVE BOX
PULMONARY CONSULT SERVICE FOLLOW-UP NOTE    INTERVAL HPI:  Reviewed chart and overnight events; patient seen and examined at bedside.    MEDICATIONS:  Pulmonary:  albuterol/ipratropium for Nebulization 3 milliLiter(s) Nebulizer every 6 hours  budesonide  80 MICROgram(s)/formoterol 4.5 MICROgram(s) Inhaler 2 Puff(s) Inhalation two times a day    Antimicrobials:    Anticoagulants:  enoxaparin Injectable 40 milliGRAM(s) SubCutaneous every 24 hours    Cardiac:  hydrochlorothiazide 12.5 milliGRAM(s) Oral daily      Allergies    penicillin (Unknown)    Intolerances        Vital Signs Last 24 Hrs  T(C): 35.1 (18 Jan 2024 09:23), Max: 36.1 (17 Jan 2024 21:00)  T(F): 95.2 (18 Jan 2024 09:23), Max: 96.9 (17 Jan 2024 21:00)  HR: 91 (18 Jan 2024 09:25) (83 - 115)  BP: 146/83 (18 Jan 2024 09:23) (114/73 - 146/83)  BP(mean): --  RR: 18 (18 Jan 2024 09:25) (18 - 22)  SpO2: 91% (18 Jan 2024 09:25) (83% - 96%)    Parameters below as of 18 Jan 2024 09:25  Patient On (Oxygen Delivery Method): nasal cannula  O2 Flow (L/min): 2      01-17 @ 07:01  -  01-18 @ 07:00  --------------------------------------------------------  IN: 350 mL / OUT: 0 mL / NET: 350 mL          PHYSICAL EXAM:  Constitutional: WDWN  HEENT: NC/AT; PERRL, anicteric sclera; MMM  Neck: supple  Cardiovascular: +S1/S2, RRR  Respiratory: CTA B/L; no W/R/R  Gastrointestinal: soft, NT/ND; +BSx4  Extremities: WWP; no edema, clubbing or cyanosis  Vascular: 2+ radial, DP/PT pulses B/L  Neurological: AAOx3; no focal deficits    LABS:  ABG - ( 16 Jan 2024 14:15 )  pH, Arterial: 7.31  pH, Blood: x     /  pCO2: 49    /  pO2: 105   / HCO3: 25    / Base Excess: -2.1  /  SaO2: 98.4                CBC Full  -  ( 18 Jan 2024 05:30 )  WBC Count : 10.30 K/uL  RBC Count : 3.05 M/uL  Hemoglobin : 9.6 g/dL  Hematocrit : 29.3 %  Platelet Count - Automated : 246 K/uL  Mean Cell Volume : 96.1 fl  Mean Cell Hemoglobin : 31.5 pg  Mean Cell Hemoglobin Concentration : 32.8 gm/dL  Auto Neutrophil # : 7.60 K/uL  Auto Lymphocyte # : 1.77 K/uL  Auto Monocyte # : 0.77 K/uL  Auto Eosinophil # : 0.10 K/uL  Auto Basophil # : 0.01 K/uL  Auto Neutrophil % : 73.7 %  Auto Lymphocyte % : 17.2 %  Auto Monocyte % : 7.5 %  Auto Eosinophil % : 1.0 %  Auto Basophil % : 0.1 %    01-18    136  |  101  |  22  ----------------------------<  98  3.6   |  27  |  1.21    Ca    8.5      18 Jan 2024 05:30  Phos  3.4     01-18  Mg     1.9     01-18    TPro  6.1  /  Alb  2.2<L>  /  TBili  0.4  /  DBili  x   /  AST  32  /  ALT  40  /  AlkPhos  73  01-18          Urinalysis Basic - ( 18 Jan 2024 05:30 )    Color: x / Appearance: x / SG: x / pH: x  Gluc: 98 mg/dL / Ketone: x  / Bili: x / Urobili: x   Blood: x / Protein: x / Nitrite: x   Leuk Esterase: x / RBC: x / WBC x   Sq Epi: x / Non Sq Epi: x / Bacteria: x                RADIOLOGY & ADDITIONAL STUDIES:

## 2024-01-18 NOTE — PROGRESS NOTE ADULT - SUBJECTIVE AND OBJECTIVE BOX
**INCOMPLETE NOTE    OVERNIGHT EVENTS: None    SUBJECTIVE:  Patient seen and examined at bedside, comfortable, NAD. Denied fever, chest pain, dyspnea, abdominal pain.     Vital Signs Last 12 Hrs  T(F): 96.8 (01-18-24 @ 06:00), Max: 96.9 (01-17-24 @ 21:00)  HR: 85 (01-18-24 @ 06:00) (83 - 85)  BP: 137/77 (01-18-24 @ 06:00) (131/70 - 137/77)  BP(mean): --  RR: 18 (01-18-24 @ 06:00) (18 - 18)  SpO2: 92% (01-18-24 @ 06:00) (92% - 96%)  I&O's Summary    17 Jan 2024 07:01  -  18 Jan 2024 07:00  --------------------------------------------------------  IN: 350 mL / OUT: 0 mL / NET: 350 mL        PHYSICAL EXAM:  Constitutional: NAD, comfortable in bed.  HEENT: NC/AT, PERRLA, EOMI, no conjunctival pallor or scleral icterus, MMM  Neck: Supple, no JVD  Respiratory: CTA B/L. No w/r/r.   Cardiovascular: RRR, normal S1 and S2, no m/r/g.   Gastrointestinal: +BS, soft NTND, no guarding or rebound tenderness, no palpable masses   Extremities: wwp; no cyanosis, clubbing or edema.   Vascular: Pulses equal and strong throughout.   Neurological: AAOx3, no CN deficits, strength and sensation intact throughout.   Skin: No gross skin abnormalities or rashes        LABS:                        9.6    10.30 )-----------( 246      ( 18 Jan 2024 05:30 )             29.3     01-18    136  |  101  |  22  ----------------------------<  98  3.6   |  27  |  1.21    Ca    8.5      18 Jan 2024 05:30  Phos  3.4     01-18  Mg     1.9     01-18    TPro  6.1  /  Alb  2.2<L>  /  TBili  0.4  /  DBili  x   /  AST  32  /  ALT  40  /  AlkPhos  73  01-18      Urinalysis Basic - ( 18 Jan 2024 05:30 )    Color: x / Appearance: x / SG: x / pH: x  Gluc: 98 mg/dL / Ketone: x  / Bili: x / Urobili: x   Blood: x / Protein: x / Nitrite: x   Leuk Esterase: x / RBC: x / WBC x   Sq Epi: x / Non Sq Epi: x / Bacteria: x          RADIOLOGY & ADDITIONAL TESTS:    MEDICATIONS  (STANDING):  albuterol/ipratropium for Nebulization 3 milliLiter(s) Nebulizer every 6 hours  budesonide  80 MICROgram(s)/formoterol 4.5 MICROgram(s) Inhaler 2 Puff(s) Inhalation two times a day  buPROPion XL (24-Hour) . 150 milliGRAM(s) Oral daily  enoxaparin Injectable 40 milliGRAM(s) SubCutaneous every 24 hours  finasteride 5 milliGRAM(s) Oral daily  hydrochlorothiazide 12.5 milliGRAM(s) Oral daily  lactated ringers. 1000 milliLiter(s) (100 mL/Hr) IV Continuous <Continuous>  melatonin 3 milliGRAM(s) Oral at bedtime  polyethylene glycol 3350 17 Gram(s) Oral two times a day  senna 2 Tablet(s) Oral at bedtime    MEDICATIONS  (PRN):   OVERNIGHT EVENTS:   - refused enema placed at 5 pm yesterday. pt amenable to dulcolax suppository as last BM multiple days.  - amb sats 83%    SUBJECTIVE:  -  Patient seen and examined at bedside, comfortable, NAD. Denied fever, chest pain, dyspnea, abdominal pain.     Vital Signs Last 12 Hrs  T(F): 96.8 (01-18-24 @ 06:00), Max: 96.9 (01-17-24 @ 21:00)  HR: 85 (01-18-24 @ 06:00) (83 - 85)  BP: 137/77 (01-18-24 @ 06:00) (131/70 - 137/77)  BP(mean): --  RR: 18 (01-18-24 @ 06:00) (18 - 18)  SpO2: 92% (01-18-24 @ 06:00) (92% - 96%)    I&O's Summary    17 Jan 2024 07:01  -  18 Jan 2024 07:00  --------------------------------------------------------  IN: 350 mL / OUT: 0 mL / NET: 350 mL    PHYSICAL EXAM:  GENERAL: resting comfortably in bed; NAD  HEAD:  Atraumatic, Normocephalic  EYES: EOMI, PERRLA, conjunctiva and sclera clear   NECK: Supple, No JVD, Normal thyroid, no enlarged nodes   NERVOUS SYSTEM:  AAOx3; CNII-XII grossly intact; no focal deficits  CHEST/LUNG: CTA B/L; no W/R/R  HEART: S1/S2 normal, Regular rate and rhythm; No murmurs / rubs/gallops  ABDOMEN: Soft, Nontender, Nondistended; Bowel sounds present   EXTREMITIES:  2+ Peripheral Pulses, No clubbing, cyanosis. Trace lower extremity edema on b/l LE  LYMPH: No lymphadenopathy noted   SKIN: No rashes or lesions  PSYCHIATRIC: affect and characteristics of appearance, verbalizations, behaviors are appropriate    LABS:                        9.6    10.30 )-----------( 246      ( 18 Jan 2024 05:30 )             29.3     01-18    136  |  101  |  22  ----------------------------<  98  3.6   |  27  |  1.21    Ca    8.5      18 Jan 2024 05:30  Phos  3.4     01-18  Mg     1.9     01-18    TPro  6.1  /  Alb  2.2<L>  /  TBili  0.4  /  DBili  x   /  AST  32  /  ALT  40  /  AlkPhos  73  01-18      Urinalysis Basic - ( 18 Jan 2024 05:30 )    Color: x / Appearance: x / SG: x / pH: x  Gluc: 98 mg/dL / Ketone: x  / Bili: x / Urobili: x   Blood: x / Protein: x / Nitrite: x   Leuk Esterase: x / RBC: x / WBC x   Sq Epi: x / Non Sq Epi: x / Bacteria: x    RADIOLOGY & ADDITIONAL TESTS:  - reviewed    MEDICATIONS  (STANDING):  albuterol/ipratropium for Nebulization 3 milliLiter(s) Nebulizer every 6 hours  budesonide  80 MICROgram(s)/formoterol 4.5 MICROgram(s) Inhaler 2 Puff(s) Inhalation two times a day  buPROPion XL (24-Hour) . 150 milliGRAM(s) Oral daily  enoxaparin Injectable 40 milliGRAM(s) SubCutaneous every 24 hours  finasteride 5 milliGRAM(s) Oral daily  hydrochlorothiazide 12.5 milliGRAM(s) Oral daily  lactated ringers. 1000 milliLiter(s) (100 mL/Hr) IV Continuous <Continuous>  melatonin 3 milliGRAM(s) Oral at bedtime  polyethylene glycol 3350 17 Gram(s) Oral two times a day  senna 2 Tablet(s) Oral at bedtime

## 2024-01-18 NOTE — PROGRESS NOTE ADULT - PROBLEM SELECTOR PLAN 1
#R/o TB  Presented w/ worsening SOB over 6 months & productive cough w/ yellow sputum mixed w/ blood at times.   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  Pt states outpatient CT performed in "early 2023" with no concerning findings. Follows w/ PCP Dr. Jose Worley  DDx includes esophogeal carcinoma vs primary lung carcinoma vs infectious agent (TB vs histoplasma).  1/16 IP performed a bronch with BAL, endobronchial biopsies, and EBUS w/ TBNA of stations 7, 4L, 4R, 11R and airway appeared infectious with thick white purulent secretions. Multiple specimens sent for testing.  - pulm consulted, c to f/u their recs  - pending endobronchial biopsy results  - consider PET CT while inpatient vs outpatient  - AFB repeat sputum culture 1/17, HIV workup as well given c/f TB  - low threshold for LP if c/f tuberculous meningitis   - will need close f/u outpatient with pulm  - heme/onc made aware of patient, recommend completing staging w/ CT abd/pelv. Signed off in the setting of potential infectious process , can follow up outpatient   - f/u TTE, pending (on hold given TB concern) #R/o TB  Presented w/ worsening SOB over 6 months & productive cough w/ yellow sputum mixed w/ blood at times.   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  Pt states outpatient CT performed in "early 2023" with no concerning findings. Follows w/ PCP Dr. Jose Worley  DDx includes esophogeal carcinoma vs primary lung carcinoma vs infectious agent (TB vs histoplasma).  1/16 IP performed a bronch with BAL, endobronchial biopsies, and EBUS w/ TBNA of stations 7, 4L, 4R, 11R and airway appeared infectious with thick white purulent secretions. Multiple specimens sent for testing.  - pulm consulted, c to f/u their recs  - pending endobronchial biopsy results  - consider PET CT while inpatient vs outpatient  - will re order CT scan for staging purposes with IV and Oral contrast  - AFB repeat sputum culture 1/17, HIV workup as well given c/f TB  - low threshold for LP if c/f tuberculous meningitis   - will need close f/u outpatient with pulm  - heme/onc made aware of patient, recommend completing staging w/ CT abd/pelv. Signed off in the setting of potential infectious process , can follow up outpatient   - f/u TTE, pending (on hold given TB concern)

## 2024-01-18 NOTE — PROGRESS NOTE ADULT - PROBLEM SELECTOR PLAN 3
Pt states that he has a hx of hypertension. Follows w/ PCP Dr. Jose Worley. Has not picked up medication of HCTZ 12.5 since May 2023.   - c HCTZ 12.5 at this time  - CTM for CP/CT  - encourage low salt in diet

## 2024-01-18 NOTE — PROGRESS NOTE ADULT - PROBLEM SELECTOR PLAN 4
Slightly elevated BUN and Cr on 1/17 AM labs. Likely 2/2 poor po intake of recent, pre renal in nature.  - LR 100cc/hr for 1 day, completing this AM  - Avoid nephrotoxic agents  - Adjust medication dosages for level of renal function

## 2024-01-18 NOTE — PROGRESS NOTE ADULT - ASSESSMENT
78 YO male w/ a PMHx of COPD (former smoker), history of TB (1970s, recieved treatment from providers at [facility]) and hypertension who presented to the ED w/ SOB and an abnormal CT scan performed 2 days ago at [facility], found to have ?esophageal mass, concerning for malignancy now s/p EBUS 1/16, with findings concerning for R/O TB vs. infectious process.

## 2024-01-18 NOTE — PROGRESS NOTE ADULT - ASSESSMENT
79M who presents for progressive shortness of breath over months to years found to have hilar and mediastinal lymphadenopathy with masslike encasement, for which pulmonology has been consulted.     #Mediastinal and hilar lymphadenopathy  #Heavy smoking history  #COPD  #Dyspnea    The patient has shortness of breath likely from the abnormalities noted on the CT chest. The CT demonstrated large lymphadenopathy in the right paratracheal and subcarinal area. Also with extension to the esophageal wall. Lung parenchymal with smooth interlobar septal thickening which can be due to fluid overload or due to lymphangitic spread.  Satting 93-94% on RA.      Plan:  -IP performed a bronch with BAL, endobronchial biopsies, and EBUS w/ TBNA of stations 7, 4L, 4R, 11R and airway appeared infectious with thick white purulent secretions. Multiple specimens sent for testing: negative AFB, negative bacterial and fungal growth, negative for malignancy; appears to have inflammatory cells   -Can discontinue TB isolation precautions as negative AFB on BAL and specimens  -PET-CT 79M who presents for progressive shortness of breath over months to years found to have hilar and mediastinal lymphadenopathy with masslike encasement, for which pulmonology has been consulted.     #Mediastinal and hilar lymphadenopathy  #Heavy smoking history  #COPD  #Dyspnea    The patient has shortness of breath likely from the abnormalities noted on the CT chest. The CT demonstrated large lymphadenopathy in the right paratracheal and subcarinal area. Also with extension to the esophageal wall. Lung parenchymal with smooth interlobar septal thickening which can be due to fluid overload or due to lymphangitic spread.  Satting 93-94% on RA.      Plan:  -IP performed a bronch with BAL, endobronchial biopsies, and EBUS w/ TBNA of stations 7, 4L, 4R, 11R and airway appeared infectious with thick white purulent secretions. Multiple specimens sent for testing: negative AFB, negative bacterial and fungal growth, negative for malignancy; appears to have inflammatory cells   -Can discontinue TB isolation precautions as negative AFB on BAL and specimens  -PET-CT in the outpatient setting.  -Please have the patient set up with Dr. Gill in 2-4 weeks.    Pulm will sign off. Please call with further questions.  Patient s/e/d with Dr. Matos

## 2024-01-18 NOTE — PROGRESS NOTE ADULT - PROBLEM SELECTOR PLAN 7
Multi year smoking history. Takes varenicline 1mg bid and buproprion 150mg xl qam.  - varenicline not available in house  - c home med of buproprion Patient noting last bowel movement was a few days ago. Abdomen is distended on exam.   Most likely etiology is that of poor fluid intake and prolonged laying in bed causing poor movement through the bowel.   - miralax standing  - senna at night   - encourage out of bed as able  - s/p suppository which patient states did not help  - tap water enema 1/18  - Encourage fiber in diet

## 2024-01-18 NOTE — CHART NOTE - NSCHARTNOTEFT_GEN_A_CORE
Was called by RN due to patient wanting to sign out AMA. Asked patient why he wanted to leave, reports that he simply can't wait anymore for more information and wants to follow up with a pulmonologist outpatient and promises to see a pulmonologist and his PCP. He could not be convinced to stay.    Patient is AAO x 3. I explained at length to the patient the risks of signing out AMA including but not limited to harm, injury or death. I explained the risks, benefits and alternatives to treatment as well as the attendant risks of refusing treatment at this time. I offered to answer any questions and fully answered any such questions. We believe that the patient fully understands what has been explained and answered. I informed hospitalist Dr. Shah of this, aware. Patient signed form to sign out AMA and accepts responsibility for any and all results of this decision.      Jeff Madden MD  Department of Medicine Was called by RN due to patient wanting to sign out AMA. Asked patient why he wanted to leave, reports that he simply can't wait anymore for more information and wants to follow up with a pulmonologist outpatient and promises to see a pulmonologist and his PCP. He could not be convinced to stay. He would not agree to stay to wait for home oxygen forms to be completed as well.    Patient is AAO x 3. I explained at length to the patient the risks of signing out AMA including but not limited to harm, injury or death. I explained the risks, benefits and alternatives to treatment as well as the attendant risks of refusing treatment at this time. I offered to answer any questions and fully answered any such questions. We believe that the patient fully understands what has been explained and answered. I informed hospitalist Dr. Shah of this, aware. Patient signed form to sign out AMA and accepts responsibility for any and all results of this decision.      Jeff Madden MD  Department of Medicine

## 2024-01-18 NOTE — PROGRESS NOTE ADULT - PROBLEM SELECTOR PLAN 2
Pt w/ hx of COPD. Home meds: anoro-ellipta 62.5-25 qd, albuterol 1 puff q6hr prn  Has been using his albuterol more often recently, sometimes 1-2x/day w/ relief of SOB.   Anoro-ellipta not available   - c symbicort substitute   - c w duonebs PRN  - albuterol as needed Pt w/ hx of COPD. Home meds: anoro-ellipta 62.5-25 qd, albuterol 1 puff q6hr prn  Has been using his albuterol more often recently, sometimes 1-2x/day w/ relief of SOB.   Anoro-ellipta not available   - c symbicort substitute   - c w duonebs PRN  - albuterol as needed  - given amb sats 83%, will get the process started for home O2  - outpt pulm apt

## 2024-01-18 NOTE — PROGRESS NOTE ADULT - REASON FOR ADMISSION
Malignancy evaluation, SOB

## 2024-01-18 NOTE — CHART NOTE - NSCHARTNOTEFT_GEN_A_CORE
Dx : COPD (ICD10: J44. 9)  - Room air pulse ox. at rest:  88%   - Room air pulse ox. while ambulatin%  - Pulse ox while ambulating on 2 liters n/c  O2 91%    Patient will require home O2 for discharge.  Patient is aware and agreeable to home O2.  Patient is in a chronic stable state of COPD.    - Jeff Madden MD

## 2024-01-18 NOTE — PROGRESS NOTE ADULT - PROBLEM SELECTOR PLAN 5
P/w Hgb 11, normal MCV. No concern for acute bleed.   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 Cannot r/o alcohol related etiology.   B12, folate WNL.  - CANDIDO  - CTM  - type and screen, maintain active

## 2024-01-19 LAB
-  CEFTRIAXONE: SIGNIFICANT CHANGE UP
-  PENICILLIN: SIGNIFICANT CHANGE UP
CULTURE RESULTS: ABNORMAL
METHOD TYPE: SIGNIFICANT CHANGE UP
METHOD TYPE: SIGNIFICANT CHANGE UP
ORGANISM # SPEC MICROSCOPIC CNT: ABNORMAL
ORGANISM # SPEC MICROSCOPIC CNT: ABNORMAL
ORGANISM # SPEC MICROSCOPIC CNT: SIGNIFICANT CHANGE UP
SPECIMEN SOURCE: SIGNIFICANT CHANGE UP

## 2024-02-14 LAB
CULTURE RESULTS: SIGNIFICANT CHANGE UP
CULTURE RESULTS: SIGNIFICANT CHANGE UP
SPECIMEN SOURCE: SIGNIFICANT CHANGE UP
SPECIMEN SOURCE: SIGNIFICANT CHANGE UP

## 2024-06-02 NOTE — ED PROVIDER NOTE - PENIS
Pt A&Ox0-1. Pt asymptomatic. VSS. No s/s of bleeding. Pt denies cp, denies SOB, denies pain. no lesions

## 2025-03-10 ENCOUNTER — EMERGENCY (EMERGENCY)
Facility: HOSPITAL | Age: 80
LOS: 1 days | Discharge: LEFT BEFORE DISCHARGE | End: 2025-03-10
Attending: STUDENT IN AN ORGANIZED HEALTH CARE EDUCATION/TRAINING PROGRAM | Admitting: STUDENT IN AN ORGANIZED HEALTH CARE EDUCATION/TRAINING PROGRAM
Payer: MEDICARE

## 2025-03-10 VITALS
WEIGHT: 218.26 LBS | RESPIRATION RATE: 18 BRPM | DIASTOLIC BLOOD PRESSURE: 105 MMHG | OXYGEN SATURATION: 91 % | HEART RATE: 88 BPM | SYSTOLIC BLOOD PRESSURE: 190 MMHG | TEMPERATURE: 97 F

## 2025-03-10 VITALS
DIASTOLIC BLOOD PRESSURE: 106 MMHG | RESPIRATION RATE: 18 BRPM | SYSTOLIC BLOOD PRESSURE: 188 MMHG | HEART RATE: 86 BPM | OXYGEN SATURATION: 92 %

## 2025-03-10 DIAGNOSIS — Z90.49 ACQUIRED ABSENCE OF OTHER SPECIFIED PARTS OF DIGESTIVE TRACT: Chronic | ICD-10-CM

## 2025-03-10 PROBLEM — Z87.438 PERSONAL HISTORY OF OTHER DISEASES OF MALE GENITAL ORGANS: Chronic | Status: ACTIVE | Noted: 2024-01-16

## 2025-03-10 LAB
ALBUMIN SERPL ELPH-MCNC: 3.3 G/DL — LOW (ref 3.4–5)
ALP SERPL-CCNC: 84 U/L — SIGNIFICANT CHANGE UP (ref 40–120)
ALT FLD-CCNC: 29 U/L — SIGNIFICANT CHANGE UP (ref 12–42)
ANION GAP SERPL CALC-SCNC: 12 MMOL/L — SIGNIFICANT CHANGE UP (ref 9–16)
AST SERPL-CCNC: 23 U/L — SIGNIFICANT CHANGE UP (ref 15–37)
BILIRUB SERPL-MCNC: 1.3 MG/DL — HIGH (ref 0.2–1.2)
BUN SERPL-MCNC: 21 MG/DL — SIGNIFICANT CHANGE UP (ref 7–23)
CALCIUM SERPL-MCNC: 9 MG/DL — SIGNIFICANT CHANGE UP (ref 8.5–10.5)
CHLORIDE SERPL-SCNC: 107 MMOL/L — SIGNIFICANT CHANGE UP (ref 96–108)
CO2 SERPL-SCNC: 26 MMOL/L — SIGNIFICANT CHANGE UP (ref 22–31)
CREAT SERPL-MCNC: 1.33 MG/DL — HIGH (ref 0.5–1.3)
EGFR: 54 ML/MIN/1.73M2 — LOW
FLUAV AG NPH QL: SIGNIFICANT CHANGE UP
FLUBV AG NPH QL: SIGNIFICANT CHANGE UP
GLUCOSE SERPL-MCNC: 105 MG/DL — HIGH (ref 70–99)
HCT VFR BLD CALC: 41.6 % — SIGNIFICANT CHANGE UP (ref 39–50)
HGB BLD-MCNC: 13.3 G/DL — SIGNIFICANT CHANGE UP (ref 13–17)
MCHC RBC-ENTMCNC: 31.7 PG — SIGNIFICANT CHANGE UP (ref 27–34)
MCHC RBC-ENTMCNC: 32 G/DL — SIGNIFICANT CHANGE UP (ref 32–36)
MCV RBC AUTO: 99 FL — SIGNIFICANT CHANGE UP (ref 80–100)
NRBC # BLD AUTO: 0 K/UL — SIGNIFICANT CHANGE UP (ref 0–0)
NRBC # FLD: 0 K/UL — SIGNIFICANT CHANGE UP (ref 0–0)
NRBC BLD AUTO-RTO: 0 /100 WBCS — SIGNIFICANT CHANGE UP (ref 0–0)
NT-PROBNP SERPL-SCNC: 5575 PG/ML — HIGH
PLATELET # BLD AUTO: 163 K/UL — SIGNIFICANT CHANGE UP (ref 150–400)
PMV BLD: 10.3 FL — SIGNIFICANT CHANGE UP (ref 7–13)
POTASSIUM SERPL-MCNC: 3.7 MMOL/L — SIGNIFICANT CHANGE UP (ref 3.5–5.3)
POTASSIUM SERPL-SCNC: 3.7 MMOL/L — SIGNIFICANT CHANGE UP (ref 3.5–5.3)
PROT SERPL-MCNC: 7.5 G/DL — SIGNIFICANT CHANGE UP (ref 6.4–8.2)
RBC # BLD: 4.2 M/UL — SIGNIFICANT CHANGE UP (ref 4.2–5.8)
RBC # FLD: 14.9 % — HIGH (ref 10.3–14.5)
RSV RNA NPH QL NAA+NON-PROBE: SIGNIFICANT CHANGE UP
SARS-COV-2 RNA SPEC QL NAA+PROBE: SIGNIFICANT CHANGE UP
SODIUM SERPL-SCNC: 145 MMOL/L — SIGNIFICANT CHANGE UP (ref 132–145)
TROPONIN I, HIGH SENSITIVITY RESULT: 115.8 NG/L — HIGH
TROPONIN I, HIGH SENSITIVITY RESULT: 116 NG/L — HIGH
WBC # BLD: 6.7 K/UL — SIGNIFICANT CHANGE UP (ref 3.8–10.5)
WBC # FLD AUTO: 6.7 K/UL — SIGNIFICANT CHANGE UP (ref 3.8–10.5)

## 2025-03-10 PROCEDURE — 99285 EMERGENCY DEPT VISIT HI MDM: CPT

## 2025-03-10 PROCEDURE — 71046 X-RAY EXAM CHEST 2 VIEWS: CPT | Mod: 26

## 2025-03-10 RX ORDER — LABETALOL HYDROCHLORIDE 200 MG/1
10 TABLET, FILM COATED ORAL ONCE
Refills: 0 | Status: COMPLETED | OUTPATIENT
Start: 2025-03-10 | End: 2025-03-10

## 2025-03-10 RX ORDER — METOPROLOL SUCCINATE 50 MG/1
5 TABLET, EXTENDED RELEASE ORAL ONCE
Refills: 0 | Status: ACTIVE | OUTPATIENT
Start: 2025-03-10 | End: 2025-03-10

## 2025-03-10 RX ADMIN — LABETALOL HYDROCHLORIDE 10 MILLIGRAM(S): 200 TABLET, FILM COATED ORAL at 15:32

## 2025-03-10 RX ADMIN — Medication 500 MILLILITER(S): at 18:29

## 2025-03-10 RX ADMIN — LABETALOL HYDROCHLORIDE 10 MILLIGRAM(S): 200 TABLET, FILM COATED ORAL at 16:58

## 2025-03-10 RX ADMIN — Medication 500 MILLILITER(S): at 16:58

## 2025-03-10 NOTE — ED PROVIDER NOTE - CLINICAL SUMMARY MEDICAL DECISION MAKING FREE TEXT BOX
81y/o M p/w c/o SOB found to be hypertensive. The patient is an 81 y/o M hx of COPD p/w c/o SOB - on initial examination patient found to be profoundly hypertensive >190 systolic. Patient states that he was in his usual state of health up until day of presentation when he presented (at the request of his primary care provider). He denies any symptoms of chest pain, SOB, or difficulty breathing. He denies any current difficulty breathing, SOB has subsided, he also denies any f/c. He denies prior treatment for HTN.    On exam, patient in no acute distress, labs reveal elevated troponin >500 (concerning for htn emergency) patient initially provided labetolol x 2 with some response however upon reexamination patient adamant that he would like to leave, patient explained concerns and need for admission, patient expressed understanding of risk of HTN, continues to decline treatment, but initially agreeable to intervention. Patient then eloped after discussion.

## 2025-03-10 NOTE — ED ADULT NURSE NOTE - OBJECTIVE STATEMENT
Walk in with C/O bilateral leg swelling and [place] "for months. Hypertensive in triage with SpO2 91%. Speaking in full sentences. Hx COPD and HTN, home o2 "sometimes".

## 2025-03-10 NOTE — ED ADULT TRIAGE NOTE - CHIEF COMPLAINT QUOTE
Walk in with C/O bilateral leg swelling and P[place] "for months. Hypertensive in triage with SpO2 91%. Speaking in full sentences.

## 2025-03-10 NOTE — ED PROVIDER NOTE - NSICDXPASTMEDICALHX_GEN_ALL_CORE_FT
PAST MEDICAL HISTORY:  COPD (chronic obstructive pulmonary disease)     History of BPH     HTN (hypertension)     Kidney stone

## 2025-03-10 NOTE — ED PROVIDER NOTE - OBJECTIVE STATEMENT
81y/o M p/w c/o SOB found to be hypertensive. The patient is an 79 y/o M hx of COPD p/w c/o SOB - on initial examination patient found to be profoundly hypertensive >190 systolic. Patient states that he was in his usual state of health up until day of presentation when he presented (at the request of his primary care provider). He denies any symptoms of chest pain, SOB, or difficulty breathing. He denies any current difficulty breathing, SOB has subsided, he also denies any f/c. He denies prior treatment for HTN.

## 2025-03-13 DIAGNOSIS — Z53.29 PROCEDURE AND TREATMENT NOT CARRIED OUT BECAUSE OF PATIENT'S DECISION FOR OTHER REASONS: ICD-10-CM

## 2025-03-13 DIAGNOSIS — I10 ESSENTIAL (PRIMARY) HYPERTENSION: ICD-10-CM

## 2025-03-13 DIAGNOSIS — R06.02 SHORTNESS OF BREATH: ICD-10-CM

## 2025-03-13 DIAGNOSIS — Z88.0 ALLERGY STATUS TO PENICILLIN: ICD-10-CM

## 2025-03-13 DIAGNOSIS — J44.9 CHRONIC OBSTRUCTIVE PULMONARY DISEASE, UNSPECIFIED: ICD-10-CM

## 2025-04-12 VITALS
TEMPERATURE: 98 F | DIASTOLIC BLOOD PRESSURE: 104 MMHG | OXYGEN SATURATION: 88 % | WEIGHT: 164.91 LBS | HEART RATE: 95 BPM | RESPIRATION RATE: 17 BRPM | SYSTOLIC BLOOD PRESSURE: 166 MMHG

## 2025-04-12 LAB
ALBUMIN SERPL ELPH-MCNC: 3 G/DL — LOW (ref 3.4–5)
ALP SERPL-CCNC: 83 U/L — SIGNIFICANT CHANGE UP (ref 40–120)
ALT FLD-CCNC: 22 U/L — SIGNIFICANT CHANGE UP (ref 12–42)
ANION GAP SERPL CALC-SCNC: 10 MMOL/L — SIGNIFICANT CHANGE UP (ref 9–16)
APTT BLD: 20.7 SEC — LOW (ref 24.5–35.6)
AST SERPL-CCNC: 46 U/L — HIGH (ref 15–37)
BASOPHILS # BLD AUTO: 0.04 K/UL — SIGNIFICANT CHANGE UP (ref 0–0.2)
BASOPHILS NFR BLD AUTO: 0.2 % — SIGNIFICANT CHANGE UP (ref 0–2)
BILIRUB SERPL-MCNC: 3.4 MG/DL — HIGH (ref 0.2–1.2)
BUN SERPL-MCNC: 26 MG/DL — HIGH (ref 7–23)
CALCIUM SERPL-MCNC: 8.8 MG/DL — SIGNIFICANT CHANGE UP (ref 8.5–10.5)
CHLORIDE SERPL-SCNC: 106 MMOL/L — SIGNIFICANT CHANGE UP (ref 96–108)
CO2 SERPL-SCNC: 26 MMOL/L — SIGNIFICANT CHANGE UP (ref 22–31)
CREAT SERPL-MCNC: 1.52 MG/DL — HIGH (ref 0.5–1.3)
EGFR: 46 ML/MIN/1.73M2 — LOW
EGFR: 46 ML/MIN/1.73M2 — LOW
EOSINOPHIL # BLD AUTO: 0.01 K/UL — SIGNIFICANT CHANGE UP (ref 0–0.5)
EOSINOPHIL NFR BLD AUTO: 0.1 % — SIGNIFICANT CHANGE UP (ref 0–6)
GLUCOSE SERPL-MCNC: 124 MG/DL — HIGH (ref 70–99)
HCT VFR BLD CALC: 40.8 % — SIGNIFICANT CHANGE UP (ref 39–50)
HGB BLD-MCNC: 13.5 G/DL — SIGNIFICANT CHANGE UP (ref 13–17)
IMM GRANULOCYTES # BLD AUTO: 0.15 K/UL — HIGH (ref 0–0.07)
IMM GRANULOCYTES NFR BLD AUTO: 0.8 % — SIGNIFICANT CHANGE UP (ref 0–0.9)
INR BLD: 1.08 — SIGNIFICANT CHANGE UP (ref 0.85–1.16)
LACTATE BLDV-MCNC: 1.4 MMOL/L — SIGNIFICANT CHANGE UP (ref 0.5–2)
LACTATE BLDV-MCNC: 2.8 MMOL/L — HIGH (ref 0.5–2)
LYMPHOCYTES # BLD AUTO: 1.14 K/UL — SIGNIFICANT CHANGE UP (ref 1–3.3)
LYMPHOCYTES NFR BLD AUTO: 6.3 % — LOW (ref 13–44)
MCHC RBC-ENTMCNC: 32.1 PG — SIGNIFICANT CHANGE UP (ref 27–34)
MCHC RBC-ENTMCNC: 33.1 G/DL — SIGNIFICANT CHANGE UP (ref 32–36)
MCV RBC AUTO: 97.1 FL — SIGNIFICANT CHANGE UP (ref 80–100)
MONOCYTES # BLD AUTO: 1.51 K/UL — HIGH (ref 0–0.9)
MONOCYTES NFR BLD AUTO: 8.3 % — SIGNIFICANT CHANGE UP (ref 2–14)
NEUTROPHILS # BLD AUTO: 15.28 K/UL — HIGH (ref 1.8–7.4)
NEUTROPHILS NFR BLD AUTO: 84.3 % — HIGH (ref 43–77)
NRBC # BLD AUTO: 0 K/UL — SIGNIFICANT CHANGE UP (ref 0–0)
NRBC # FLD: 0 K/UL — SIGNIFICANT CHANGE UP (ref 0–0)
NRBC BLD AUTO-RTO: 0 /100 WBCS — SIGNIFICANT CHANGE UP (ref 0–0)
PLATELET # BLD AUTO: 149 K/UL — LOW (ref 150–400)
PMV BLD: 11.9 FL — SIGNIFICANT CHANGE UP (ref 7–13)
POTASSIUM SERPL-MCNC: 5.4 MMOL/L — HIGH (ref 3.5–5.3)
POTASSIUM SERPL-SCNC: 5.4 MMOL/L — HIGH (ref 3.5–5.3)
PROT SERPL-MCNC: 7 G/DL — SIGNIFICANT CHANGE UP (ref 6.4–8.2)
PROTHROM AB SERPL-ACNC: 12.6 SEC — SIGNIFICANT CHANGE UP (ref 9.9–13.4)
RBC # BLD: 4.2 M/UL — SIGNIFICANT CHANGE UP (ref 4.2–5.8)
RBC # FLD: 15.5 % — HIGH (ref 10.3–14.5)
SODIUM SERPL-SCNC: 142 MMOL/L — SIGNIFICANT CHANGE UP (ref 132–145)
WBC # BLD: 18.13 K/UL — HIGH (ref 3.8–10.5)
WBC # FLD AUTO: 18.13 K/UL — HIGH (ref 3.8–10.5)

## 2025-04-12 PROCEDURE — 93971 EXTREMITY STUDY: CPT | Mod: 26,RT

## 2025-04-12 PROCEDURE — 73590 X-RAY EXAM OF LOWER LEG: CPT | Mod: 26,RT

## 2025-04-12 PROCEDURE — 99285 EMERGENCY DEPT VISIT HI MDM: CPT | Mod: FS

## 2025-04-12 RX ORDER — CEFEPIME 2 G/20ML
2000 INJECTION, POWDER, FOR SOLUTION INTRAVENOUS ONCE
Refills: 0 | Status: COMPLETED | OUTPATIENT
Start: 2025-04-12 | End: 2025-04-12

## 2025-04-12 RX ORDER — LOSARTAN POTASSIUM 100 MG/1
50 TABLET, FILM COATED ORAL ONCE
Refills: 0 | Status: COMPLETED | OUTPATIENT
Start: 2025-04-12 | End: 2025-04-12

## 2025-04-12 RX ORDER — ACETAMINOPHEN 500 MG/5ML
650 LIQUID (ML) ORAL ONCE
Refills: 0 | Status: COMPLETED | OUTPATIENT
Start: 2025-04-12 | End: 2025-04-12

## 2025-04-12 RX ORDER — VANCOMYCIN HCL IN 5 % DEXTROSE 1.5G/250ML
1000 PLASTIC BAG, INJECTION (ML) INTRAVENOUS ONCE
Refills: 0 | Status: COMPLETED | OUTPATIENT
Start: 2025-04-12 | End: 2025-04-12

## 2025-04-12 RX ADMIN — Medication 1000 MILLILITER(S): at 19:40

## 2025-04-12 RX ADMIN — LOSARTAN POTASSIUM 50 MILLIGRAM(S): 100 TABLET, FILM COATED ORAL at 21:47

## 2025-04-12 RX ADMIN — CEFEPIME 100 MILLIGRAM(S): 2 INJECTION, POWDER, FOR SOLUTION INTRAVENOUS at 21:47

## 2025-04-12 RX ADMIN — Medication 650 MILLIGRAM(S): at 19:41

## 2025-04-12 RX ADMIN — Medication 250 MILLIGRAM(S): at 19:41

## 2025-04-12 NOTE — ED PROVIDER NOTE - AVIAN FLU SYMPTOMS
----- Message from Mike Noguera sent at 12/10/2024  3:23 PM CST -----  Regarding: Clinic visit  Please check the following labs: PTH, Vitamin D, DSA, and CD4 level.  
No

## 2025-04-12 NOTE — PHARMACOTHERAPY INTERVENTION NOTE - COMMENTS
Clarified with [facility] [phone] most recent medications.    As of 04/07/2025 patient is on :  - Atorvastatin 80mg - 1 tablet by mouth once a day  - Hydrochlorothiazide 12.5mg - 1 capsule by mouth once a day  - Clopidogrel 75mg - 1 tablet by mouth once a day  - Levothyroxine 50mcg - 1 tablet by mouth once a day ( patient did not  4/07 as per pharmacy)    All meds were prescribed by Dr. Anthony - [phone].    Thank you!

## 2025-04-12 NOTE — ED PROVIDER NOTE - CLINICAL SUMMARY MEDICAL DECISION MAKING FREE TEXT BOX
81yo M hx of HTN and COPD presents with R lower leg erythema and induration of skin with central wound with draining clear fluid.  Suspect cellulitis.  Will magdiel area, start IV abx, admit to medicine.

## 2025-04-12 NOTE — ED ADULT NURSE NOTE - NSFALLHARMRISKINTERV_ED_ALL_ED
Assistance OOB with selected safe patient handling equipment if applicable/Assistance with ambulation/Communicate risk of Fall with Harm to all staff, patient, and family/Monitor gait and stability/Provide visual cue: red socks, yellow wristband, yellow gown, etc/Reinforce activity limits and safety measures with patient and family/Bed in lowest position, wheels locked, appropriate side rails in place/Call bell, personal items and telephone in reach/Instruct patient to call for assistance before getting out of bed/chair/stretcher/Non-slip footwear applied when patient is off stretcher/[place] to call system/Physically safe environment - no spills, clutter or unnecessary equipment/Purposeful Proactive Rounding/Room/bathroom lighting operational, light cord in reach

## 2025-04-12 NOTE — ED PROVIDER NOTE - OBJECTIVE STATEMENT
79yo M hx of HTN, COPD not on home O2, presents with 3wks of RLE redness, pain, and swelling of lower leg.  Pt denies trauma to area.  No fever or chills.  Has open wound on R lower leg which has had clear fluid draining.  No hx of DM.

## 2025-04-12 NOTE — ED ADULT TRIAGE NOTE - CHIEF COMPLAINT QUOTE
pt c/o infection to the right lower extremity, pt states having a wound there for weeks and unsure how it  occurred. Hx COPD, O2 Sat 88% during triage, pt denies SOB.

## 2025-04-12 NOTE — ED ADULT NURSE NOTE - OBJECTIVE STATEMENT
Pt presents to ed with friend who brought him r/t RLE redness, swelling, wound weeping. Pt unsure of initial injury. Pt denies fever. +pain. able to ambulate but painful. sepsis protocol in process.

## 2025-04-12 NOTE — ED PROVIDER NOTE - PHYSICAL EXAMINATION
Constitutional: awake and alert, in no acute distress  HEENT: head normocephalic and atraumatic. moist mucous membranes  Eyes: extraocular movements intact, normal conjunctiva  Neck: supple, normal ROM  Cardiovascular: regular rate   Pulmonary: no respiratory distress  Gastrointestinal: abdomen flat and nondistended  Skin: warm, dry, normal for ethnicity  Musculoskeletal: 2+ edema in R lower leg, 1+ edema in L lower leg.  R lower leg with erythema and induration of skin to anterior lower leg from ankle to knee.  large central lesion with clear drainage.  Neurological: oriented x4, no focal neurologic deficit.   Psychiatric: calm and cooperative Constitutional: awake and alert, in no acute distress  HEENT: head normocephalic and atraumatic. moist mucous membranes  Eyes: extraocular movements intact, normal conjunctiva  Neck: supple, normal ROM  Cardiovascular: regular rate   Pulmonary: no respiratory distress  Gastrointestinal: abdomen flat and nondistended  Skin: warm, dry, normal for ethnicity  Musculoskeletal: 2+ edema in R lower leg, 1+ edema in L lower leg.  R lower leg with erythema and induration of skin to anterior lower leg from ankle to knee approx [id number].  large central lesion with clear drainage approx 3x4cm. bounding pulses bilat dp/pt  Neurological: oriented x4, no focal neurologic deficit.   Psychiatric: calm and cooperative

## 2025-04-13 ENCOUNTER — INPATIENT (INPATIENT)
Facility: HOSPITAL | Age: 80
LOS: 0 days | Discharge: LEFT BEFORE DISCHARGE | End: 2025-04-14
Attending: INTERNAL MEDICINE | Admitting: STUDENT IN AN ORGANIZED HEALTH CARE EDUCATION/TRAINING PROGRAM
Payer: MEDICARE

## 2025-04-13 DIAGNOSIS — J44.9 CHRONIC OBSTRUCTIVE PULMONARY DISEASE, UNSPECIFIED: ICD-10-CM

## 2025-04-13 DIAGNOSIS — Z29.9 ENCOUNTER FOR PROPHYLACTIC MEASURES, UNSPECIFIED: ICD-10-CM

## 2025-04-13 DIAGNOSIS — I10 ESSENTIAL (PRIMARY) HYPERTENSION: ICD-10-CM

## 2025-04-13 DIAGNOSIS — A41.9 SEPSIS, UNSPECIFIED ORGANISM: ICD-10-CM

## 2025-04-13 DIAGNOSIS — N17.9 ACUTE KIDNEY FAILURE, UNSPECIFIED: ICD-10-CM

## 2025-04-13 DIAGNOSIS — Z90.49 ACQUIRED ABSENCE OF OTHER SPECIFIED PARTS OF DIGESTIVE TRACT: Chronic | ICD-10-CM

## 2025-04-13 DIAGNOSIS — E87.5 HYPERKALEMIA: ICD-10-CM

## 2025-04-13 DIAGNOSIS — E80.6 OTHER DISORDERS OF BILIRUBIN METABOLISM: ICD-10-CM

## 2025-04-13 DIAGNOSIS — L03.90 CELLULITIS, UNSPECIFIED: ICD-10-CM

## 2025-04-13 LAB
A1C WITH ESTIMATED AVERAGE GLUCOSE RESULT: 5 % — SIGNIFICANT CHANGE UP (ref 4–5.6)
ADD ON TEST-SPECIMEN IN LAB: SIGNIFICANT CHANGE UP
ALBUMIN SERPL ELPH-MCNC: 2.9 G/DL — LOW (ref 3.3–5)
ALP SERPL-CCNC: 59 U/L — SIGNIFICANT CHANGE UP (ref 40–120)
ALT FLD-CCNC: 12 U/L — SIGNIFICANT CHANGE UP (ref 10–45)
ANION GAP SERPL CALC-SCNC: 11 MMOL/L — SIGNIFICANT CHANGE UP (ref 5–17)
AST SERPL-CCNC: 14 U/L — SIGNIFICANT CHANGE UP (ref 10–40)
BASOPHILS # BLD AUTO: 0.02 K/UL — SIGNIFICANT CHANGE UP (ref 0–0.2)
BASOPHILS NFR BLD AUTO: 0.2 % — SIGNIFICANT CHANGE UP (ref 0–2)
BILIRUB DIRECT SERPL-MCNC: 0.7 MG/DL — HIGH (ref 0–0.3)
BILIRUB INDIRECT FLD-MCNC: 1.4 MG/DL — HIGH (ref 0.2–1)
BILIRUB SERPL-MCNC: 2.1 MG/DL — HIGH (ref 0.2–1.2)
BILIRUB SERPL-MCNC: 2.1 MG/DL — HIGH (ref 0.2–1.2)
BUN SERPL-MCNC: 24 MG/DL — HIGH (ref 7–23)
CALCIUM SERPL-MCNC: 8.7 MG/DL — SIGNIFICANT CHANGE UP (ref 8.4–10.5)
CHLORIDE SERPL-SCNC: 108 MMOL/L — SIGNIFICANT CHANGE UP (ref 96–108)
CO2 SERPL-SCNC: 24 MMOL/L — SIGNIFICANT CHANGE UP (ref 22–31)
CREAT SERPL-MCNC: 1.27 MG/DL — SIGNIFICANT CHANGE UP (ref 0.5–1.3)
EGFR: 57 ML/MIN/1.73M2 — LOW
EGFR: 57 ML/MIN/1.73M2 — LOW
EOSINOPHIL # BLD AUTO: 0.04 K/UL — SIGNIFICANT CHANGE UP (ref 0–0.5)
EOSINOPHIL NFR BLD AUTO: 0.4 % — SIGNIFICANT CHANGE UP (ref 0–6)
ESTIMATED AVERAGE GLUCOSE: 97 MG/DL — SIGNIFICANT CHANGE UP (ref 68–114)
GLUCOSE SERPL-MCNC: 116 MG/DL — HIGH (ref 70–99)
HAPTOGLOB SERPL-MCNC: 133 MG/DL — SIGNIFICANT CHANGE UP (ref 34–200)
HCT VFR BLD CALC: 33.4 % — LOW (ref 39–50)
HGB BLD-MCNC: 11 G/DL — LOW (ref 13–17)
IMM GRANULOCYTES NFR BLD AUTO: 0.5 % — SIGNIFICANT CHANGE UP (ref 0–0.9)
LDH SERPL L TO P-CCNC: 191 U/L — SIGNIFICANT CHANGE UP (ref 50–242)
LYMPHOCYTES # BLD AUTO: 1.23 K/UL — SIGNIFICANT CHANGE UP (ref 1–3.3)
LYMPHOCYTES # BLD AUTO: 11.8 % — LOW (ref 13–44)
MAGNESIUM SERPL-MCNC: 1.8 MG/DL — SIGNIFICANT CHANGE UP (ref 1.6–2.6)
MCHC RBC-ENTMCNC: 32.2 PG — SIGNIFICANT CHANGE UP (ref 27–34)
MCHC RBC-ENTMCNC: 32.9 G/DL — SIGNIFICANT CHANGE UP (ref 32–36)
MCV RBC AUTO: 97.7 FL — SIGNIFICANT CHANGE UP (ref 80–100)
MONOCYTES # BLD AUTO: 1.02 K/UL — HIGH (ref 0–0.9)
MONOCYTES NFR BLD AUTO: 9.8 % — SIGNIFICANT CHANGE UP (ref 2–14)
NEUTROPHILS # BLD AUTO: 8.1 K/UL — HIGH (ref 1.8–7.4)
NEUTROPHILS NFR BLD AUTO: 77.3 % — HIGH (ref 43–77)
NRBC BLD AUTO-RTO: 0 /100 WBCS — SIGNIFICANT CHANGE UP (ref 0–0)
PHOSPHATE SERPL-MCNC: 4.3 MG/DL — SIGNIFICANT CHANGE UP (ref 2.5–4.5)
PLATELET # BLD AUTO: 114 K/UL — LOW (ref 150–400)
POTASSIUM SERPL-MCNC: 3.2 MMOL/L — LOW (ref 3.5–5.3)
POTASSIUM SERPL-SCNC: 3.2 MMOL/L — LOW (ref 3.5–5.3)
PROT SERPL-MCNC: 5.1 G/DL — LOW (ref 6–8.3)
RBC # BLD: 3.42 M/UL — LOW (ref 4.2–5.8)
RBC # FLD: 15.3 % — HIGH (ref 10.3–14.5)
SODIUM SERPL-SCNC: 143 MMOL/L — SIGNIFICANT CHANGE UP (ref 135–145)
WBC # BLD: 10.46 K/UL — SIGNIFICANT CHANGE UP (ref 3.8–10.5)
WBC # FLD AUTO: 10.46 K/UL — SIGNIFICANT CHANGE UP (ref 3.8–10.5)

## 2025-04-13 PROCEDURE — 99223 1ST HOSP IP/OBS HIGH 75: CPT

## 2025-04-13 PROCEDURE — 76705 ECHO EXAM OF ABDOMEN: CPT | Mod: 26

## 2025-04-13 PROCEDURE — 71045 X-RAY EXAM CHEST 1 VIEW: CPT | Mod: 26

## 2025-04-13 RX ORDER — CEFAZOLIN SODIUM IN 0.9 % NACL 3 G/100 ML
2000 INTRAVENOUS SOLUTION, PIGGYBACK (ML) INTRAVENOUS EVERY 8 HOURS
Refills: 0 | Status: DISCONTINUED | OUTPATIENT
Start: 2025-04-13 | End: 2025-04-14

## 2025-04-13 RX ORDER — IPRATROPIUM BROMIDE AND ALBUTEROL SULFATE .5; 2.5 MG/3ML; MG/3ML
3 SOLUTION RESPIRATORY (INHALATION) EVERY 6 HOURS
Refills: 0 | Status: DISCONTINUED | OUTPATIENT
Start: 2025-04-13 | End: 2025-04-14

## 2025-04-13 RX ORDER — ACETAMINOPHEN 500 MG/5ML
650 LIQUID (ML) ORAL EVERY 6 HOURS
Refills: 0 | Status: DISCONTINUED | OUTPATIENT
Start: 2025-04-13 | End: 2025-04-14

## 2025-04-13 RX ORDER — CLOPIDOGREL BISULFATE 75 MG/1
75 TABLET, FILM COATED ORAL DAILY
Refills: 0 | Status: DISCONTINUED | OUTPATIENT
Start: 2025-04-13 | End: 2025-04-14

## 2025-04-13 RX ORDER — VANCOMYCIN HCL IN 5 % DEXTROSE 1.5G/250ML
1250 PLASTIC BAG, INJECTION (ML) INTRAVENOUS EVERY 24 HOURS
Refills: 0 | Status: DISCONTINUED | OUTPATIENT
Start: 2025-04-13 | End: 2025-04-13

## 2025-04-13 RX ORDER — MELATONIN 5 MG
3 TABLET ORAL AT BEDTIME
Refills: 0 | Status: DISCONTINUED | OUTPATIENT
Start: 2025-04-13 | End: 2025-04-14

## 2025-04-13 RX ORDER — HEPARIN SODIUM 1000 [USP'U]/ML
5000 INJECTION INTRAVENOUS; SUBCUTANEOUS EVERY 8 HOURS
Refills: 0 | Status: DISCONTINUED | OUTPATIENT
Start: 2025-04-13 | End: 2025-04-14

## 2025-04-13 RX ORDER — LEVOTHYROXINE SODIUM 300 MCG
50 TABLET ORAL DAILY
Refills: 0 | Status: DISCONTINUED | OUTPATIENT
Start: 2025-04-13 | End: 2025-04-14

## 2025-04-13 RX ORDER — SODIUM CHLORIDE 9 G/1000ML
1000 INJECTION, SOLUTION INTRAVENOUS
Refills: 0 | Status: DISCONTINUED | OUTPATIENT
Start: 2025-04-13 | End: 2025-04-14

## 2025-04-13 RX ADMIN — Medication 3 MILLIGRAM(S): at 03:18

## 2025-04-13 RX ADMIN — HEPARIN SODIUM 5000 UNIT(S): 1000 INJECTION INTRAVENOUS; SUBCUTANEOUS at 13:53

## 2025-04-13 RX ADMIN — HEPARIN SODIUM 5000 UNIT(S): 1000 INJECTION INTRAVENOUS; SUBCUTANEOUS at 22:33

## 2025-04-13 RX ADMIN — SODIUM CHLORIDE 250 MILLILITER(S): 9 INJECTION, SOLUTION INTRAVENOUS at 06:07

## 2025-04-13 RX ADMIN — HEPARIN SODIUM 5000 UNIT(S): 1000 INJECTION INTRAVENOUS; SUBCUTANEOUS at 06:07

## 2025-04-13 RX ADMIN — Medication 3 MILLIGRAM(S): at 22:33

## 2025-04-13 RX ADMIN — Medication 100 MILLIGRAM(S): at 13:53

## 2025-04-13 NOTE — H&P ADULT - ATTENDING COMMENTS
80M former smoker with COPD (intermittent home O2 use), HTN, who presented with pain/redness of RLE for a few weeks. Patient states he does not have a PCP and did not otherwise seek care until now. Patient admitted for sepsis 2/2 non-purulent cellulitis.     By patient report and physical exam today cellulitis appears to be improving/receding  Given lack of obvious purulence will treat with cefazolin, monitor daily  Given patient reports he lives alone and has 3 flights of stairs to his apartment that he struggles with will request  PT ema for safe dispo  COPD - patient initially says he hasn't seen a pulmonologist in a while but then says he saw a doctor 1 month ago and thinks it was a pulmonologist; unable to give clear history about home inhaler use and med rec not noting any inhalers  For now will continue with supplemental O2 to target sat 88-92 and prn duonebs; consider pulm consult for COPD optimization versus close outpatient follow up  Also highly encouraged patient to establish with a PCP after discharge (will have to clarify his relationship with Dr Anthony who is listed per pharmacy -- see pharmacotherapy note from 4/12/25-- as current prescriber of plavix, synthroid, lipitor, and hctz although name and phone number leads to a family doctor who appears to be practicing in [place])  As pharmacy has confirmed these are current meds will continue and obtain further collateral as able    Dispo: pending PT ema

## 2025-04-13 NOTE — H&P ADULT - NSHPLABSRESULTS_GEN_ALL_CORE
LABS:                        13.5   18.13 )-----------( 149      ( 12 Apr 2025 19:11 )             40.8     04-12    142  |  106  |  26[H]  ----------------------------<  124[H]  5.4[H]   |  26  |  1.52[H]    Ca    8.8      12 Apr 2025 19:11    TPro  7.0  /  Alb  3.0[L]  /  TBili  3.4[H]  /  DBili  x   /  AST  46[H]  /  ALT  22  /  AlkPhos  83  04-12    PT/INR - ( 12 Apr 2025 19:11 )   PT: 12.6 sec;   INR: 1.08          PTT - ( 12 Apr 2025 19:11 )  PTT:20.7 sec  Urinalysis Basic - ( 12 Apr 2025 19:11 )    Color: x / Appearance: x / SG: x / pH: x  Gluc: 124 mg/dL / Ketone: x  / Bili: x / Urobili: x   Blood: x / Protein: x / Nitrite: x   Leuk Esterase: x / RBC: x / WBC x   Sq Epi: x / Non Sq Epi: x / Bacteria: x          RADIOLOGY & ADDITIONAL TESTS:  Reviewed

## 2025-04-13 NOTE — H&P ADULT - HISTORY OF PRESENT ILLNESS
80M w/ PMhx of HTN, COPD not on home O2, presents with 3wks of RLE redness, pain, and swelling of lower leg.  Pt denies trauma to area.  No fever or chills.  Has open wound on R lower leg which has had clear fluid draining.  No hx of DM.    ED Course:  Vitals: 97.5F, HR 95, /104, RR 17 (88%) on RA -> 97% on RA  Labs: WCC 18.13 w/ 84% neutrophils, Plt 149, K+ 5.4, BUN 26, Cr 1.52, Tbili 3.4, AST 46, lactate 2.8 -> 1.4   Imaging: RLE duplex w/ soft tissue edema below the knee w/o DVT, XR fibula w/o obvious fx  EKG: prolonged QTc, RBBB, ?PACs  Interventions: cefepime 2g, vancomycin 1g, NS 1L, losartan 50mg, tylenol 650mg        80M w/ PMhx of HTN, COPD on home O2, presents with 2wks of RLE redness, pain, and swelling of RLE. Patient does recall sustaining a wound to the RLE however cannot recall details. No fever or chills. Claims that he noticed purulent drainage from the wound. Patient additionally complains of chronic cough and SOB due to his COPD - unchanged.    ED Course:  Vitals: 97.5F, HR 95, /104, RR 17 (88%) on RA -> 97% on RA  Labs: WCC 18.13 w/ 84% neutrophils, Plt 149, K+ 5.4, BUN 26, Cr 1.52, Tbili 3.4, AST 46, lactate 2.8 -> 1.4   Imaging: RLE duplex w/ soft tissue edema below the knee w/o DVT, XR fibula w/o obvious fx  EKG: prolonged QTc, RBBB, ?PACs  Interventions: cefepime 2g, vancomycin 1g, NS 1L, losartan 50mg, tylenol 650mg

## 2025-04-13 NOTE — H&P ADULT - PROBLEM SELECTOR PLAN 1
On admission met 2/4 SIRS criteria with HR > 90, WBC >12.  Likely source RLE cellulitis. Lactate 2.8 -> 1.4 s/p 1L NS in ED. Given cefepime 2g and vancomycin 1g iso penicillin allergy.   - Abx  - f/u blood cxs  - give additional 1.5L IVF to complete 30cc/kg resuscitation On admission met 2/4 SIRS criteria with HR > 90, WBC >12.  Likely source RLE cellulitis. Lactate 2.8 -> 1.4 s/p 1L NS in ED. Given cefepime 2g and vancomycin 1g iso penicillin allergy.   - Abx vancomycin 15mg/kg renally dosed  - f/u blood cxs  - give additional 1.5L IVF to complete 30cc/kg resuscitation

## 2025-04-13 NOTE — PATIENT PROFILE ADULT - FALL HARM RISK - HARM RISK INTERVENTIONS

## 2025-04-13 NOTE — H&P ADULT - PROBLEM SELECTOR PLAN 6
Pt w/ Hx of HTN managed w/ HCTZ 12.5mg QD. Goal BP <130/80.    - DASH diet  - Meds: hold iso MONA and sepsis

## 2025-04-13 NOTE — H&P ADULT - PROBLEM SELECTOR PLAN 8
F: s/p 1L NS in the ED -> additional 1.5L IVF to compete 30cc/kg sepsis resuscitation   E: replete K<4, Mg<2  N: DASH  VTE Prophylaxis: heparin TID  GI: none  C: Full Code  D: RMF

## 2025-04-13 NOTE — H&P ADULT - PROBLEM SELECTOR PLAN 7
Pt w/ hx of COPD. Home meds: anoro-ellipta 62.5-25 qd, albuterol 1 puff q6hr prn.   - c symbicort TI  - c w duslim PRN  - outpt pulm apt. Pt w/ hx of COPD. Home meds: anoro-ellipta 62.5-25 qd, albuterol 1 puff q6hr prn.   - c symbicort TI  - c w duonebs PRN  - outpt pulm apt.    #Mediastinal and hilar lymphadenopathy  On last admission The CT demonstrated large lymphadenopathy in the right paratracheal and subcarinal area. Also with extension to the esophageal wall. Lung parenchymal with smooth interlobar septal thickening which can be due to fluid overload or due to lymphangitic spread. IP performed a bronch with BAL, endobronchial biopsies, and EBUS w/ TBNA of stations 7, 4L, 4R, 11R and airway appeared infectious with thick white purulent secretions. Multiple specimens sent for testing: negative AFB, negative bacterial and fungal growth, negative for malignancy; appears to have inflammatory cells. Was planned for OP PET and further w/u but failed to f/u. Bx showed a marked acute inflammatory infiltrate.  Alveolar macrophages and reactive bronchial epithelial cells are present in the background.  The cellblock shows similar findings.  GMS stain is negative for pneumocystis and fungi.  AFB stain is negative for acid-fast microorganisms. + Strep intermedius  - OP f/u w/ pulm   - f/u CXR Pt w/ hx of COPD. No home inhalers.   - c w duonebs PRN  - outpt pulm apt.    #Mediastinal and hilar lymphadenopathy  On last admission The CT demonstrated large lymphadenopathy in the right paratracheal and subcarinal area. Also with extension to the esophageal wall. Lung parenchymal with smooth interlobar septal thickening which can be due to fluid overload or due to lymphangitic spread. IP performed a bronch with BAL, endobronchial biopsies, and EBUS w/ TBNA of stations 7, 4L, 4R, 11R and airway appeared infectious with thick white purulent secretions. Multiple specimens sent for testing: negative AFB, negative bacterial and fungal growth, negative for malignancy; appears to have inflammatory cells. Was planned for OP PET and further w/u but failed to f/u. Bx showed a marked acute inflammatory infiltrate.  Alveolar macrophages and reactive bronchial epithelial cells are present in the background.  The cellblock shows similar findings.  GMS stain is negative for pneumocystis and fungi.  AFB stain is negative for acid-fast microorganisms. + Strep intermedius  - OP f/u w/ pulm   - f/u CXR

## 2025-04-13 NOTE — H&P ADULT - NSHPSOCIALHISTORY_GEN_ALL_CORE
Marital Status:  (   )    (  ) Single    (   )    (  )   Lives with: (  ) alone  (  ) children   (  ) spouse   (  ) parents  (  ) other  Recent Travel: No recent travel  Occupation:   Mobility:   Functional status:   Substance Use (street drugs): (  ) never used  (  ) other:  Tobacco Usage:  (   ) never smoked   (   ) former smoker   (   ) current smoker  (     ) pack year  Alcohol Usage: - Lives with: alone  Recent Travel: No recent travel  Occupation: retired  Mobility: no mobility aids  Functional status: independent of ADLs however struggling on his own  Substance Use (street drugs): never used  Tobacco Usage: former smoker - quit 4y ago - started at age 17 - 1pack per day  Alcohol Usage: 1 glass of wine daily - no withdrawal history

## 2025-04-13 NOTE — H&P ADULT - PROBLEM SELECTOR PLAN 4
TBili 3.4. ALT22, AST 46 and ALP 83. R-Factor 0.8 - cholestatic. DDx hepatic (sepsis, medication, choledocholithiasis, and less likely malignancy, PSC and PBC) vs indirect (hemolysis, Gilbert's).   - fractionate bili  - RUQUS  - hemolysis labs TBili 3.4. ALT22, AST 46 and ALP 83. R-Factor 0.8 - cholestatic. On exam w/ hepatomegaly and fluid thrill. DDx hepatic (sepsis, medication, choledocholithiasis, and less likely malignancy, PSC and PBC) vs indirect (hemolysis, Gilbert's).  - fractionate bili  - abdominal US  - hemolysis labs

## 2025-04-13 NOTE — PATIENT PROFILE ADULT - DOES PATIENT HAVE ADVANCE DIRECTIVE
Repeat BP performed.    Vitals:    04/16/22 1556   BP: (!) 179/80   Pulse:    Resp:    Temp:        
No

## 2025-04-13 NOTE — H&P ADULT - ASSESSMENT
80M w/ PMhx of HTN, COPD not on home O2, presents with 3wks of RLE redness, pain, and swelling of lower leg, found to have cellulitis and admitted for IV abx,

## 2025-04-13 NOTE — H&P ADULT - PROBLEM SELECTOR PLAN 3
K 5.4. Likely 2/2 home losartan?. No EKG changes.   - trend BMP in AM  - Lokelma 5g if cont K 5.4. No EKG changes.   - trend BMP in AM  - Lokelma 5g if cont to be elevated

## 2025-04-13 NOTE — H&P ADULT - PROBLEM SELECTOR PLAN 5
Patient noted to have MONA with Cr 1.52 over baseline 1.1. Likely prerenal etiology 2/2 sepsis. Currently producing urine. s/p 1L IVF in ED.  - trend Cr  - avoid nephrotoxic drugs, renally dose meds - hold home HCTZ  - will consider obtaining urine lytes if not improving

## 2025-04-13 NOTE — H&P ADULT - NSHPPHYSICALEXAM_GEN_ALL_CORE
VITAL SIGNS:  T(F): 97.2 (04-13-25 @ 01:28)  HR: 76 (04-13-25 @ 01:28)  BP: 136/79 (04-13-25 @ 01:28)  RR: 16 (04-13-25 @ 01:28)  SpO2: 97% (04-13-25 @ 01:28)  Wt(kg): --        PHYSICAL EXAM:  Constitutional: NAD  HEENT: PERRL, EOMI, sclera non-icteric, neck supple, trachea midline, no masses, no JVD, MMM  Respiratory: CTA b/l, good air entry b/l, no wheezing, no rhonchi, no rales, without accessory muscle use and no intercostal retractions  Cardiovascular: RRR, normal S1S2, no M/R/G  Gastrointestinal: soft, NTND, no masses palpable, BS normal  Extremities: Warm, well perfused, pulses equal bilateral upper and lower extremities, no edema, no clubbing  Neurological: AAOx3, CN Grossly intact  Skin: Normal temperature, warm, dry VITAL SIGNS:  T(F): 97.2 (04-13-25 @ 01:28)  HR: 76 (04-13-25 @ 01:28)  BP: 136/79 (04-13-25 @ 01:28)  RR: 16 (04-13-25 @ 01:28)  SpO2: 97% (04-13-25 @ 01:28)  Wt(kg): --    PHYSICAL EXAM:  Constitutional: NAD  HEENT: PERRL, EOMI, sclera non-icteric, neck supple, trachea midline, no masses, no JVD, MMM  Respiratory: scattered wheezing b/l, no rhonchi, no rales, without accessory muscle use and no intercostal retractions  Cardiovascular: RRR, normal S1S2, no M/R/G  Gastrointestinal: soft, NT, distended w/ a fluid thrill, no masses palpable but liver edge palpable 1cm below R costal margin, BS normal, ferrari negative  Extremities: Warm, well perfused, pulses equal bilateral upper and lower extremities, no edema in LLE, no clubbing, RLE w/ a 2x2cm healed wound w/o exudate on lateral side below the knee. Redness, warmth and soft tissue swelling extending among lateral and anterior aspect of RLE from knee to ankle.  Neurological: AAOx3, CN Grossly intact  Skin: Normal temperature, warm, dry VITAL SIGNS:  T(F): 97.2 (04-13-25 @ 01:28)  HR: 76 (04-13-25 @ 01:28)  BP: 136/79 (04-13-25 @ 01:28)  RR: 16 (04-13-25 @ 01:28)  SpO2: 97% (04-13-25 @ 01:28)  Wt(kg): --    PHYSICAL EXAM:  Constitutional: NAD  HEENT: PERRL, EOMI, sclera non-icteric, neck supple, trachea midline, no masses, no JVD, MMM  Respiratory: scattered wheezing b/l, no rhonchi, no rales, without accessory muscle use and no intercostal retractions  Cardiovascular: RRR, normal S1S2, no M/R/G  Gastrointestinal: soft, NT, distended w/ a fluid thrill, no masses palpable but liver edge palpable 1cm below R costal margin, BS normal, ferrari negative, large scar across the RUQ along the R costal margin - pt cannot recall what surgery he had  Extremities: Warm, well perfused, pulses equal bilateral upper and lower extremities, no edema in LLE, no clubbing, RLE w/ a 2x2cm healed wound w/o exudate on lateral side below the knee. Redness, warmth and soft tissue swelling extending among lateral and anterior aspect of RLE from knee to ankle.  Neurological: AAOx3, CN Grossly intact  Skin: Normal temperature, warm, dry

## 2025-04-13 NOTE — H&P ADULT - PROBLEM SELECTOR PLAN 2
Patient presents with skin and soft tissue infection of RLE with no evidence of purulence. No evidence of abscess at this time. Soft tissue edema on RLE Duplex.   - Abx: as above  - trend CBC  - A1c in AM for RF stratification, HIV Patient presents with skin and soft tissue infection of RLE with no evidence of purulence. No evidence of abscess at this time. Soft tissue edema on RLE Duplex.   - Abx: as above  - trend CBC  - A1c in AM for RF stratification

## 2025-04-14 VITALS
DIASTOLIC BLOOD PRESSURE: 88 MMHG | HEART RATE: 106 BPM | RESPIRATION RATE: 18 BRPM | SYSTOLIC BLOOD PRESSURE: 163 MMHG | OXYGEN SATURATION: 96 % | TEMPERATURE: 98 F

## 2025-04-14 DIAGNOSIS — E03.9 HYPOTHYROIDISM, UNSPECIFIED: ICD-10-CM

## 2025-04-14 DIAGNOSIS — D53.9 NUTRITIONAL ANEMIA, UNSPECIFIED: ICD-10-CM

## 2025-04-14 LAB
ANION GAP SERPL CALC-SCNC: 13 MMOL/L — SIGNIFICANT CHANGE UP (ref 5–17)
BUN SERPL-MCNC: 23 MG/DL — SIGNIFICANT CHANGE UP (ref 7–23)
CALCIUM SERPL-MCNC: 8.3 MG/DL — LOW (ref 8.4–10.5)
CHLORIDE SERPL-SCNC: 104 MMOL/L — SIGNIFICANT CHANGE UP (ref 96–108)
CO2 SERPL-SCNC: 23 MMOL/L — SIGNIFICANT CHANGE UP (ref 22–31)
CREAT SERPL-MCNC: 1.13 MG/DL — SIGNIFICANT CHANGE UP (ref 0.5–1.3)
EGFR: 65 ML/MIN/1.73M2 — SIGNIFICANT CHANGE UP
EGFR: 65 ML/MIN/1.73M2 — SIGNIFICANT CHANGE UP
GLUCOSE SERPL-MCNC: 105 MG/DL — HIGH (ref 70–99)
HCT VFR BLD CALC: 33.4 % — LOW (ref 39–50)
HGB BLD-MCNC: 10.7 G/DL — LOW (ref 13–17)
MAGNESIUM SERPL-MCNC: 1.9 MG/DL — SIGNIFICANT CHANGE UP (ref 1.6–2.6)
MCHC RBC-ENTMCNC: 32 G/DL — SIGNIFICANT CHANGE UP (ref 32–36)
MCHC RBC-ENTMCNC: 32.4 PG — SIGNIFICANT CHANGE UP (ref 27–34)
MCV RBC AUTO: 101.2 FL — HIGH (ref 80–100)
NRBC BLD AUTO-RTO: 0 /100 WBCS — SIGNIFICANT CHANGE UP (ref 0–0)
PHOSPHATE SERPL-MCNC: 4.2 MG/DL — SIGNIFICANT CHANGE UP (ref 2.5–4.5)
PLATELET # BLD AUTO: 114 K/UL — LOW (ref 150–400)
POTASSIUM SERPL-MCNC: 3.5 MMOL/L — SIGNIFICANT CHANGE UP (ref 3.5–5.3)
POTASSIUM SERPL-SCNC: 3.5 MMOL/L — SIGNIFICANT CHANGE UP (ref 3.5–5.3)
RBC # BLD: 3.3 M/UL — LOW (ref 4.2–5.8)
RBC # FLD: 15.3 % — HIGH (ref 10.3–14.5)
SODIUM SERPL-SCNC: 140 MMOL/L — SIGNIFICANT CHANGE UP (ref 135–145)
WBC # BLD: 7.27 K/UL — SIGNIFICANT CHANGE UP (ref 3.8–10.5)
WBC # FLD AUTO: 7.27 K/UL — SIGNIFICANT CHANGE UP (ref 3.8–10.5)

## 2025-04-14 PROCEDURE — 85027 COMPLETE CBC AUTOMATED: CPT

## 2025-04-14 PROCEDURE — 83735 ASSAY OF MAGNESIUM: CPT

## 2025-04-14 PROCEDURE — 83036 HEMOGLOBIN GLYCOSYLATED A1C: CPT

## 2025-04-14 PROCEDURE — 87040 BLOOD CULTURE FOR BACTERIA: CPT

## 2025-04-14 PROCEDURE — 84100 ASSAY OF PHOSPHORUS: CPT

## 2025-04-14 PROCEDURE — 96374 THER/PROPH/DIAG INJ IV PUSH: CPT

## 2025-04-14 PROCEDURE — 85025 COMPLETE CBC W/AUTO DIFF WBC: CPT

## 2025-04-14 PROCEDURE — 85610 PROTHROMBIN TIME: CPT

## 2025-04-14 PROCEDURE — 83605 ASSAY OF LACTIC ACID: CPT

## 2025-04-14 PROCEDURE — 99233 SBSQ HOSP IP/OBS HIGH 50: CPT | Mod: GC

## 2025-04-14 PROCEDURE — 80053 COMPREHEN METABOLIC PANEL: CPT

## 2025-04-14 PROCEDURE — 71045 X-RAY EXAM CHEST 1 VIEW: CPT

## 2025-04-14 PROCEDURE — 36415 COLL VENOUS BLD VENIPUNCTURE: CPT

## 2025-04-14 PROCEDURE — 80048 BASIC METABOLIC PNL TOTAL CA: CPT

## 2025-04-14 PROCEDURE — 85730 THROMBOPLASTIN TIME PARTIAL: CPT

## 2025-04-14 PROCEDURE — 73590 X-RAY EXAM OF LOWER LEG: CPT

## 2025-04-14 PROCEDURE — 82247 BILIRUBIN TOTAL: CPT

## 2025-04-14 PROCEDURE — 99285 EMERGENCY DEPT VISIT HI MDM: CPT

## 2025-04-14 PROCEDURE — 83615 LACTATE (LD) (LDH) ENZYME: CPT

## 2025-04-14 PROCEDURE — 93971 EXTREMITY STUDY: CPT

## 2025-04-14 PROCEDURE — 76705 ECHO EXAM OF ABDOMEN: CPT

## 2025-04-14 PROCEDURE — 83010 ASSAY OF HAPTOGLOBIN QUANT: CPT

## 2025-04-14 PROCEDURE — 82248 BILIRUBIN DIRECT: CPT

## 2025-04-14 PROCEDURE — 94640 AIRWAY INHALATION TREATMENT: CPT

## 2025-04-14 PROCEDURE — 97161 PT EVAL LOW COMPLEX 20 MIN: CPT

## 2025-04-14 PROCEDURE — 93005 ELECTROCARDIOGRAM TRACING: CPT

## 2025-04-14 RX ADMIN — Medication 100 MILLIGRAM(S): at 01:53

## 2025-04-14 RX ADMIN — Medication 40 MILLIEQUIVALENT(S): at 12:01

## 2025-04-14 RX ADMIN — Medication 100 MILLIGRAM(S): at 10:13

## 2025-04-14 RX ADMIN — Medication 50 MICROGRAM(S): at 06:30

## 2025-04-14 RX ADMIN — HEPARIN SODIUM 5000 UNIT(S): 1000 INJECTION INTRAVENOUS; SUBCUTANEOUS at 11:31

## 2025-04-14 RX ADMIN — IPRATROPIUM BROMIDE AND ALBUTEROL SULFATE 3 MILLILITER(S): .5; 2.5 SOLUTION RESPIRATORY (INHALATION) at 03:33

## 2025-04-14 RX ADMIN — HEPARIN SODIUM 5000 UNIT(S): 1000 INJECTION INTRAVENOUS; SUBCUTANEOUS at 06:30

## 2025-04-14 RX ADMIN — Medication 100 MILLIGRAM(S): at 18:10

## 2025-04-14 RX ADMIN — CLOPIDOGREL BISULFATE 75 MILLIGRAM(S): 75 TABLET, FILM COATED ORAL at 11:31

## 2025-04-14 RX ADMIN — Medication 650 MILLIGRAM(S): at 13:59

## 2025-04-14 NOTE — CONSULT NOTE ADULT - SUBJECTIVE AND OBJECTIVE BOX
Patient is a 80y old  Male who presents with a chief complaint of RLE cellultis (14 Apr 2025 08:55)      HPI:  80M w/ PMhx of HTN, COPD on home O2, presents with 2wks of RLE redness, pain, and swelling of RLE. Patient does recall sustaining a wound to the RLE however cannot recall details. No fever or chills. Claims that he noticed purulent drainage from the wound. Patient additionally complains of chronic cough and SOB due to his COPD - unchanged.    ED Course:  Vitals: 97.5F, HR 95, /104, RR 17 (88%) on RA -> 97% on RA  Labs: WCC 18.13 w/ 84% neutrophils, Plt 149, K+ 5.4, BUN 26, Cr 1.52, Tbili 3.4, AST 46, lactate 2.8 -> 1.4   Imaging: RLE duplex w/ soft tissue edema below the knee w/o DVT, XR fibula w/o obvious fx  EKG: prolonged QTc, RBBB, ?PACs  Interventions: cefepime 2g, vancomycin 1g, NS 1L, losartan 50mg, tylenol 650mg        (13 Apr 2025 01:51)    PAST MEDICAL & SURGICAL HISTORY:  Kidney stone      HTN (hypertension)      COPD (chronic obstructive pulmonary disease)      History of BPH      History of cholecystectomy        MEDICATIONS  (STANDING):  ceFAZolin   IVPB 2000 milliGRAM(s) IV Intermittent every 8 hours  clopidogrel Tablet 75 milliGRAM(s) Oral daily  heparin   Injectable 5000 Unit(s) SubCutaneous every 8 hours  hydrochlorothiazide 12.5 milliGRAM(s) Oral daily  lactated ringers. 1000 milliLiter(s) (250 mL/Hr) IV Continuous <Continuous>  levothyroxine 50 MICROGram(s) Oral daily  melatonin 3 milliGRAM(s) Oral at bedtime    MEDICATIONS  (PRN):  acetaminophen     Tablet .. 650 milliGRAM(s) Oral every 6 hours PRN Temp greater or equal to 38C (100.4F), Mild Pain (1 - 3)  albuterol/ipratropium for Nebulization 3 milliLiter(s) Nebulizer every 6 hours PRN Shortness of Breath and/or Wheezing          Home Living Status :  lives alone in a 3 flight walkup apartment          -  Home Services :  none       Baseline Functional Level Prior to Admission :             - ADL's/ IADL's :  independent          - Ambulatory status prior to admission :   walked with a cane         FAMILY HISTORY:      CBC Full  -  ( 13 Apr 2025 05:30 )  WBC Count : 10.46 K/uL  RBC Count : 3.42 M/uL  Hemoglobin : 11.0 g/dL  Hematocrit : 33.4 %  Platelet Count - Automated : 114 K/uL  Mean Cell Volume : 97.7 fl  Mean Cell Hemoglobin : 32.2 pg  Mean Cell Hemoglobin Concentration : 32.9 g/dL  Auto Neutrophil # : x  Auto Lymphocyte # : x  Auto Monocyte # : x  Auto Eosinophil # : x  Auto Basophil # : x  Auto Neutrophil % : x  Auto Lymphocyte % : x  Auto Monocyte % : x  Auto Eosinophil % : x  Auto Basophil % : x      04-13    143  |  108  |  24[H]  ----------------------------<  116[H]  3.2[L]   |  24  |  1.27    Ca    8.7      13 Apr 2025 05:30  Phos  4.3     04-13  Mg     1.8     04-13    TPro  5.1[L]  /  Alb  2.9[L]  /  TBili  2.1[H]  /  DBili  0.7[H]  /  AST  14  /  ALT  12  /  AlkPhos  59  04-13      Urinalysis Basic - ( 13 Apr 2025 05:30 )    Color: x / Appearance: x / SG: x / pH: x  Gluc: 116 mg/dL / Ketone: x  / Bili: x / Urobili: x   Blood: x / Protein: x / Nitrite: x   Leuk Esterase: x / RBC: x / WBC x   Sq Epi: x / Non Sq Epi: x / Bacteria: x        Radiology :       ACC: [id number] EXAM:  US DPLX LWR EXT VEINS LTD RT   ORDERED BY: TOÑA STYLES     PROCEDURE DATE:  04/12/2025          INTERPRETATION:  CLINICAL INFORMATION: Leg infection. Evaluate for DVT.    COMPARISON: Venous duplex of the lower extremities from 1/16/2024.    TECHNIQUE: Duplex sonography of the RIGHT LOWER extremity veins with   color and spectral Doppler, with and without compression.    FINDINGS:    There is normal compressibility of the right common femoral, femoral and   popliteal veins.  The contralateral common femoral vein is patent.  Doppler examination shows normal spontaneous and phasic flow.    No calf vein thrombosis is detected.    Soft tissue edema below the level of the knee.    IMPRESSION:    No evidence of right lower extremity deep venous thrombosis.    Soft tissue edema below the level of the knee.      ACC: [id number] EXAM:  XR TIB FIB 2 VIEWS RT   ORDERED BY: TOÑA STYLES     PROCEDURE DATE:  04/12/2025          INTERPRETATION:  INDICATION: Leg infection. Evaluate for bony erosion.    COMPARISON: None.    FINDINGS: AP and lateral views of the right tibia and fibula are   provided. There is no fracture, sclerosis, or erosion. The joint spaces   are normal in appearance. Extensive soft tissue swelling. Probable   ulcer/wound along the lateral aspect of the right mid calf. Vascular   calcifications. No radiopaque foreign body. Quadriceps enthesopathy.    IMPRESSION:  No bony erosion.        ACC: [id number] EXAM:  XR CHEST PORTABLE URGENT 1V   ORDERED BY: JOCELYN LEWIS     PROCEDURE DATE:  04/13/2025          INTERPRETATION:  CLINICAL INDICATION:  COPD    TECHNIQUE: Frontal chest radiograph on 4/13/2025 4:25 AM    COMPARISON: Radiographs ofthe chest from 3/10/2025. CTA of the chest   from 1/12/2024.    FINDINGS:  Emphysematous changes. Trace fluid within the right minor fissure. There   is no focal consolidation, pleural effusion or pneumothorax. The   cardiomediastinal silhouette and osseous structures are unchanged.   Atherosclerotic calcifications of the aortic arch.    IMPRESSION:  No acute infiltrates          Review of Systems : per HPI         Vital Signs Last 24 Hrs  T(C): 36.2 (14 Apr 2025 08:30), Max: 37.1 (14 Apr 2025 06:00)  T(F): 97.1 (14 Apr 2025 08:30), Max: 98.8 (14 Apr 2025 06:00)  HR: 72 (14 Apr 2025 08:30) (72 - 89)  BP: 146/76 (14 Apr 2025 08:30) (146/73 - 178/95)  BP(mean): 123 (13 Apr 2025 20:59) (123 - 123)  RR: 18 (14 Apr 2025 08:30) (16 - 18)  SpO2: 98% (14 Apr 2025 08:30) (96% - 98%)    Parameters below as of 14 Apr 2025 08:30  Patient On (Oxygen Delivery Method): nasal cannula  O2 Flow (L/min): 2          Physical Exam:  80 y o man lying comfortably in semi You's position , awake , alert , no acute complaints     Head: normocephalic , atraumatic    Eyes: PERRLA , EOMI , no nystagmus , sclera anicteric    ENT / FACE: neg nasal discharge , uvula midline , no oropharyngeal erythema / exudate    Neck: supple , negative JVD , negative carotid bruits , no thyromegaly    Chest: CTA bilaterally      Cardiovascular: regular rate and rhythm , neg murmurs / rubs / gallops    Abdomen: RUQ scar , soft , distended w/ a fluid thrill , no tenderness to palpation in all 4 quadrants ,  normal bowel sounds     Extremities:  healed wound on lateral side below the knee. + redness, warmth and soft tissue swelling extending among lateral and anterior aspect of RLE from knee to ankle    Neurologic Exam:     Alert and oriented to person , place , date/year , speech fluent w/o dysarthria     Cranial Nerves:           II:                         pupils equal , round and reactive to light , visual fields intact         III/ IV/VI:             extraocular movements intact , neg nystagmus , neg ptosis        V:                        facial sensation intact , V1-3 normal        VII:                      face symmetric , no droop , normal eye closure and smile        VIII:                     hearing intact to finger rub bilaterally        IX and X:             no hoarseness , gag intact , palate/ uvula rise symmetrically        XI:                       SCM / trapezius strength intact bilateral        XII:                      no tongue deviation    Motor Exam:        > 3+/5 x 4 extremities , without drift     Sensation:         intact to light touch x 4 extremities                            no neglect or extinction on double simultaneous testing    DTR:           biceps/brachioradialis: equal                            patella/ankle: equal          neg Babinski     Coordination:            Finger to Nose:  neg dysmetria bilaterally        Gait:  not tested         PM&R Impression: admitted for RLE cellulitis     - deconditioned    - no focal weakness        Recommendations / Plan:       1) Physical / Occupational therapy focusing on therapeutic exercises , equipment evaluation , bed mobility/transfer out of bed evaluation , progressive ambulation with assistive devices prn .    2) Current disposition plan recommendation:    pending functional progress

## 2025-04-14 NOTE — PROGRESS NOTE ADULT - PROBLEM SELECTOR PLAN 7
Pt w/ hx of COPD. No home inhalers.   - c w duonebs PRN  - outpt pulm apt.    #Mediastinal and hilar lymphadenopathy  On last admission The CT demonstrated large lymphadenopathy in the right paratracheal and subcarinal area. Also with extension to the esophageal wall. Lung parenchymal with smooth interlobar septal thickening which can be due to fluid overload or due to lymphangitic spread. IP performed a bronch with BAL, endobronchial biopsies, and EBUS w/ TBNA of stations 7, 4L, 4R, 11R and airway appeared infectious with thick white purulent secretions. Multiple specimens sent for testing: negative AFB, negative bacterial and fungal growth, negative for malignancy; appears to have inflammatory cells. Was planned for OP PET and further w/u but failed to f/u. Bx showed a marked acute inflammatory infiltrate.  Alveolar macrophages and reactive bronchial epithelial cells are present in the background.  The cellblock shows similar findings.  GMS stain is negative for pneumocystis and fungi.  AFB stain is negative for acid-fast microorganisms. + Strep intermedius  - OP f/u w/ pulm   - f/u CXR Pt w/ Hx of HTN managed w/ HCTZ 12.5mg QD. Goal BP <130/80.  /76 (4/14)    Plan:  DASH diet  Meds: continue home HCTZ

## 2025-04-14 NOTE — PROGRESS NOTE ADULT - PROBLEM SELECTOR PLAN 1
On admission met 2/4 SIRS criteria with HR > 90, WBC >12.  Likely source RLE cellulitis. Lactate 2.8 -> 1.4 s/p 1L NS in ED. Given cefepime 2g and vancomycin 1g iso penicillin allergy.   - Abx vancomycin 15mg/kg renally dosed  - f/u blood cxs  - give additional 1.5L IVF to complete 30cc/kg resuscitation On admission met 2/4 SIRS criteria with HR > 90, WBC >12.  Likely source RLE cellulitis. Lactate 2.8 -> 1.4 s/p 1L NS in ED. Given cefepime 2g and vancomycin 1g iso penicillin allergy.   De-escalated antibiotics to cefazolin iso non-purulent cellulitis and penicillin allergy (4/13)  -Vitals WNL (4/14)  -WBC 7.27 (4/14)    Plan:   - transition to PO Cefalexin for discharge planning   - wound care consult for potential debridement  - PT consult for potential home PT   - f/u blood cxs

## 2025-04-14 NOTE — DISCHARGE NOTE PROVIDER - NSDCCPCAREPLAN_GEN_ALL_CORE_FT
PRINCIPAL DISCHARGE DIAGNOSIS  Diagnosis: Severe sepsis  Assessment and Plan of Treatment: Sepsis is a serious reaction to an infection. It causes inflammation across large areas of the body and can damage tissue and organs. Sepsis causes varied symptoms. Symptoms may include breathing problems, a fast heartbeat, chills, cool clammy skin, skin rashes, and shaking. Other symptoms may include a fever or low body temperature, confusion, and low blood pressure. Sepsis is treated with antibiotics. While in the hopsital you were started on antibiotics. Please continue to take your Cefalexin. You can follow up with your primary care provider, Dr. Lizarraga on July 17th at 1:20 pm.      SECONDARY DISCHARGE DIAGNOSES  Diagnosis: Cellulitis  Assessment and Plan of Treatment: Cellulitis is a skin infection caused by bacteria, most often strep or staph. It often occurs after a break in the skin from a scrape, cut, bite, or puncture, or after a rash. While in the hospital, you were diagnosed with cellulitis on your right leg and started on antibiotics. Please continue to take your Cefalexin. Please return to the hospital if you have new or worsening swelling and redness or develop fever.    Diagnosis: Macrocytic anemia  Assessment and Plan of Treatment: Anemia is a low level of red blood cells, which carry oxygen throughout your body. While in the hospital, you were found to have a mild anemia. Please follow up with your primary care provider to further evaluate this condition.     PRINCIPAL DISCHARGE DIAGNOSIS  Diagnosis: Severe sepsis  Assessment and Plan of Treatment: Sepsis is a serious reaction to an infection. It causes inflammation across large areas of the body and can damage tissue and organs. Sepsis causes varied symptoms. Symptoms may include breathing problems, a fast heartbeat, chills, cool clammy skin, skin rashes, and shaking. Other symptoms may include a fever or low body temperature, confusion, and low blood pressure. Sepsis is treated with antibiotics. While in the hopsital you were started on antibiotics. Please continue to take your Cefalexin every 6 hours for the next 7 days (4/14-4/21). You can follow up with your primary care provider, Dr. Lizarraga on July 17th at 1:20 pm.      SECONDARY DISCHARGE DIAGNOSES  Diagnosis: Cellulitis  Assessment and Plan of Treatment: Cellulitis is a skin infection caused by bacteria, most often strep or staph. It often occurs after a break in the skin from a scrape, cut, bite, or puncture, or after a rash. While in the hospital, you were diagnosed with cellulitis on your right leg and started on antibiotics. Please continue to take your Cefalexin every 6 hours for the next 7 days (4/14-4/21). Please return to the hospital if you have new or worsening swelling and redness or develop fever.    Diagnosis: Macrocytic anemia  Assessment and Plan of Treatment: Anemia is a low level of red blood cells, which carry oxygen throughout your body. While in the hospital, you were found to have a mild anemia. Please follow up with your primary care provider to further evaluate this condition.

## 2025-04-14 NOTE — PROGRESS NOTE ADULT - PROBLEM SELECTOR PLAN 5
Patient noted to have MONA with Cr 1.52 over baseline 1.1. Likely prerenal etiology 2/2 sepsis. Currently producing urine. s/p 1L IVF in ED.  - trend Cr  - avoid nephrotoxic drugs, renally dose meds - hold home HCTZ  - will consider obtaining urine lytes if not improving K 5.4. on admission. No EKG changes.   K 3.2 (4/14); Low  - CTM BMP in AM  - replete as needed K 5.4. on admission. No EKG changes.   K 3.5 (4/14); Low  - CTM BMP in AM  - replete as needed

## 2025-04-14 NOTE — PHYSICAL THERAPY INITIAL EVALUATION ADULT - ADDITIONAL COMMENTS
Pt. reports 3 flights to negotiate; denies prior use of assistive device; + fall with difficulty to get up, but able to recover w/o assistance.

## 2025-04-14 NOTE — PROGRESS NOTE ADULT - ATTENDING COMMENTS
right leg , lateral calf wound with necrotic base, no Purulence , Consult Wound Care , Continue IV antibiotics , has good peripheral Pulses , will transition to Oral Antibiotics IN am   PT recommended TIN

## 2025-04-14 NOTE — PROGRESS NOTE ADULT - PROBLEM SELECTOR PLAN 9
H/o hypothyroid currently following with PCP  Home meds: Levothyroxine, 50mg daily    Plan: c/w home meds

## 2025-04-14 NOTE — PHYSICAL THERAPY INITIAL EVALUATION ADULT - PERTINENT HX OF CURRENT PROBLEM, REHAB EVAL
Pt. is an 80 y.o male with h/o COPD on home O@ PRN, presenting with RLE swelling, pain, redness. Additionally c/o chronic SOB/cough.   Pt. has been admitted for IV Abx management of RLE cellulutis.

## 2025-04-14 NOTE — DISCHARGE NOTE PROVIDER - NSDCMRMEDTOKEN_GEN_ALL_CORE_FT
atorvastatin 80 mg oral tablet: 1 tab(s) orally once a day  clopidogrel 75 mg oral tablet: 1 tab(s) orally once a day  hydroCHLOROthiazide 12.5 mg oral capsule: 1 cap(s) orally once a day  levothyroxine 50 mcg (0.05 mg) oral tablet: 1 tab(s) orally once a day

## 2025-04-14 NOTE — PHYSICAL THERAPY INITIAL EVALUATION ADULT - GENERAL OBSERVATIONS, REHAB EVAL
Pt. was received supine, on RA - desaturating to 87%, NC=2L/min replaced with pt's saturation improved to 9$%; +hep-lock.

## 2025-04-14 NOTE — PROGRESS NOTE ADULT - PROBLEM SELECTOR PLAN 2
Patient presents with skin and soft tissue infection of RLE with no evidence of purulence. No evidence of abscess at this time. Soft tissue edema on RLE Duplex.   - Abx: as above  - trend CBC  - A1c in AM for RF stratification Patient presents with skin and soft tissue infection of RLE with no evidence of purulence. No evidence of abscess at this time. Soft tissue edema on RLE Duplex with no evidence of DVT. A1C 5.0; WNL. WBC 7.27 (4/14); WNL  - Abx: as above  - trend CBC

## 2025-04-14 NOTE — PROGRESS NOTE ADULT - PROBLEM SELECTOR PLAN 3
K 5.4. No EKG changes.   - trend BMP in AM  - Lokelma 5g if cont to be elevated On admission, Hgb 13.5, MCV 97.1. Now, Hgb 10.7, .2 consistent with macrocytic anemia (4/14). No concern of bleeding at this time. Given hyperbillirubinemia of 2.1 with indirect billirubin 1.4, differential includes hemolytic anemia vs B12/folate deficiency vs h/o hypothyroidism    Plan:   - B12, Folate levels  - CTM billirubin On admission, Hgb 13.5, MCV 97.1. Now, Hgb 10.7, .2 consistent with macrocytic anemia (4/14). No concern of bleeding at this time. Given hyperbillirubinemia of 2.1 with indirect billirubin 1.4, differential includes hemolytic anemia vs B12/folate deficiency vs h/o hypothyroidism    Plan:   - f/u tomorrow am B12, Folate levels  - CTM billirubin

## 2025-04-14 NOTE — DISCHARGE NOTE PROVIDER - HOSPITAL COURSE
PT is an 80M w/ PMhx of HTN, COPD not on home O2, presents with 3wks of RLE redness, pain, and swelling of lower leg, found to have cellulitis and admitted for IV abx,    On admission, pt found to have non-purulent cellulitis surrounding necrotic wound on the RLE. Pt does not recall sustaining wound. In the ED, RLE duplex showed soft tissue edema below the knee w/out DVT. Was started on Cefepime (iso penicillin allergy) and Vancomycin. Admitted for continuation of antibiotics iso severe sepsis. While on floor, antibiotics de-escalated to Cefazolin. Given continued improvement, pt medically ready to discharge home on PO Cefalexin.     Hospital course (by problem):   #Severe sepsis.   RESOLVED  On admission met 2/4 SIRS criteria with HR > 90, WBC >12.  Likely source RLE cellulitis. Lactate 2.8 -> 1.4 s/p 1L NS in ED. Given cefepime 2g and vancomycin 1g iso penicillin allergy. De-escalated antibiotics to cefazolin iso non-purulent cellulitis and penicillin allergy (4/13) At discharge, Vitals WNL, WBC 7.27 (4/14).    #Cellulitis.   Patient presents with skin and soft tissue infection of RLE with no evidence of purulence. No evidence of abscess at this time. Soft tissue edema on RLE Duplex with no evidence of DVT. A1C 5.0; S/p Cefepime 2g and Vancomycin 1g in ED (4/13). S/p Cefazolin (4/14)  Vitals WNL. WBC 7.27; WNL  Plan: PO Cefalexin    #Macrocytic anemia.  On admission, Hgb 13.5, MCV 97.1. Now, Hgb 10.7, .2 consistent with macrocytic anemia (4/14). No concern of bleeding at this time. Given hyperbillirubinemia of 2.1 with indirect billirubin 1.4, differential includes hemolytic anemia vs B12/folate deficiency vs h/o hypothyroidism  Plan: F/u Outpatient with PCP    #Hyperbilirubinemia.   RESOLVING  TBili 3.4. ALT22, AST 46 and ALP 83. R-Factor 0.8 - cholestatic. On exam w/ hepatomegaly and fluid thrill. DDx hepatic (sepsis, medication, choledocholithiasis, and less likely malignancy, PSC and PBC) vs indirect (hemolysis, Gilbert's).  Abdominal US: 9 mm CBD ectasia, commonly related to prior cholecystectomy/cholecystitis and normal aging. (4/13). Bilirubin 2.1, Direct 0.7, Indirect 1.4; Downtrending (4/14) likely iso sepsis vs hemolytic anemia    Plan:  - CTM.        Patient was discharged to: (home/TIN/acute rehab/hospice, etc, and with what services – home health PT/RN? Home O2?)    New medications:   Changes to old medications:  Medications that were stopped:    Items to follow up as outpatient:    Physical exam at the time of discharge:       PT is an 80M w/ PMhx of HTN, COPD not on home O2, presents with 3wks of RLE redness, pain, and swelling of lower leg, found to have cellulitis and admitted for IV abx,    On admission, pt found to have non-purulent cellulitis surrounding necrotic wound on the RLE. Pt does not recall sustaining wound. In the ED, RLE duplex showed soft tissue edema below the knee w/out DVT. Was started on Cefepime (iso penicillin allergy) and Vancomycin. Admitted for continuation of antibiotics iso severe sepsis. While on floor, antibiotics de-escalated to Cefazolin. Given continued improvement, pt medically ready to discharge home on PO Cefalexin.     Hospital course (by problem):   #Severe sepsis.   RESOLVED  On admission met 2/4 SIRS criteria with HR > 90, WBC >12.  Likely source RLE cellulitis. Lactate 2.8 -> 1.4 s/p 1L NS in ED. Given cefepime 2g and vancomycin 1g iso penicillin allergy. De-escalated antibiotics to cefazolin iso non-purulent cellulitis and penicillin allergy (4/13) At discharge, Vitals WNL, WBC 7.27 (4/14).    #Cellulitis.   Patient presents with skin and soft tissue infection of RLE with no evidence of purulence. No evidence of abscess at this time. Soft tissue edema on RLE Duplex with no evidence of DVT. A1C 5.0; S/p Cefepime 2g and Vancomycin 1g in ED (4/13). S/p Cefazolin (4/14)  Vitals WNL. WBC 7.27; WNL  Plan: PO Cefalexin    #Macrocytic anemia.  On admission, Hgb 13.5, MCV 97.1. Now, Hgb 10.7, .2 consistent with macrocytic anemia (4/14). No concern of bleeding at this time. Given hyperbillirubinemia of 2.1 with indirect billirubin 1.4, differential includes hemolytic anemia vs B12/folate deficiency vs h/o hypothyroidism  Plan: F/u Outpatient with PCP    #Hyperbilirubinemia.   RESOLVING  TBili 3.4. ALT22, AST 46 and ALP 83. R-Factor 0.8 - cholestatic. On exam w/ hepatomegaly and fluid thrill. DDx hepatic (sepsis, medication, choledocholithiasis, and less likely malignancy, PSC and PBC) vs indirect (hemolysis, Gilbert's).  Abdominal US: 9 mm CBD ectasia, commonly related to prior cholecystectomy/cholecystitis and normal aging. (4/13). Bilirubin 2.1, Direct 0.7, Indirect 1.4; Downtrending (4/14) likely iso sepsis vs hemolytic anemia  Plan: F/u with PCP    #Hyperkalemia.  K 5.4. on admission. No EKG changes. >> 3.2 (4/14); Low  Plan: F/u with PCP    #MONA (acute kidney injury).  RESOLVING  Patient noted to have MONA with Cr 1.52 and BUN 26 on admission. Baseline 1.1. Likely prerenal etiology 2/2 sepsis. Currently producing urine. s/p 1L IVF in ED.  Cr 1.27, BUN 24 (4/14)    #HTN (hypertension).  Pt w/ Hx of HTN managed w/ HCTZ 12.5mg QD. Goal BP <130/80.  /76 (4/14)    #COPD (chronic obstructive pulmonary disease).  Pt w/ hx of COPD. No home inhalers. Using home O2, unknown amount.  Plan: F/u with Pulmonologist    #Mediastinal and hilar lymphadenopathy  On last admission The CT demonstrated large lymphadenopathy in the right paratracheal and subcarinal area. Also with extension to the esophageal wall. Lung parenchymal with smooth interlobar septal thickening which can be due to fluid overload or due to lymphangitic spread. IP performed a bronch with BAL, endobronchial biopsies, and EBUS w/ TBNA of stations 7, 4L, 4R, 11R and airway appeared infectious with thick white purulent secretions. Multiple specimens sent for testing: negative AFB, negative bacterial and fungal growth, negative for malignancy; appears to have inflammatory cells. Was planned for OP PET and further w/u but failed to f/u. Bx showed a marked acute inflammatory infiltrate.  Alveolar macrophages and reactive bronchial epithelial cells are present in the background.  The cellblock shows similar findings.  GMS stain is negative for pneumocystis and fungi.  AFB stain is negative for acid-fast microorganisms. + Strep intermedius  - OP f/u w/ pulm   - f/u CXR.    #Hypothyroid.   H/o hypothyroid currently following with PCP  Home meds: Levothyroxine, 50mg daily    Plan: c/w home meds.          Patient was discharged to: (home/TIN/acute rehab/hospice, etc, and with what services – home health PT/RN? Home O2?)    New medications:   Changes to old medications:  Medications that were stopped:    Items to follow up as outpatient:    Physical exam at the time of discharge:       PT is an 80M w/ PMhx of HTN, COPD not on home O2, presents with 3wks of RLE redness, pain, and swelling of lower leg, found to have cellulitis and admitted for IV abx,    On admission, pt found to have non-purulent cellulitis surrounding necrotic wound on the RLE. Pt does not recall sustaining wound. In the ED, RLE duplex showed soft tissue edema below the knee w/out DVT. Was started on Cefepime (iso penicillin allergy) and Vancomycin. Admitted for continuation of antibiotics iso severe sepsis. While on floor, antibiotics de-escalated to Cefazolin. Given continued improvement, pt medically ready to discharge home on PO Cefalexin.     Hospital course (by problem):   #Severe sepsis.   RESOLVED  On admission met 2/4 SIRS criteria with HR > 90, WBC >12.  Likely source RLE cellulitis. Lactate 2.8 -> 1.4 s/p 1L NS in ED. Given cefepime 2g and vancomycin 1g iso penicillin allergy. De-escalated antibiotics to cefazolin iso non-purulent cellulitis and penicillin allergy (4/13) At discharge, Vitals WNL, WBC 7.27 (4/14).    #Cellulitis.   Patient presents with skin and soft tissue infection of RLE with no evidence of purulence. No evidence of abscess at this time. Soft tissue edema on RLE Duplex with no evidence of DVT. A1C 5.0; S/p Cefepime 2g and Vancomycin 1g in ED (4/13). S/p Cefazolin (4/14)  Vitals WNL. WBC 7.27; WNL  Plan: PO Cefalexin    #Macrocytic anemia.  On admission, Hgb 13.5, MCV 97.1. Now, Hgb 10.7, .2 consistent with macrocytic anemia (4/14). No concern of bleeding at this time. Given hyperbillirubinemia of 2.1 with indirect billirubin 1.4, differential includes hemolytic anemia vs B12/folate deficiency vs h/o hypothyroidism  Plan: F/u Outpatient with PCP    #Hyperbilirubinemia.   RESOLVING  TBili 3.4. ALT22, AST 46 and ALP 83. R-Factor 0.8 - cholestatic. On exam w/ hepatomegaly and fluid thrill. DDx hepatic (sepsis, medication, choledocholithiasis, and less likely malignancy, PSC and PBC) vs indirect (hemolysis, Gilbert's).  Abdominal US: 9 mm CBD ectasia, commonly related to prior cholecystectomy/cholecystitis and normal aging. (4/13).   Bilirubin 2.1, Direct 0.7, Indirect 1.4; Downtrending (4/14) likely iso sepsis vs hemolytic anemia    #Hyperkalemia.  RESOLVED  K 5.4. on admission. No EKG changes. >> 3.2 (4/14)    #MONA (acute kidney injury).  RESOLVING  Patient noted to have MONA with Cr 1.52 and BUN 26 on admission. Baseline 1.1. Likely prerenal etiology 2/2 sepsis. Currently producing urine. s/p 1L IVF in ED.  Cr 1.27, BUN 24 (4/14)    #COPD (chronic obstructive pulmonary disease).  Pt w/ hx of COPD. No home inhalers. Using home O2, unknown amount.  Plan: F/u with Pulmonologist    #Mediastinal and hilar lymphadenopathy  On last admission The CT demonstrated large lymphadenopathy in the right paratracheal and subcarinal area. Also with extension to the esophageal wall. Lung parenchymal with smooth interlobar septal thickening which can be due to fluid overload or due to lymphangitic spread. IP performed a bronch with BAL, endobronchial biopsies, and EBUS w/ TBNA of stations 7, 4L, 4R, 11R and airway appeared infectious with thick white purulent secretions. Multiple specimens sent for testing: negative AFB, negative bacterial and fungal growth, negative for malignancy; appears to have inflammatory cells. Was planned for OP PET and further w/u but failed to f/u. Bx showed a marked acute inflammatory infiltrate.  Alveolar macrophages and reactive bronchial epithelial cells are present in the background.  The cellblock shows similar findings.  GMS stain is negative for pneumocystis and fungi.  AFB stain is negative for acid-fast microorganisms. + Strep intermedius    #HTN (hypertension).  Pt w/ Hx of HTN managed w/ HCTZ 12.5mg QD. Goal BP <130/80.  /76 (4/14)    #Hypothyroid.   H/o hypothyroid currently following with PCP  Home meds: Levothyroxine, 50mg daily  Plan: c/w home meds.    Patient was discharged to: home  New medications: Cefalexin   Changes to old medications:  Medications that were stopped:    Items to follow up as outpatient:  - Follow up O2 requirements and COPD management with Pulmonologist  - Follow up macrocytic anemia with PCP    Physical exam at the time of discharge:       PT is an 80M w/ PMhx of HTN, COPD not on home O2, presents with 3wks of RLE redness, pain, and swelling of lower leg, found to have cellulitis and admitted for IV abx,    On admission, pt found to have non-purulent cellulitis surrounding necrotic wound on the RLE. Pt does not recall sustaining wound. In the ED, RLE duplex showed soft tissue edema below the knee w/out DVT. Was started on Cefepime (iso penicillin allergy) and Vancomycin. Admitted for continuation of antibiotics iso severe sepsis. While on floor, antibiotics de-escalated to Cefazolin. Given continued improvement, pt medically ready to discharge home on PO Cefalexin.     Hospital course (by problem):   #Severe sepsis.   RESOLVED  On admission met 2/4 SIRS criteria with HR > 90, WBC >12.  Likely source RLE cellulitis. Lactate 2.8 -> 1.4 s/p 1L NS in ED. Given cefepime 2g and vancomycin 1g iso penicillin allergy. De-escalated antibiotics to cefazolin iso non-purulent cellulitis and penicillin allergy (4/13) At discharge, Vitals WNL, WBC 7.27 (4/14).    #Cellulitis.   Patient presents with skin and soft tissue infection of RLE with no evidence of purulence. No evidence of abscess at this time. Soft tissue edema on RLE Duplex with no evidence of DVT. A1C 5.0; S/p Cefepime 2g and Vancomycin 1g in ED (4/13). S/p Cefazolin (4/14)  Vitals WNL. WBC 7.27; WNL  Plan: PO Cefalexin 500mge every 6 hours for 7 days    #Macrocytic anemia.  On admission, Hgb 13.5, MCV 97.1. Now, Hgb 10.7, .2 consistent with macrocytic anemia (4/14). No concern of bleeding at this time. Given hyperbillirubinemia of 2.1 with indirect billirubin 1.4, differential includes hemolytic anemia vs B12/folate deficiency vs h/o hypothyroidism  Plan: F/u Outpatient with PCP    #Hyperbilirubinemia.   RESOLVING  TBili 3.4. ALT22, AST 46 and ALP 83. R-Factor 0.8 - cholestatic. On exam w/ hepatomegaly and fluid thrill. DDx hepatic (sepsis, medication, choledocholithiasis, and less likely malignancy, PSC and PBC) vs indirect (hemolysis, Gilbert's).  Abdominal US: 9 mm CBD ectasia, commonly related to prior cholecystectomy/cholecystitis and normal aging. (4/13).   Bilirubin 2.1, Direct 0.7, Indirect 1.4; Downtrending (4/14) likely iso sepsis vs hemolytic anemia    #Hyperkalemia.  RESOLVED  K 5.4. on admission. No EKG changes. >> 3.2 (4/14)    #MONA (acute kidney injury).  RESOLVING  Patient noted to have MONA with Cr 1.52 and BUN 26 on admission. Baseline 1.1. Likely prerenal etiology 2/2 sepsis. Currently producing urine. s/p 1L IVF in ED.  Cr 1.27, BUN 24 (4/14)    #COPD (chronic obstructive pulmonary disease).  Pt w/ hx of COPD. No home inhalers. Using home O2, unknown amount.  Plan: F/u with Pulmonologist    #Mediastinal and hilar lymphadenopathy  On last admission The CT demonstrated large lymphadenopathy in the right paratracheal and subcarinal area. Also with extension to the esophageal wall. Lung parenchymal with smooth interlobar septal thickening which can be due to fluid overload or due to lymphangitic spread. IP performed a bronch with BAL, endobronchial biopsies, and EBUS w/ TBNA of stations 7, 4L, 4R, 11R and airway appeared infectious with thick white purulent secretions. Multiple specimens sent for testing: negative AFB, negative bacterial and fungal growth, negative for malignancy; appears to have inflammatory cells. Was planned for OP PET and further w/u but failed to f/u. Bx showed a marked acute inflammatory infiltrate.  Alveolar macrophages and reactive bronchial epithelial cells are present in the background.  The cellblock shows similar findings.  GMS stain is negative for pneumocystis and fungi.  AFB stain is negative for acid-fast microorganisms. + Strep intermedius    #HTN (hypertension).  Pt w/ Hx of HTN managed w/ HCTZ 12.5mg QD. Goal BP <130/80.  /76 (4/14)    #Hypothyroid.   H/o hypothyroid currently following with PCP  Home meds: Levothyroxine, 50mg daily  Plan: c/w home meds.    Patient was discharged to: home  New medications: Cefalexin 500 mg every 6 hours for 7 days  Changes to old medications:  Medications that were stopped:    Items to follow up as outpatient:  - Follow up O2 requirements and COPD management with Pulmonologist (July 17th, 1:20 PM w/ Dr. Lizarraga)  - Follow up macrocytic anemia with PCP    Physical exam at the time of discharge:    General: NAD  HEENT: PERRL, EOM intact, sclera anicteric, MMM  Cardiovascular: RRR; no MRG; no JVD  Respiratory: CTAB; no ronchi, rales or wheezes. No accessory muscle use.  GI/: soft; NTND  Extremities: Warm well perfused. Wound present on RLE without exudate but with some necrosis. Soft tissue swelling surrounding wound. Pitting edema bilaterally with increased edema on RLE extending into right foot. RLE is erythematous and warm. Erythema does not extend past previous markings -currently present from knee to ankle. Pulses are difficult to assess given extent of edema. Capillary refill <2s. Active and passive ROM normal on LLE but reduced on RLE.   Skin: normal color & turgor; no rash  Neurologic: aox3; no focal deficits    T(F): 98.8 (04-14-25 @ 06:00), Max: 98.8 (04-14-25 @ 06:00)  HR: 87 (04-14-25 @ 06:00) (87 - 88)  BP: 146/73 (04-14-25 @ 06:00) (146/73 - 178/95)  BP(mean): 123 (04-13-25 @ 20:59) (123 - 123)  RR: 18 (04-14-25 @ 06:00) (18 - 18)  SpO2: 98% (04-14-25 @ 06:00) (96% - 98%)  I&O's Summary

## 2025-04-14 NOTE — PROGRESS NOTE ADULT - SUBJECTIVE AND OBJECTIVE BOX
**INCOMPLETE NOTE    INTERVAL/OVERNIGHT EVENTS: None    SUBJECTIVE:  Patient seen and examined at bedside, comfortable, NAD. Denied fever, chest pain, dyspnea, abdominal pain.     Vital Signs Last 12 Hrs  T(F): 98.8 (04-14-25 @ 06:00), Max: 98.8 (04-14-25 @ 06:00)  HR: 87 (04-14-25 @ 06:00) (87 - 88)  BP: 146/73 (04-14-25 @ 06:00) (146/73 - 178/95)  BP(mean): 123 (04-13-25 @ 20:59) (123 - 123)  RR: 18 (04-14-25 @ 06:00) (18 - 18)  SpO2: 98% (04-14-25 @ 06:00) (96% - 98%)  I&O's Summary      PHYSICAL EXAM:  General: NAD  HEENT: PERRL, EOM intact, sclera anicteric, MMM  Cardiovascular: RRR; no MRG; no JVD  Respiratory: CTAB; no WRR  GI/: soft; NTND; BS x4  Extremities: WWP; 2+ peripheral pulses bilaterally; no LE edema  Skin: normal color & turgor; no rash  Neurologic: aox3; no focal deficits    LABS:                        11.0   10.46 )-----------( 114      ( 13 Apr 2025 05:30 )             33.4     04-13    143  |  108  |  24[H]  ----------------------------<  116[H]  3.2[L]   |  24  |  1.27    Ca    8.7      13 Apr 2025 05:30  Phos  4.3     04-13  Mg     1.8     04-13    TPro  5.1[L]  /  Alb  2.9[L]  /  TBili  2.1[H]  /  DBili  0.7[H]  /  AST  14  /  ALT  12  /  AlkPhos  59  04-13    PT/INR - ( 12 Apr 2025 19:11 )   PT: 12.6 sec;   INR: 1.08          PTT - ( 12 Apr 2025 19:11 )  PTT:20.7 sec  Urinalysis Basic - ( 13 Apr 2025 05:30 )    Color: x / Appearance: x / SG: x / pH: x  Gluc: 116 mg/dL / Ketone: x  / Bili: x / Urobili: x   Blood: x / Protein: x / Nitrite: x   Leuk Esterase: x / RBC: x / WBC x   Sq Epi: x / Non Sq Epi: x / Bacteria: x          RADIOLOGY & ADDITIONAL TESTS:    MEDICATIONS  (STANDING):  ceFAZolin   IVPB 2000 milliGRAM(s) IV Intermittent every 8 hours  clopidogrel Tablet 75 milliGRAM(s) Oral daily  heparin   Injectable 5000 Unit(s) SubCutaneous every 8 hours  hydrochlorothiazide 12.5 milliGRAM(s) Oral daily  lactated ringers. 1000 milliLiter(s) (250 mL/Hr) IV Continuous <Continuous>  levothyroxine 50 MICROGram(s) Oral daily  melatonin 3 milliGRAM(s) Oral at bedtime    MEDICATIONS  (PRN):  acetaminophen     Tablet .. 650 milliGRAM(s) Oral every 6 hours PRN Temp greater or equal to 38C (100.4F), Mild Pain (1 - 3)  albuterol/ipratropium for Nebulization 3 milliLiter(s) Nebulizer every 6 hours PRN Shortness of Breath and/or Wheezing   **INCOMPLETE NOTE    INTERVAL/OVERNIGHT EVENTS: None    SUBJECTIVE:    Overnight, pulled out IV, and initially refusing antibiotics. After discussion, was agreeable to antibiotics and new IV.  This morning, patient seen and examined at bedside, comfortable, NAD. Denied fever, chest pain, dyspnea, abdominal pain. Has not had any headache, nausea or vomiting. Reports 5/10 pain in RLE with mild tingling in his toes. Reports that he has some amount of swelling in his legs at baseline, but the swelling that he is experiencing in his RLE now is more significant.     Vital Signs Last 12 Hrs  T(F): 98.8 (04-14-25 @ 06:00), Max: 98.8 (04-14-25 @ 06:00)  HR: 87 (04-14-25 @ 06:00) (87 - 88)  BP: 146/73 (04-14-25 @ 06:00) (146/73 - 178/95)  BP(mean): 123 (04-13-25 @ 20:59) (123 - 123)  RR: 18 (04-14-25 @ 06:00) (18 - 18)  SpO2: 98% (04-14-25 @ 06:00) (96% - 98%)  I&O's Summary      PHYSICAL EXAM:  General: NAD  HEENT: PERRL, EOM intact, sclera anicteric, MMM  Cardiovascular: RRR; no MRG; no JVD  Respiratory: CTAB; no ronchi, rales or wheezes. No accessory muscle use.  GI/: soft; NTND  Extremities: Warm well perfused. Wound present on RLE without exudate but with some necrosis. Soft tissue swelling surrounding wound. Pitting edema bilaterally with increased edema on RLE extending into right foot. RLE is erythematous and warm. Erythema does not extend past previous markings -currently present from knee to ankle. Pulses are difficult to assess given extent of edema. Capillary refill <2s. Active and passive ROM normal on LLE but reduced on RLE.   Skin: normal color & turgor; no rash  Neurologic: aox3; no focal deficits    LABS:                        11.0   10.46 )-----------( 114      ( 13 Apr 2025 05:30 )             33.4     04-13    143  |  108  |  24[H]  ----------------------------<  116[H]  3.2[L]   |  24  |  1.27    Ca    8.7      13 Apr 2025 05:30  Phos  4.3     04-13  Mg     1.8     04-13    TPro  5.1[L]  /  Alb  2.9[L]  /  TBili  2.1[H]  /  DBili  0.7[H]  /  AST  14  /  ALT  12  /  AlkPhos  59  04-13    PT/INR - ( 12 Apr 2025 19:11 )   PT: 12.6 sec;   INR: 1.08          PTT - ( 12 Apr 2025 19:11 )  PTT:20.7 sec  Urinalysis Basic - ( 13 Apr 2025 05:30 )    Color: x / Appearance: x / SG: x / pH: x  Gluc: 116 mg/dL / Ketone: x  / Bili: x / Urobili: x   Blood: x / Protein: x / Nitrite: x   Leuk Esterase: x / RBC: x / WBC x   Sq Epi: x / Non Sq Epi: x / Bacteria: x          RADIOLOGY & ADDITIONAL TESTS:    MEDICATIONS  (STANDING):  ceFAZolin   IVPB 2000 milliGRAM(s) IV Intermittent every 8 hours  clopidogrel Tablet 75 milliGRAM(s) Oral daily  heparin   Injectable 5000 Unit(s) SubCutaneous every 8 hours  hydrochlorothiazide 12.5 milliGRAM(s) Oral daily  lactated ringers. 1000 milliLiter(s) (250 mL/Hr) IV Continuous <Continuous>  levothyroxine 50 MICROGram(s) Oral daily  melatonin 3 milliGRAM(s) Oral at bedtime    MEDICATIONS  (PRN):  acetaminophen     Tablet .. 650 milliGRAM(s) Oral every 6 hours PRN Temp greater or equal to 38C (100.4F), Mild Pain (1 - 3)  albuterol/ipratropium for Nebulization 3 milliLiter(s) Nebulizer every 6 hours PRN Shortness of Breath and/or Wheezing   INTERVAL/OVERNIGHT EVENTS: Overnight, pulled out IV, and initially refusing antibiotics. After discussion, was agreeable to antibiotics and new IV.    SUBJECTIVE:    This morning, patient seen and examined at bedside, comfortable, NAD. Denied fever, chest pain, dyspnea, abdominal pain. Has not had any headache, nausea or vomiting. Reports 5/10 pain in RLE with mild tingling in his toes. Reports that he has some amount of swelling in his legs at baseline, but the swelling that he is experiencing in his RLE now is more significant.     Vital Signs Last 12 Hrs  T(F): 98.8 (04-14-25 @ 06:00), Max: 98.8 (04-14-25 @ 06:00)  HR: 87 (04-14-25 @ 06:00) (87 - 88)  BP: 146/73 (04-14-25 @ 06:00) (146/73 - 178/95)  BP(mean): 123 (04-13-25 @ 20:59) (123 - 123)  RR: 18 (04-14-25 @ 06:00) (18 - 18)  SpO2: 98% (04-14-25 @ 06:00) (96% - 98%)  I&O's Summary      PHYSICAL EXAM:  General: NAD  HEENT: PERRL, EOM intact, sclera anicteric, MMM  Cardiovascular: RRR; no MRG; no JVD  Respiratory: CTAB; no ronchi, rales or wheezes. No accessory muscle use.  GI/: soft; NTND  Extremities: Warm well perfused. 3-cm ulcerative lesion mid-tibia of RLE non-purulent w/ overlying necrosis.oft tissue swelling surrounding wound. Pitting edema bilaterally with increased edema on RLE extending into right foot. RLE is erythematous and warm. Erythema does not extend past previous markings -currently present from knee to ankle. Pulses are difficult to assess given extent of edema. Capillary refill <2s. Active and passive ROM normal on LLE but reduced on RLE.   Skin: normal color & turgor; no rash  Neurologic: aox3; no focal deficits    LABS:                        11.0   10.46 )-----------( 114      ( 13 Apr 2025 05:30 )             33.4     04-13    143  |  108  |  24[H]  ----------------------------<  116[H]  3.2[L]   |  24  |  1.27    Ca    8.7      13 Apr 2025 05:30  Phos  4.3     04-13  Mg     1.8     04-13    TPro  5.1[L]  /  Alb  2.9[L]  /  TBili  2.1[H]  /  DBili  0.7[H]  /  AST  14  /  ALT  12  /  AlkPhos  59  04-13    PT/INR - ( 12 Apr 2025 19:11 )   PT: 12.6 sec;   INR: 1.08          PTT - ( 12 Apr 2025 19:11 )  PTT:20.7 sec  Urinalysis Basic - ( 13 Apr 2025 05:30 )    Color: x / Appearance: x / SG: x / pH: x  Gluc: 116 mg/dL / Ketone: x  / Bili: x / Urobili: x   Blood: x / Protein: x / Nitrite: x   Leuk Esterase: x / RBC: x / WBC x   Sq Epi: x / Non Sq Epi: x / Bacteria: x          RADIOLOGY & ADDITIONAL TESTS:    MEDICATIONS  (STANDING):  ceFAZolin   IVPB 2000 milliGRAM(s) IV Intermittent every 8 hours  clopidogrel Tablet 75 milliGRAM(s) Oral daily  heparin   Injectable 5000 Unit(s) SubCutaneous every 8 hours  hydrochlorothiazide 12.5 milliGRAM(s) Oral daily  lactated ringers. 1000 milliLiter(s) (250 mL/Hr) IV Continuous <Continuous>  levothyroxine 50 MICROGram(s) Oral daily  melatonin 3 milliGRAM(s) Oral at bedtime    MEDICATIONS  (PRN):  acetaminophen     Tablet .. 650 milliGRAM(s) Oral every 6 hours PRN Temp greater or equal to 38C (100.4F), Mild Pain (1 - 3)  albuterol/ipratropium for Nebulization 3 milliLiter(s) Nebulizer every 6 hours PRN Shortness of Breath and/or Wheezing

## 2025-04-14 NOTE — PROGRESS NOTE ADULT - PROBLEM SELECTOR PLAN 4
TBili 3.4. ALT22, AST 46 and ALP 83. R-Factor 0.8 - cholestatic. On exam w/ hepatomegaly and fluid thrill. DDx hepatic (sepsis, medication, choledocholithiasis, and less likely malignancy, PSC and PBC) vs indirect (hemolysis, Gilbert's).  - fractionate bili  - abdominal US  - hemolysis labs TBili 3.4. ALT22, AST 46 and ALP 83. R-Factor 0.8 - cholestatic. On exam w/ hepatomegaly and fluid thrill. DDx hepatic (sepsis, medication, choledocholithiasis, and less likely malignancy, PSC and PBC) vs indirect (hemolysis, Gilbert's).  - Abdominal US: 9 mm CBD ectasia,, commonly related to prior cholecystectomy/cholecystitis and normal aging. (4/13)  - Bilirubin 2.1, Direct 0.7, Indirect 1.4; Downtrending (4/14) likely iso sepsis vs hemolytic anemia    Plan:  - CTM

## 2025-04-14 NOTE — PROGRESS NOTE ADULT - PROBLEM SELECTOR PLAN 8
F: s/p 1L NS in the ED -> additional 1.5L IVF to compete 30cc/kg sepsis resuscitation   E: replete K<4, Mg<2  N: DASH  VTE Prophylaxis: heparin TID  GI: none  C: Full Code  D: RMF Pt w/ hx of COPD. No home inhalers. Using home O2, unknown amount.  - c/w duonebs PRN  - outpt pulm apt.    #Mediastinal and hilar lymphadenopathy  On last admission The CT demonstrated large lymphadenopathy in the right paratracheal and subcarinal area. Also with extension to the esophageal wall. Lung parenchymal with smooth interlobar septal thickening which can be due to fluid overload or due to lymphangitic spread. IP performed a bronch with BAL, endobronchial biopsies, and EBUS w/ TBNA of stations 7, 4L, 4R, 11R and airway appeared infectious with thick white purulent secretions. Multiple specimens sent for testing: negative AFB, negative bacterial and fungal growth, negative for malignancy; appears to have inflammatory cells. Was planned for OP PET and further w/u but failed to f/u. Bx showed a marked acute inflammatory infiltrate.  Alveolar macrophages and reactive bronchial epithelial cells are present in the background.  The cellblock shows similar findings.  GMS stain is negative for pneumocystis and fungi.  AFB stain is negative for acid-fast microorganisms. + Strep intermedius  - OP f/u w/ pulm   - f/u CXR

## 2025-04-14 NOTE — CONSULT NOTE ADULT - ASSESSMENT
I M    80M w/ PMhx of HTN, COPD not on home O2, presents with 3wks of RLE redness, pain, and swelling of lower leg, found to have cellulitis and admitted for IV abx,    Problem/Plan - 1:  ·  Problem: Severe sepsis.   ·  Plan: On admission met 2/4 SIRS criteria with HR > 90, WBC >12.  Likely source RLE cellulitis. Lactate 2.8 -> 1.4 s/p 1L NS in ED. Given cefepime 2g and vancomycin 1g iso penicillin allergy.   - Abx vancomycin 15mg/kg renally dosed  - f/u blood cxs  - give additional 1.5L IVF to complete 30cc/kg resuscitation.    Problem/Plan - 2:  ·  Problem: Cellulitis.   ·  Plan: Patient presents with skin and soft tissue infection of RLE with no evidence of purulence. No evidence of abscess at this time. Soft tissue edema on RLE Duplex.   - Abx: as above  - trend CBC  - A1c in AM for RF stratification.    Problem/Plan - 3:  ·  Problem: Hyperkalemia.   ·  Plan: K 5.4. No EKG changes.   - trend BMP in AM  - Lokelma 5g if cont to be elevated.    Problem/Plan - 4:  ·  Problem: Hyperbilirubinemia.   ·  Plan: TBili 3.4. ALT22, AST 46 and ALP 83. R-Factor 0.8 - cholestatic. On exam w/ hepatomegaly and fluid thrill. DDx hepatic (sepsis, medication, choledocholithiasis, and less likely malignancy, PSC and PBC) vs indirect (hemolysis, Gilbert's).  - fractionate bili  - abdominal US  - hemolysis labs.    Problem/Plan - 5:  ·  Problem: MONA (acute kidney injury).   ·  Plan: Patient noted to have MONA with Cr 1.52 over baseline 1.1. Likely prerenal etiology 2/2 sepsis. Currently producing urine. s/p 1L IVF in ED.  - trend Cr  - avoid nephrotoxic drugs, renally dose meds - hold home HCTZ  - will consider obtaining urine lytes if not improving.    Problem/Plan - 6:  ·  Problem: HTN (hypertension).   ·  Plan: Pt w/ Hx of HTN managed w/ HCTZ 12.5mg QD. Goal BP <130/80.    - DASH diet  - Meds: hold iso MONA and sepsis.    Problem/Plan - 7:  ·  Problem: COPD (chronic obstructive pulmonary disease).   ·  Plan: Pt w/ hx of COPD. No home inhalers.   - c w duonebs PRN  - outpt pulm apt.    #Mediastinal and hilar lymphadenopathy  On last admission The CT demonstrated large lymphadenopathy in the right paratracheal and subcarinal area. Also with extension to the esophageal wall. Lung parenchymal with smooth interlobar septal thickening which can be due to fluid overload or due to lymphangitic spread. IP performed a bronch with BAL, endobronchial biopsies, and EBUS w/ TBNA of stations 7, 4L, 4R, 11R and airway appeared infectious with thick white purulent secretions. Multiple specimens sent for testing: negative AFB, negative bacterial and fungal growth, negative for malignancy; appears to have inflammatory cells. Was planned for OP PET and further w/u but failed to f/u. Bx showed a marked acute inflammatory infiltrate.  Alveolar macrophages and reactive bronchial epithelial cells are present in the background.  The cellblock shows similar findings.  GMS stain is negative for pneumocystis and fungi.  AFB stain is negative for acid-fast microorganisms. + Strep intermedius  - OP f/u w/ pulm   - f/u CXR.    Problem/Plan - 8:  ·  Problem: Prophylactic measure.   ·  Plan: F: s/p 1L NS in the ED -> additional 1.5L IVF to compete 30cc/kg sepsis resuscitation   E: replete K<4, Mg<2  N: DASH  VTE Prophylaxis: heparin TID  GI: none  C: Full Code  D: RMF.

## 2025-04-14 NOTE — PROGRESS NOTE ADULT - PROBLEM SELECTOR PLAN 6
Pt w/ Hx of HTN managed w/ HCTZ 12.5mg QD. Goal BP <130/80.    - DASH diet  - Meds: hold iso MONA and sepsis Patient noted to have MONA with Cr 1.52 and BUN 26 on admission. Baseline 1.1. Likely prerenal etiology 2/2 sepsis. Currently producing urine. s/p 1L IVF in ED.  Cr 1.27, BUN 24 (4/14)    Plan:  - CTM Cr  - avoid nephrotoxic drugs, renally dose meds  - will consider obtaining urine lytes if not improving

## 2025-04-14 NOTE — CHART NOTE - NSCHARTNOTEFT_GEN_A_CORE
Patient left AMA. I was unable to see patient when notified that he wanted to leave AMA. Nurse called me and as pernurse, she tried to tell the patient I would be there in 30 minutes to speak to him, patient still eloped.

## 2025-04-14 NOTE — PHYSICAL THERAPY INITIAL EVALUATION ADULT - PREDICTED DURATION OF THERAPY (DAYS/WKS), PT EVAL
Pt. would benefit from continued PT f/u to improve gait, balance, endurance; assess pt's ability to negotiate stairs.

## 2025-04-24 ENCOUNTER — INPATIENT (INPATIENT)
Facility: HOSPITAL | Age: 80
LOS: 3 days | Discharge: LEFT BEFORE DISCHARGE | End: 2025-04-28
Attending: STUDENT IN AN ORGANIZED HEALTH CARE EDUCATION/TRAINING PROGRAM | Admitting: INTERNAL MEDICINE
Payer: MEDICARE

## 2025-04-24 ENCOUNTER — RESULT REVIEW (OUTPATIENT)
Age: 80
End: 2025-04-24

## 2025-04-24 VITALS
OXYGEN SATURATION: 100 % | WEIGHT: 179.9 LBS | DIASTOLIC BLOOD PRESSURE: 108 MMHG | SYSTOLIC BLOOD PRESSURE: 196 MMHG | HEIGHT: 69 IN | HEART RATE: 94 BPM | TEMPERATURE: 97 F | RESPIRATION RATE: 20 BRPM

## 2025-04-24 DIAGNOSIS — I50.20 UNSPECIFIED SYSTOLIC (CONGESTIVE) HEART FAILURE: ICD-10-CM

## 2025-04-24 DIAGNOSIS — L03.90 CELLULITIS, UNSPECIFIED: ICD-10-CM

## 2025-04-24 DIAGNOSIS — Z90.49 ACQUIRED ABSENCE OF OTHER SPECIFIED PARTS OF DIGESTIVE TRACT: Chronic | ICD-10-CM

## 2025-04-24 DIAGNOSIS — J44.9 CHRONIC OBSTRUCTIVE PULMONARY DISEASE, UNSPECIFIED: ICD-10-CM

## 2025-04-24 DIAGNOSIS — I10 ESSENTIAL (PRIMARY) HYPERTENSION: ICD-10-CM

## 2025-04-24 DIAGNOSIS — Z29.9 ENCOUNTER FOR PROPHYLACTIC MEASURES, UNSPECIFIED: ICD-10-CM

## 2025-04-24 DIAGNOSIS — J44.1 CHRONIC OBSTRUCTIVE PULMONARY DISEASE WITH (ACUTE) EXACERBATION: ICD-10-CM

## 2025-04-24 LAB
ALBUMIN SERPL ELPH-MCNC: 2.8 G/DL — LOW (ref 3.4–5)
ALBUMIN SERPL ELPH-MCNC: 3.5 G/DL — SIGNIFICANT CHANGE UP (ref 3.3–5)
ALP SERPL-CCNC: 102 U/L — SIGNIFICANT CHANGE UP (ref 40–120)
ALP SERPL-CCNC: 107 U/L — SIGNIFICANT CHANGE UP (ref 40–120)
ALT FLD-CCNC: 24 U/L — SIGNIFICANT CHANGE UP (ref 10–45)
ALT FLD-CCNC: 36 U/L — SIGNIFICANT CHANGE UP (ref 12–42)
AMMONIA BLD-MCNC: 14 UMOL/L — SIGNIFICANT CHANGE UP (ref 11–32)
ANION GAP SERPL CALC-SCNC: 14 MMOL/L — SIGNIFICANT CHANGE UP (ref 5–17)
ANION GAP SERPL CALC-SCNC: 7 MMOL/L — LOW (ref 9–16)
APTT BLD: 26.7 SEC — SIGNIFICANT CHANGE UP (ref 26.1–36.8)
AST SERPL-CCNC: 34 U/L — SIGNIFICANT CHANGE UP (ref 10–40)
AST SERPL-CCNC: 57 U/L — HIGH (ref 15–37)
BASOPHILS # BLD AUTO: 0 K/UL — SIGNIFICANT CHANGE UP (ref 0–0.2)
BASOPHILS # BLD AUTO: 0.05 K/UL — SIGNIFICANT CHANGE UP (ref 0–0.2)
BASOPHILS NFR BLD AUTO: 0 % — SIGNIFICANT CHANGE UP (ref 0–2)
BASOPHILS NFR BLD AUTO: 0.7 % — SIGNIFICANT CHANGE UP (ref 0–2)
BILIRUB SERPL-MCNC: 1.2 MG/DL — SIGNIFICANT CHANGE UP (ref 0.2–1.2)
BILIRUB SERPL-MCNC: 1.4 MG/DL — HIGH (ref 0.2–1.2)
BUN SERPL-MCNC: 22 MG/DL — SIGNIFICANT CHANGE UP (ref 7–23)
BUN SERPL-MCNC: 25 MG/DL — HIGH (ref 7–23)
CALCIUM SERPL-MCNC: 9 MG/DL — SIGNIFICANT CHANGE UP (ref 8.5–10.5)
CALCIUM SERPL-MCNC: 9.1 MG/DL — SIGNIFICANT CHANGE UP (ref 8.4–10.5)
CHLORIDE SERPL-SCNC: 105 MMOL/L — SIGNIFICANT CHANGE UP (ref 96–108)
CHLORIDE SERPL-SCNC: 97 MMOL/L — SIGNIFICANT CHANGE UP (ref 96–108)
CO2 SERPL-SCNC: 28 MMOL/L — SIGNIFICANT CHANGE UP (ref 22–31)
CO2 SERPL-SCNC: 31 MMOL/L — SIGNIFICANT CHANGE UP (ref 22–31)
CREAT SERPL-MCNC: 1.23 MG/DL — SIGNIFICANT CHANGE UP (ref 0.5–1.3)
CREAT SERPL-MCNC: 1.28 MG/DL — SIGNIFICANT CHANGE UP (ref 0.5–1.3)
EGFR: 56 ML/MIN/1.73M2 — LOW
EGFR: 56 ML/MIN/1.73M2 — LOW
EGFR: 59 ML/MIN/1.73M2 — LOW
EGFR: 59 ML/MIN/1.73M2 — LOW
EOSINOPHIL # BLD AUTO: 0 K/UL — SIGNIFICANT CHANGE UP (ref 0–0.5)
EOSINOPHIL # BLD AUTO: 0.08 K/UL — SIGNIFICANT CHANGE UP (ref 0–0.5)
EOSINOPHIL NFR BLD AUTO: 0 % — SIGNIFICANT CHANGE UP (ref 0–6)
EOSINOPHIL NFR BLD AUTO: 1.1 % — SIGNIFICANT CHANGE UP (ref 0–6)
FLUAV AG NPH QL: SIGNIFICANT CHANGE UP
FLUBV AG NPH QL: SIGNIFICANT CHANGE UP
GLUCOSE SERPL-MCNC: 119 MG/DL — HIGH (ref 70–99)
GLUCOSE SERPL-MCNC: 130 MG/DL — HIGH (ref 70–99)
HCT VFR BLD CALC: 40.2 % — SIGNIFICANT CHANGE UP (ref 39–50)
HCT VFR BLD CALC: 42.3 % — SIGNIFICANT CHANGE UP (ref 39–50)
HGB BLD-MCNC: 13 G/DL — SIGNIFICANT CHANGE UP (ref 13–17)
HGB BLD-MCNC: 13.2 G/DL — SIGNIFICANT CHANGE UP (ref 13–17)
IMM GRANULOCYTES # BLD AUTO: 0.02 K/UL — SIGNIFICANT CHANGE UP (ref 0–0.07)
IMM GRANULOCYTES NFR BLD AUTO: 0.3 % — SIGNIFICANT CHANGE UP (ref 0–0.9)
INR BLD: 1.21 — HIGH (ref 0.85–1.16)
LACTATE BLDV-MCNC: 1.7 MMOL/L — SIGNIFICANT CHANGE UP (ref 0.5–2)
LYMPHOCYTES # BLD AUTO: 0.08 K/UL — LOW (ref 1–3.3)
LYMPHOCYTES # BLD AUTO: 0.9 % — LOW (ref 13–44)
LYMPHOCYTES # BLD AUTO: 1.04 K/UL — SIGNIFICANT CHANGE UP (ref 1–3.3)
LYMPHOCYTES NFR BLD AUTO: 14.3 % — SIGNIFICANT CHANGE UP (ref 13–44)
MAGNESIUM SERPL-MCNC: 1.9 MG/DL — SIGNIFICANT CHANGE UP (ref 1.6–2.6)
MAGNESIUM SERPL-MCNC: 2.2 MG/DL — SIGNIFICANT CHANGE UP (ref 1.6–2.6)
MCHC RBC-ENTMCNC: 31.1 PG — SIGNIFICANT CHANGE UP (ref 27–34)
MCHC RBC-ENTMCNC: 31.2 G/DL — LOW (ref 32–36)
MCHC RBC-ENTMCNC: 31.7 PG — SIGNIFICANT CHANGE UP (ref 27–34)
MCHC RBC-ENTMCNC: 32.3 G/DL — SIGNIFICANT CHANGE UP (ref 32–36)
MCV RBC AUTO: 98 FL — SIGNIFICANT CHANGE UP (ref 80–100)
MCV RBC AUTO: 99.5 FL — SIGNIFICANT CHANGE UP (ref 80–100)
MONOCYTES # BLD AUTO: 0.08 K/UL — SIGNIFICANT CHANGE UP (ref 0–0.9)
MONOCYTES # BLD AUTO: 0.5 K/UL — SIGNIFICANT CHANGE UP (ref 0–0.9)
MONOCYTES NFR BLD AUTO: 0.9 % — LOW (ref 2–14)
MONOCYTES NFR BLD AUTO: 6.9 % — SIGNIFICANT CHANGE UP (ref 2–14)
NEUTROPHILS # BLD AUTO: 5.57 K/UL — SIGNIFICANT CHANGE UP (ref 1.8–7.4)
NEUTROPHILS # BLD AUTO: 8.51 K/UL — HIGH (ref 1.8–7.4)
NEUTROPHILS NFR BLD AUTO: 76.7 % — SIGNIFICANT CHANGE UP (ref 43–77)
NEUTROPHILS NFR BLD AUTO: 98.2 % — HIGH (ref 43–77)
NRBC # BLD AUTO: 0 K/UL — SIGNIFICANT CHANGE UP (ref 0–0)
NRBC # FLD: 0 K/UL — SIGNIFICANT CHANGE UP (ref 0–0)
NRBC BLD AUTO-RTO: 0 /100 WBCS — SIGNIFICANT CHANGE UP (ref 0–0)
NT-PROBNP SERPL-SCNC: HIGH PG/ML
PCO2 BLDA: 45 MMHG — SIGNIFICANT CHANGE UP (ref 35–48)
PCO2 BLDV: 57 MMHG — HIGH (ref 42–55)
PH BLDA: 7.4 — SIGNIFICANT CHANGE UP (ref 7.35–7.45)
PH BLDV: 7.35 — SIGNIFICANT CHANGE UP (ref 7.32–7.43)
PHOSPHATE SERPL-MCNC: 3.5 MG/DL — SIGNIFICANT CHANGE UP (ref 2.5–4.5)
PLATELET # BLD AUTO: 173 K/UL — SIGNIFICANT CHANGE UP (ref 150–400)
PLATELET # BLD AUTO: 177 K/UL — SIGNIFICANT CHANGE UP (ref 150–400)
PMV BLD: 11.4 FL — SIGNIFICANT CHANGE UP (ref 7–13)
PO2 BLDA: 160 MMHG — HIGH (ref 83–108)
PO2 BLDV: <35 MMHG — SIGNIFICANT CHANGE UP (ref 25–45)
POTASSIUM SERPL-MCNC: 3.8 MMOL/L — SIGNIFICANT CHANGE UP (ref 3.5–5.3)
POTASSIUM SERPL-MCNC: 4.3 MMOL/L — SIGNIFICANT CHANGE UP (ref 3.5–5.3)
POTASSIUM SERPL-SCNC: 3.8 MMOL/L — SIGNIFICANT CHANGE UP (ref 3.5–5.3)
POTASSIUM SERPL-SCNC: 4.3 MMOL/L — SIGNIFICANT CHANGE UP (ref 3.5–5.3)
PROT SERPL-MCNC: 6.7 G/DL — SIGNIFICANT CHANGE UP (ref 6.4–8.2)
PROT SERPL-MCNC: 6.8 G/DL — SIGNIFICANT CHANGE UP (ref 6–8.3)
PROTHROM AB SERPL-ACNC: 14.1 SEC — HIGH (ref 9.9–13.4)
RBC # BLD: 4.1 M/UL — LOW (ref 4.2–5.8)
RBC # BLD: 4.25 M/UL — SIGNIFICANT CHANGE UP (ref 4.2–5.8)
RBC # FLD: 14.9 % — HIGH (ref 10.3–14.5)
RBC # FLD: 15 % — HIGH (ref 10.3–14.5)
RSV RNA NPH QL NAA+NON-PROBE: SIGNIFICANT CHANGE UP
SAO2 % BLDA: 100 % — HIGH (ref 94–98)
SAO2 % BLDV: 49.9 % — LOW (ref 67–88)
SARS-COV-2 RNA SPEC QL NAA+PROBE: SIGNIFICANT CHANGE UP
SODIUM SERPL-SCNC: 140 MMOL/L — SIGNIFICANT CHANGE UP (ref 132–145)
SODIUM SERPL-SCNC: 142 MMOL/L — SIGNIFICANT CHANGE UP (ref 135–145)
SOURCE RESPIRATORY: SIGNIFICANT CHANGE UP
TROPONIN I, HIGH SENSITIVITY RESULT: 117.7 NG/L — HIGH
TROPONIN T, HIGH SENSITIVITY RESULT: 61 NG/L — CRITICAL HIGH (ref 0–51)
TSH SERPL-MCNC: 3.26 UIU/ML — SIGNIFICANT CHANGE UP (ref 0.36–3.74)
WBC # BLD: 7.26 K/UL — SIGNIFICANT CHANGE UP (ref 3.8–10.5)
WBC # BLD: 8.67 K/UL — SIGNIFICANT CHANGE UP (ref 3.8–10.5)
WBC # FLD AUTO: 7.26 K/UL — SIGNIFICANT CHANGE UP (ref 3.8–10.5)
WBC # FLD AUTO: 8.67 K/UL — SIGNIFICANT CHANGE UP (ref 3.8–10.5)

## 2025-04-24 PROCEDURE — 99222 1ST HOSP IP/OBS MODERATE 55: CPT | Mod: GC

## 2025-04-24 PROCEDURE — 93306 TTE W/DOPPLER COMPLETE: CPT | Mod: 26

## 2025-04-24 PROCEDURE — 71045 X-RAY EXAM CHEST 1 VIEW: CPT | Mod: 26

## 2025-04-24 PROCEDURE — 70450 CT HEAD/BRAIN W/O DYE: CPT | Mod: 26

## 2025-04-24 PROCEDURE — 73590 X-RAY EXAM OF LOWER LEG: CPT | Mod: 26,RT

## 2025-04-24 PROCEDURE — 99222 1ST HOSP IP/OBS MODERATE 55: CPT | Mod: 25

## 2025-04-24 PROCEDURE — 93970 EXTREMITY STUDY: CPT | Mod: 26

## 2025-04-24 PROCEDURE — 99223 1ST HOSP IP/OBS HIGH 75: CPT | Mod: GC

## 2025-04-24 PROCEDURE — 93925 LOWER EXTREMITY STUDY: CPT | Mod: 26

## 2025-04-24 PROCEDURE — 71275 CT ANGIOGRAPHY CHEST: CPT | Mod: 26

## 2025-04-24 PROCEDURE — 99291 CRITICAL CARE FIRST HOUR: CPT

## 2025-04-24 PROCEDURE — 11042 DBRDMT SUBQ TIS 1ST 20SQCM/<: CPT | Mod: GC,RT

## 2025-04-24 RX ORDER — METHYLPREDNISOLONE ACETATE 80 MG/ML
125 INJECTION, SUSPENSION INTRA-ARTICULAR; INTRALESIONAL; INTRAMUSCULAR; SOFT TISSUE ONCE
Refills: 0 | Status: COMPLETED | OUTPATIENT
Start: 2025-04-24 | End: 2025-04-24

## 2025-04-24 RX ORDER — VANCOMYCIN HCL IN 5 % DEXTROSE 1.5G/250ML
1250 PLASTIC BAG, INJECTION (ML) INTRAVENOUS EVERY 24 HOURS
Refills: 0 | Status: DISCONTINUED | OUTPATIENT
Start: 2025-04-25 | End: 2025-04-25

## 2025-04-24 RX ORDER — AZITHROMYCIN 250 MG
500 CAPSULE ORAL ONCE
Refills: 0 | Status: COMPLETED | OUTPATIENT
Start: 2025-04-24 | End: 2025-04-24

## 2025-04-24 RX ORDER — AMLODIPINE BESYLATE 10 MG/1
10 TABLET ORAL EVERY 24 HOURS
Refills: 0 | Status: DISCONTINUED | OUTPATIENT
Start: 2025-04-24 | End: 2025-04-24

## 2025-04-24 RX ORDER — IPRATROPIUM BROMIDE AND ALBUTEROL SULFATE .5; 2.5 MG/3ML; MG/3ML
3 SOLUTION RESPIRATORY (INHALATION) EVERY 4 HOURS
Refills: 0 | Status: DISCONTINUED | OUTPATIENT
Start: 2025-04-24 | End: 2025-04-25

## 2025-04-24 RX ORDER — CLOPIDOGREL BISULFATE 75 MG/1
1 TABLET, FILM COATED ORAL
Refills: 0 | DISCHARGE

## 2025-04-24 RX ORDER — AZITHROMYCIN 250 MG
500 CAPSULE ORAL EVERY 24 HOURS
Refills: 0 | Status: DISCONTINUED | OUTPATIENT
Start: 2025-04-25 | End: 2025-04-27

## 2025-04-24 RX ORDER — NIFEDIPINE 30 MG
30 TABLET, EXTENDED RELEASE 24 HR ORAL EVERY 24 HOURS
Refills: 0 | Status: DISCONTINUED | OUTPATIENT
Start: 2025-04-24 | End: 2025-04-25

## 2025-04-24 RX ORDER — FUROSEMIDE 10 MG/ML
60 INJECTION INTRAMUSCULAR; INTRAVENOUS DAILY
Refills: 0 | Status: DISCONTINUED | OUTPATIENT
Start: 2025-04-24 | End: 2025-04-24

## 2025-04-24 RX ORDER — LEVOTHYROXINE SODIUM 300 MCG
1 TABLET ORAL
Refills: 0 | DISCHARGE

## 2025-04-24 RX ORDER — NIFEDIPINE 30 MG
30 TABLET, EXTENDED RELEASE 24 HR ORAL EVERY 24 HOURS
Refills: 0 | Status: DISCONTINUED | OUTPATIENT
Start: 2025-04-24 | End: 2025-04-24

## 2025-04-24 RX ORDER — HYDROCHLOROTHIAZIDE 50 MG/1
1 TABLET ORAL
Refills: 0 | DISCHARGE

## 2025-04-24 RX ORDER — PREDNISONE 20 MG/1
40 TABLET ORAL EVERY 24 HOURS
Refills: 0 | Status: COMPLETED | OUTPATIENT
Start: 2025-04-24 | End: 2025-04-28

## 2025-04-24 RX ORDER — VANCOMYCIN HCL IN 5 % DEXTROSE 1.5G/250ML
1250 PLASTIC BAG, INJECTION (ML) INTRAVENOUS ONCE
Refills: 0 | Status: COMPLETED | OUTPATIENT
Start: 2025-04-24 | End: 2025-04-24

## 2025-04-24 RX ORDER — ATORVASTATIN CALCIUM 80 MG/1
80 TABLET, FILM COATED ORAL AT BEDTIME
Refills: 0 | Status: DISCONTINUED | OUTPATIENT
Start: 2025-04-24 | End: 2025-04-24

## 2025-04-24 RX ORDER — MAGNESIUM SULFATE 500 MG/ML
2 SYRINGE (ML) INJECTION ONCE
Refills: 0 | Status: COMPLETED | OUTPATIENT
Start: 2025-04-24 | End: 2025-04-24

## 2025-04-24 RX ORDER — CEPHALEXIN 250 MG/1
500 CAPSULE ORAL EVERY 6 HOURS
Refills: 0 | Status: DISCONTINUED | OUTPATIENT
Start: 2025-04-24 | End: 2025-04-24

## 2025-04-24 RX ORDER — LEVOTHYROXINE SODIUM 300 MCG
50 TABLET ORAL DAILY
Refills: 0 | Status: DISCONTINUED | OUTPATIENT
Start: 2025-04-24 | End: 2025-04-24

## 2025-04-24 RX ORDER — ACETAMINOPHEN 500 MG/5ML
650 LIQUID (ML) ORAL EVERY 6 HOURS
Refills: 0 | Status: DISCONTINUED | OUTPATIENT
Start: 2025-04-24 | End: 2025-04-28

## 2025-04-24 RX ORDER — ENOXAPARIN SODIUM 100 MG/ML
40 INJECTION SUBCUTANEOUS EVERY 24 HOURS
Refills: 0 | Status: DISCONTINUED | OUTPATIENT
Start: 2025-04-24 | End: 2025-04-28

## 2025-04-24 RX ORDER — MELATONIN 5 MG
3 TABLET ORAL AT BEDTIME
Refills: 0 | Status: DISCONTINUED | OUTPATIENT
Start: 2025-04-24 | End: 2025-04-28

## 2025-04-24 RX ORDER — ATORVASTATIN CALCIUM 80 MG/1
1 TABLET, FILM COATED ORAL
Refills: 0 | DISCHARGE

## 2025-04-24 RX ORDER — IPRATROPIUM BROMIDE AND ALBUTEROL SULFATE .5; 2.5 MG/3ML; MG/3ML
3 SOLUTION RESPIRATORY (INHALATION) ONCE
Refills: 0 | Status: COMPLETED | OUTPATIENT
Start: 2025-04-24 | End: 2025-04-24

## 2025-04-24 RX ADMIN — IPRATROPIUM BROMIDE AND ALBUTEROL SULFATE 3 MILLILITER(S): .5; 2.5 SOLUTION RESPIRATORY (INHALATION) at 19:42

## 2025-04-24 RX ADMIN — Medication 1250 MILLIGRAM(S): at 06:19

## 2025-04-24 RX ADMIN — FUROSEMIDE 60 MILLIGRAM(S): 10 INJECTION INTRAMUSCULAR; INTRAVENOUS at 03:26

## 2025-04-24 RX ADMIN — PREDNISONE 40 MILLIGRAM(S): 20 TABLET ORAL at 10:49

## 2025-04-24 RX ADMIN — Medication 20 MILLIEQUIVALENT(S): at 14:59

## 2025-04-24 RX ADMIN — METHYLPREDNISOLONE ACETATE 125 MILLIGRAM(S): 80 INJECTION, SUSPENSION INTRA-ARTICULAR; INTRALESIONAL; INTRAMUSCULAR; SOFT TISSUE at 05:45

## 2025-04-24 RX ADMIN — Medication 255 MILLIGRAM(S): at 06:10

## 2025-04-24 RX ADMIN — ENOXAPARIN SODIUM 40 MILLIGRAM(S): 100 INJECTION SUBCUTANEOUS at 10:50

## 2025-04-24 RX ADMIN — Medication 150 GRAM(S): at 05:45

## 2025-04-24 RX ADMIN — IPRATROPIUM BROMIDE AND ALBUTEROL SULFATE 3 MILLILITER(S): .5; 2.5 SOLUTION RESPIRATORY (INHALATION) at 10:49

## 2025-04-24 RX ADMIN — IPRATROPIUM BROMIDE AND ALBUTEROL SULFATE 3 MILLILITER(S): .5; 2.5 SOLUTION RESPIRATORY (INHALATION) at 23:34

## 2025-04-24 RX ADMIN — Medication 30 MILLIGRAM(S): at 17:04

## 2025-04-24 RX ADMIN — CEPHALEXIN 500 MILLIGRAM(S): 250 CAPSULE ORAL at 14:59

## 2025-04-24 RX ADMIN — Medication 166.67 MILLIGRAM(S): at 04:47

## 2025-04-24 RX ADMIN — IPRATROPIUM BROMIDE AND ALBUTEROL SULFATE 3 MILLILITER(S): .5; 2.5 SOLUTION RESPIRATORY (INHALATION) at 05:45

## 2025-04-24 RX ADMIN — Medication 10 MILLIGRAM(S): at 05:31

## 2025-04-24 RX ADMIN — Medication 1 DOSE(S): at 19:42

## 2025-04-24 RX ADMIN — IPRATROPIUM BROMIDE AND ALBUTEROL SULFATE 3 MILLILITER(S): .5; 2.5 SOLUTION RESPIRATORY (INHALATION) at 15:00

## 2025-04-24 RX ADMIN — Medication 500 MILLIGRAM(S): at 23:34

## 2025-04-24 RX ADMIN — Medication 2 GRAM(S): at 06:19

## 2025-04-24 NOTE — ED PROVIDER NOTE - LOWER EXTREMITY EXAM, RIGHT
5 cmThere is a chronic open wound to the right lateral tib-fib region of the right lower extremity with surrounding erythema tender to palpation along the medial edges, there is no fluctuance or induration there is scant serosanguineous drainage but necrotic skin in the center.  No crepitus

## 2025-04-24 NOTE — PROGRESS NOTE ADULT - PROBLEM SELECTOR PLAN 1
RLE non-healing ulcer and cellulitis with mild fluctuance and no drainage, initially admitted 4/12-4/14 for treatment with cefepime and vancomycin, de-escalated to cefazolin, and planned for discharge on cefalexin, however, patient left AMA and did not continue antibiotics at home.   RLE XR with soft tissue gas.   - C/w cefalexin 500 mg q6h PO  - Vascular consulted for non-healing ulcer c/f PAD  - Venous and arterial duplex RLE non-healing ulcer and cellulitis with mild fluctuance and no drainage, initially admitted 4/12-4/14 for treatment with cefepime and vancomycin, de-escalated to cefazolin, and planned for discharge on cefalexin, however, patient left AMA and did not continue antibiotics at home.   RLE XR (4/24): with soft tissue gas.   B/l LE venous dopplers: negative for DVT  B/l LE arterial dopplers: soft tissue swelling near R foot  S/p debridement w/ Vascular (4/24) with findings concerning for wound that tracks deep with purulence.  S/p Vanc 1250mg x1 in ED  - Transition from cefalexin 500 mg q6h PO -> Vancomycin for MRSA coverage i/s/o purulence  - wound care per vascular (pack cavity, WTD gauze and kerlix dressing) RLE non-healing ulcer and cellulitis with mild fluctuance and no drainage, initially admitted 4/12-4/14 for treatment with cefepime and vancomycin, de-escalated to cefazolin, and planned for discharge on cefalexin, however, patient left AMA and did not continue antibiotics at home.   RLE XR (4/24): with soft tissue gas.   B/l LE venous dopplers: negative for DVT  B/l LE arterial dopplers: soft tissue swelling near R foot  S/p debridement w/ Vascular (4/24) with findings concerning for wound that tracks deep with purulence.  S/p Vanc 1250mg (4/24 5AM) x1 in ED  - Transition from cefalexin 500 mg q6h PO -> Vancomycin 1250mg q24h for MRSA coverage i/s/o purulence  - Vanc trough before 3rd dose per pharmacy for concern of being supratheraputic (4/25 4AM)  - wound care per vascular (pack cavity, WTD gauze and kerlix dressing) RLE non-healing ulcer and cellulitis with mild fluctuance and no drainage, initially admitted 4/12-4/14 for treatment with cefepime and vancomycin, de-escalated to cefazolin, and planned for discharge on cefalexin, however, patient left AMA and did not continue antibiotics at home.   RLE XR (4/24): with soft tissue gas.   B/l LE venous dopplers: negative for DVT  B/l LE arterial dopplers: soft tissue swelling near R foot  S/p debridement w/ Vascular (4/24) with findings concerning for wound that tracks deep with purulence.  S/p Vanc 1250mg (4/24 5AM) x1 in ED  - Transition from Cefalexin 500 mg q6h PO -> Vancomycin 1250mg q24h for MRSA coverage i/s/o purulence (next dose 4/25 AM)  - Vanc trough before 3rd dose per pharmacy for concern of being supratheraputic (4/26 4AM)  - wound care per vascular (pack cavity, WTD gauze and kerlix dressing) Suspected to be i/s/o COPD exacerbation, medication noncompliance. Initially presenting with SOB on 3LNC, placed on BiPAP for iWOB/tachypena, transitioned to 3L NC on 7Lach.   Home medications: Trelegy Ellipta 200 and albuterol inhalers prn, however patient states he takes no medications  - C/w NC, wean as tolerated  - C/w prednisone 40 mg QD  - C/w duonebs q4h   - C/w Advair inhaler  - C/w Azithromycin 500 mg QD x3 days (4/24 - 4/27)

## 2025-04-24 NOTE — ED ADULT NURSE NOTE - OBJECTIVE STATEMENT
80y male presents to ED c/o shortness of breath. Pt c/o three weeks of RLE redness, pain, and swelling of lower leg, Recently admitted and treated for cellulitis. Pt presented to ED on 10L NRB. On RA saturating at 86%.

## 2025-04-24 NOTE — H&P ADULT - ASSESSMENT
80M w/ PMhx of HTN, COPD not on home O2 with recent admission to [facility] for RLE cellulitis 4/12-4/13 and left AMA presented to [facility] 4/24 after he was found with altered mental status and shortness of breath, admitted for COPD exacerbation and RLE cellulitis.     Neuro  #Acute metabolic encephalopathy, likely 2/2 hypercarbic respiratory failure, RESOLVED  Pt presented with AMS in [facility], CTH negative. Upon arrival to med [facility], at baseline mental status, A&Ox4, without focal deficits.  -Ctm mental status with treatment of AHRF below    Cardiology  #HTN  Pt w/ Hx of HTN managed w/ HCTZ 12.5mg QD  -C/w home med    #Elevated troponin       Pulm  #Acute hypoxic respiratory failure  #COPD exacerbation  Patient p/w LGHV with SOB, initially on 3L NC, then placed on BIPAP for increased work of breathing and tachypnea at rate of 27. Upon arrival to med [facility], was breathing comfortably on BIPAP 10/5, FiO2 30%.   - Weaned to nasal cannula  - Start prednisone 40 mg QD  - Start duonebs q4h     GI    Endo    Renal    ID    Heme        Prophylactic  Fluids: S/P 1L, encourage PO  Diet: Regular  DVT: Lovenox  GI: NA  Dispo: Tele   80M w/ PMhx of HTN, COPD not on home O2 with recent admission to [facility] for RLE cellulitis 4/12-4/13 and left AMA presented to [facility] 4/24 after he was found with altered mental status and shortness of breath, admitted for COPD exacerbation and RLE cellulitis.     Neuro  #Acute metabolic encephalopathy, likely 2/2 hypercarbic respiratory failure, RESOLVED  Pt presented with AMS in [facility], CTH negative. Upon arrival to [facility], at baseline mental status, A&Ox4, without focal deficits.  -Ctm mental status with treatment of AHRF below    Cardiology  #HTN  Pt w/ Hx of HTN managed w/ HCTZ 12.5mg QD  -Hold HCTZ iso normotension, restart if hypertensive    #Elevated troponin   Troponin 117 in ED, possibly iso supply/demand mismatch. Also had elevated troponin on previous admissions and may have difficulty clearing due to renal function. No chest pain prior to admission or at this time. EKG non-ischemic.   - Trend to peak  - Monitor for symptoms of ACS    #Elevated pro-BNP  Presented with pro-BNP 26345, up from 5575 in March 2025. CXR with pulmonary vascular congestion.   - TTE    Pulm  #Acute hypoxic respiratory failure  #COPD exacerbation  Patient p/w LGHV with SOB, initially on 3L NC, then placed on BIPAP for increased work of breathing and tachypnea at rate of 27. Upon arrival to [facility], was breathing comfortably on BIPAP 10/5, FiO2 30%. Initial VBG with respiratory acidosis, resolved on ABG after initiation of BIPAP.   - Weaned to nasal cannula  - Start prednisone 40 mg QD  - Start duonebs q4h   - C/w azithromycin 500 mg QD     GI  Diet: Regular    Endo  CANDIDO    Renal  #CKD  Cr 1.23, GFR 59 on admission, appears to be at baseline.   - Monitor sCr and UOP    ID  #Non-healing ulcer  #Cellulitis  Patient with RLE non-healing ulcer and cellulitis with mild fluctuance and no drainage, initially admitted 4/12-4/14 for treatment with cefepime and vancomycin, de-escalated to cefazolin, and planned for discharge on cefalexin, however, patient left AMA and did not continue antibiotics at home. RLE XR with soft tissue gas.   - Start cefalexin 500 mg q6h PO  - Vascular consulted for non-healing ulcer c/f PAD  - Venous and arterial duplex    Heme  CANDIDO    Prophylactic  Fluids: S/P 1L, encourage PO  Diet: Regular  DVT: Lovenox  GI: NA  Dispo: Tele   80M w/ PMhx of HTN, COPD not on home O2 with recent admission to [facility] for RLE cellulitis 4/12-4/13 and left AMA presented to [facility] 4/24 after he was found with altered mental status and shortness of breath, admitted for COPD exacerbation and RLE cellulitis.     Neuro  #Acute metabolic encephalopathy, likely 2/2 hypercarbic respiratory failure, RESOLVED  Pt presented with AMS in [facility], CTH negative. Upon arrival to [facility], at baseline mental status, A&Ox4, without focal deficits.  -Ctm mental status with treatment of AHRF below    Cardiology  #Elevated troponin   Troponin 117 in ED, possibly iso supply/demand mismatch. Also had elevated troponin on previous admissions and may have difficulty clearing due to renal function. No chest pain prior to admission or at this time. EKG non-ischemic.   - Trend to peak  - Monitor for symptoms of ACS    #Elevated pro-BNP  Presented with pro-BNP 05961, up from 5575 in March 2025. CXR with pulmonary vascular congestion.   - TTE    #HTN  Pt w/ Hx of HTN managed w/ HCTZ 12.5mg QD  -Hold HCTZ iso normotension, restart if hypertensive    #HLD  Pt with Hx of HLD on atorvastatin 80 mg QD at home  - C/w home med    Pulm  #Acute hypoxic respiratory failure  #COPD exacerbation  Patient p/w LGHV with SOB, initially on 3L NC, then placed on BIPAP for increased work of breathing and tachypnea at rate of 27. Upon arrival to [facility], was breathing comfortably on BIPAP 10/5, FiO2 30%. Initial VBG with respiratory acidosis, resolved on ABG after initiation of BIPAP.   - Weaned to nasal cannula  - Start prednisone 40 mg QD  - Start duonebs q4h   - C/w azithromycin 500 mg QD     GI  Diet: Regular    Endo  CANDIDO    Renal  #CKD  Cr 1.23, GFR 59 on admission, appears to be at baseline.   - Monitor sCr and UOP    ID  #Non-healing ulcer  #Cellulitis  Patient with RLE non-healing ulcer and cellulitis with mild fluctuance and no drainage, initially admitted 4/12-4/14 for treatment with cefepime and vancomycin, de-escalated to cefazolin, and planned for discharge on cefalexin, however, patient left AMA and did not continue antibiotics at home. RLE XR with soft tissue gas.   - Start cefalexin 500 mg q6h PO  - Vascular consulted for non-healing ulcer c/f PAD  - Venous and arterial duplex    Heme  CANDIDO    Prophylactic  Fluids: S/P 1L, encourage PO  Diet: Regular  DVT: Lovenox  GI: NA  Dispo: Tele   80M w/ PMhx of HTN, COPD not on home O2 with recent admission to [facility] for RLE cellulitis 4/12-4/13 and left AMA presented to [facility] 4/24 after he was found with altered mental status and shortness of breath, admitted for COPD exacerbation and RLE cellulitis.     Neuro  #Acute metabolic encephalopathy, likely 2/2 hypercarbic respiratory failure, RESOLVED  Pt presented with AMS in [facility], CTH negative. Upon arrival to [facility], at baseline mental status, A&Ox4, without focal deficits.  -Ctm mental status with treatment of AHRF below    Cardiology  #Elevated troponin   Troponin 117 in ED, possibly iso supply/demand mismatch. Also had elevated troponin on previous admissions and may have difficulty clearing due to renal function. No chest pain prior to admission or at this time. EKG non-ischemic.   - Trend to peak  - Monitor for symptoms of ACS    #Elevated pro-BNP  Presented with pro-BNP 13555, up from 5575 in March 2025. CXR with pulmonary vascular congestion.   - TTE    #HTN  Pt w/ Hx of HTN managed w/ HCTZ 12.5mg QD  -Hold HCTZ iso normotension, restart if hypertensive    #HLD  Pt with Hx of HLD on atorvastatin 80 mg QD at home  - C/w home med    Pulm  #Acute hypoxic respiratory failure  #COPD exacerbation  Patient p/w LGHV with SOB, initially on 3L NC, then placed on BIPAP for increased work of breathing and tachypnea at rate of 27. Upon arrival to [facility], was breathing comfortably on BIPAP 10/5, FiO2 30%. Initial VBG with respiratory acidosis, resolved on ABG after initiation of BIPAP.   - Weaned to nasal cannula  - Start prednisone 40 mg QD  - Start duonebs q4h   - C/w azithromycin 500 mg QD     GI  Diet: Regular    Endo  #Hypothyroidism  Home med: levothyroxine 50 mcg  - C/w home med    Renal  #CKD  Cr 1.23, GFR 59 on admission, appears to be at baseline.   - Monitor sCr and UOP    ID  #Non-healing ulcer  #Cellulitis  Patient with RLE non-healing ulcer and cellulitis with mild fluctuance and no drainage, initially admitted 4/12-4/14 for treatment with cefepime and vancomycin, de-escalated to cefazolin, and planned for discharge on cefalexin, however, patient left AMA and did not continue antibiotics at home. RLE XR with soft tissue gas.   - Start cefalexin 500 mg q6h PO  - Vascular consulted for non-healing ulcer c/f PAD  - Venous and arterial duplex    Heme  CANDIDO    Prophylactic  Fluids: S/P 1L, encourage PO  Diet: Regular  DVT: Lovenox  GI: NA  Dispo: Tele   80M w/ PMhx of HTN, COPD not on home O2 with recent admission to [facility] for RLE cellulitis 4/12-4/13 and left AMA presented to [facility] 4/24 after he was found with altered mental status and shortness of breath, admitted for COPD exacerbation and RLE cellulitis.     Neuro  #Acute metabolic encephalopathy, likely 2/2 hypercarbic respiratory failure, RESOLVED  Pt presented with AMS in [facility], CTH negative. Upon arrival to [facility], at baseline mental status, A&Ox4, without focal deficits.  -Ctm mental status with treatment of AHRF below    Cardiology  #Elevated troponin   Troponin 117 in ED, possibly iso supply/demand mismatch. Also had elevated troponin on previous admissions and may have difficulty clearing due to renal function. No chest pain prior to admission or at this time. EKG non-ischemic.   - Trend to peak  - Monitor for symptoms of ACS    #Elevated pro-BNP  Presented with pro-BNP 62325, up from 5575 in March 2025. CXR with pulmonary vascular congestion.   - TTE    #HTN  Pt w/ Hx of HTN managed w/ HCTZ 12.5mg QD  -Hold HCTZ iso normotension, restart if hypertensive    #HLD  Pt with Hx of HLD on atorvastatin 80 mg QD at home  - C/w home med    Pulm  #Acute hypoxic respiratory failure  #COPD exacerbation  Patient p/w LGHV with SOB, initially on 3L NC, then placed on BIPAP for increased work of breathing and tachypnea at rate of 27. Upon arrival to [facility], was breathing comfortably on BIPAP 10/5, FiO2 30%. Initial VBG with respiratory acidosis, resolved on ABG after initiation of BIPAP.   - Weaned to nasal cannula  - Start prednisone 40 mg QD  - Start duonebs q4h   - C/w azithromycin 500 mg QD     GI  Diet: Regular    Endo  ?Hypothyroidism  Per pharmacy, was on levothyroxine 50 mcg however not picked up this month. Patient denies taking levothyroxine and states he does not have a thyroid problem to his knowledge. Pt's brother in law (ER physician) and sister are unsure of his home meds.  - TFTs    Renal  #CKD  Cr 1.23, GFR 59 on admission, appears to be at baseline.   - Monitor sCr and UOP    ID  #Non-healing ulcer  #Cellulitis  Patient with RLE non-healing ulcer and cellulitis with mild fluctuance and no drainage, initially admitted 4/12-4/14 for treatment with cefepime and vancomycin, de-escalated to cefazolin, and planned for discharge on cefalexin, however, patient left AMA and did not continue antibiotics at home. RLE XR with soft tissue gas.   - Start cefalexin 500 mg q6h PO  - Vascular consulted for non-healing ulcer c/f PAD  - Venous and arterial duplex    Heme  CANDIDO    Prophylactic  Fluids: S/P 1L, encourage PO  Diet: Regular  DVT: Lovenox  GI: NA  Dispo: Tele   80M w/ PMhx of HTN, COPD not on home O2 with recent admission to [facility] for RLE cellulitis 4/12-4/13 and left AMA presented to [facility] 4/24 after he was found with altered mental status and shortness of breath, admitted for COPD exacerbation and RLE cellulitis.     Neuro  #Acute metabolic encephalopathy, likely 2/2 hypercarbic respiratory failure, RESOLVED  Pt presented with AMS in [facility], CTH negative. Upon arrival to [facility], at baseline mental status, A&Ox4, without focal deficits.  -Ctm mental status with treatment of AHRF below    Cardiology  #Elevated troponin   Troponin 117 in ED, possibly iso supply/demand mismatch. Also had elevated troponin on previous admissions and may have difficulty clearing due to renal function. No chest pain prior to admission or at this time. EKG non-ischemic.   - Trend to peak  - Monitor for symptoms of ACS    #Elevated pro-BNP  Presented with pro-BNP 41923, up from 5575 in March 2025. CXR with pulmonary vascular congestion.   - TTE    #HTN  Pt w/ Hx of HTN managed w/ HCTZ 12.5mg QD  -Hold HCTZ iso normotension, restart if hypertensive    #HLD  Pt with Hx of HLD on atorvastatin 80 mg QD at home  - C/w home med    Pulm  #Acute hypoxic respiratory failure  #COPD exacerbation  Patient p/w LGHV with SOB, initially on 3L NC, then placed on BIPAP for increased work of breathing and tachypnea at rate of 27. Upon arrival to [facility], was breathing comfortably on BIPAP 10/5, FiO2 30%. Initial VBG with respiratory acidosis, resolved on ABG after initiation of BIPAP.   - Weaned to nasal cannula  - Start prednisone 40 mg QD  - Start duonebs q4h   - C/w azithromycin 500 mg QD     GI  Diet: Regular    Endo  ?Hypothyroidism  Per pharmacy, was on levothyroxine 50 mcg however not picked up this month. Patient denies taking levothyroxine and states he does not have a thyroid problem to his knowledge. Pt's brother in law (ER physician) and sister are unsure of his home meds. Pt does not have a PCP.  - TFTs    Renal  #CKD  Cr 1.23, GFR 59 on admission, appears to be at baseline.   - Monitor sCr and UOP    ID  #Non-healing ulcer  #Cellulitis  Patient with RLE non-healing ulcer and cellulitis with mild fluctuance and no drainage, initially admitted 4/12-4/14 for treatment with cefepime and vancomycin, de-escalated to cefazolin, and planned for discharge on cefalexin, however, patient left AMA and did not continue antibiotics at home. RLE XR with soft tissue gas.   - Start cefalexin 500 mg q6h PO  - Vascular consulted for non-healing ulcer c/f PAD  - Venous and arterial duplex    Heme  CANDIDO    Prophylactic  Fluids: S/P 1L, encourage PO  Diet: Regular  DVT: Lovenox  GI: NA  Dispo: Tele   80M w/ PMhx of HTN, COPD not on home O2 with recent admission to [facility] for RLE cellulitis 4/12-4/13 and left AMA presented to [facility] 4/24 after he was found with altered mental status and shortness of breath, admitted for COPD exacerbation and RLE cellulitis.     Neuro  #Acute metabolic encephalopathy, likely 2/2 hypercarbic respiratory failure, RESOLVED  Pt presented with AMS in [facility], CTH negative. Upon arrival to [facility], at baseline mental status, A&Ox4, without focal deficits.  -Ctm mental status with treatment of AHRF below    Cardiology  #Elevated troponin   Troponin 117 in ED, possibly iso supply/demand mismatch. Also had elevated troponin on previous admissions and may have difficulty clearing due to renal function. No chest pain prior to admission or at this time. EKG non-ischemic.   - Trend to peak  - Monitor for symptoms of ACS    #Elevated pro-BNP  Presented with pro-BNP 22101, up from 5575 in March 2025. CXR with pulmonary vascular congestion.   - TTE    #HTN  Pt w/ Hx of HTN, not currently on any meds, previously on amlodipine 10 mg QD last picked up 8/29/24, HCTZ 12.5mg QD last picked up 5/15/23  -Ctm BP, consider restarting CCB if hypertensive     Pulm  #Acute hypoxic respiratory failure  #COPD exacerbation  Patient p/w LGHV with SOB, initially on 3L NC, then placed on BIPAP for increased work of breathing and tachypnea at rate of 27. Upon arrival to [facility], was breathing comfortably on BIPAP 10/5, FiO2 30%. Initial VBG with respiratory acidosis, resolved on ABG after initiation of BIPAP.   - Weaned to nasal cannula  - Start prednisone 40 mg QD  - Start duonebs q4h   - C/w azithromycin 500 mg QD     GI  Diet: Regular    Endo  CANDIDO    Renal  #CKD  Cr 1.23, GFR 59 on admission, appears to be at baseline.   - Monitor sCr and UOP    ID  #Non-healing ulcer  #Cellulitis  Patient with RLE non-healing ulcer and cellulitis with mild fluctuance and no drainage, initially admitted 4/12-4/14 for treatment with cefepime and vancomycin, de-escalated to cefazolin, and planned for discharge on cefalexin, however, patient left AMA and did not continue antibiotics at home. RLE XR with soft tissue gas.   - Start cefalexin 500 mg q6h PO  - Vascular consulted for non-healing ulcer c/f PAD  - Venous and arterial duplex    Heme  CANDIDO    Prophylactic  Fluids: S/P 1L, encourage PO  Diet: Regular  DVT: Lovenox  GI: NA  Dispo: Tele   80M w/ PMhx of HTN, COPD not on home O2 with recent admission to [facility] for RLE cellulitis 4/12-4/13 and left AMA presented to [facility] 4/24 after he was found with altered mental status and shortness of breath, admitted for COPD exacerbation and RLE cellulitis.     Neuro  #Acute metabolic encephalopathy, likely 2/2 hypercarbic respiratory failure, RESOLVED  Pt presented with AMS in [facility], CTH negative. Upon arrival to [facility], at baseline mental status, A&Ox4, without focal deficits.  -Ctm mental status with treatment of AHRF below    Cardiology  #Elevated troponin   Troponin 117 in ED, possibly iso supply/demand mismatch. Also had elevated troponin on previous admissions and may have difficulty clearing due to renal function. No chest pain prior to admission or at this time. EKG non-ischemic.   - Trend to peak  - Monitor for symptoms of ACS    #Elevated pro-BNP  Presented with pro-BNP 11810, up from 5575 in March 2025. CXR with pulmonary vascular congestion.   - TTE    #HTN  Pt w/ Hx of HTN, not currently on any meds, previously on amlodipine 10 mg QD last picked up 8/29/24, HCTZ 12.5mg QD last picked up 5/15/23  -Ctm BP, consider restarting CCB if hypertensive     Pulm  #Acute hypoxic respiratory failure  #COPD exacerbation  Patient p/w LGHV with SOB, initially on 3L NC, then placed on BIPAP for increased work of breathing and tachypnea at rate of 27. Upon arrival to [facility], was breathing comfortably on BIPAP 10/5, FiO2 30%. Initial VBG with respiratory acidosis, resolved on ABG after initiation of BIPAP. At home, was on Trelegy ellipta 200 and albuterol inhalers prn, however unclear adherence as they were last picked up 1/8/25 and 12/2/24 respectively.  - Weaned to nasal cannula  - Start prednisone 40 mg QD  - Start duonebs q4h   - Start Advair inhaler  - C/w azithromycin 500 mg QD     GI  Diet: Regular    Endo  CANDIDO    Renal  #CKD  Cr 1.23, GFR 59 on admission, appears to be at baseline.   - Monitor sCr and UOP    ID  #Non-healing ulcer  #Cellulitis  Patient with RLE non-healing ulcer and cellulitis with mild fluctuance and no drainage, initially admitted 4/12-4/14 for treatment with cefepime and vancomycin, de-escalated to cefazolin, and planned for discharge on cefalexin, however, patient left AMA and did not continue antibiotics at home. RLE XR with soft tissue gas.   - Start cefalexin 500 mg q6h PO  - Vascular consulted for non-healing ulcer c/f PAD  - Venous and arterial duplex    Heme  CANDIDO    Prophylactic  Fluids: S/P 1L, encourage PO  Diet: Regular  DVT: Lovenox  GI: NA  Dispo: Tele

## 2025-04-24 NOTE — PROGRESS NOTE ADULT - PROBLEM SELECTOR PLAN 2
Suspected to be i/s/o COPD exacerbation  Patient p/w LGHV with SOB, initially on 3L NC, then placed on BIPAP for increased work of breathing and tachypnea at rate of 27. Upon arrival to [facility], was breathing comfortably on BIPAP 10/5, FiO2 30%. Initial VBG with respiratory acidosis, resolved on ABG after initiation of BIPAP. At home, was on Trelegy ellipta 200 and albuterol inhalers prn, however unclear adherence as they were last picked up 1/8/25 and 12/2/24 respectively.  - Weaned to nasal cannula  - C/w prednisone 40 mg QD  - C/w duonebs q4h   - C/w Advair inhaler  - C/w azithromycin 500 mg QD Suspected to be i/s/o COPD exacerbation, medication noncompliance. Initially presenting with SOB on 3LNC, placed on BiPAP for iWOB/tachypena, transitioned to 3L NC on 7Lach.   Home medications: Trelegy ellipta 200 and albuterol inhalers prn, however patient states he takes no medications  - C/w NC, wean as tolerated  - C/w prednisone 40 mg QD  - C/w duonebs q4h   - C/w Advair inhaler  - C/w azithromycin 500 mg QD (4/24 - ) Suspected to be i/s/o COPD exacerbation, medication noncompliance. Initially presenting with SOB on 3LNC, placed on BiPAP for iWOB/tachypena, transitioned to 3L NC on 7Lach.   Home medications: Trelegy ellipta 200 and albuterol inhalers prn, however patient states he takes no medications  - C/w NC, wean as tolerated  - C/w prednisone 40 mg QD  - C/w duonebs q4h   - C/w Advair inhaler  - C/w azithromycin 500 mg QD x3 days (4/24 - ) Suspected to be i/s/o COPD exacerbation, medication noncompliance. Initially presenting with SOB on 3LNC, placed on BiPAP for iWOB/tachypena, transitioned to 3L NC on 7Lach.   Home medications: Trelegy Ellipta 200 and albuterol inhalers prn, however patient states he takes no medications  - C/w NC, wean as tolerated  - C/w prednisone 40 mg QD  - C/w duonebs q4h   - C/w Advair inhaler  - C/w Azithromycin 500 mg QD x3 days (4/24 - 4/27) Plan as above [id number]

## 2025-04-24 NOTE — ED PROVIDER NOTE - OBJECTIVE STATEMENT
PT is an 80M w/ PMhx of HTN, COPD not on home O2, presents with 3wks of RLE redness, pain, and swelling of lower leg, found to have cellulitis and admitted for IV abx at [facility] 4/12-4/13, PT is an 80M w/ PMhx of HTN, COPD not on home O2, presents with 3wks of RLE redness, pain, and swelling of lower leg, found to have cellulitis and admitted for IV abx at [facility] 4/12-4/13 At which time he signed out AGAINST MEDICAL ADVICE, he arrives with hypoxia from home after neighbor called 911.  Patient was found wearing no oxygen satting 87% on room air.  Per his discharge paperwork patient is alert and oriented x 3 at baseline, upon my exam patient does not know why he is in the hospital, does not recall his recent hospital admission.  Patient notes that he has oxygen at home but does not use it.  Patient lives alone with no home health aide.  He denies active complaints of shortness of breath chest pain abdominal pain nausea vomiting diarrhea and is unsure if he took his antibiotics after being discharged for his right lower extremity open wound which is chronic in nature. History is limited secondary to patient being poor historian

## 2025-04-24 NOTE — ED PROVIDER NOTE - IV ALTEPLASE ADMIN OUTSIDE HIDDEN
show

## 2025-04-24 NOTE — PROGRESS NOTE ADULT - PROBLEM SELECTOR PLAN 3
Presented with pro-BNP 92488, up from 5575 in March 2025. CXR with pulmonary vascular congestion.   TTE 4/24: EF 40-45%, mild LV hypertrophy, mild LV diastolic dysfunction, normal RV size and systolic fxn. Estimated PASP 40. Presented with pro-BNP 40529, up from 5575 in March 2025. CXR with pulmonary vascular congestion.   TTE 4/24: EF 40-45%, mild LV hypertrophy, mild LV diastolic dysfunction, normal RV size and systolic fxn. Estimated PASP 40.  No previous ECHO for comparison  Patient does not appear to be volume overloaded at this time, currently stable on 3L NC    - Consider Cardiology consult in AM for initiation of GDMT Presented with pro-BNP 57163, up from 5575 in March 2025. CXR with pulmonary vascular congestion.   TTE 4/24: EF 40-45%, mild LV hypertrophy, mild LV diastolic dysfunction, normal RV size and systolic fxn. Estimated PASP 40.  No previous ECHO for comparison  Patient does not appear to be volume overloaded at this time, currently stable on 3L NC    - Consider Cardiology consult in AM for initiation of GDMT  - Strict I/Os  - Caution with fluids RLE non-healing ulcer and cellulitis with mild fluctuance and no drainage, initially admitted 4/12-4/14 for treatment with cefepime and vancomycin, de-escalated to cefazolin, and planned for discharge on cefalexin, however, patient left AMA and did not continue antibiotics at home.   RLE XR (4/24): with soft tissue gas.   B/l LE venous dopplers: negative for DVT  B/l LE arterial dopplers: soft tissue swelling near R foot  S/p debridement w/ Vascular (4/24) with findings concerning for wound that tracks deep with purulence.  S/p Vanc 1250mg (4/24 5AM) x1 in ED  - Transition from Cefalexin 500 mg q6h PO -> Vancomycin 1250mg q24h for MRSA coverage i/s/o purulence (next dose 4/25 AM)  - Vanc trough before 3rd dose per pharmacy for concern of being supratheraputic (4/26 4AM)  - wound care per vascular (pack cavity, WTD gauze and kerlix dressing)  - consider ID consult

## 2025-04-24 NOTE — ED ADULT NURSE NOTE - NSFALLRISKINTERV_ED_ALL_ED
Assistance OOB with selected safe patient handling equipment if applicable/Assistance with ambulation/Communicate fall risk and risk factors to all staff, patient, and family/Monitor gait and stability/Provide visual cue: yellow wristband, yellow gown, etc/Reinforce activity limits and safety measures with patient and family/Call bell, personal items and telephone in reach/Instruct patient to call for assistance before getting out of bed/chair/stretcher/Non-slip footwear applied when patient is off stretcher/[place] to call system/Physically safe environment - no spills, clutter or unnecessary equipment/Purposeful Proactive Rounding/Room/bathroom lighting operational, light cord in reach

## 2025-04-24 NOTE — H&P ADULT - HISTORY OF PRESENT ILLNESS
80M w/ PMhx of HTN, COPD not on home O2 with recent admission to [facility] for RLE cellulitis 4/12-4/13 and left AMA presented to [facility] 4/24 after he was found with altered mental status and shortness of breath by a neighbor. Patient denies any acute worsening of respiratory status that he perceived. Sometimes uses oxygen at home but is able to leave the house and go grocery shopping without using home oxygen. Did not take any antibiotics after leaving the hospital. Denies any fever, chest pain, cough, increased sputum production, abdominal pain, nausea, vomiting, diarrhea, sick contacts, or recent travel.     In the ED  Vitals: T 97.4, HR 94, /108-> 170/84, RR 16-27, initially on 3L NC -> BiPAP with increased RR 97%  Labs: WBC 7.26, Hb 13.2, Plts 177, Na 140, K 4.3, Cr 1.28, Trop I 117.1, Pro BNP 26267, ABG: pH 7.4, pCO2 45, pO2 160  Imaging: CTH: No acute intracranial hemorrhage, mass effect, or midline shift.  CTA: No pulmonary embolism. Previously seen masslike encasement of the right hilum with ipsilateral subcarinal and paratracheal lymphadenopathy is no longer seen. Extensive diffuse emphysema. Improvement of previously noted mediastinal adenopathy. Small right greater than left pleural effusion.  Interventions: Azithromycin 500mg x1, Lasix 60mg x1, Hydralazine 10mg x1, HCTZ 25mg x1, MgSO4 2g x1, Methylprednisolone 125mg x1, Vancomycin 1250mg x1, duonebs x1   HOSPITAL COURSE: 80M w/ PMHx of HTN, COPD not on home O2 with recent admission to [facility] for RLE cellulitis 4/12-4/13 and left AMA presented to [facility] 4/24 after he was found with altered mental status and shortness of breath by a neighbor, found to have tachypnea and hypercapnia 2/2 COPD exacerbation. Placed on BIPAP at [facility], weaned to 3L NC upon arrival to med tele. Started prednisone, azithromycin, and duonebs. No prior hx of CHF, however has elevated BNP and CXR with pulm vascular congestion, pending TTE. Also has RLE cellulitis treated incompletely during prior admission 4/12-4/14 after pt left AMA and did not continue antibiotics at home, now on cephalexin. Vascular consulted for RLE non-healing ulcer, pending venous and arterial duplex.     HPI: 80M w/ PMhx of HTN, COPD not on home O2 with recent admission to [facility] for RLE cellulitis 4/12-4/13 and left AMA presented to [facility] 4/24 after he was found with altered mental status and shortness of breath by a neighbor. Patient denies any acute worsening of respiratory status that he perceived. Sometimes uses oxygen at home but is able to leave the house and go grocery shopping without using home oxygen. Did not take any antibiotics after leaving the hospital. Denies any fever, chest pain, cough, increased sputum production, abdominal pain, nausea, vomiting, diarrhea, sick contacts, or recent travel.     In the ED  Vitals: T 97.4, HR 94, /108-> 170/84, RR 16-27, initially on 3L NC -> BiPAP with increased RR 97%  Labs: WBC 7.26, Hb 13.2, Plts 177, Na 140, K 4.3, Cr 1.28, Trop I 117.1, Pro BNP 35255, ABG: pH 7.4, pCO2 45, pO2 160  Imaging: CTH: No acute intracranial hemorrhage, mass effect, or midline shift.  CTA: No pulmonary embolism. Previously seen masslike encasement of the right hilum with ipsilateral subcarinal and paratracheal lymphadenopathy is no longer seen. Extensive diffuse emphysema. Improvement of previously noted mediastinal adenopathy. Small right greater than left pleural effusion.  Interventions: Azithromycin 500mg x1, Lasix 60mg x1, Hydralazine 10mg x1, HCTZ 25mg x1, MgSO4 2g x1, Methylprednisolone 125mg x1, Vancomycin 1250mg x1, duonebs x1

## 2025-04-24 NOTE — PROVIDER CONTACT NOTE (EICU) - ASSESSMENT
Patient seen using [employer] cart with ED attending Dr. Chakraborty and ED nurse at bedside: HR 93 /84 RR 27 O2 sat 97% on Bipap 10/5 30%. Alert and oriented x 3, but poor historian when asked questions about medical history. Patient states he is still feeling a little short of breath but much improved since coming to [facility].

## 2025-04-24 NOTE — PROGRESS NOTE ADULT - PROBLEM SELECTOR PLAN 5
Cr 1.23, GFR 59 on admission, appears to be at baseline.     - Monitor sCr and UOP Cr 1.23, GFR 59 on admission, appears to be at baseline.     - CTM BMP Hx of HTN, not currently on any meds, previously on amlodipine 10 mg QD last picked up 8/29/24, HCTZ 12.5mg QD last picked up 5/15/23.    - Started on Nifedipine 30mg q24h for elevated BPs, uptitrate as needed

## 2025-04-24 NOTE — PROVIDER CONTACT NOTE (EICU) - SITUATION
Srinivas call from ED attending Dr. Chakraborty for evaluation of patient for ICU vs. SDU admission
[facility] transfer to [facility] for SDU admission

## 2025-04-24 NOTE — CONSULT NOTE ADULT - SUBJECTIVE AND OBJECTIVE BOX
Vascular Attending: Dr. Valdez      HPI:  80M w/ PMhx of HTN, COPD not on home O2 with recent admission to [facility] for RLE cellulitis 4/12-4/13 and left AMA presented to [facility] 4/24 after he was found with altered mental status and shortness of breath by a neighbor, admitted to medicine for COPD exacerbation and RLE cellulitis/ulcer management.  Patient denies rest pain/claudication/prev hx of peripheral interventions. per patie    In the ED  Vit    PAST MEDICAL & SURGICAL HISTORY:  Kidney stone      HTN (hypertension)      COPD (chronic obstructive pulmonary disease)      History of BPH      History of cholecystectomy          REVIEW OF SYSTEMS      General:	    Skin/Breast:  	  Ophthalmologic:  	  ENMT:	    Respiratory and Thorax:  	  Cardiovascular:	    Gastrointestinal:	    Genitourinary:	    Musculoskeletal:	    Neurological:	    Psychiatric:	    Hematology/Lymphatics:	    Endocrine:	    Allergic/Immunologic:	    MEDICATIONS  (STANDING):  albuterol/ipratropium for Nebulization 3 milliLiter(s) Nebulizer every 4 hours  atorvastatin 80 milliGRAM(s) Oral at bedtime  azithromycin   Tablet 500 milliGRAM(s) Oral every 24 hours  cephalexin 500 milliGRAM(s) Oral every 6 hours  enoxaparin Injectable 40 milliGRAM(s) SubCutaneous every 24 hours  levothyroxine 50 MICROGram(s) Oral daily  potassium chloride    Tablet ER 20 milliEquivalent(s) Oral once  predniSONE   Tablet 40 milliGRAM(s) Oral every 24 hours    MEDICATIONS  (PRN):  acetaminophen     Tablet .. 650 milliGRAM(s) Oral every 6 hours PRN Temp greater or equal to 38C (100.4F), Mild Pain (1 - 3)  melatonin 3 milliGRAM(s) Oral at bedtime PRN Insomnia      Allergies    penicillin (Unknown)    Intolerances        SOCIAL HISTORY:    FAMILY HISTORY:      Vital Signs Last 24 Hrs  T(C): 36.5 (24 Apr 2025 09:55), Max: 36.9 (24 Apr 2025 06:30)  T(F): 97.7 (24 Apr 2025 09:55), Max: 98.4 (24 Apr 2025 06:30)  HR: 80 (24 Apr 2025 11:07) (74 - 97)  BP: 175/89 (24 Apr 2025 11:07) (145/73 - 196/108)  BP(mean): 125 (24 Apr 2025 11:07) (100 - 125)  RR: 17 (24 Apr 2025 11:07) (16 - 27)  SpO2: 95% (24 Apr 2025 11:07) (91% - 100%)    Parameters below as of 24 Apr 2025 11:07  Patient On (Oxygen Delivery Method): nasal cannula w/ humidification  O2 Flow (L/min): 3      PHYSICAL EXAM:      Constitutional:    Eyes:    ENMT:    Neck:    Breasts:    Back:    Respiratory:    Cardiovascular:    Gastrointestinal:    Genitourinary:    Rectal:    Extremities:    Vascular:    Neurological:    Skin:    Lymph Nodes:    Musculoskeletal:    Psychiatric:        LABS:                        13.0   8.67  )-----------( 173      ( 24 Apr 2025 10:24 )             40.2     04-24    142  |  97  |  22  ----------------------------<  130[H]  3.8   |  31  |  1.23    Ca    9.1      24 Apr 2025 10:24  Phos  3.5     04-24  Mg     2.2     04-24    TPro  6.8  /  Alb  3.5  /  TBili  1.2  /  DBili  x   /  AST  34  /  ALT  24  /  AlkPhos  102  04-24    PT/INR - ( 24 Apr 2025 00:58 )   PT: 14.1 sec;   INR: 1.21          PTT - ( 24 Apr 2025 00:58 )  PTT:26.7 sec  Urinalysis Basic - ( 24 Apr 2025 10:24 )    Color: x / Appearance: x / SG: x / pH: x  Gluc: 130 mg/dL / Ketone: x  / Bili: x / Urobili: x   Blood: x / Protein: x / Nitrite: x   Leuk Esterase: x / RBC: x / WBC x   Sq Epi: x / Non Sq Epi: x / Bacteria: x        RADIOLOGY & ADDITIONAL STUDIES Vascular Attending: Dr. Valdez      HPI:  80M w/ PMhx of HTN, COPD not on home O2 with recent admission to [facility] for RLE cellulitis 4/12-4/13 and left AMA presented to [facility] 4/24 after he was found with altered mental status and shortness of breath by a neighbor, admitted to medicine for COPD exacerbation and RLE cellulitis/ulcer management.  Patient denies rest pain/claudication/prev hx of peripheral interventions. Patient believes he developed the ulcer 1 month ago secondary to a traumatic event. He cannot recall any hx of previous ulcers/wounds on his extremities. Patient states he also feels his leg is swollen althou    PAST MEDICAL & SURGICAL HISTORY:  Kidney stone      HTN (hypertension)      COPD (chronic obstructive pulmonary disease)      History of BPH      History of cholecystectomy          REVIEW OF SYSTEMS: As per HPI	    MEDICATIONS  (STANDING):  albuterol/ipratropium for Nebulization 3 milliLiter(s) Nebulizer every 4 hours  atorvastatin 80 milliGRAM(s) Oral at bedtime  azithromycin   Tablet 500 milliGRAM(s) Oral every 24 hours  cephalexin 500 milliGRAM(s) Oral every 6 hours  enoxaparin Injectable 40 milliGRAM(s) SubCutaneous every 24 hours  levothyroxine 50 MICROGram(s) Oral daily  potassium chloride    Tablet ER 20 milliEquivalent(s) Oral once  predniSONE   Tablet 40 milliGRAM(s) Oral every 24 hours    MEDICATIONS  (PRN):  acetaminophen     Tablet .. 650 milliGRAM(s) Oral every 6 hours PRN Temp greater or equal to 38C (100.4F), Mild Pain (1 - 3)  melatonin 3 milliGRAM(s) Oral at bedtime PRN Insomnia      Allergies    penicillin (Unknown)    Intolerances        SOCIAL HISTORY:    FAMILY HISTORY:      Vital Signs Last 24 Hrs  T(C): 36.5 (24 Apr 2025 09:55), Max: 36.9 (24 Apr 2025 06:30)  T(F): 97.7 (24 Apr 2025 09:55), Max: 98.4 (24 Apr 2025 06:30)  HR: 80 (24 Apr 2025 11:07) (74 - 97)  BP: 175/89 (24 Apr 2025 11:07) (145/73 - 196/108)  BP(mean): 125 (24 Apr 2025 11:07) (100 - 125)  RR: 17 (24 Apr 2025 11:07) (16 - 27)  SpO2: 95% (24 Apr 2025 11:07) (91% - 100%)    Parameters below as of 24 Apr 2025 11:07  Patient On (Oxygen Delivery Method): nasal cannula w/ humidification  O2 Flow (L/min): 3      PHYSICAL EXAM:  Constitutional: NAD, AOx3  Respiratory: No respiratory distress  Cardiovascular: RRR  Gastrointestinal: soft, nt, nd  Extremities:    Vascular:    Neurological:    Skin:    Lymph Nodes:    Musculoskeletal:    Psychiatric:        LABS:                        13.0   8.67  )-----------( 173      ( 24 Apr 2025 10:24 )             40.2     04-24    142  |  97  |  22  ----------------------------<  130[H]  3.8   |  31  |  1.23    Ca    9.1      24 Apr 2025 10:24  Phos  3.5     04-24  Mg     2.2     04-24    TPro  6.8  /  Alb  3.5  /  TBili  1.2  /  DBili  x   /  AST  34  /  ALT  24  /  AlkPhos  102  04-24    PT/INR - ( 24 Apr 2025 00:58 )   PT: 14.1 sec;   INR: 1.21          PTT - ( 24 Apr 2025 00:58 )  PTT:26.7 sec  Urinalysis Basic - ( 24 Apr 2025 10:24 )    Color: x / Appearance: x / SG: x / pH: x  Gluc: 130 mg/dL / Ketone: x  / Bili: x / Urobili: x   Blood: x / Protein: x / Nitrite: x   Leuk Esterase: x / RBC: x / WBC x   Sq Epi: x / Non Sq Epi: x / Bacteria: x        RADIOLOGY & ADDITIONAL STUDIES Vascular Attending: Dr. Valdez      HPI:  80M w/ PMhx of HTN, COPD not on home O2 with recent admission to [facility] for RLE cellulitis 4/12-4/13 and left AMA presented to [facility] 4/24 after he was found with altered mental status and shortness of breath by a neighbor, admitted to medicine for COPD exacerbation and RLE cellulitis/ulcer management.  Patient denies rest pain/claudication/prev hx of peripheral interventions. Patient believes he developed the ulcer 1 month ago secondary to a traumatic event. He cannot recall any hx of previous ulcers/wounds on his extremities. Patient states he also feels his left leg is swollen but denies pain anywhere other than the site of ulceration/numbness/weakness. Vascular surgery consulted for evaluation.    PAST MEDICAL & SURGICAL HISTORY:  Kidney stone      HTN (hypertension)      COPD (chronic obstructive pulmonary disease)      History of BPH      History of cholecystectomy          REVIEW OF SYSTEMS: As per HPI	    MEDICATIONS  (STANDING):  albuterol/ipratropium for Nebulization 3 milliLiter(s) Nebulizer every 4 hours  atorvastatin 80 milliGRAM(s) Oral at bedtime  azithromycin   Tablet 500 milliGRAM(s) Oral every 24 hours  cephalexin 500 milliGRAM(s) Oral every 6 hours  enoxaparin Injectable 40 milliGRAM(s) SubCutaneous every 24 hours  levothyroxine 50 MICROGram(s) Oral daily  potassium chloride    Tablet ER 20 milliEquivalent(s) Oral once  predniSONE   Tablet 40 milliGRAM(s) Oral every 24 hours    MEDICATIONS  (PRN):  acetaminophen     Tablet .. 650 milliGRAM(s) Oral every 6 hours PRN Temp greater or equal to 38C (100.4F), Mild Pain (1 - 3)  melatonin 3 milliGRAM(s) Oral at bedtime PRN Insomnia      Allergies    penicillin (Unknown)    Intolerances        SOCIAL HISTORY: N/A    FAMILY HISTORY: N/A      Vital Signs Last 24 Hrs  T(C): 36.5 (24 Apr 2025 09:55), Max: 36.9 (24 Apr 2025 06:30)  T(F): 97.7 (24 Apr 2025 09:55), Max: 98.4 (24 Apr 2025 06:30)  HR: 80 (24 Apr 2025 11:07) (74 - 97)  BP: 175/89 (24 Apr 2025 11:07) (145/73 - 196/108)  BP(mean): 125 (24 Apr 2025 11:07) (100 - 125)  RR: 17 (24 Apr 2025 11:07) (16 - 27)  SpO2: 95% (24 Apr 2025 11:07) (91% - 100%)    Parameters below as of 24 Apr 2025 11:07  Patient On (Oxygen Delivery Method): nasal cannula w/ humidification  O2 Flow (L/min): 3      PHYSICAL EXAM:  Constitutional: NAD, AOx3  Respiratory: No respiratory distress  Cardiovascular: RRR  Gastrointestinal: soft, nt, nd  Extremities: R leg annular 2x2 cm ulcer with overlying necrotic eschar-unroofed. .5 ccs of purulence expressed, wound tracks deep both inferiorly and superiorly but does not seem to track to bone. Packed with gauze and kerlix.   Vascular: Palp DP/PT          LABS:                        13.0   8.67  )-----------( 173      ( 24 Apr 2025 10:24 )             40.2     04-24    142  |  97  |  22  ----------------------------<  130[H]  3.8   |  31  |  1.23    Ca    9.1      24 Apr 2025 10:24  Phos  3.5     04-24  Mg     2.2     04-24    TPro  6.8  /  Alb  3.5  /  TBili  1.2  /  DBili  x   /  AST  34  /  ALT  24  /  AlkPhos  102  04-24    PT/INR - ( 24 Apr 2025 00:58 )   PT: 14.1 sec;   INR: 1.21          PTT - ( 24 Apr 2025 00:58 )  PTT:26.7 sec  Urinalysis Basic - ( 24 Apr 2025 10:24 )    Color: x / Appearance: x / SG: x / pH: x  Gluc: 130 mg/dL / Ketone: x  / Bili: x / Urobili: x   Blood: x / Protein: x / Nitrite: x   Leuk Esterase: x / RBC: x / WBC x   Sq Epi: x / Non Sq Epi: x / Bacteria: x

## 2025-04-24 NOTE — ED PROVIDER NOTE - CLINICAL SUMMARY MEDICAL DECISION MAKING FREE TEXT BOX
complains of pain/discomfort
PT is an 80M w/ PMhx of HTN, COPD not on home O2, presents with 3wks of RLE redness, pain, and swelling of lower leg, found to have cellulitis and admitted for IV abx at [facility] 4/12-4/13 At which time he signed out AGAINST MEDICAL ADVICE, he arrives with hypoxia from home after neighbor called 911.  Patient was found wearing no oxygen satting 87% on room air.  Per his discharge paperwork patient is alert and oriented x 3 at baseline, upon my exam patient does not know why he is in the hospital, does not recall his recent hospital admission.  Patient notes that he has oxygen at home but does not use it.  Patient lives alone with no home health aide.  He denies active complaints of shortness of breath chest pain abdominal pain nausea vomiting diarrhea and is unsure if he took his antibiotics after being discharged for his right lower extremity open wound which is chronic in nature. History is limited secondary to patient being poor historian

## 2025-04-24 NOTE — PROGRESS NOTE ADULT - PROBLEM SELECTOR PLAN 6
Fluids: S/P 1L, encourage PO  Diet: Regular  DVT: Lovenox  GI: NA  Dispo: Tele ->F Fluids: S/P 1L, encourage PO  Diet: Regular  DVT: Lovenox 40mg q24h  GI: NA  Dispo: Tele ->F Fluids: caution with fluids given new HFrEF  Diet: Regular  DVT: Lovenox 40mg q24h  Dispo: Tele ->F Cr 1.23, GFR 59 on admission, appears to be at baseline.     - CTM BMP

## 2025-04-24 NOTE — H&P ADULT - ATTENDING COMMENTS
Pt seen and examined with house staff on rounds. Pt arrived on BiPAP 10/5 30%. Bipap removed as pt was comfortable with RR21 and  and transitioned to NC. History obtained and chart also reviewed. Pt had increasing SOB for several days. He lives on the third floor in a walk up. He denied fever, chills, chest pain or leg pain. On exam he was moving air well and in no distress. His right leg had a wound with an eschar and erythema that was warm and tender. Vascular surgery to see. We treated him for a COPD exacerbation and continued bronchodilators and steroids and azithromycin. CXR reviewed and no infiltrates noted. Start po diet and continue Cephalexin for right LE wound. Check LE dopplers.

## 2025-04-24 NOTE — PATIENT PROFILE ADULT - FALL HARM RISK - HARM RISK INTERVENTIONS

## 2025-04-24 NOTE — ED PROVIDER NOTE - PROGRESS NOTE DETAILS
Telemetry ICU initiated Evaluated patient, ABG done when BiPAP was initiated at 40% FiO2 turned down to 30% after results.  eICU attending agrees patient should be in stepdown versus ICU.  I presented patient to Dr. rodriguez  who is excepting the patient to stepdown spoke to dr charanjit kathleen, patient's brother in law who is an ed physician. at [phone], notes patient has been having cognitive decline, for the past 6 months, he was able to make an appt for him at a wound clinic this past tues which patient did not go to the appt. he consents to admission for the patient as the patient at this time cognitively impaired.

## 2025-04-24 NOTE — PROGRESS NOTE ADULT - PROBLEM SELECTOR PLAN 4
Hx of HTN, not currently on any meds, previously on amlodipine 10 mg QD last picked up 8/29/24, HCTZ 12.5mg QD last picked up 5/15/23    -Ctm BP, consider restarting CCB if hypertensive Hx of HTN, not currently on any meds, previously on amlodipine 10 mg QD last picked up 8/29/24, HCTZ 12.5mg QD last picked up 5/15/23.    - Started on Nifedipine 30mg q24h for elevated BPs, uptitrate as needed Presented with pro-BNP 32936, up from 5575 in March 2025. CXR with pulmonary vascular congestion.   TTE 4/24: EF 40-45%, no WMAs, mild LV hypertrophy, mild LV diastolic dysfunction, normal RV size and systolic fxn. Estimated PASP 40.  No previous ECHO for comparison  Patient does not appear to be volume overloaded at this time, currently stable on 3L NC    -outpt cards f/up   - Strict I/Os  - Caution with fluids

## 2025-04-24 NOTE — CONSULT NOTE ADULT - ASSESSMENT
80M w/ PMhx of HTN, COPD not on home O2 with recent admission to [facility] for RLE cellulitis 4/12-4/13 and left AMA presented to [facility] 4/24 after he was found with altered mental status and shortness of breath by a neighbor, admitted to medicine for COPD exacerbation and RLE cellulitis/ulcer management.  Patient denies rest pain/claudication/prev hx of peripheral interventions. Patient believes he developed the ulcer 1 month ago secondary to a traumatic event. He cannot recall any hx of previous ulcers/wounds on his extremities. Patient states he also feels his left leg is swollen but denies pain anywhere other than the site of ulceration/numbness/weakness. Vascular surgery consulted for evaluation.  Plan:   -Patient pulse exam and arterial duplex reassuring, no concern for ischemia   -In addition no DVT on venous duplex, patient's chronic leg swelling and venous stasis most likely 2/2 reflux disease. Recommend Rest, Elevation, and Compression of extremities   -Patient has wound that tracks deep with purulence expressed   -Recc ID consult   -Recc wound care(pack cavity, WTD gauze and kerlix dressing)   -Vasc will CTF

## 2025-04-24 NOTE — PROGRESS NOTE ADULT - ASSESSMENT
80M w/ PMhx of HTN, COPD not on home O2 with recent admission to [facility] for RLE cellulitis 4/12-4/13 and left AMA presented to [facility] 4/24 after he was found with altered mental status and shortness of breath, admitted for COPD exacerbation and RLE cellulitis.    80M w/ PMhx of HTN, COPD not on home O2 with recent admission to [facility] for RLE cellulitis 4/12-4/13 and left AMA presented to [facility] 4/24 after he was found with altered mental status and shortness of breath, admitted to [facility] for COPD exacerbation and RLE cellulitis, s/p debridement with Vascular, now stable for management on RMF.

## 2025-04-24 NOTE — ED PROVIDER NOTE - CARE PLAN
1 Principal Discharge DX:	Open wound of right lower extremity with complication, subsequent encounter  Secondary Diagnosis:	COPD with hypoxia   Principal Discharge DX:	COPD exacerbation  Secondary Diagnosis:	Open wound of right lower leg  Secondary Diagnosis:	Cognitive decline  Secondary Diagnosis:	Fluid overload

## 2025-04-24 NOTE — PROVIDER CONTACT NOTE (EICU) - BACKGROUND
79 yo M with hypoxic resp failure on Bipap being admitted to [facility] SDU (see prior eICU provider note for further details of history and ED course)
Information obtained via chart review and discussion with ED attending Dr. Chakraborty: 80M w/ PMhx of HTN, COPD not on home O2, recent admission at [facility] for RLE cellulitis 4/12-4/14 at which time he signed out AMA, who presented to [facility] ED for hypoxia after neighbor called 911. Patient found to be hypoxic, hypertensive with SBP 190s.  Troponin 117 (was 116 in March), pro-BNP 55264, was 55 Per Dr. Chakraborty, patient placed on nasal cannula with some improvement, however, then had increased respiratory distress after he removed his supplemental oxygen with blood pressure increased to SBP 210s. He received duoneb, solumedrol, magnesium, hydralazine 10 mg IVP, lasix 60 mg IVP and was started on Bipap with improvement in work of breathing, hypoxia and BP. CTA negative for PE, Dr. Chakraborty requesting TeleICU to evaluate if patient requires ICU or SDU level of care for admission.

## 2025-04-24 NOTE — PROVIDER CONTACT NOTE (EICU) - RECOMMENDATIONS
-continue Bipap 10/5 30%  -patient does not require ICU level care at this time, Dr. Chakraborty to contact [facility] for stepdown admission   -please recontact TeleICU for re-evaluation if clinical condition changes prior to transfer to [facility] -continue Bipap 10/5 30%  -continue management of COPD and likely volume overload given elevated pro-BNP, recommend trend troponin, EKG, also TTE at [facility]   -patient does not require ICU level care at this time, Dr. Chakraborty to contact [facility] for stepdown admission   -please recontact TeleICU for re-evaluation if clinical condition changes prior to transfer to [facility]

## 2025-04-24 NOTE — H&P ADULT - NSHPLABSRESULTS_GEN_ALL_CORE
LABS:                         13.0   8.67  )-----------( 173      ( 24 Apr 2025 10:24 )             40.2     04-24    140  |  105  |  25[H]  ----------------------------<  119[H]  4.3   |  28  |  1.28    Ca    9.0      24 Apr 2025 00:58  Mg     1.9     04-24    TPro  6.7  /  Alb  2.8[L]  /  TBili  1.4[H]  /  DBili  x   /  AST  57[H]  /  ALT  36  /  AlkPhos  107  04-24    PT/INR - ( 24 Apr 2025 00:58 )   PT: 14.1 sec;   INR: 1.21          PTT - ( 24 Apr 2025 00:58 )  PTT:26.7 sec  Urinalysis Basic - ( 24 Apr 2025 00:58 )    Color: x / Appearance: x / SG: x / pH: x  Gluc: 119 mg/dL / Ketone: x  / Bili: x / Urobili: x   Blood: x / Protein: x / Nitrite: x   Leuk Esterase: x / RBC: x / WBC x   Sq Epi: x / Non Sq Epi: x / Bacteria: x                RADIOLOGY, EKG & ADDITIONAL TESTS:

## 2025-04-24 NOTE — H&P ADULT - NSHPPHYSICALEXAM_GEN_ALL_CORE
General: in no acute distress  Eyes: EOMI intact bilaterally. Anicteric sclerae, moist conjunctivae  HENT: Moist mucous membranes  Neck: Trachea midline, supple  Lungs: CTA B/L. No wheezes, rales, or rhonchi  Cardiovascular: RRR. No murmurs, rubs, or gallops  Abdomen: Soft, non-tender non-distended; No rebound or guarding  Extremities: WWP, No clubbing, cyanosis or edema  Neurological: Alert and oriented  Skin: Warm and dry. No obvious rash General: in no acute distress  Eyes: Anicteric sclerae, moist conjunctivae  HENT: Moist mucous membranes  Neck: Trachea midline, supple  Lungs: Decreased lung sounds diffusely. No wheezes, rales, or rhonchi  Cardiovascular: RRR. No murmurs, rubs, or gallops  Abdomen: Soft, non-tender non-distended; No rebound or guarding  Extremities: 2+ DP pulses, RLE with erythema and edema below knee, 3x3 cm non-healing ulcer with eschar, no drainage seen  Neurological: Alert and oriented  Skin: Warm and dry. No obvious rash

## 2025-04-24 NOTE — ED ADULT TRIAGE NOTE - CHIEF COMPLAINT QUOTE
[facility]IBA EMS. Pt c/o SOB tonight with no relief after increasing LPM from 2 to 6. EMS reports spo2 increase and sob decreased with NRM @10 lpm. Pt denies further at triage.

## 2025-04-24 NOTE — H&P ADULT - NSHPSOCIALHISTORY_GEN_ALL_CORE
previously smoked 1 pack/day since age 17, stopped 4 years ago  denies alcohol or recreational drug use  lives in 3rd floor walk up [facility]

## 2025-04-24 NOTE — PROGRESS NOTE ADULT - SUBJECTIVE AND OBJECTIVE BOX
************************TRANSFER FROM [facility] TO [facility]****************************    HOSPITAL COURSE: 80M w/ PMHx of HTN, COPD not on home O2 with recent admission to [facility] for RLE cellulitis 4/12-4/13 and left AMA presented to [facility] 4/24 after he was found with altered mental status and shortness of breath by a neighbor, found to have tachypnea and hypercapnia 2/2 COPD exacerbation. Placed on BIPAP at [facility], weaned to 3L NC upon arrival to med tele. Started prednisone, azithromycin, and duonebs. No prior hx of CHF, however has elevated BNP and CXR with pulm vascular congestion, pending TTE. Also has RLE cellulitis treated incompletely during prior admission 4/12-4/14 after pt left AMA and did not continue antibiotics at home, now on cephalexin. Vascular consulted for RLE non-healing ulcer, pending venous and arterial duplex.     INTERVAL HPI/OVERNIGHT EVENTS:    SUBJECTIVE: Patient seen and examined at bedside, resting comfortably in bed, and does not appear to be in any acute distress. Patient reports he/she is feeling well, denies any dizziness, lightheadedness, headache, chest pain, abdominal pain, nausea, vomiting, diarrhea, constipation.    Vital Signs Last 24 Hrs  T(C): 36.5 (24 Apr 2025 09:55), Max: 36.9 (24 Apr 2025 06:30)  T(F): 97.7 (24 Apr 2025 09:55), Max: 98.4 (24 Apr 2025 06:30)  HR: 89 (24 Apr 2025 14:55) (74 - 97)  BP: 169/91 (24 Apr 2025 14:55) (145/73 - 196/108)  BP(mean): 124 (24 Apr 2025 14:55) (100 - 125)  RR: 17 (24 Apr 2025 14:55) (16 - 27)  SpO2: 95% (24 Apr 2025 14:55) (91% - 100%)    Parameters below as of 24 Apr 2025 14:55  Patient On (Oxygen Delivery Method): nasal cannula w/ humidification  O2 Flow (L/min): 3    PHYSICAL EXAM:  General: in no acute distress  Eyes: EOMI intact bilaterally. Anicteric sclerae, moist conjunctivae  HENT: Moist mucous membranes  Neck: Trachea midline, supple. No cervical lymphadenopathy in anterior or posterior chain.  Lungs: CTA B/L. No wheezes, rales, or rhonchi  Cardiovascular: RRR. No murmurs, rubs, or gallops  Abdomen: +BS, soft, non-tender non-distended; No rebound or guarding  Extremities: WWP, No clubbing, cyanosis or edema. 2+ peripheral pulses, cap refill <2 seconds  Neurological: AOx3 to person, place, time  Skin: Warm and dry. No obvious rash     LABS:                        13.0   8.67  )-----------( 173      ( 24 Apr 2025 10:24 )             40.2     04-24    142  |  97  |  22  ----------------------------<  130[H]  3.8   |  31  |  1.23    Ca    9.1      24 Apr 2025 10:24  Phos  3.5     04-24  Mg     2.2     04-24    TPro  6.8  /  Alb  3.5  /  TBili  1.2  /  DBili  x   /  AST  34  /  ALT  24  /  AlkPhos  102  04-24   ************************TRANSFER FROM [facility] TO [facility]****************************    HOSPITAL COURSE: 80M w/ PMHx of HTN, COPD not on home O2 with recent admission to [facility] for RLE cellulitis 4/12-4/13 and left AMA presented to [facility] 4/24 after he was found with altered mental status and shortness of breath by a neighbor, found to have tachypnea and hypercapnia 2/2 COPD exacerbation. Placed on BIPAP at [facility], weaned to 3L NC upon arrival to med tele. Started prednisone, azithromycin, and duonebs. No prior hx of CHF, however has elevated BNP and CXR with pulm vascular congestion, TTE with EF 40-45%, mild LV hypertrophy, mild LV diastolic dysfunction, normal RV size and systolic fxn. Estimated PASP 40, no prior echo for comparison. He also has RLE cellulitis treated incompletely during prior admission 4/12-4/14 after pt left AMA and did not continue antibiotics at home, now on cephalexin. Vascular consulted for RLE non-healing ulcer, s/p debridement pending venous and arterial duplex.     INTERVAL HPI/OVERNIGHT EVENTS:    SUBJECTIVE: Patient seen and examined at bedside, resting comfortably in bed, and does not appear to be in any acute distress. Patient reports he/she is feeling well, denies any dizziness, lightheadedness, headache, chest pain, abdominal pain, nausea, vomiting, diarrhea, constipation.    Vital Signs Last 24 Hrs  T(C): 36.5 (24 Apr 2025 09:55), Max: 36.9 (24 Apr 2025 06:30)  T(F): 97.7 (24 Apr 2025 09:55), Max: 98.4 (24 Apr 2025 06:30)  HR: 89 (24 Apr 2025 14:55) (74 - 97)  BP: 169/91 (24 Apr 2025 14:55) (145/73 - 196/108)  BP(mean): 124 (24 Apr 2025 14:55) (100 - 125)  RR: 17 (24 Apr 2025 14:55) (16 - 27)  SpO2: 95% (24 Apr 2025 14:55) (91% - 100%)    Parameters below as of 24 Apr 2025 14:55  Patient On (Oxygen Delivery Method): nasal cannula w/ humidification  O2 Flow (L/min): 3    PHYSICAL EXAM:  General: in no acute distress  Eyes: EOMI intact bilaterally. Anicteric sclerae, moist conjunctivae  HENT: Moist mucous membranes  Neck: Trachea midline, supple. No cervical lymphadenopathy in anterior or posterior chain.  Lungs: Mild R sided crackles, left lung exam unremarkable  Cardiovascular: RRR. No murmurs, rubs, or gallops  Abdomen: +BS, soft, non-tender, slight distension; No rebound or guarding  Extremities: RLE with dressing in place, erythema extending below the knee (marked off), slightly warm to palpation, non tender. B/l LE with +1 PE. Dried scaling in toes of R foot. 2+ peripheral pulses  Neurological: AOx3 to person (full name), place ([facility] [address]), time (April)  Skin: Warm and dry.     LABS:                        13.0   8.67  )-----------( 173      ( 24 Apr 2025 10:24 )             40.2     04-24    142  |  97  |  22  ----------------------------<  130[H]  3.8   |  31  |  1.23    Ca    9.1      24 Apr 2025 10:24  Phos  3.5     04-24  Mg     2.2     04-24    TPro  6.8  /  Alb  3.5  /  TBili  1.2  /  DBili  x   /  AST  34  /  ALT  24  /  AlkPhos  102  04-24   ************************TRANSFER FROM [facility] TO [facility]****************************    HOSPITAL COURSE: 80M w/ PMHx of HTN, COPD not on home O2 with recent admission to [facility] for RLE cellulitis 4/12-4/13 and left AMA presented to [facility] 4/24 after he was found with altered mental status and shortness of breath by a neighbor, found to have tachypnea and hypercapnia 2/2 COPD exacerbation. Placed on BIPAP at [facility], weaned to 3L NC upon arrival to med tele. Started prednisone, azithromycin, and duonebs. No prior hx of CHF, however has elevated BNP and CXR with pulm vascular congestion, TTE with EF 40-45%, mild LV hypertrophy, mild LV diastolic dysfunction, normal RV size and systolic fxn. Estimated PASP 40, no prior echo for comparison. He also has RLE cellulitis treated incompletely during prior admission 4/12-4/14 after pt left AMA and did not continue antibiotics at home, started on cephalexin. Vascular consulted for RLE non-healing ulcer, s/p debridement with concern for deep wound with purulence.     SUBJECTIVE: Patient seen and examined at bedside, resting comfortably in bed, and does not appear to be in any acute distress. Patient reports he is feeling much better and currently does not have any shortness of breath or increased work of breathing. He denies any dizziness, lightheadedness, headache, chest pain, abdominal pain, nausea, vomiting, diarrhea, constipation.    Vital Signs Last 24 Hrs  T(C): 36.5 (24 Apr 2025 09:55), Max: 36.9 (24 Apr 2025 06:30)  T(F): 97.7 (24 Apr 2025 09:55), Max: 98.4 (24 Apr 2025 06:30)  HR: 89 (24 Apr 2025 14:55) (74 - 97)  BP: 169/91 (24 Apr 2025 14:55) (145/73 - 196/108)  BP(mean): 124 (24 Apr 2025 14:55) (100 - 125)  RR: 17 (24 Apr 2025 14:55) (16 - 27)  SpO2: 95% (24 Apr 2025 14:55) (91% - 100%)    Parameters below as of 24 Apr 2025 14:55  Patient On (Oxygen Delivery Method): nasal cannula w/ humidification  O2 Flow (L/min): 3    PHYSICAL EXAM:  General: in no acute distress  Eyes: EOMI intact bilaterally. Anicteric sclerae, moist conjunctivae  HENT: Moist mucous membranes  Neck: Trachea midline, supple. No cervical lymphadenopathy in anterior or posterior chain.  Lungs: Mild R sided crackles, left lung exam unremarkable  Cardiovascular: RRR. No murmurs, rubs, or gallops  Abdomen: +BS, soft, non-tender, slight distension; No rebound or guarding  Extremities: RLE with dressing in place, erythema extending below the knee (marked off), slightly warm to palpation, non tender. B/l LE with +1 PE. Dried scaling in toes of R foot. 2+ peripheral pulses  Neurological: AOx3 to person (full name), place ([facility] [address]), time (April)  Skin: Warm and dry.     LABS:                        13.0   8.67  )-----------( 173      ( 24 Apr 2025 10:24 )             40.2     04-24    142  |  97  |  22  ----------------------------<  130[H]  3.8   |  31  |  1.23    Ca    9.1      24 Apr 2025 10:24  Phos  3.5     04-24  Mg     2.2     04-24    TPro  6.8  /  Alb  3.5  /  TBili  1.2  /  DBili  x   /  AST  34  /  ALT  24  /  AlkPhos  102  04-24   ************************TRANSFER FROM [facility] TO [facility]****************************    HOSPITAL COURSE: 80M w/ PMHx of HTN, COPD not on home O2 with recent admission to [facility] for RLE cellulitis 4/12-4/13 and left AMA presented to [facility] 4/24 after he was found with altered mental status and shortness of breath by a neighbor, found to have tachypnea and hypercapnia 2/2 COPD exacerbation. Placed on BIPAP at [facility], weaned to 3L NC upon arrival to med tele. Started prednisone, azithromycin, and duonebs. No prior hx of CHF, however has elevated BNP and CXR with pulm vascular congestion, TTE with EF 40-45%, mild LV hypertrophy, mild LV diastolic dysfunction, normal RV size and systolic fxn. Estimated PASP 40, no prior echo for comparison. He also has RLE cellulitis treated incompletely during prior admission 4/12-4/14 after pt left AMA and did not continue antibiotics at home, started on cephalexin. Vascular consulted for RLE non-healing ulcer, s/p debridement with concern for deep wound with purulence.     INTERVAL HPI: Patient states that his neighbor came to see him and he called the ambulance because of concern of the patient's breathing. He lives alone and ambulates without assistance, does not take any medications for his COPD, takes medication for his HTN but he does not know what they are (unclear on compliance). He reports he had a RLE injury to his leg many months ago which resulted in the wound, unclear of the injury.    SUBJECTIVE: Patient seen and examined at bedside, resting comfortably in bed, and does not appear to be in any acute distress. Patient reports he is feeling much better and currently does not have any shortness of breath or increased work of breathing. He denies any dizziness, lightheadedness, headache, chest pain, abdominal pain, nausea, vomiting, diarrhea, constipation.    Vital Signs Last 24 Hrs  T(C): 36.5 (24 Apr 2025 09:55), Max: 36.9 (24 Apr 2025 06:30)  T(F): 97.7 (24 Apr 2025 09:55), Max: 98.4 (24 Apr 2025 06:30)  HR: 89 (24 Apr 2025 14:55) (74 - 97)  BP: 169/91 (24 Apr 2025 14:55) (145/73 - 196/108)  BP(mean): 124 (24 Apr 2025 14:55) (100 - 125)  RR: 17 (24 Apr 2025 14:55) (16 - 27)  SpO2: 95% (24 Apr 2025 14:55) (91% - 100%)    Parameters below as of 24 Apr 2025 14:55  Patient On (Oxygen Delivery Method): nasal cannula w/ humidification  O2 Flow (L/min): 3    PHYSICAL EXAM:  General: in no acute distress  Eyes: EOMI intact bilaterally. Anicteric sclerae, moist conjunctivae  HENT: Moist mucous membranes  Neck: Trachea midline, supple. No cervical lymphadenopathy in anterior or posterior chain.  Lungs: Mild R sided crackles, left lung exam unremarkable  Cardiovascular: RRR. No murmurs, rubs, or gallops  Abdomen: +BS, soft, non-tender, slight distension; No rebound or guarding  Extremities: RLE with dressing in place, erythema extending below the knee (marked off), slightly warm to palpation, non tender. B/l LE with +1 PE. Dried scaling in toes of R foot. 2+ peripheral pulses  Neurological: AOx3 to person (full name), place ([facility] [address]), time (April)  Skin: Warm and dry.     LABS:                        13.0   8.67  )-----------( 173      ( 24 Apr 2025 10:24 )             40.2     04-24    142  |  97  |  22  ----------------------------<  130[H]  3.8   |  31  |  1.23    Ca    9.1      24 Apr 2025 10:24  Phos  3.5     04-24  Mg     2.2     04-24    TPro  6.8  /  Alb  3.5  /  TBili  1.2  /  DBili  x   /  AST  34  /  ALT  24  /  AlkPhos  102  04-24

## 2025-04-24 NOTE — ED PROVIDER NOTE - BREATH SOUNDS
[place] ambulatory encounter  URGENT CARE OFFICE VISIT    SUBJECTIVE:  Kimani Patel is a 57 year old female who presented requesting evaluation for 2 days of dizziness, nausea, cold sweats. Patient had URI symptoms 3 weeks ago, lasted a little over 2 weeks, and have since pretty much resolved. Last night patient experienced sudden dizziness with sweating. Also with nausea without vomiting. Patient went to bed to try to sleep it off. This morning still with some nausea and positional dizziness, worse when moving and getting up. More sweaty than usual today. Denies recurrence of URI symptoms, diarrhea, dysuria, other sick symptoms. Denies blood in stool, vaginal bleeding. Patient admits to having occasional palpitations. 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 Is not feeling like that right now, no neck stiffness and very mild headache but just concerned.    Review of systems:    A review of systems was performed and findings relevant to these symptoms are included in the HPI.     PAST HISTORIES:    ALLERGIES:  No Known Allergies    MEDICATIONS:  Current Outpatient Medications   Medication Sig   • diphenhydramine-acetaminophen (TYLENOL PM)  MG Tab Take 1 tablet by mouth nightly as needed.   • lisinopril-hydroCHLOROthiazide (PRINZIDE,ZESTORETIC) 20-25 MG per tablet Take 1 tablet by mouth daily.     No current facility-administered medications for this visit.        Past Medical History:   Diagnosis Date   • Adhesive capsulitis of shoulder    • Essential (primary) hypertension    • IFG (impaired fasting glucose) 2019   • Shoulder pain, bilateral        OBJECTIVE:  Physical Exam:    Visit Vitals  /78   Pulse 64   Temp 97.9 °F (36.6 °C)   Resp 20   Ht 5' 6\" (1.676 m)   Wt 113.4 kg   SpO2 97%   BMI 40.35 kg/m²        CONSTITUTIONAL: Well-hydrated, well-nourished female who appears to be in no acute distress. Awake, alert and cooperative.  HEENT: TMs are clear  bilaterally. External ears without deformities. Hearing is grossly normal. Nose without deformities. There is moist nasal mucosa. Throat is clear with moist mucous membranes.   NECK: No lymphadenopathy (check) or thyroid enlargement. There is full range of motion. There is no tenderness noted. Negative Kernig's sign, good range of motion, chin to chest without increase in mild headache.  LUNGS: Clear to auscultation bilaterally. No wheezes, rales or rhonchi noted.   CARDIOVASCULAR: Regular rate and rhythm with normal S1 and S2. There are no murmurs auscultated.   ABDOMEN: Soft and non-tender. No masses. No liver or spleen enlargement. Bowel sounds normal.   SKIN: Warm, dry, intact without rash or lesion.  NEUROLOGIC: Alert and oriented x3.  PSYCHIATRIC:  Speech and behavior appropriate. Normal mood and affect.    DIAGNOSTICS:  Workup for dizziness    EKG with sinus leila 55, also seen on previous EKGs. Possible incomplete right bundle branch block seen in v2 and v3. Otherwise no acute ST depression or elevation    CBC  CMP    Urgent care course:  1315: initial evaluation  1345: EKG completed  1400: Reevaluation, pt feels slightly worse, weak. Informed no white count, no anemia  1415: swabbed for flu, result negative  1425: CMP reveals elevated Cr 1.4. Chart review reveals normal baseline Cr. Will get UA with reflex to micro and culture. Also ordering microalbumin.   1430: Will give 4mg zofran for nausea, crackers, water  1500: Pt with nausea improved. Eating crackers and drinking water  1510: UA with moderate bacteria and leukocyte esterase, will treat.      Assessment:  1. Dizziness    2. Nausea    3. Elevated serum creatinine          PLAN:  Orders Placed This Encounter   • CBC with Automated Differential   • Comprehensive Metabolic Panel   • Urinalysis & Reflex Micro with Culture if Indicated   • Microalbumin Urine Random   • Urinalysis Microscopic   • Electrocardiogram 12-Lead   • POCT Flu, Influenza, Rapid A/B    • ondansetron (ZOFRAN ODT) disintegrating tablet 4 mg       Will treat for UTI today. Possibly the cause of patient's nausea. Could also be early gastroenteritis. Will also send over Zofran to help with nausea.    The patient was advised to follow up with primary physician or to recheck with the urgent care clinic sooner if symptoms get worse or if new symptoms appear.    The patient indicated understanding of the diagnosis and agreed with the plan of care.       DIMINISHED

## 2025-04-24 NOTE — ED PROVIDER NOTE - CRITICAL CARE ATTENDING CONTRIBUTION TO CARE
Patient will require admission for right lower extremity wound infection chronic, patient also developed COPD exacerbation with worsening and increased oxygen requirement at this time he is on 4 L.  Patient also required IV antihypertensive medications as his blood pressure is refractory to oral medications likely secondary to patient being noncompliant with home meds. At this time patient is full code.

## 2025-04-24 NOTE — PROVIDER CONTACT NOTE (EICU) - ASSESSMENT
Patient seen using [employer] cart with ED RN and EMS at bedside. T 98.4 /73 HR 94 RR 23 O2 sat 94% on Bipap 10/5 30%. Patient, bedside team and EMS without questions or concerns regarding transfer.

## 2025-04-25 DIAGNOSIS — J44.1 CHRONIC OBSTRUCTIVE PULMONARY DISEASE WITH (ACUTE) EXACERBATION: ICD-10-CM

## 2025-04-25 LAB
ADD ON TEST-SPECIMEN IN LAB: SIGNIFICANT CHANGE UP
ADD ON TEST-SPECIMEN IN LAB: SIGNIFICANT CHANGE UP
ANION GAP SERPL CALC-SCNC: 19 MMOL/L — HIGH (ref 5–17)
BLD GP AB SCN SERPL QL: NEGATIVE — SIGNIFICANT CHANGE UP
BUN SERPL-MCNC: 24 MG/DL — HIGH (ref 7–23)
CALCIUM SERPL-MCNC: 9 MG/DL — SIGNIFICANT CHANGE UP (ref 8.4–10.5)
CHLORIDE SERPL-SCNC: 92 MMOL/L — LOW (ref 96–108)
CO2 SERPL-SCNC: 26 MMOL/L — SIGNIFICANT CHANGE UP (ref 22–31)
CREAT SERPL-MCNC: 1.24 MG/DL — SIGNIFICANT CHANGE UP (ref 0.5–1.3)
EGFR: 58 ML/MIN/1.73M2 — LOW
EGFR: 58 ML/MIN/1.73M2 — LOW
GLUCOSE SERPL-MCNC: 148 MG/DL — HIGH (ref 70–99)
HCT VFR BLD CALC: 36.4 % — LOW (ref 39–50)
HGB BLD-MCNC: 12.1 G/DL — LOW (ref 13–17)
MAGNESIUM SERPL-MCNC: 1.8 MG/DL — SIGNIFICANT CHANGE UP (ref 1.6–2.6)
MCHC RBC-ENTMCNC: 31.8 PG — SIGNIFICANT CHANGE UP (ref 27–34)
MCHC RBC-ENTMCNC: 33.2 G/DL — SIGNIFICANT CHANGE UP (ref 32–36)
MCV RBC AUTO: 95.8 FL — SIGNIFICANT CHANGE UP (ref 80–100)
NRBC BLD AUTO-RTO: 0 /100 WBCS — SIGNIFICANT CHANGE UP (ref 0–0)
PLATELET # BLD AUTO: 191 K/UL — SIGNIFICANT CHANGE UP (ref 150–400)
POTASSIUM SERPL-MCNC: 3.8 MMOL/L — SIGNIFICANT CHANGE UP (ref 3.5–5.3)
POTASSIUM SERPL-SCNC: 3.8 MMOL/L — SIGNIFICANT CHANGE UP (ref 3.5–5.3)
RBC # BLD: 3.8 M/UL — LOW (ref 4.2–5.8)
RBC # FLD: 15 % — HIGH (ref 10.3–14.5)
RH IG SCN BLD-IMP: POSITIVE — SIGNIFICANT CHANGE UP
SODIUM SERPL-SCNC: 137 MMOL/L — SIGNIFICANT CHANGE UP (ref 135–145)
WBC # BLD: 15.52 K/UL — HIGH (ref 3.8–10.5)
WBC # FLD AUTO: 15.52 K/UL — HIGH (ref 3.8–10.5)

## 2025-04-25 PROCEDURE — 97597 DBRDMT OPN WND 1ST 20 CM/<: CPT

## 2025-04-25 PROCEDURE — 73701 CT LOWER EXTREMITY W/DYE: CPT | Mod: 26,RT

## 2025-04-25 PROCEDURE — 99233 SBSQ HOSP IP/OBS HIGH 50: CPT | Mod: GC

## 2025-04-25 PROCEDURE — 99222 1ST HOSP IP/OBS MODERATE 55: CPT

## 2025-04-25 PROCEDURE — 99221 1ST HOSP IP/OBS SF/LOW 40: CPT

## 2025-04-25 RX ORDER — FUROSEMIDE 10 MG/ML
40 INJECTION INTRAMUSCULAR; INTRAVENOUS EVERY 24 HOURS
Refills: 0 | Status: DISCONTINUED | OUTPATIENT
Start: 2025-04-25 | End: 2025-04-26

## 2025-04-25 RX ORDER — OXYCODONE HYDROCHLORIDE 30 MG/1
5 TABLET ORAL EVERY 6 HOURS
Refills: 0 | Status: DISCONTINUED | OUTPATIENT
Start: 2025-04-25 | End: 2025-04-28

## 2025-04-25 RX ORDER — LOSARTAN POTASSIUM 100 MG/1
50 TABLET, FILM COATED ORAL DAILY
Refills: 0 | Status: DISCONTINUED | OUTPATIENT
Start: 2025-04-25 | End: 2025-04-28

## 2025-04-25 RX ORDER — OXYCODONE HYDROCHLORIDE 30 MG/1
2.5 TABLET ORAL EVERY 6 HOURS
Refills: 0 | Status: DISCONTINUED | OUTPATIENT
Start: 2025-04-25 | End: 2025-04-28

## 2025-04-25 RX ORDER — HYDROMORPHONE/SOD CHLOR,ISO/PF 2 MG/10 ML
0.25 SYRINGE (ML) INJECTION ONCE
Refills: 0 | Status: DISCONTINUED | OUTPATIENT
Start: 2025-04-25 | End: 2025-04-25

## 2025-04-25 RX ORDER — FUROSEMIDE 10 MG/ML
40 INJECTION INTRAMUSCULAR; INTRAVENOUS DAILY
Refills: 0 | Status: DISCONTINUED | OUTPATIENT
Start: 2025-04-25 | End: 2025-04-25

## 2025-04-25 RX ORDER — IPRATROPIUM BROMIDE AND ALBUTEROL SULFATE .5; 2.5 MG/3ML; MG/3ML
3 SOLUTION RESPIRATORY (INHALATION) EVERY 8 HOURS
Refills: 0 | Status: DISCONTINUED | OUTPATIENT
Start: 2025-04-25 | End: 2025-04-28

## 2025-04-25 RX ADMIN — IPRATROPIUM BROMIDE AND ALBUTEROL SULFATE 3 MILLILITER(S): .5; 2.5 SOLUTION RESPIRATORY (INHALATION) at 06:28

## 2025-04-25 RX ADMIN — PREDNISONE 40 MILLIGRAM(S): 20 TABLET ORAL at 09:50

## 2025-04-25 RX ADMIN — Medication 1 DOSE(S): at 19:36

## 2025-04-25 RX ADMIN — Medication 0.25 MILLIGRAM(S): at 17:44

## 2025-04-25 RX ADMIN — Medication 4 MILLILITER(S): at 22:49

## 2025-04-25 RX ADMIN — Medication 166.67 MILLIGRAM(S): at 04:05

## 2025-04-25 RX ADMIN — Medication 0.25 MILLIGRAM(S): at 16:44

## 2025-04-25 RX ADMIN — Medication 500 MILLIGRAM(S): at 22:59

## 2025-04-25 RX ADMIN — IPRATROPIUM BROMIDE AND ALBUTEROL SULFATE 3 MILLILITER(S): .5; 2.5 SOLUTION RESPIRATORY (INHALATION) at 22:13

## 2025-04-25 RX ADMIN — IPRATROPIUM BROMIDE AND ALBUTEROL SULFATE 3 MILLILITER(S): .5; 2.5 SOLUTION RESPIRATORY (INHALATION) at 03:10

## 2025-04-25 RX ADMIN — ENOXAPARIN SODIUM 40 MILLIGRAM(S): 100 INJECTION SUBCUTANEOUS at 09:53

## 2025-04-25 RX ADMIN — FUROSEMIDE 40 MILLIGRAM(S): 10 INJECTION INTRAMUSCULAR; INTRAVENOUS at 13:41

## 2025-04-25 RX ADMIN — Medication 1 DOSE(S): at 06:28

## 2025-04-25 NOTE — DISCHARGE NOTE PROVIDER - PROVIDER TOKENS
PROVIDER:[TOKEN:[[id number]],FOLLOWUP:[2 weeks]],PROVIDER:[TOKEN:[[id number]],FOLLOWUP:[2 weeks]] PROVIDER:[TOKEN:[[id number]],FOLLOWUP:[2 weeks]],FREE:[LAST:[Ekaterina],FIRST:[Jaz],PHONE:[phone]],FAX:[(   )    -],ADDRESS:[[place] [address]],SCHEDULEDAPPT:[05/01/2025],SCHEDULEDAPPTTIME:[11:30 AM]] PROVIDER:[TOKEN:[[id number]],SCHEDULEDAPPT:[05/21/2025],SCHEDULEDAPPTTIME:[11:30 AM]],FREE:[LAST:[Ekaterina],FIRST:[Jaz],PHONE:[phone]],FAX:[(   )    -],ADDRESS:[[place] [address]],SCHEDULEDAPPT:[05/01/2025],SCHEDULEDAPPTTIME:[11:30 AM]]

## 2025-04-25 NOTE — PROGRESS NOTE ADULT - PROBLEM SELECTOR PLAN 5
Hx of HTN, not currently on any meds, previously on amlodipine 10 mg QD last picked up 8/29/24, HCTZ 12.5mg QD last picked up 5/15/23.    - Started on Nifedipine 30mg q24h for elevated BPs, uptitrate as needed Hx of HTN, not currently on any meds, previously on amlodipine 10 mg QD last picked up 8/29/24, HCTZ 12.5mg QD last picked up 5/15/23.    - Started on Nifedipine 30mg q24h for elevated BPs. will dc if gdmt needs to be uninitiated per cards recs
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
## 2025-04-25 NOTE — CONSULT NOTE ADULT - ATTENDING COMMENTS
Patient is a 81 yo Male former smoker (~60 pack years) with PMH of Emphysema, Prior mass like consolidation s/p biopsy, multiple hospital admissions for COPD exacerbation HTN, recently admitted with RLE cellulitis 4/12-4/13 after which he left AMA now readmitted with COPD exacerbation w/ increased PENNINGTON and cough, with RLE cellulitis, s/p debridement with Vascular, found to have HFrEF for which Cardiology was consulted    Review of studies:  - TTE 4/24/2025: EF 40-45%, no WMAs, mild LV hypertrophy, mild LV diastolic dysfunction, normal RV size and systolic fxn. Estimated PASP 40.  - EKG 4/24/2025:  sinus rhythm with PACs, RBBB, mild LA enlargement  - ekg 4/12/2025: HR 94 sinus rhythm with PACs, RBBB    Home meds: ketoconazole 2% cream, mupirocin 2% ointment, trelegy ellipta 200, albuterol inhaler, trazadone 50 qhs prn sleep, amlodipine 10mg, hctz 12.5mg    # HFrEF     - Patient is a poor Historian. He reports known Prior Hx of LV dysfunction however could not recall who diagnosed him with Heart Failure, or where.   - He reports following with an Internist whose name he could not recall  - Prior to his hospitalization for cellulitis he reported living independently with good functional status without significant exertional symptoms  - Echo this hospitalization revealed mildly reduced LV function with Grade 1 DD and no significant valvular heart disease  - Clinically, patient is warm, well perfused and compensated. JVP is around 7 cm and he has 2 +lower extremity edema, R>L  - At this time, would opt to diurese patient with Lasix 40 IVP and monitor response. Moving Fwd would prefer patient be maintained on Lasix 40 mg po Qd in lieu of HCTZ  - Given LV Dysfunction, would Also DC Norvasc  - Would maintain patient on Losartan 50mg qD with strict holding parameters BP<100/60 starting 4/26 am for GDMT and BP control  - Will defer BB therapy for now given reactive airways and recurrent COPD exacerbations  - Given active infection in patient with Hx of AMA and poor follow up, will Defer ischemic evaluation to outpatient setting.  - A1C if 5 and TSH is WNL. Please add on LIpid profile to am labs  - Vascular following for evaluation of Venous statis wound in RLE with cellulitis. Patient is planned for CT today  - Please Start [facility] Dr. Ridge Wheeler within 2 weeks of DC  - Cardiology will continue to follow with you, please call with any questions
Extensive emphysema, prior hx of mass like consolidation s/p biopsy and found to be purulent drainage related to infection, with apparently multiple hospital admissions for COPD exacerbation but not on any maintenance therapy and does not follow with pulmonologist, presents again with COPD exacerbation w/ increased PENNINGTON and cough. On CT noted to have known emphysema, resolution of R mass like consolidation, but also with interlobular septal thickening suggesting mild pulmonary edema and significant debris in distal trachea/R mainstem, needs airway clearance as outlined above. Warrants prednisone 40mg x 5 days, azithromycin x 3 days, standing duonebs with plan for home LAMA/LABA + azithromycin TIW. Strongly encourage follow up with pulmonary as out pt given poorly controlled symptoms. F/U TTE and consider gentle diuresis. Rest as above.    Reviewed medications, laboratory results, imaging, Discussed with fellow. Decision making of high complexity.
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
## 2025-04-25 NOTE — PHYSICAL THERAPY INITIAL EVALUATION ADULT - MODALITIES TREATMENT COMMENTS
During PT session, Pt attempted to alope from hospital. MD Scott, MD Llanes, MICHAEL Pascual, and RN Cristina aware. Pt ambulated to lobby and attempted exit, however PT, MICHAEL Pascual and security team able to redirect Pt back to his room. PT present throughout elopement attempt for Pt safety. Pt was left sitting up at EOB with MICHAEL Pascual in NAD satting at 93% on 2L O2 via NC.

## 2025-04-25 NOTE — PHYSICAL THERAPY INITIAL EVALUATION ADULT - GENERAL OBSERVATIONS, REHAB EVAL
Pt received sitting in chair in the hallway on RA, +HEP lock, BISI Spencer cleared Pt for PT session. Case discussed with MD Llanes who cleared Pt for PT eval despite elevated troponin. Pt is AO x 3 (self, place, time) and follows 75% of commands.

## 2025-04-25 NOTE — DISCHARGE NOTE NURSING/CASE MANAGEMENT/SOCIAL WORK - PATIENT PORTAL LINK FT
You can access the FollowMyHealth Patient Portal offered by [facility] by registering at the following website: [email or web address]. By joining [facility]’s FollowMyHealth portal, you will also be able to view your health information using other applications (apps) compatible with our system.

## 2025-04-25 NOTE — DISCHARGE NOTE PROVIDER - NSDCMRMEDTOKEN_GEN_ALL_CORE_FT
Albuterol (Eqv-ProAir HFA) 90 mcg/inh inhalation aerosol: 2 puff(s) inhaled every 6 hours as needed for  shortness of breath and/or wheezing  Trelegy Ellipta 200 mcg-62.5 mcg-25 mcg/inh inhalation powder: 1 puff(s) inhaled once a day   Albuterol (Eqv-ProAir HFA) 90 mcg/inh inhalation aerosol: 2 puff(s) inhaled every 6 hours as needed for  shortness of breath and/or wheezing  linezolid 600 mg oral tablet: 1 tab(s) orally every 12 hours starting 4/29  Trelegy Ellipta 200 mcg-62.5 mcg-25 mcg/inh inhalation powder: 1 puff(s) inhaled once a day

## 2025-04-25 NOTE — PHYSICAL THERAPY INITIAL EVALUATION ADULT - GAIT DISTANCE, PT EVAL
300ft 300ft; of note Pt demos O2 desaturation to 81% post ambulation on RA, MICHAEL Pascual and MD Llanes aware. O2 sats recovered to 93% with seated rest and 2L O2 via NC

## 2025-04-25 NOTE — PHYSICAL THERAPY INITIAL EVALUATION ADULT - ADDITIONAL COMMENTS
Pt lives alone in a 3rd floor walk up apartment. He reports being independent at baseline for functional mobility and ADLs without use of AD. Pt has a walk in shower and does not own any DME. Pt lives alone in a 3rd floor walk up apartment. He reports being independent at baseline for functional mobility and ADLs without use of AD. Pt has a walk in shower and does not own any DME. Pt states that he has home oxygen which he uses "as needed."

## 2025-04-25 NOTE — PROGRESS NOTE ADULT - PROBLEM SELECTOR PLAN 4
Presented with pro-BNP 12972, up from 5575 in March 2025. CXR with pulmonary vascular congestion.   TTE 4/24: EF 40-45%, no WMAs, mild LV hypertrophy, mild LV diastolic dysfunction, normal RV size and systolic fxn. Estimated PASP 40.  No previous ECHO for comparison  Patient does not appear to be volume overloaded at this time, currently stable on 3L NC    -outpt cards f/up   - Strict I/Os  - Caution with fluids Presented with pro-BNP 34328, up from 5575 in March 2025. CXR with pulmonary vascular congestion.   TTE 4/24: EF 40-45%, no WMAs, mild LV hypertrophy, mild LV diastolic dysfunction, normal RV size and systolic fxn. Estimated PASP 40.  No previous ECHO for comparison  Patient does not appear to be volume overloaded at this time, currently stable on 3L NC    - Strict I/Os  - cards consult for possible need for GDMT initiation

## 2025-04-25 NOTE — PROGRESS NOTE ADULT - PROBLEM SELECTOR PLAN 1
Suspected to be i/s/o COPD exacerbation, medication noncompliance. Initially presenting with SOB on 3LNC, placed on BiPAP for iWOB/tachypena, transitioned to 3L NC on 7Lach.   Home medications: Trelegy Ellipta 200 and albuterol inhalers prn, however patient states he takes no medications  - C/w NC, wean as tolerated  - C/w prednisone 40 mg QD  - C/w duonebs q4h   - C/w Advair inhaler  - C/w Azithromycin 500 mg QD x3 days (4/24 - 4/27) Suspected to be i/s/o COPD exacerbation, medication noncompliance. Initially presenting with SOB on 3LNC, placed on BiPAP for iWOB/tachypena, transitioned to 3L NC on 7Lach.   Home medications: Trelegy Ellipta 200 and albuterol inhalers prn, however patient states he takes no medications  - C/w NC, wean as tolerated  - C/w prednisone 40 mg QD  - C/w duonebs q4h   - C/w Advair inhaler  - C/w Azithromycin 500 mg QD x3 days (4/24 - 4/27)  - will consult pulm

## 2025-04-25 NOTE — PHYSICAL THERAPY INITIAL EVALUATION ADULT - DIAGNOSIS, PT EVAL
Primary Prevention/Risk Reduction for Loss of Balance and Falling; Impaired Aerobic Capacity/Endurance Associated with Deconditioning

## 2025-04-25 NOTE — PROGRESS NOTE ADULT - ASSESSMENT
80M w/ PMhx of HTN, COPD not on home O2 with recent admission to [facility] for RLE cellulitis 4/12-4/13 and left AMA presented to [facility] 4/24 after he was found with altered mental status and shortness of breath, admitted to [facility] for COPD exacerbation and RLE cellulitis, s/p debridement with Vascular, now stable for management on RMF.

## 2025-04-25 NOTE — PROCEDURE NOTE - PROCEDURE DATE TIME, MLM
Physical Therapy  Visit Type: treatment  Precautions:  Medical precautions:  fall risk; standard precautions.  The patient is a 40 year old male admitted on 12/17/2021.  Pt admitted with diagnosis of Left facial numbness (R20.0)  Abnormal CT of brain (R90.89)  Abnormal head CT (R93.0)     Pt presents with left UE weakness.    Prior to admission pt lives in a house with his brother. 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 Per pt report he has a [facility] bed at home and a power wheelchair. At baseline pt was able to roll modified independent in bed w/ use of rails and was transferring into his chair independently by laying it flat and rolling in/out of it. Pt was previously independent with eating, brushing his teeth and washing/drying his face and \"top half.\"         Lines:     Basic: indwelling urinary catheter      Lines in chart and on patient reviewed, precautions maintained throughout session.  Safety Measures: bed alarm and bed rails    SUBJECTIVE  Patient agreed to participate in therapy this date.  Pt stated that his L arm feels a little weaker today compared to yesterday.  Patient / Family Goal: maximize function, return to previous functional status and home exercise education     OBJECTIVE     Oriented to person, place, time and situation     Arousal alertness: appropriate responses to stimuli    Affect/Behavior: alert, calm, cooperative, pleasant and appropriate  Functional Communication/Cognition    Overall status:  Within functional limits  Strength (out of 5 unless otherwise indicated)   Shoulder:   - Flexion:      • Left: 3+      • Right: 4+  Elbow/Forearm:   - Flexion:        • Left: 4        • Right: 5    - Extension:        • Left: 4        • Right: 5   Gross : left  less than right       Interventions     Supine      Upper Extremity: Bilateral: shoulder flexion, elbow flexion, elbow extension, shoulder extension, 
wrist flexion and wrist extension, AROM, resistive and AAROM, 10 reps, 2 sets  Skilled input: Verbal instruction/cues  Verbal Consent: Writer verbally educated and received verbal consent for hand placement, positioning of patient, and techniques to be performed today from patient for clothing adjustments for techniques, hand placement and palpation for techniques and therapist position for techniques as described above and how they are pertinent to the patient's plan of care.       ASSESSMENT    Impairments: range of motion, strength, balance deficits and activity tolerance  Functional Limitations: all functional mobility    Patient seen today for continued PT treatment. Pt supine in bed at start of session, cleared by RN.  Pt reported that his L arm was feeling a little weaker again today compared to previous day. Focus of session on B UE exercises, which he performed with AA/AROM and light resistance.  Pt will benefit from continued therapy to improve bed mobility and maximize independence. Pt repositioned to comfort at end of session with all needs met.     Recommended Consults: PT: OT  Discharge Recommendations   Recommendation for Discharge: PT IL: Patient is appropriate for daily Physical Therapy                  Skilled therapy is required to address these limitations in attempt to maximize the patient's independence.  Progress: progressing toward goals  Predicted patient presentation: Low (stable) - Patient comorbidities and complexities, as defined above, will have little effect on progress for prescribed plan of care.    End of Session:   Location: in bed  Safety measures: alarm system in place/re-engaged, call light within reach, equipment intact, lines intact and bed rails x2  Handoff to: nurse  Education Provided:   Learning assessment:  - Primary learner: patient  - Are they ready to learn: yes  - Preferred learning style: verbal  - Barriers to learning: no barriers apparent at this time  Education 
provided during session:  - Receiving education: patient    PLAN    Suggestions for next session as indicated: PT Frequency: 6 days/week  Frequency Comments: 3/6 last 12/22 (R) RENNY, JAZMYN    Interventions: balance, bed mobility, body mechanics, continued evaluation, endurance training, functional transfer training, gait training, HEP train/position, ROM, safety education, stairs retraining and strengthening  Agreement to plan and goals: patient agrees with goals and treatment plan        GOALS  Review Date: 12/22/2021  Long Term Goals: (to be met by time of discharge from hospital)  Rolling left: Patient will complete bed mobility for rolling left modified independent.  Rolling right: Patient will complete bed mobility for rolling right modified independent.  Reposition in bed: Patient will complete repositioning in bed modified independent.  Lateral transfer: Patient will complete lateral transfer with minimal assist.     Documented in the chart in the following areas: Assessment.      Therapy procedure time and total treatment time can be found documented on the Time Entry flowsheet  
24-Apr-2025 15:42
25-Apr-2025 17:46

## 2025-04-25 NOTE — CONSULT NOTE ADULT - SUBJECTIVE AND OBJECTIVE BOX
HPI:  HOSPITAL COURSE: 80M w/ PMHx of HTN, COPD not on home O2 with recent admission to [facility] for RLE cellulitis 4/12-4/13 and left AMA presented to [facility] 4/24 after he was found with altered mental status and shortness of breath by a neighbor, found to have tachypnea and hypercapnia 2/2 COPD exacerbation. Placed on BIPAP at [facility], weaned to 3L NC upon arrival to med tele. Started prednisone, azithromycin, and duonebs. No prior hx of CHF, however has elevated BNP and CXR with pulm vascular congestion, pending TTE. Also has RLE cellulitis treated incompletely during prior admission 4/12-4/14 after pt left AMA and did not continue antibiotics at home, now on cephalexin. Vascular consulted for RLE non-healing ulcer, pending venous and arterial duplex.     HPI: 80M w/ PMhx of HTN, COPD not on home O2 with recent admission to [facility] for RLE cellulitis 4/12-4/13 and left AMA presented to [facility] 4/24 after he was found with altered mental status and shortness of breath by a neighbor. Patient denies any acute worsening of respiratory status that he perceived. Sometimes uses oxygen at home but is able to leave the house and go grocery shopping without using home oxygen. Did not take any antibiotics after leaving the hospital. Denies any fever, chest pain, cough, increased sputum production, abdominal pain, nausea, vomiting, diarrhea, sick contacts, or recent travel.     In the ED  Vitals: T 97.4, HR 94, /108-> 170/84, RR 16-27, initially on 3L NC -> BiPAP with increased RR 97%  Labs: WBC 7.26, Hb 13.2, Plts 177, Na 140, K 4.3, Cr 1.28, Trop I 117.1, Pro BNP 95877, ABG: pH 7.4, pCO2 45, pO2 160  Imaging: CTH: No acute intracranial hemorrhage, mass effect, or midline shift.  CTA: No pulmonary embolism. Previously seen masslike encasement of the right hilum with ipsilateral subcarinal and paratracheal lymphadenopathy is no longer seen. Extensive diffuse emphysema. Improvement of previously noted mediastinal adenopathy. Small right greater than left pleural effusion.  Interventions: Azithromycin 500mg x1, Lasix 60mg x1, Hydralazine 10mg x1, HCTZ 25mg x1, MgSO4 2g x1, Methylprednisolone 125mg x1, Vancomycin 1250mg x1, duonebs x1   (24 Apr 2025 10:36)    ADDITIONAL CARDIOLOGY HPI:  Stated he was told he had a "weak heart years ago" but does not follow with cardiologist or PCP. Occasionally sees "[facility] [address] [place] [address]." His sister is a nurse, his brother in law is Dr. Sin Rodas who practices in [place]. Patient has had bilateral leg swelling for "many months" and feels short of breath when ambulating. Denied chest pain/palpitations, orthopnea, abdominal pain, nausea/vomiting, diarrhea.  Denied hx of stress test or cardiac cath.    ROS: A 10-point review of systems was otherwise negative.    PAST MEDICAL & SURGICAL HISTORY:  Kidney stone      HTN (hypertension)      COPD (chronic obstructive pulmonary disease)      History of BPH      History of cholecystectomy        SOCIAL HISTORY:  FAMILY HISTORY:    ALLERGIES: 	  penicillin (Unknown)          MEDICATIONS:  acetaminophen     Tablet .. 650 milliGRAM(s) Oral every 6 hours PRN  albuterol/ipratropium for Nebulization 3 milliLiter(s) Nebulizer every 4 hours  azithromycin   Tablet 500 milliGRAM(s) Oral every 24 hours  enoxaparin Injectable 40 milliGRAM(s) SubCutaneous every 24 hours  fluticasone propionate/ salmeterol 100-50 MICROgram(s) Diskus 1 Dose(s) Inhalation two times a day  furosemide   Injectable 40 milliGRAM(s) IV Push every 24 hours  losartan 50 milliGRAM(s) Oral daily  melatonin 3 milliGRAM(s) Oral at bedtime PRN  oxyCODONE    IR 2.5 milliGRAM(s) Oral every 6 hours PRN  oxyCODONE    IR 5 milliGRAM(s) Oral every 6 hours PRN  predniSONE   Tablet 40 milliGRAM(s) Oral every 24 hours  vancomycin  IVPB 1250 milliGRAM(s) IV Intermittent every 24 hours      PHYSICAL EXAM:  T(C): 36.8 (04-25-25 @ 05:25), Max: 36.9 (04-24-25 @ 21:00)  HR: 88 (04-25-25 @ 05:25) (87 - 93)  BP: 137/68 (04-25-25 @ 05:25) (130/86 - 169/91)  RR: 18 (04-25-25 @ 05:25) (17 - 18)  SpO2: 97% (04-25-25 @ 05:25) (94% - 97%)  Wt(kg): --    GEN: Awake, comfortable. NAD.   HEENT: NCAT, PERRL, EOMI. Mucosa moist. No JVD.   RESP: CTA b/l  CV: RRR, normal s1/s2. No m/r/g.  ABD: Soft, NTND. BS+  EXT: Warm. +2 pitting edema bilaterally up to mid-calves. Right ulcer present on lateral right calf.  NEURO: AAOx3. No focal deficits.    I&O's Summary    24 Apr 2025 07:01  -  25 Apr 2025 07:00  --------------------------------------------------------  IN: 0 mL / OUT: 750 mL / NET: -750 mL        LABS:	 	                        12.1   15.52 )-----------( 191      ( 25 Apr 2025 05:30 )             36.4     04-25    137  |  92[L]  |  24[H]  ----------------------------<  148[H]  3.8   |  26  |  1.24    Ca    9.0      25 Apr 2025 05:30  Phos  3.5     04-24  Mg     1.8     04-25    TPro  6.8  /  Alb  3.5  /  TBili  1.2  /  DBili  x   /  AST  34  /  ALT  24  /  AlkPhos  102  04-24        proBNP:   Lipid Profile:   HgA1c:   TSH:     TELEMETRY: 	    ECG:  	  RADIOLOGY:   ECHO:  STRESS:  CATH:

## 2025-04-25 NOTE — DISCHARGE NOTE PROVIDER - CARE PROVIDERS DIRECT ADDRESSES
,DirectAddress_Unknown,[email or web address],DirectAddress_Unknown

## 2025-04-25 NOTE — DISCHARGE NOTE PROVIDER - NSDCCPCAREPLAN_GEN_ALL_CORE_FT
PRINCIPAL DISCHARGE DIAGNOSIS  Diagnosis: Open wound of right lower leg  Assessment and Plan of Treatment: Cellulitis is a common, potentially serious bacterial skin infection. The affected skin is swollen and inflamed and is typically painful and warm to the touch. Your cellilitis most likely began from an open wound present on your leg. You were seen by the vascular surgeons who helped clean the leg with a procedure called debridement. You were also treated with IV antibiotics to treat the infection. Please continue to take ___ to ensure the infection clears properly.      SECONDARY DISCHARGE DIAGNOSES  Diagnosis: HFrEF (heart failure with reduced ejection fraction)  Assessment and Plan of Treatment: Heart failure with reduced ejection fraction (HFrEF), also known as systolic heart failure, occurs when the left ventricle of the heart weakens, resulting in less blood being pumped into the body. This condition is characterized by a left ventricular ejection fraction (LVEF) of 40% or less, which means the heart is unable to pump enough blood to meet the body's needs. Symptoms can be persistent and life-threatening, requiring ongoing treatment to manage and reduce the risk of complications. Please be sure to continue ___ mediations and follow up with ____ (cardiology) at your scheduled appointment.    Diagnosis: COPD exacerbation  Assessment and Plan of Treatment: A COPD exacerbation is a sudden worsening of symptoms in people with chronic obstructive pulmonary disease (COPD). These exacerbations can last for several days to weeks and may be triggered by factors such as cold air, air pollution, infections, or other insults to the airways. Signs of a flare-up include increased coughing, breathlessness, changes in mucus, fatigue, and wheezing. Please continue to use your inhalers as prescribed and follow up with ____ (pulmonology).    Diagnosis: MONA (acute kidney injury)  Assessment and Plan of Treatment: Acute Kidney Injury (MONA) is a sudden episode of kidney failure or damage that occurs within a few hours or days. It leads to a build-up of waste products in the blood and can cause significant damage to the body. MONA can range from mild to severe and is often a complication of another serious illness. Your kidneys had injury likely secondary to decreased blood perfusion. Please follow with your primary doctor and cardiologist to reassess your kidney function on discharge.

## 2025-04-25 NOTE — CONSULT NOTE ADULT - ASSESSMENT
80M w/ PMhx of HTN, COPD not on home O2 with recent admission to [facility] for RLE cellulitis 4/12-4/13 and left AMA presented to [facility] 4/24 after he was found with altered mental status and shortness of breath by a neighbor, admitted for LLE cellulitis and ACOPDE necessitating BiPAP initially. Pulm consulted for COPDE exacerbation.       DATA REVIEWED:  ***  TTE 4/24/25 CONCLUSIONS:   1. Left ventricular cavity is normal in size. Left ventricular systolic   function is mildly to moderately decreased with an ejection fraction   visually estimated at 40 to 45 %.   2. Mild left ventricular hypertrophy.   3. There is mild (grade 1) left ventricular diastolic dysfunction.   4. Normal right ventricular cavity size and normal right ventricular   systolic function. Tricuspid annular plane systolic excursion (TAPSE) is   1.8 cm (normal >=1.7 cm).   5. Left atrium is mildly dilated.   6. Fibrocalcific aortic valve sclerosis without stenosis.   7. Mild to moderate aortic regurgitation.   8. Ascending aorta is dilated, measuring 4.00 cm (indexed 2.03 cm/m²).   9. Estimated pulmonary artery systolic pressure is 40 mmHg.  10. No prior echocardiogram is available for comparison.    CTA pulm 4/24/25 IMPRESSION:  No pulmonary embolism.  Previously seen masslike encasement of the right hilum with ipsilateral   subcarinal and paratracheal lymphadenopathy is no longer seen.  Extensive diffuse emphysema.  Improvement of previously noted mediastinal adenopathy.  Small right greater than left pleural effusion.  ***    Plan:   80M w/ PMhx of HTN, COPD not on home O2 with recent admission to [facility] for RLE cellulitis 4/12-4/13 and left AMA presented to [facility] 4/24 after he was found with altered mental status and shortness of breath by a neighbor, admitted for LLE cellulitis and ACOPDE necessitating BiPAP initially. Pulm consulted for COPDE exacerbation.       DATA REVIEWED:  ***  TTE 4/24/25 CONCLUSIONS:   1. Left ventricular cavity is normal in size. Left ventricular systolic   function is mildly to moderately decreased with an ejection fraction   visually estimated at 40 to 45 %.   2. Mild left ventricular hypertrophy.   3. There is mild (grade 1) left ventricular diastolic dysfunction.   4. Normal right ventricular cavity size and normal right ventricular   systolic function. Tricuspid annular plane systolic excursion (TAPSE) is   1.8 cm (normal >=1.7 cm).   5. Left atrium is mildly dilated.   6. Fibrocalcific aortic valve sclerosis without stenosis.   7. Mild to moderate aortic regurgitation.   8. Ascending aorta is dilated, measuring 4.00 cm (indexed 2.03 cm/m²).   9. Estimated pulmonary artery systolic pressure is 40 mmHg.  10. No prior echocardiogram is available for comparison.    CTA pulm 4/24/25 IMPRESSION:  No pulmonary embolism.  Previously seen masslike encasement of the right hilum with ipsilateral   subcarinal and paratracheal lymphadenopathy is no longer seen.  Extensive diffuse emphysema.  Improvement of previously noted mediastinal adenopathy.  Small right greater than left pleural effusion.  ***    #COPD exacerbation  #Severe emphysema  #Endobronchial debris in ASHA    Plan:  -Continue pred 40 for 5 days  -Azithro for 3 days total  -Can discharge with Advair (LAMA/LABA) and azithro TIW  -Continue duonebs TID->hypersaline 3% TID -> aerobika TID  -Would benefit from f/u with our pulmonologists; PFTs/6MWT; immunizations for RSV, flu, shingles, pneumococcus; pulmonary rehab  -OOBTC, IS, ambulation.      Patient discussed with pulmonary attending, Dr. Ramirez  Please feel free to contact me if questions or concerns arise.  Thank you for your consultation.    Cliff Royal MD  Pulmonary Critical Care Fellow, PGY-6  [phone]

## 2025-04-25 NOTE — DISCHARGE NOTE NURSING/CASE MANAGEMENT/SOCIAL WORK - FINANCIAL ASSISTANCE
[facility] provides services at a reduced cost to those who are determined to be eligible through [facility]’s financial assistance program. Information regarding [facility]’s financial assistance program can be found by going to [email or web address] or by calling [phone].

## 2025-04-25 NOTE — PHYSICAL THERAPY INITIAL EVALUATION ADULT - PERTINENT HX OF CURRENT PROBLEM, REHAB EVAL
80M w/ PMhx of HTN, COPD not on home O2 with recent admission to [facility] for RLE cellulitis 4/12-4/13 and left AMA presented to [facility] 4/24 after he was found with altered mental status and shortness of breath, admitted to [facility] for COPD exacerbation and RLE cellulitis, s/p debridement with Vascular, now stable for management on RMF.

## 2025-04-25 NOTE — PROGRESS NOTE ADULT - PROBLEM SELECTOR PLAN 3
RLE non-healing ulcer and cellulitis with mild fluctuance and no drainage, initially admitted 4/12-4/14 for treatment with cefepime and vancomycin, de-escalated to cefazolin, and planned for discharge on cefalexin, however, patient left AMA and did not continue antibiotics at home.   RLE XR (4/24): with soft tissue gas.   B/l LE venous dopplers: negative for DVT  B/l LE arterial dopplers: soft tissue swelling near R foot  S/p debridement w/ Vascular (4/24) with findings concerning for wound that tracks deep with purulence.  S/p Vanc 1250mg (4/24 5AM) x1 in ED  - Transition from Cefalexin 500 mg q6h PO -> Vancomycin 1250mg q24h for MRSA coverage i/s/o purulence (next dose 4/25 AM)  - Vanc trough before 3rd dose per pharmacy for concern of being supratheraputic (4/26 4AM)  - wound care per vascular (pack cavity, WTD gauze and kerlix dressing)  - consider ID consult RLE non-healing ulcer and cellulitis with mild fluctuance and no drainage, initially admitted 4/12-4/14 for treatment with cefepime and vancomycin, de-escalated to cefazolin, and planned for discharge on cefalexin, however, patient left AMA and did not continue antibiotics at home.   RLE XR (4/24): with soft tissue gas.   B/l LE venous dopplers: negative for DVT  B/l LE arterial dopplers: soft tissue swelling near R foot  S/p debridement w/ Vascular (4/24) with findings concerning for wound that tracks deep with purulence.  S/p Vanc 1250mg (4/24 5AM) x1 in ED    Plan   - Transition from Cephalexin 500 mg q6h PO -> Vancomycin 1250mg q24h for MRSA coverage i/s/o purulence (next dose 4/25 AM)  - Vanc trough before 3rd dose per pharmacy for concern of being supratheraputic (4/26 4AM)  - wound care per vascular (pack cavity, WTD gauze and kerlix dressing)

## 2025-04-25 NOTE — PROGRESS NOTE ADULT - PROBLEM SELECTOR PLAN 6
Today's visit was performed with the assistance of  Services.   Name of : Penny ID [id number]    Service used: Video     Cr 1.23, GFR 59 on admission, appears to be at baseline.     - CTM BMP

## 2025-04-25 NOTE — DISCHARGE NOTE PROVIDER - CARE PROVIDER_API CALL
Jose Carlos Bledsoe  Pulmonary Disease  [address], [address] [place] [address]  Phone: [phone]  Fax: [phone]  Follow Up Time: 2 weeks    Ridge Wheeler  Cardiovascular Disease  [address] [place] [address], [address]  [place] [address]  Phone: [phone]  Fax: [phone]  Follow Up Time: 2 weeks   Ridge Wheeler  Cardiovascular Disease  [address] [place] [address], [address]  [place] [address]  Phone: [phone]  Fax: [phone]  Follow Up Time: 2 weeks    Jaz Tobar  Pulmonary Disease  [address] [place] [address]  Phone: [phone]  Fax: (   )    -  Scheduled Appointment: 05/01/2025 11:30 AM   Ridge Wheeler  Cardiovascular Disease  [address] [place] [address], [address]  [place] [address]  Phone: [phone]  Fax: [phone]  Scheduled Appointment: 05/21/2025 11:30 AM    Jaz Tobar  Pulmonary Disease  [address] [place] [address]  Phone: [phone]  Fax: (   )    -  Scheduled Appointment: 05/01/2025 11:30 AM

## 2025-04-25 NOTE — DISCHARGE NOTE PROVIDER - HOSPITAL COURSE
80M w/ PMhx of HTN, COPD not on home O2 with recent admission to [facility] for RLE cellulitis 4/12-4/13 and left AMA presented to [facility] 4/24 after he was found with altered mental status and shortness of breath, admitted for COPD exacerbation and RLE cellulitis, underwent debridement x2 with vascular, found to have HFrEF 40-45%, cardiology consulted. Patient started jayme IV lasix and transitioned to oral and losartan per cardiology. Pulm consulted for hypoxia and desaturation upon ambulation who recommended prednisone for 5 soumya course, azithro for 3 day course and airway clearance with duo nebs and aerobika. BCx + for MRSA and plurophilla bacteria. Treated with vancomycin with improvement in white count and inflammatory markers. DC with ____    Hospital course (by problem)    #  Acute hypoxic respiratory failure. # COPD exacerbation    Suspected to be i/s/o COPD exacerbation, medication noncompliance. Initially presenting with SOB on 3LNC, placed on BiPAP for iWOB/tachypena, transitioned to 3L NC on 7Lach.   Home medications: Trelegy Ellipta 200 and albuterol inhalers prn, however patient states he takes no medications    s/p 3 doses azithromycin, s/p 5 days 40 mg prednisone     Plan   - OP pulm scheduled for __       # Cellulitis.   RLE non-healing ulcer and cellulitis with mild fluctuance and no drainage, initially admitted 4/12-4/14 for treatment with cefepime and vancomycin, de-escalated to cefazolin, and planned for discharge on cefalexin, however, patient left AMA and did not continue antibiotics at home.   RLE XR (4/24): with soft tissue gas.   B/l LE venous dopplers: negative for DVT  B/l LE arterial dopplers: soft tissue swelling near R foot  S/p debridement w/ Vascular (4/24) with findings concerning for wound that tracks deep with purulence.    wound culture + for staph aureus as well as phuralibacter . treated with vancomycin for MRSA. Specificies:____    Plan   - vascular follow up:   - c/w ___ for __   - wound care per vascular (pack cavity, WTD gauze and kerlix dressing).    # HFrEF (heart failure with reduced ejection fraction).   ·  Plan: Presented with pro-BNP 29214, up from 5575 in March 2025. CXR with pulmonary vascular congestion.   TTE 4/24: EF 40-45%, no WMAs, mild LV hypertrophy, mild LV diastolic dysfunction, normal RV size and systolic fxn. Estimated PASP 40.  No previous ECHO for comparison  Patient does not appear to be volume overloaded at this time, currently stable on 3L NC    - c/w PO lasix 40mg QD   - c/w Losartan   - On GDMT per cardiology, will f/u with ___ on dc     # HTN (hypertension).   Hx of HTN, not currently on any meds, previously on amlodipine 10 mg QD last picked up 8/29/24, HCTZ 12.5mg QD last picked up 5/15/23.   - manaagement with GDMT as above.    #Chronic kidney disease (CKD).   Cr 1.23, GFR 59 on admission, appears to be at baseline. Slight bump to 1.37 s/p losartan and lasix reintroduction     Plan  -     Patient was discharged to: (home/TIN/acute rehab/hospice, etc, and with what services – home health PT/RN? Home O2?)    New medications:   Changes to old medications:  Medications that were stopped:    Items to follow up as outpatient:    Physical exam at the time of discharge:

## 2025-04-25 NOTE — PROGRESS NOTE ADULT - PROBLEM SELECTOR PLAN 7
Fluids: caution with fluids given new HFrEF  Diet: Regular  DVT: Lovenox 40mg q24h  Dispo: Tele ->F Fluids: caution with fluids given new HFrEF  Diet: Regular  DVT: Lovenox 40mg q24h  Dispo: RMF

## 2025-04-25 NOTE — DISCHARGE NOTE PROVIDER - NSDCFUADDAPPT_GEN_ALL_CORE_FT
Please bring your Insurance card, Photo ID and Discharge paperwork to the following appointments:    (1) Please follow up with your Pulmonary Medicine Provider, Dr. Jaz Tobar at [address] [place] [address] on 5/1/2025 at 11:30am.    Appointment was scheduled by Ms. ETELVINA Hargrove, Referral Coordinator.   Please bring your Insurance card, Photo ID and Discharge paperwork to the following appointments:    (1) Please follow up with your Pulmonary Medicine Provider, Dr. Jaz Tobar at [address] [place] [address] on 5/1/2025 at 11:30am.    Appointment was scheduled by Ms. ETELVINA Hargrove, Referral Coordinator.    (2) Please follow up with your Cardiology Provider, Dr. Ridge Wheeler at [address] [place] [address] on 5/21/2025 at 1:30pm.     Please note that the office will contact you for an earlier appointment once available.     Appointment was scheduled by Ms. ETELVINA Hargrove, Referral Coordinator.

## 2025-04-25 NOTE — PROGRESS NOTE ADULT - SUBJECTIVE AND OBJECTIVE BOX
Subjective: No acute events overnight. VSS stable and afebrile overnight.    ROS: Denies headache, blurred vision, chest pain, SOB, abdominal pain, nausea or vomiting       Social   acetaminophen     Tablet .. 650 milliGRAM(s) Oral every 6 hours PRN  albuterol/ipratropium for Nebulization 3 milliLiter(s) Nebulizer every 4 hours  azithromycin   Tablet 500 milliGRAM(s) Oral every 24 hours  enoxaparin Injectable 40 milliGRAM(s) SubCutaneous every 24 hours  fluticasone propionate/ salmeterol 100-50 MICROgram(s) Diskus 1 Dose(s) Inhalation two times a day  melatonin 3 milliGRAM(s) Oral at bedtime PRN  NIFEdipine XL 30 milliGRAM(s) Oral every 24 hours  predniSONE   Tablet 40 milliGRAM(s) Oral every 24 hours  vancomycin  IVPB 1250 milliGRAM(s) IV Intermittent every 24 hours  acetaminophen     Tablet .. 650 milliGRAM(s) Oral every 6 hours PRN  albuterol/ipratropium for Nebulization 3 milliLiter(s) Nebulizer every 4 hours  azithromycin   Tablet 500 milliGRAM(s) Oral every 24 hours  enoxaparin Injectable 40 milliGRAM(s) SubCutaneous every 24 hours  fluticasone propionate/ salmeterol 100-50 MICROgram(s) Diskus 1 Dose(s) Inhalation two times a day  melatonin 3 milliGRAM(s) Oral at bedtime PRN  NIFEdipine XL 30 milliGRAM(s) Oral every 24 hours  predniSONE   Tablet 40 milliGRAM(s) Oral every 24 hours  vancomycin  IVPB 1250 milliGRAM(s) IV Intermittent every 24 hours      Allergies    penicillin (Unknown)    Intolerances        Vital Signs Last 24 Hrs  T(C): 36.8 (25 Apr 2025 05:25), Max: 36.9 (24 Apr 2025 21:00)  T(F): 98.2 (25 Apr 2025 05:25), Max: 98.4 (24 Apr 2025 21:00)  HR: 88 (25 Apr 2025 05:25) (80 - 93)  BP: 137/68 (25 Apr 2025 05:25) (130/86 - 175/89)  BP(mean): 103 (24 Apr 2025 17:06) (103 - 125)  RR: 18 (25 Apr 2025 05:25) (17 - 18)  SpO2: 97% (25 Apr 2025 05:25) (94% - 97%)    Parameters below as of 25 Apr 2025 05:25  Patient On (Oxygen Delivery Method): nasal cannula      I&O's Summary    24 Apr 2025 07:01  -  25 Apr 2025 07:00  --------------------------------------------------------  IN: 0 mL / OUT: 750 mL / NET: -750 mL          Physical Exam:  General: NAD, resting comfortably in bed  Pulmonary: No respiratory distress, nonlabored breathing, on room air  Cardiovascular: NSR  Abdominal: Soft, NT, ND  Extremities: RLE w chronic lymphedema, 2x2cm wound s/p debridement yesterday with surrounding fluctuance expressing purulence from the wound, wound tracking inferiorly and medially, wound packed with iodoform packing, WTD guaze, followed by kerlix wrap  Pulses: palp dp/pt      LABS:                        13.0   8.67  )-----------( 173      ( 24 Apr 2025 10:24 )             40.2     04-24    142  |  97  |  22  ----------------------------<  130[H]  3.8   |  31  |  1.23    Ca    9.1      24 Apr 2025 10:24  Phos  3.5     04-24  Mg     2.2     04-24    TPro  6.8  /  Alb  3.5  /  TBili  1.2  /  DBili  x   /  AST  34  /  ALT  24  /  AlkPhos  102  04-24    PT/INR - ( 24 Apr 2025 00:58 )   PT: 14.1 sec;   INR: 1.21          PTT - ( 24 Apr 2025 00:58 )  PTT:26.7 sec      Assessment & Plan    80M w/ PMhx of HTN, COPD not on home O2 with recent admission to [facility] for RLE cellulitis 4/12-4/13 and left AMA presented to [facility] 4/24 after he was found with altered mental status and shortness of breath by a neighbor, admitted to medicine for COPD exacerbation and RLE cellulitis/ulcer management.  Patient denies rest pain/claudication/prev hx of peripheral interventions. Patient believes he developed the ulcer 1 month ago secondary to a traumatic event. He cannot recall any hx of previous ulcers/wounds on his extremities. Patient states he also feels his left leg is swollen but denies pain anywhere other than the site of ulceration/numbness/weakness. Vascular surgery consulted for evaluation of wound. No evidence of ischemia at this time, wound likely 2/2 venous stasis.    Plan:  - follow up wound cultures 4/25  - pain control  - ID Consult  - wound care: packing, WTD guaze, kerlix  - vascular surgery to continue to follow  - Discussed with Chief and Attending  - Call [phone] with any questions or concerns Subjective: No acute events overnight. VSS stable and afebrile overnight.    ROS: Denies headache, blurred vision, chest pain, SOB, abdominal pain, nausea or vomiting       Social   acetaminophen     Tablet .. 650 milliGRAM(s) Oral every 6 hours PRN  albuterol/ipratropium for Nebulization 3 milliLiter(s) Nebulizer every 4 hours  azithromycin   Tablet 500 milliGRAM(s) Oral every 24 hours  enoxaparin Injectable 40 milliGRAM(s) SubCutaneous every 24 hours  fluticasone propionate/ salmeterol 100-50 MICROgram(s) Diskus 1 Dose(s) Inhalation two times a day  melatonin 3 milliGRAM(s) Oral at bedtime PRN  NIFEdipine XL 30 milliGRAM(s) Oral every 24 hours  predniSONE   Tablet 40 milliGRAM(s) Oral every 24 hours  vancomycin  IVPB 1250 milliGRAM(s) IV Intermittent every 24 hours  acetaminophen     Tablet .. 650 milliGRAM(s) Oral every 6 hours PRN  albuterol/ipratropium for Nebulization 3 milliLiter(s) Nebulizer every 4 hours  azithromycin   Tablet 500 milliGRAM(s) Oral every 24 hours  enoxaparin Injectable 40 milliGRAM(s) SubCutaneous every 24 hours  fluticasone propionate/ salmeterol 100-50 MICROgram(s) Diskus 1 Dose(s) Inhalation two times a day  melatonin 3 milliGRAM(s) Oral at bedtime PRN  NIFEdipine XL 30 milliGRAM(s) Oral every 24 hours  predniSONE   Tablet 40 milliGRAM(s) Oral every 24 hours  vancomycin  IVPB 1250 milliGRAM(s) IV Intermittent every 24 hours      Allergies    penicillin (Unknown)    Intolerances        Vital Signs Last 24 Hrs  T(C): 36.8 (25 Apr 2025 05:25), Max: 36.9 (24 Apr 2025 21:00)  T(F): 98.2 (25 Apr 2025 05:25), Max: 98.4 (24 Apr 2025 21:00)  HR: 88 (25 Apr 2025 05:25) (80 - 93)  BP: 137/68 (25 Apr 2025 05:25) (130/86 - 175/89)  BP(mean): 103 (24 Apr 2025 17:06) (103 - 125)  RR: 18 (25 Apr 2025 05:25) (17 - 18)  SpO2: 97% (25 Apr 2025 05:25) (94% - 97%)    Parameters below as of 25 Apr 2025 05:25  Patient On (Oxygen Delivery Method): nasal cannula      I&O's Summary    24 Apr 2025 07:01  -  25 Apr 2025 07:00  --------------------------------------------------------  IN: 0 mL / OUT: 750 mL / NET: -750 mL          Physical Exam:  General: NAD, resting comfortably in bed  Pulmonary: No respiratory distress, nonlabored breathing, on room air  Cardiovascular: NSR  Abdominal: Soft, NT, ND  Extremities: RLE w chronic lymphedema, 2x2cm wound s/p debridement yesterday with surrounding fluctuance expressing purulence from the wound, wound tracking inferiorly and medially, wound packed with iodoform packing, WTD guaze, followed by kerlix wrap  Pulses: palp dp/pt      LABS:                        13.0   8.67  )-----------( 173      ( 24 Apr 2025 10:24 )             40.2     04-24    142  |  97  |  22  ----------------------------<  130[H]  3.8   |  31  |  1.23    Ca    9.1      24 Apr 2025 10:24  Phos  3.5     04-24  Mg     2.2     04-24    TPro  6.8  /  Alb  3.5  /  TBili  1.2  /  DBili  x   /  AST  34  /  ALT  24  /  AlkPhos  102  04-24    PT/INR - ( 24 Apr 2025 00:58 )   PT: 14.1 sec;   INR: 1.21          PTT - ( 24 Apr 2025 00:58 )  PTT:26.7 sec      Assessment & Plan    80M w/ PMhx of HTN, COPD not on home O2 with recent admission to [facility] for RLE cellulitis 4/12-4/13 and left AMA presented to [facility] 4/24 after he was found with altered mental status and shortness of breath by a neighbor, admitted to medicine for COPD exacerbation and RLE cellulitis/ulcer management.  Patient denies rest pain/claudication/prev hx of peripheral interventions. Patient believes he developed the ulcer 1 month ago secondary to a traumatic event. He cannot recall any hx of previous ulcers/wounds on his extremities. Patient states he also feels his left leg is swollen but denies pain anywhere other than the site of ulceration/numbness/weakness. Vascular surgery consulted for evaluation of wound. No evidence of ischemia at this time, wound likely 2/2 venous stasis.    Plan:  - follow up wound cultures 4/25  - pain control  - ID Consult  - wound care: packing, WTD nikazamy luzlix  - please obtain CT RLE with and without IV contrast  - vascular surgery to continue to follow  - Discussed with Chief and Attending  - Call [phone] with any questions or concerns

## 2025-04-25 NOTE — SBIRT NOTE ADULT - NSSBIRTALCPOSREINDET_GEN_A_CORE
Patient states that he has not drank alcohol in a few days, noting that when he does drink alcohol he only drinks 1-2 drinks at a time. Patient states that he does not drink more than 2 drinks at a time and does not drink more than 1-2x/week, if at all.  provided positive reinforcement.

## 2025-04-25 NOTE — PHYSICAL THERAPY INITIAL EVALUATION ADULT - GAIT DEVIATIONS NOTED, PT EVAL
steady gait without LOB or knee buckling/decreased edwin steady gait without LOB or knee buckling/decreased edwin/decreased stride length

## 2025-04-25 NOTE — PROGRESS NOTE ADULT - SUBJECTIVE AND OBJECTIVE BOX
INTERVAL HPI/OVERNIGHT EVENTS: charis     SUBJECTIVE: Patient seen and examined at bedside, resting comfortably in bed, and does not appear to be in any acute distress. Patient reports feeling short of breath. States walking around "improves" his breathing.     Vital Signs Last 24 Hrs  T(C): 36.8 (25 Apr 2025 05:25), Max: 36.9 (24 Apr 2025 21:00)  T(F): 98.2 (25 Apr 2025 05:25), Max: 98.4 (24 Apr 2025 21:00)  HR: 88 (25 Apr 2025 05:25) (87 - 93)  BP: 137/68 (25 Apr 2025 05:25) (130/86 - 169/91)  BP(mean): 103 (24 Apr 2025 17:06) (103 - 124)  RR: 18 (25 Apr 2025 05:25) (17 - 18)  SpO2: 97% (25 Apr 2025 05:25) (94% - 97%)    Parameters below as of 25 Apr 2025 05:25  Patient On (Oxygen Delivery Method): nasal cannula        PHYSICAL EXAM:  General: in no acute distress  Eyes: EOMI intact bilaterally. Anicteric sclerae, moist conjunctivae  HENT: Moist mucous membranes  Neck: Trachea midline, supple  Lungs: CTA B/L. No wheezes, rales, or rhonchi. Nasal cannula in place.   Cardiovascular: RRR. No murmurs, rubs, or gallops  Abdomen: Soft, non-tender non-distended; No rebound or guarding  Extremities: WWP, No clubbing, cyanosis or edema  Neurological: Alert and orientedx2-3.   Skin: Warm and dry. No obvious rash     LABS:                        12.1   15.52 )-----------( 191      ( 25 Apr 2025 05:30 )             36.4     04-25    137  |  92[L]  |  24[H]  ----------------------------<  148[H]  3.8   |  26  |  1.24    Ca    9.0      25 Apr 2025 05:30  Phos  3.5     04-24  Mg     1.8     04-25    TPro  6.8  /  Alb  3.5  /  TBili  1.2  /  DBili  x   /  AST  34  /  ALT  24  /  AlkPhos  102  04-24

## 2025-04-26 LAB
ANION GAP SERPL CALC-SCNC: 11 MMOL/L — SIGNIFICANT CHANGE UP (ref 5–17)
BASOPHILS # BLD AUTO: 0.01 K/UL — SIGNIFICANT CHANGE UP (ref 0–0.2)
BASOPHILS NFR BLD AUTO: 0.1 % — SIGNIFICANT CHANGE UP (ref 0–2)
BUN SERPL-MCNC: 32 MG/DL — HIGH (ref 7–23)
CALCIUM SERPL-MCNC: 8.8 MG/DL — SIGNIFICANT CHANGE UP (ref 8.4–10.5)
CHLORIDE SERPL-SCNC: 97 MMOL/L — SIGNIFICANT CHANGE UP (ref 96–108)
CO2 SERPL-SCNC: 32 MMOL/L — HIGH (ref 22–31)
CREAT SERPL-MCNC: 1.29 MG/DL — SIGNIFICANT CHANGE UP (ref 0.5–1.3)
EGFR: 56 ML/MIN/1.73M2 — LOW
EGFR: 56 ML/MIN/1.73M2 — LOW
EOSINOPHIL # BLD AUTO: 0 K/UL — SIGNIFICANT CHANGE UP (ref 0–0.5)
EOSINOPHIL NFR BLD AUTO: 0 % — SIGNIFICANT CHANGE UP (ref 0–6)
GLUCOSE SERPL-MCNC: 124 MG/DL — HIGH (ref 70–99)
HCT VFR BLD CALC: 34.7 % — LOW (ref 39–50)
HGB BLD-MCNC: 11.3 G/DL — LOW (ref 13–17)
IMM GRANULOCYTES NFR BLD AUTO: 0.6 % — SIGNIFICANT CHANGE UP (ref 0–0.9)
LYMPHOCYTES # BLD AUTO: 1.4 K/UL — SIGNIFICANT CHANGE UP (ref 1–3.3)
LYMPHOCYTES # BLD AUTO: 11.3 % — LOW (ref 13–44)
MAGNESIUM SERPL-MCNC: 1.8 MG/DL — SIGNIFICANT CHANGE UP (ref 1.6–2.6)
MCHC RBC-ENTMCNC: 32 PG — SIGNIFICANT CHANGE UP (ref 27–34)
MCHC RBC-ENTMCNC: 32.6 G/DL — SIGNIFICANT CHANGE UP (ref 32–36)
MCV RBC AUTO: 98.3 FL — SIGNIFICANT CHANGE UP (ref 80–100)
MONOCYTES # BLD AUTO: 0.86 K/UL — SIGNIFICANT CHANGE UP (ref 0–0.9)
MONOCYTES NFR BLD AUTO: 7 % — SIGNIFICANT CHANGE UP (ref 2–14)
NEUTROPHILS # BLD AUTO: 10.02 K/UL — HIGH (ref 1.8–7.4)
NEUTROPHILS NFR BLD AUTO: 81 % — HIGH (ref 43–77)
NRBC BLD AUTO-RTO: 0 /100 WBCS — SIGNIFICANT CHANGE UP (ref 0–0)
PHOSPHATE SERPL-MCNC: 4.7 MG/DL — HIGH (ref 2.5–4.5)
PLATELET # BLD AUTO: 193 K/UL — SIGNIFICANT CHANGE UP (ref 150–400)
POTASSIUM SERPL-MCNC: 4 MMOL/L — SIGNIFICANT CHANGE UP (ref 3.5–5.3)
POTASSIUM SERPL-SCNC: 4 MMOL/L — SIGNIFICANT CHANGE UP (ref 3.5–5.3)
RBC # BLD: 3.53 M/UL — LOW (ref 4.2–5.8)
RBC # FLD: 15.3 % — HIGH (ref 10.3–14.5)
SODIUM SERPL-SCNC: 140 MMOL/L — SIGNIFICANT CHANGE UP (ref 135–145)
VANCOMYCIN TROUGH SERPL-MCNC: 13.1 UG/ML — SIGNIFICANT CHANGE UP (ref 10–20)
WBC # BLD: 12.36 K/UL — HIGH (ref 3.8–10.5)
WBC # FLD AUTO: 12.36 K/UL — HIGH (ref 3.8–10.5)

## 2025-04-26 PROCEDURE — 99233 SBSQ HOSP IP/OBS HIGH 50: CPT

## 2025-04-26 PROCEDURE — 99231 SBSQ HOSP IP/OBS SF/LOW 25: CPT

## 2025-04-26 RX ORDER — VANCOMYCIN HCL IN 5 % DEXTROSE 1.5G/250ML
1250 PLASTIC BAG, INJECTION (ML) INTRAVENOUS ONCE
Refills: 0 | Status: COMPLETED | OUTPATIENT
Start: 2025-04-26 | End: 2025-04-26

## 2025-04-26 RX ORDER — FUROSEMIDE 10 MG/ML
40 INJECTION INTRAMUSCULAR; INTRAVENOUS DAILY
Refills: 0 | Status: DISCONTINUED | OUTPATIENT
Start: 2025-04-27 | End: 2025-04-28

## 2025-04-26 RX ADMIN — IPRATROPIUM BROMIDE AND ALBUTEROL SULFATE 3 MILLILITER(S): .5; 2.5 SOLUTION RESPIRATORY (INHALATION) at 06:16

## 2025-04-26 RX ADMIN — PREDNISONE 40 MILLIGRAM(S): 20 TABLET ORAL at 09:58

## 2025-04-26 RX ADMIN — Medication 1 DOSE(S): at 06:44

## 2025-04-26 RX ADMIN — ENOXAPARIN SODIUM 40 MILLIGRAM(S): 100 INJECTION SUBCUTANEOUS at 10:57

## 2025-04-26 RX ADMIN — Medication 4 MILLILITER(S): at 06:44

## 2025-04-26 RX ADMIN — FUROSEMIDE 40 MILLIGRAM(S): 10 INJECTION INTRAMUSCULAR; INTRAVENOUS at 13:50

## 2025-04-26 RX ADMIN — Medication 166.67 MILLIGRAM(S): at 06:44

## 2025-04-26 RX ADMIN — IPRATROPIUM BROMIDE AND ALBUTEROL SULFATE 3 MILLILITER(S): .5; 2.5 SOLUTION RESPIRATORY (INHALATION) at 21:30

## 2025-04-26 RX ADMIN — IPRATROPIUM BROMIDE AND ALBUTEROL SULFATE 3 MILLILITER(S): .5; 2.5 SOLUTION RESPIRATORY (INHALATION) at 13:50

## 2025-04-26 RX ADMIN — Medication 3 MILLIGRAM(S): at 21:30

## 2025-04-26 RX ADMIN — LOSARTAN POTASSIUM 50 MILLIGRAM(S): 100 TABLET, FILM COATED ORAL at 09:58

## 2025-04-26 RX ADMIN — Medication 500 MILLIGRAM(S): at 22:12

## 2025-04-26 RX ADMIN — Medication 4 MILLILITER(S): at 13:50

## 2025-04-26 RX ADMIN — Medication 1 DOSE(S): at 18:34

## 2025-04-26 RX ADMIN — Medication 4 MILLILITER(S): at 21:30

## 2025-04-26 NOTE — PROGRESS NOTE ADULT - ASSESSMENT
80M w/ PMhx of HTN, COPD not on home O2 with recent admission to [facility] for RLE cellulitis 4/12-4/13 and left AMA presented to [facility] 4/24 after he was found with altered mental status and shortness of breath by a neighbor, admitted for LLE cellulitis and ACOPDE necessitating BiPAP initially. Pulm consulted for COPDE exacerbation.       DATA REVIEWED:  ***  TTE 4/24/25: LV dysfunction 40 - 45%, grade 1 DD, RV normal size/function, LA dilation. Ascending aorta dilation. PASP 40  CTA pulm 4/24/25 IMPRESSION: No pulmonary embolism. Previously seen masslike encasement of the right hilum with ipsilateral   subcarinal and paratracheal lymphadenopathy is no longer seen. Extensive diffuse emphysema. Improvement of previously noted mediastinal adenopathy. Small right greater than left pleural effusion.    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 [facility] [address]    Seen and discussed w/ Dr. Mckeon

## 2025-04-26 NOTE — PROGRESS NOTE ADULT - SUBJECTIVE AND OBJECTIVE BOX
Subjective:   Overnight the vanc trough was 13.1, repeat vanc trough per pharmacy/primary team.  Patient otherwise feels well and has minimal pain. No fevers or chills.    ROS: Denies headache, blurred vision, chest pain, SOB, abdominal pain, nausea or vomiting       Social   acetaminophen     Tablet .. 650 milliGRAM(s) Oral every 6 hours PRN  albuterol/ipratropium for Nebulization 3 milliLiter(s) Nebulizer every 8 hours  azithromycin   Tablet 500 milliGRAM(s) Oral every 24 hours  enoxaparin Injectable 40 milliGRAM(s) SubCutaneous every 24 hours  fluticasone propionate/ salmeterol 100-50 MICROgram(s) Diskus 1 Dose(s) Inhalation two times a day  furosemide   Injectable 40 milliGRAM(s) IV Push every 24 hours  losartan 50 milliGRAM(s) Oral daily  melatonin 3 milliGRAM(s) Oral at bedtime PRN  oxyCODONE    IR 2.5 milliGRAM(s) Oral every 6 hours PRN  oxyCODONE    IR 5 milliGRAM(s) Oral every 6 hours PRN  predniSONE   Tablet 40 milliGRAM(s) Oral every 24 hours  sodium chloride 3%  Inhalation 4 milliLiter(s) Inhalation every 8 hours  acetaminophen     Tablet .. 650 milliGRAM(s) Oral every 6 hours PRN  albuterol/ipratropium for Nebulization 3 milliLiter(s) Nebulizer every 8 hours  azithromycin   Tablet 500 milliGRAM(s) Oral every 24 hours  enoxaparin Injectable 40 milliGRAM(s) SubCutaneous every 24 hours  fluticasone propionate/ salmeterol 100-50 MICROgram(s) Diskus 1 Dose(s) Inhalation two times a day  furosemide   Injectable 40 milliGRAM(s) IV Push every 24 hours  losartan 50 milliGRAM(s) Oral daily  melatonin 3 milliGRAM(s) Oral at bedtime PRN  oxyCODONE    IR 2.5 milliGRAM(s) Oral every 6 hours PRN  oxyCODONE    IR 5 milliGRAM(s) Oral every 6 hours PRN  predniSONE   Tablet 40 milliGRAM(s) Oral every 24 hours  sodium chloride 3%  Inhalation 4 milliLiter(s) Inhalation every 8 hours      Allergies    penicillin (Unknown)    Intolerances        Vital Signs Last 24 Hrs  T(C): 36.6 (26 Apr 2025 05:58), Max: 36.9 (25 Apr 2025 21:06)  T(F): 97.9 (26 Apr 2025 05:58), Max: 98.4 (25 Apr 2025 21:06)  HR: 88 (26 Apr 2025 05:58) (88 - 116)  BP: 123/55 (26 Apr 2025 05:58) (111/70 - 147/70)  BP(mean): 78 (26 Apr 2025 05:58) (78 - 78)  RR: 18 (26 Apr 2025 05:58) (18 - 18)  SpO2: 96% (26 Apr 2025 05:58) (81% - 96%)    Parameters below as of 26 Apr 2025 05:58  Patient On (Oxygen Delivery Method): nasal cannula      I&O's Summary        Physical Exam:  General: NAD, resting comfortably in bed  Pulmonary: No respiratory distress, nonlabored breathing, on room air  Cardiovascular: NSR  Abdominal: Soft, NT, ND  Extremities: RLE w edema up to shin, 2x2cm wound s/p debridement yesterday with healthy tissue edges, wound packed with iodoform packing, guaze, followed by kerlix wrap  Pulses: palp dp/pt      LABS:                        12.1   15.52 )-----------( 191      ( 25 Apr 2025 05:30 )             36.4     04-25    137  |  92[L]  |  24[H]  ----------------------------<  148[H]  3.8   |  26  |  1.24    Ca    9.0      25 Apr 2025 05:30  Phos  3.5     04-24  Mg     1.8     04-25    TPro  6.8  /  Alb  3.5  /  TBili  1.2  /  DBili  x   /  AST  34  /  ALT  24  /  AlkPhos  102  04-24        Assessment & Plan    80M w/ PMhx of HTN, COPD not on home O2 with recent admission to [facility] for RLE cellulitis 4/12-4/13 and left AMA presented to [facility] 4/24 after he was found with altered mental status and shortness of breath by a neighbor, admitted to medicine for COPD exacerbation and RLE cellulitis/ulcer management.  Patient denies rest pain/claudication/prev hx of peripheral interventions. Patient believes he developed the ulcer 1 month ago secondary to a traumatic event. He cannot recall any hx of previous ulcers/wounds on his extremities. Patient states he also feels his left leg is swollen but denies pain anywhere other than the site of ulceration/numbness/weakness. Vascular surgery consulted for evaluation of wound. No evidence of ischemia at this time, wound likely 2/2 venous stasis. CT scan 4/25 showed subcutaneous gas likely introduced during debridement but not abscess. Patient now s/p wound debridement x2 4/24 and 4/25.    Plan:  - follow up wound cultures 4/25  - pain control  - ID Consult  - wound care: packing, WTD guaze, kerlix  - Discussed with Chief and Attending  - Vascular surgery will CTF. Call [phone] with any questions or concerns Subjective:   Overnight the vanc trough was 13.1, repeat vanc trough per pharmacy/primary team.  Patient otherwise feels well and has minimal pain. No fevers or chills.    ROS: Denies headache, blurred vision, chest pain, SOB, abdominal pain, nausea or vomiting       Social   acetaminophen     Tablet .. 650 milliGRAM(s) Oral every 6 hours PRN  albuterol/ipratropium for Nebulization 3 milliLiter(s) Nebulizer every 8 hours  azithromycin   Tablet 500 milliGRAM(s) Oral every 24 hours  enoxaparin Injectable 40 milliGRAM(s) SubCutaneous every 24 hours  fluticasone propionate/ salmeterol 100-50 MICROgram(s) Diskus 1 Dose(s) Inhalation two times a day  furosemide   Injectable 40 milliGRAM(s) IV Push every 24 hours  losartan 50 milliGRAM(s) Oral daily  melatonin 3 milliGRAM(s) Oral at bedtime PRN  oxyCODONE    IR 2.5 milliGRAM(s) Oral every 6 hours PRN  oxyCODONE    IR 5 milliGRAM(s) Oral every 6 hours PRN  predniSONE   Tablet 40 milliGRAM(s) Oral every 24 hours  sodium chloride 3%  Inhalation 4 milliLiter(s) Inhalation every 8 hours  acetaminophen     Tablet .. 650 milliGRAM(s) Oral every 6 hours PRN  albuterol/ipratropium for Nebulization 3 milliLiter(s) Nebulizer every 8 hours  azithromycin   Tablet 500 milliGRAM(s) Oral every 24 hours  enoxaparin Injectable 40 milliGRAM(s) SubCutaneous every 24 hours  fluticasone propionate/ salmeterol 100-50 MICROgram(s) Diskus 1 Dose(s) Inhalation two times a day  furosemide   Injectable 40 milliGRAM(s) IV Push every 24 hours  losartan 50 milliGRAM(s) Oral daily  melatonin 3 milliGRAM(s) Oral at bedtime PRN  oxyCODONE    IR 2.5 milliGRAM(s) Oral every 6 hours PRN  oxyCODONE    IR 5 milliGRAM(s) Oral every 6 hours PRN  predniSONE   Tablet 40 milliGRAM(s) Oral every 24 hours  sodium chloride 3%  Inhalation 4 milliLiter(s) Inhalation every 8 hours      Allergies    penicillin (Unknown)    Intolerances        Vital Signs Last 24 Hrs  T(C): 36.6 (26 Apr 2025 05:58), Max: 36.9 (25 Apr 2025 21:06)  T(F): 97.9 (26 Apr 2025 05:58), Max: 98.4 (25 Apr 2025 21:06)  HR: 88 (26 Apr 2025 05:58) (88 - 116)  BP: 123/55 (26 Apr 2025 05:58) (111/70 - 147/70)  BP(mean): 78 (26 Apr 2025 05:58) (78 - 78)  RR: 18 (26 Apr 2025 05:58) (18 - 18)  SpO2: 96% (26 Apr 2025 05:58) (81% - 96%)    Parameters below as of 26 Apr 2025 05:58  Patient On (Oxygen Delivery Method): nasal cannula      I&O's Summary        Physical Exam:  General: NAD, resting comfortably in bed  Pulmonary: No respiratory distress, nonlabored breathing, on room air  Cardiovascular: NSR  Abdominal: Soft, NT, ND  Extremities: RLE w edema up to shin, 2x2cm wound s/p debridement yesterday with healthy tissue edges, wound packed with iodoform packing, guaze, followed by kerlix wrap  Pulses: palp dp/pt      LABS:                        12.1   15.52 )-----------( 191      ( 25 Apr 2025 05:30 )             36.4     04-25    137  |  92[L]  |  24[H]  ----------------------------<  148[H]  3.8   |  26  |  1.24    Ca    9.0      25 Apr 2025 05:30  Phos  3.5     04-24  Mg     1.8     04-25    TPro  6.8  /  Alb  3.5  /  TBili  1.2  /  DBili  x   /  AST  34  /  ALT  24  /  AlkPhos  102  04-24        Assessment & Plan    80M w/ PMhx of HTN, COPD not on home O2 with recent admission to [facility] for RLE cellulitis 4/12-4/13 and left AMA presented to [facility] 4/24 after he was found with altered mental status and shortness of breath by a neighbor, admitted to medicine for COPD exacerbation and RLE cellulitis/ulcer management.  Patient denies rest pain/claudication/prev hx of peripheral interventions. Patient believes he developed the ulcer 1 month ago secondary to a traumatic event. He cannot recall any hx of previous ulcers/wounds on his extremities. Patient states he also feels his left leg is swollen but denies pain anywhere other than the site of ulceration/numbness/weakness. Vascular surgery consulted for evaluation of wound. No evidence of ischemia at this time, wound likely 2/2 venous stasis. CT scan 4/25 showed subcutaneous gas likely introduced during debridement but not abscess. Patient now s/p wound debridement x2 4/24 and 4/25.    Plan:  - follow up wound culture susceptibilities 4/25: Moderate staph aureus, moderate pluralibacter gergoviae  - pain control  - ID Consult  - wound care: packing, WTD guaze, kerlix  - Discussed with Chief and Attending  - Vascular surgery will CTF. Call [phone] with any questions or concerns

## 2025-04-26 NOTE — PROGRESS NOTE ADULT - PROBLEM SELECTOR PLAN 1
Suspected to be i/s/o COPD exacerbation, medication noncompliance. Initially presenting with SOB on 3LNC, placed on BiPAP for iWOB/tachypena, transitioned to 3L NC on 7Lach.   Home medications: Trelegy Ellipta 200 and albuterol inhalers prn, however patient states he takes no medications  - C/w NC, wean as tolerated  - C/w prednisone 40 mg QD  - C/w duonebs q4h   - C/w Advair inhaler  - C/w Azithromycin 500 mg QD x3 days (4/24 - 4/27)  -Pulm following

## 2025-04-26 NOTE — PROGRESS NOTE ADULT - SUBJECTIVE AND OBJECTIVE BOX
PULMONARY CONSULT SERVICE FOLLOW-UP NOTE    INTERVAL HPI:  Reviewed chart and overnight events; patient seen and examined. He feels well this AM. reports no complaints w/ breathing. He is coughing not really productive but trying to. LE swelling persists.     MEDICATIONS:  Pulmonary:  albuterol/ipratropium for Nebulization 3 milliLiter(s) Nebulizer every 8 hours  fluticasone propionate/ salmeterol 100-50 MICROgram(s) Diskus 1 Dose(s) Inhalation two times a day  sodium chloride 3%  Inhalation 4 milliLiter(s) Inhalation every 8 hours    Antimicrobials:  azithromycin   Tablet 500 milliGRAM(s) Oral every 24 hours    Anticoagulants:  enoxaparin Injectable 40 milliGRAM(s) SubCutaneous every 24 hours    Cardiac:  losartan 50 milliGRAM(s) Oral daily      Allergies    penicillin (Unknown)    Intolerances      Vital Signs Last 24 Hrs  T(C): 36.5 (26 Apr 2025 10:00), Max: 36.9 (25 Apr 2025 21:06)  T(F): 97.7 (26 Apr 2025 10:00), Max: 98.4 (25 Apr 2025 21:06)  HR: 91 (26 Apr 2025 10:00) (88 - 91)  BP: 115/58 (26 Apr 2025 10:00) (115/58 - 138/73)  BP(mean): 77 (26 Apr 2025 10:00) (77 - 78)  RR: 18 (26 Apr 2025 10:00) (18 - 18)  SpO2: 97% (26 Apr 2025 10:00) (93% - 97%)    Parameters below as of 26 Apr 2025 10:00  Patient On (Oxygen Delivery Method): nasal cannula  O2 Flow (L/min): 2          PHYSICAL EXAM:  Constitutional: well-appearing, in no apparent respiratory distress  HEENT: NC/AT; PERRL, anicteric sclera; moist mucous membranes  Neck: supple, no JVD or LAD appreciated  Cardiovascular: +S1/S2, Regular rate and rhythm, no murmurs appreciated   Respiratory: Clear breath sounds bilaterally some faint rales, in the right lower base.,. No wheezes, rhonchi  Gastrointestinal: soft, non-tender, non-distended. Normoactive bowel sounds.   Extremities: LE +2 edema, no clubbing or cyanosis  Vascular: 2+ radial pulses B/L  Neurological: awake and alert; oriented x2. A bit delirious.     LABS:      CBC Full  -  ( 26 Apr 2025 05:30 )  WBC Count : 12.36 K/uL  RBC Count : 3.53 M/uL  Hemoglobin : 11.3 g/dL  Hematocrit : 34.7 %  Platelet Count - Automated : 193 K/uL  Mean Cell Volume : 98.3 fl  Mean Cell Hemoglobin : 32.0 pg  Mean Cell Hemoglobin Concentration : 32.6 g/dL  Auto Neutrophil # : x  Auto Lymphocyte # : x  Auto Monocyte # : x  Auto Eosinophil # : x  Auto Basophil # : x  Auto Neutrophil % : x  Auto Lymphocyte % : x  Auto Monocyte % : x  Auto Eosinophil % : x  Auto Basophil % : x    04-26    140  |  97  |  32[H]  ----------------------------<  124[H]  4.0   |  32[H]  |  1.29    Ca    8.8      26 Apr 2025 05:30  Phos  4.7     04-26  Mg     1.8     04-26                      RADIOLOGY & ADDITIONAL STUDIES:

## 2025-04-26 NOTE — PROGRESS NOTE ADULT - SUBJECTIVE AND OBJECTIVE BOX
INTERVAL EVENTS: no acute events, s/p debridement    PAST MEDICAL & SURGICAL HISTORY:  Kidney stone    HTN (hypertension)    COPD (chronic obstructive pulmonary disease)    History of BPH    History of cholecystectomy        MEDICATIONS  (STANDING):  albuterol/ipratropium for Nebulization 3 milliLiter(s) Nebulizer every 8 hours  azithromycin   Tablet 500 milliGRAM(s) Oral every 24 hours  enoxaparin Injectable 40 milliGRAM(s) SubCutaneous every 24 hours  fluticasone propionate/ salmeterol 100-50 MICROgram(s) Diskus 1 Dose(s) Inhalation two times a day  furosemide   Injectable 40 milliGRAM(s) IV Push every 24 hours  losartan 50 milliGRAM(s) Oral daily  predniSONE   Tablet 40 milliGRAM(s) Oral every 24 hours  sodium chloride 3%  Inhalation 4 milliLiter(s) Inhalation every 8 hours    MEDICATIONS  (PRN):  acetaminophen     Tablet .. 650 milliGRAM(s) Oral every 6 hours PRN Temp greater or equal to 38C (100.4F), Mild Pain (1 - 3)  melatonin 3 milliGRAM(s) Oral at bedtime PRN Insomnia  oxyCODONE    IR 2.5 milliGRAM(s) Oral every 6 hours PRN Moderate Pain (4 - 6)  oxyCODONE    IR 5 milliGRAM(s) Oral every 6 hours PRN Severe Pain (7 - 10)    T(F): 97.7 (04-26-25 @ 10:00), Max: 98.4 (04-25-25 @ 21:06)  HR: 91 (04-26-25 @ 10:00) (88 - 91)  BP: 115/58 (04-26-25 @ 10:00) (115/58 - 147/70)  BP(mean): 77 (04-26-25 @ 10:00) (77 - 78)  ABP: --  ABP(mean): --  RR: 18 (04-26-25 @ 10:00) (18 - 18)  SpO2: 97% (04-26-25 @ 10:00) (93% - 97%)  CVP(mm Hg): --    I/O Detail 24H      PHYSICAL EXAM:  GEN: NAD  HEENT: EOMI   RESP: CTA b/l  CV: RRR. Normal S1/S2. No m/r/g. No JVD.   ABD: abdomen soft, NT/ND; no rebound or guarding; +BSx4  EXT: No edema, warm extremities  NEURO: alert and attentive    LABS:  CBC 04-26-25 @ 05:30                        11.3   12.36 )-----------( 193                   34.7     Hgb trend: 11.3 <-- , 12.1 <-- , 13.0 <-- , 13.2 <--   WBC trend: 12.36 <-- , 15.52 <-- , 8.67 <-- , 7.26 <--       CMP 04-26-25 @ 05:30    140  |  97  |  32[H]  ----------------------------<  124[H]  4.0   |  32[H]  |  1.29    Ca    8.8      04-26-25 @ 05:30  Phos  4.7     04-26  Mg     1.8     04-26      Serum Cr (eGFR) trend: 1.29 (56) <-- , 1.24 (58) <-- , 1.23 (59) <-- , 1.28 (56) <--           Cardiac Markers   04-25-25 @ 05:30: HS Trop T 54[HH] / CK x     / CKMB x            STUDIES:

## 2025-04-26 NOTE — PROGRESS NOTE ADULT - PROBLEM SELECTOR PLAN 3
RLE non-healing ulcer and cellulitis with mild fluctuance and no drainage, initially admitted 4/12-4/14 for treatment with cefepime and vancomycin, de-escalated to cefazolin, and planned for discharge on cefalexin, however, patient left AMA and did not continue antibiotics at home.   RLE XR (4/24): with soft tissue gas.   B/l LE venous dopplers: negative for DVT  B/l LE arterial dopplers: soft tissue swelling near R foot  S/p debridement w/ Vascular (4/24) with findings concerning for wound that tracks deep with purulence.  S/p Vanc 1250mg (4/24 5AM) x1 in ED    Plan   - Transition from Cephalexin 500 mg q6h PO -> Vancomycin 1250mg q24h for MRSA coverage i/s/o purulence  - Will follow up wound cultures  - wound care per vascular (pack cavity, WTD gauze and kerlix dressing)

## 2025-04-26 NOTE — PROGRESS NOTE ADULT - ASSESSMENT
80M w/ PMhx of HTN, COPD not on home O2 with recent admission to [facility] for RLE cellulitis 4/12-4/13 and left AMA presented to [facility] 4/24 after he was found with altered mental status and shortness of breath, admitted to [facility] for COPD exacerbation and RLE cellulitis, s/p debridement with Vascular, now stable for management on RMF.

## 2025-04-26 NOTE — PROGRESS NOTE ADULT - PROBLEM SELECTOR PLAN 5
Hx of HTN, not currently on any meds, previously on amlodipine 10 mg QD last picked up 8/29/24, HCTZ 12.5mg QD last picked up 5/15/23.   - manaagement with GDMT as above

## 2025-04-26 NOTE — PROGRESS NOTE ADULT - PROBLEM SELECTOR PLAN 4
Presented with pro-BNP 65978, up from 5575 in March 2025. CXR with pulmonary vascular congestion.   TTE 4/24: EF 40-45%, no WMAs, mild LV hypertrophy, mild LV diastolic dysfunction, normal RV size and systolic fxn. Estimated PASP 40.  No previous ECHO for comparison  Patient does not appear to be volume overloaded at this time, currently stable on 3L NC    - Strict I/Os  - Will stop IV lasix today and transition to PO lasix 40mg QD tomorrow  - On GDMT per cardiology, will f/u with outpatient cardiologist upon dc

## 2025-04-27 LAB
-  AMOXICILLIN/CLAVULANIC ACID: SIGNIFICANT CHANGE UP
-  AMPICILLIN/SULBACTAM: SIGNIFICANT CHANGE UP
-  AMPICILLIN: SIGNIFICANT CHANGE UP
-  CEFAZOLIN: SIGNIFICANT CHANGE UP
-  CEFEPIME: SIGNIFICANT CHANGE UP
-  CEFOXITIN: SIGNIFICANT CHANGE UP
-  CEFTRIAXONE: SIGNIFICANT CHANGE UP
-  CIPROFLOXACIN: SIGNIFICANT CHANGE UP
-  CLINDAMYCIN: SIGNIFICANT CHANGE UP
-  ERYTHROMYCIN: SIGNIFICANT CHANGE UP
-  GENTAMICIN: SIGNIFICANT CHANGE UP
-  GENTAMICIN: SIGNIFICANT CHANGE UP
-  LEVOFLOXACIN: SIGNIFICANT CHANGE UP
-  LINEZOLID: SIGNIFICANT CHANGE UP
-  OXACILLIN: SIGNIFICANT CHANGE UP
-  PENICILLIN: SIGNIFICANT CHANGE UP
-  PIPERACILLIN/TAZOBACTAM: SIGNIFICANT CHANGE UP
-  RIFAMPIN: SIGNIFICANT CHANGE UP
-  TETRACYCLINE: SIGNIFICANT CHANGE UP
-  TOBRAMYCIN: SIGNIFICANT CHANGE UP
-  TRIMETHOPRIM/SULFAMETHOXAZOLE: SIGNIFICANT CHANGE UP
-  TRIMETHOPRIM/SULFAMETHOXAZOLE: SIGNIFICANT CHANGE UP
-  VANCOMYCIN: SIGNIFICANT CHANGE UP
ADD ON TEST-SPECIMEN IN LAB: SIGNIFICANT CHANGE UP
ANION GAP SERPL CALC-SCNC: 13 MMOL/L — SIGNIFICANT CHANGE UP (ref 5–17)
BASOPHILS # BLD AUTO: 0.01 K/UL — SIGNIFICANT CHANGE UP (ref 0–0.2)
BASOPHILS NFR BLD AUTO: 0.1 % — SIGNIFICANT CHANGE UP (ref 0–2)
BUN SERPL-MCNC: 35 MG/DL — HIGH (ref 7–23)
CALCIUM SERPL-MCNC: 8.3 MG/DL — LOW (ref 8.4–10.5)
CHLORIDE SERPL-SCNC: 95 MMOL/L — LOW (ref 96–108)
CO2 SERPL-SCNC: 31 MMOL/L — SIGNIFICANT CHANGE UP (ref 22–31)
CREAT SERPL-MCNC: 1.37 MG/DL — HIGH (ref 0.5–1.3)
EGFR: 52 ML/MIN/1.73M2 — LOW
EGFR: 52 ML/MIN/1.73M2 — LOW
EOSINOPHIL # BLD AUTO: 0 K/UL — SIGNIFICANT CHANGE UP (ref 0–0.5)
EOSINOPHIL NFR BLD AUTO: 0 % — SIGNIFICANT CHANGE UP (ref 0–6)
GLUCOSE SERPL-MCNC: 104 MG/DL — HIGH (ref 70–99)
HCT VFR BLD CALC: 31.3 % — LOW (ref 39–50)
HGB BLD-MCNC: 10.1 G/DL — LOW (ref 13–17)
IMM GRANULOCYTES NFR BLD AUTO: 0.3 % — SIGNIFICANT CHANGE UP (ref 0–0.9)
LYMPHOCYTES # BLD AUTO: 1.19 K/UL — SIGNIFICANT CHANGE UP (ref 1–3.3)
LYMPHOCYTES # BLD AUTO: 12.8 % — LOW (ref 13–44)
MAGNESIUM SERPL-MCNC: 1.8 MG/DL — SIGNIFICANT CHANGE UP (ref 1.6–2.6)
MCHC RBC-ENTMCNC: 31.9 PG — SIGNIFICANT CHANGE UP (ref 27–34)
MCHC RBC-ENTMCNC: 32.3 G/DL — SIGNIFICANT CHANGE UP (ref 32–36)
MCV RBC AUTO: 98.7 FL — SIGNIFICANT CHANGE UP (ref 80–100)
METHOD TYPE: SIGNIFICANT CHANGE UP
METHOD TYPE: SIGNIFICANT CHANGE UP
MONOCYTES # BLD AUTO: 1.04 K/UL — HIGH (ref 0–0.9)
MONOCYTES NFR BLD AUTO: 11.2 % — SIGNIFICANT CHANGE UP (ref 2–14)
NEUTROPHILS # BLD AUTO: 7.05 K/UL — SIGNIFICANT CHANGE UP (ref 1.8–7.4)
NEUTROPHILS NFR BLD AUTO: 75.6 % — SIGNIFICANT CHANGE UP (ref 43–77)
NRBC BLD AUTO-RTO: 0 /100 WBCS — SIGNIFICANT CHANGE UP (ref 0–0)
PHOSPHATE SERPL-MCNC: 4.7 MG/DL — HIGH (ref 2.5–4.5)
PLATELET # BLD AUTO: 179 K/UL — SIGNIFICANT CHANGE UP (ref 150–400)
POTASSIUM SERPL-MCNC: 3.6 MMOL/L — SIGNIFICANT CHANGE UP (ref 3.5–5.3)
POTASSIUM SERPL-SCNC: 3.6 MMOL/L — SIGNIFICANT CHANGE UP (ref 3.5–5.3)
RBC # BLD: 3.17 M/UL — LOW (ref 4.2–5.8)
RBC # FLD: 14.9 % — HIGH (ref 10.3–14.5)
SODIUM SERPL-SCNC: 139 MMOL/L — SIGNIFICANT CHANGE UP (ref 135–145)
VANCOMYCIN TROUGH SERPL-MCNC: 16.4 UG/ML — SIGNIFICANT CHANGE UP (ref 10–20)
WBC # BLD: 9.32 K/UL — SIGNIFICANT CHANGE UP (ref 3.8–10.5)
WBC # FLD AUTO: 9.32 K/UL — SIGNIFICANT CHANGE UP (ref 3.8–10.5)

## 2025-04-27 PROCEDURE — 99233 SBSQ HOSP IP/OBS HIGH 50: CPT

## 2025-04-27 PROCEDURE — 99221 1ST HOSP IP/OBS SF/LOW 40: CPT

## 2025-04-27 RX ORDER — VANCOMYCIN HCL IN 5 % DEXTROSE 1.5G/250ML
1250 PLASTIC BAG, INJECTION (ML) INTRAVENOUS EVERY 24 HOURS
Refills: 0 | Status: DISCONTINUED | OUTPATIENT
Start: 2025-04-27 | End: 2025-04-28

## 2025-04-27 RX ADMIN — Medication 4 MILLILITER(S): at 14:25

## 2025-04-27 RX ADMIN — ENOXAPARIN SODIUM 40 MILLIGRAM(S): 100 INJECTION SUBCUTANEOUS at 10:05

## 2025-04-27 RX ADMIN — OXYCODONE HYDROCHLORIDE 5 MILLIGRAM(S): 30 TABLET ORAL at 10:29

## 2025-04-27 RX ADMIN — Medication 166.67 MILLIGRAM(S): at 07:35

## 2025-04-27 RX ADMIN — LOSARTAN POTASSIUM 50 MILLIGRAM(S): 100 TABLET, FILM COATED ORAL at 06:48

## 2025-04-27 RX ADMIN — Medication 4 MILLILITER(S): at 05:59

## 2025-04-27 RX ADMIN — Medication 4 MILLILITER(S): at 21:59

## 2025-04-27 RX ADMIN — IPRATROPIUM BROMIDE AND ALBUTEROL SULFATE 3 MILLILITER(S): .5; 2.5 SOLUTION RESPIRATORY (INHALATION) at 05:59

## 2025-04-27 RX ADMIN — IPRATROPIUM BROMIDE AND ALBUTEROL SULFATE 3 MILLILITER(S): .5; 2.5 SOLUTION RESPIRATORY (INHALATION) at 14:25

## 2025-04-27 RX ADMIN — Medication 40 MILLIEQUIVALENT(S): at 08:52

## 2025-04-27 RX ADMIN — FUROSEMIDE 40 MILLIGRAM(S): 10 INJECTION INTRAMUSCULAR; INTRAVENOUS at 06:49

## 2025-04-27 RX ADMIN — Medication 1 DOSE(S): at 05:59

## 2025-04-27 RX ADMIN — IPRATROPIUM BROMIDE AND ALBUTEROL SULFATE 3 MILLILITER(S): .5; 2.5 SOLUTION RESPIRATORY (INHALATION) at 21:59

## 2025-04-27 RX ADMIN — Medication 3 MILLIGRAM(S): at 21:59

## 2025-04-27 RX ADMIN — PREDNISONE 40 MILLIGRAM(S): 20 TABLET ORAL at 10:05

## 2025-04-27 RX ADMIN — OXYCODONE HYDROCHLORIDE 5 MILLIGRAM(S): 30 TABLET ORAL at 09:03

## 2025-04-27 RX ADMIN — Medication 1 DOSE(S): at 19:33

## 2025-04-27 NOTE — PROGRESS NOTE ADULT - SUBJECTIVE AND OBJECTIVE BOX
incomplete note    INTERVAL HPI/OVERNIGHT EVENTS: charis     SUBJECTIVE: Patient seen and examined at bedside, resting comfortably in bed, and does not appear to be in any acute distress. Patient reports continued leg pain. States his believes his breathing is improving.     Vital Signs Last 24 Hrs  T(C): 36.7 (27 Apr 2025 06:47), Max: 36.7 (27 Apr 2025 06:47)  T(F): 98.1 (27 Apr 2025 06:47), Max: 98.1 (27 Apr 2025 06:47)  HR: 84 (27 Apr 2025 06:47) (79 - 93)  BP: 155/72 (27 Apr 2025 06:47) (128/77 - 155/72)  BP(mean): 100 (27 Apr 2025 06:47) (94 - 100)  RR: 18 (27 Apr 2025 06:47) (17 - 18)  SpO2: 95% (27 Apr 2025 06:47) (94% - 96%)    Parameters below as of 27 Apr 2025 06:47  Patient On (Oxygen Delivery Method): nasal cannula  O2 Flow (L/min): 2      PHYSICAL EXAM:  General: in no acute distress  Eyes: EOMI intact bilaterally. Anicteric sclerae, moist conjunctivae  HENT: Moist mucous membranes  Neck: Trachea midline, supple  Lungs: CTA B/L. No wheezes, rales, or rhonchi  Cardiovascular: RRR. No murmurs, rubs, or gallops  Abdomen: Soft, non-tender non-distended; No rebound or guarding  Extremities: No edema in L leg. . 2+ edema to R leg. Wound wrapped in ace bandage.   Neurological: Alert and oriented x2-3.   Skin: Warm and dry. No obvious rash     LABS:                        10.1   9.32  )-----------( 179      ( 27 Apr 2025 05:30 )             31.3     04-27    139  |  95[L]  |  35[H]  ----------------------------<  104[H]  3.6   |  31  |  1.37[H]    Ca    8.3[L]      27 Apr 2025 05:30  Phos  4.7     04-27  Mg     1.8     04-27

## 2025-04-27 NOTE — PROGRESS NOTE ADULT - SUBJECTIVE AND OBJECTIVE BOX
Subjective: patient seen and examined at bedside, RLE pain has improved.    ROS: Denies headache, blurred vision, chest pain, SOB, abdominal pain, nausea or vomiting       Social   acetaminophen     Tablet .. 650 milliGRAM(s) Oral every 6 hours PRN  albuterol/ipratropium for Nebulization 3 milliLiter(s) Nebulizer every 8 hours  azithromycin   Tablet 500 milliGRAM(s) Oral every 24 hours  enoxaparin Injectable 40 milliGRAM(s) SubCutaneous every 24 hours  fluticasone propionate/ salmeterol 100-50 MICROgram(s) Diskus 1 Dose(s) Inhalation two times a day  furosemide    Tablet 40 milliGRAM(s) Oral daily  losartan 50 milliGRAM(s) Oral daily  melatonin 3 milliGRAM(s) Oral at bedtime PRN  oxyCODONE    IR 2.5 milliGRAM(s) Oral every 6 hours PRN  oxyCODONE    IR 5 milliGRAM(s) Oral every 6 hours PRN  predniSONE   Tablet 40 milliGRAM(s) Oral every 24 hours  sodium chloride 3%  Inhalation 4 milliLiter(s) Inhalation every 8 hours  vancomycin  IVPB 1250 milliGRAM(s) IV Intermittent every 24 hours  acetaminophen     Tablet .. 650 milliGRAM(s) Oral every 6 hours PRN  albuterol/ipratropium for Nebulization 3 milliLiter(s) Nebulizer every 8 hours  azithromycin   Tablet 500 milliGRAM(s) Oral every 24 hours  enoxaparin Injectable 40 milliGRAM(s) SubCutaneous every 24 hours  fluticasone propionate/ salmeterol 100-50 MICROgram(s) Diskus 1 Dose(s) Inhalation two times a day  furosemide    Tablet 40 milliGRAM(s) Oral daily  losartan 50 milliGRAM(s) Oral daily  melatonin 3 milliGRAM(s) Oral at bedtime PRN  oxyCODONE    IR 2.5 milliGRAM(s) Oral every 6 hours PRN  oxyCODONE    IR 5 milliGRAM(s) Oral every 6 hours PRN  predniSONE   Tablet 40 milliGRAM(s) Oral every 24 hours  sodium chloride 3%  Inhalation 4 milliLiter(s) Inhalation every 8 hours  vancomycin  IVPB 1250 milliGRAM(s) IV Intermittent every 24 hours      Allergies    penicillin (Unknown)    Intolerances        Vital Signs Last 24 Hrs  T(C): 36.7 (27 Apr 2025 06:47), Max: 36.7 (27 Apr 2025 06:47)  T(F): 98.1 (27 Apr 2025 06:47), Max: 98.1 (27 Apr 2025 06:47)  HR: 84 (27 Apr 2025 06:47) (79 - 93)  BP: 155/72 (27 Apr 2025 06:47) (115/58 - 155/72)  BP(mean): 100 (27 Apr 2025 06:47) (77 - 100)  RR: 18 (27 Apr 2025 06:47) (17 - 18)  SpO2: 95% (27 Apr 2025 06:47) (94% - 97%)    Parameters below as of 27 Apr 2025 06:47  Patient On (Oxygen Delivery Method): nasal cannula  O2 Flow (L/min): 2    I&O's Summary        Physical Exam:  General: NAD, resting comfortably in bed  Pulmonary: No respiratory distress, nonlabored breathing, on room air  Cardiovascular: NSR  Abdominal: Soft, NT, ND  Extremities: RLE with improving erythema and edema. 2x2cm wound s/p debridement yesterday with healthy tissue edges, wound packed with iodoform packing, gauze, followed by kerlix wrap  Pulses: palpable DP/PT      LABS:                        10.1   9.32  )-----------( 179      ( 27 Apr 2025 05:30 )             31.3     04-27    139  |  95[L]  |  35[H]  ----------------------------<  104[H]  3.6   |  31  |  1.37[H]    Ca    8.3[L]      27 Apr 2025 05:30  Phos  4.7     04-27  Mg     1.8     04-27                A/P: 80y YO Male with PMhx of HTN, COPD not on home O2 with recent admission to [facility] for RLE cellulitis 4/12-4/13 and left AMA presented to [facility] 4/24 after he was found with altered mental status and shortness of breath by a neighbor, admitted to medicine for COPD exacerbation and RLE cellulitis/ulcer management.  Patient denies rest pain/claudication/prev hx of peripheral interventions. Patient believes he developed the ulcer 1 month ago secondary to a traumatic event. He cannot recall any hx of previous ulcers/wounds on his extremities. Patient states he also feels his left leg is swollen but denies pain anywhere other than the site of ulceration/numbness/weakness. Vascular surgery consulted for evaluation of wound. No evidence of ischemia at this time, wound likely 2/2 venous stasis. CT scan 4/25 showed subcutaneous gas likely introduced during debridement but not abscess. Patient now s/p wound debridement x2 4/24 and 4/25.    Recommendations:  - follow up wound culture susceptibilities 4/25: Moderate staph aureus, moderate pluralibacter gergoviae  - pain control  - ID Consult  - Discussed with Chief and Attending  - Call [phone] with any questions or concerns

## 2025-04-27 NOTE — PROGRESS NOTE ADULT - PROBLEM SELECTOR PLAN 7
Fluids: caution with fluids given new HFrEF  Diet: Regular  DVT: Lovenox 40mg q24h  Dispo: RMF Fluids: caution with fluids given new HFrEF. none today   Diet: Regular  DVT: Lovenox 40mg q24h  Dispo: RMF

## 2025-04-27 NOTE — PROVIDER CONTACT NOTE (CRITICAL VALUE NOTIFICATION) - TEST AND RESULT REPORTED:
Troponin-54  Troponin-54
wound culture from 4/25. positive for MRSA, moderate pluralibaeter Gergoviae

## 2025-04-27 NOTE — PROGRESS NOTE ADULT - PROBLEM SELECTOR PLAN 4
Presented with pro-BNP 00172, up from 5575 in March 2025. CXR with pulmonary vascular congestion.   TTE 4/24: EF 40-45%, no WMAs, mild LV hypertrophy, mild LV diastolic dysfunction, normal RV size and systolic fxn. Estimated PASP 40.  No previous ECHO for comparison  Patient does not appear to be volume overloaded at this time, currently stable on 3L NC    - Strict I/Os  - Will stop IV lasix today and transition to PO lasix 40mg QD tomorrow  - On GDMT per cardiology, will f/u with outpatient cardiologist upon dc Presented with pro-BNP 22982, up from 5575 in March 2025. CXR with pulmonary vascular congestion.   TTE 4/24: EF 40-45%, no WMAs, mild LV hypertrophy, mild LV diastolic dysfunction, normal RV size and systolic fxn. Estimated PASP 40.  No previous ECHO for comparison  Patient does not appear to be volume overloaded at this time, currently stable on 3L NC    - Strict I/Os  - c/w PO lasix 40mg QD   - On GDMT per cardiology, will f/u with outpatient cardiologist upon dc

## 2025-04-27 NOTE — PROGRESS NOTE ADULT - PROBLEM SELECTOR PLAN 6
Cr 1.23, GFR 59 on admission, appears to be at baseline.     - CTM BMP Cr 1.23, GFR 59 on admission, appears to be at baseline. Slight bump today to 1.47. likely iso losartan and lasix reintroduction      Plan   - gentle fluids tomorrow if Cr continues to rise   - CTM BMP

## 2025-04-27 NOTE — PROGRESS NOTE ADULT - ASSESSMENT
80M w/ PMhx of HTN, COPD not on home O2 with recent admission to [facility] for RLE cellulitis 4/12-4/13 and left AMA presented to [facility] 4/24 after he was found with altered mental status and shortness of breath, [facility] and RLE cellulitis, s/p debridement with Vascular, now stable for management on RMF.

## 2025-04-27 NOTE — PROGRESS NOTE ADULT - PROBLEM SELECTOR PLAN 3
RLE non-healing ulcer and cellulitis with mild fluctuance and no drainage, initially admitted 4/12-4/14 for treatment with cefepime and vancomycin, de-escalated to cefazolin, and planned for discharge on cefalexin, however, patient left AMA and did not continue antibiotics at home.   RLE XR (4/24): with soft tissue gas.   B/l LE venous dopplers: negative for DVT  B/l LE arterial dopplers: soft tissue swelling near R foot  S/p debridement w/ Vascular (4/24) with findings concerning for wound that tracks deep with purulence.  S/p Vanc 1250mg (4/24 5AM) x1 in ED    Plan   - Transition from Cephalexin 500 mg q6h PO -> Vancomycin 1250mg q24h for MRSA coverage i/s/o purulence  - Will follow up wound cultures  - wound care per vascular (pack cavity, WTD gauze and kerlix dressing) RLE non-healing ulcer and cellulitis with mild fluctuance and no drainage, initially admitted 4/12-4/14 for treatment with cefepime and vancomycin, de-escalated to cefazolin, and planned for discharge on cefalexin, however, patient left AMA and did not continue antibiotics at home.   RLE XR (4/24): with soft tissue gas.   B/l LE venous dopplers: negative for DVT  B/l LE arterial dopplers: soft tissue swelling near R foot  S/p debridement w/ Vascular (4/24) with findings concerning for wound that tracks deep with purulence.  S/p Vanc 1250mg (4/24 5AM) x1 in ED    wound culture + for staph aureus as well as phuralibacter     Plan   - c/w > Vancomycin 1250mg q24h for MRSA coverage i/s/o purulence  - Will follow up wound sensitivities, consider ID consult once sensitivities return  - wound care per vascular (pack cavity, WTD gauze and kerlix dressing)

## 2025-04-27 NOTE — PROGRESS NOTE ADULT - PROBLEM SELECTOR PLAN 1
Suspected to be i/s/o COPD exacerbation, medication noncompliance. Initially presenting with SOB on 3LNC, placed on BiPAP for iWOB/tachypena, transitioned to 3L NC on 7Lach.   Home medications: Trelegy Ellipta 200 and albuterol inhalers prn, however patient states he takes no medications  - C/w NC, wean as tolerated  - C/w prednisone 40 mg QD  - C/w duonebs q4h   - C/w Advair inhaler  - C/w Azithromycin 500 mg QD x3 days (4/24 - 4/27)  -Pulm following Suspected to be i/s/o COPD exacerbation, medication noncompliance. Initially presenting with SOB on 3LNC, placed on BiPAP for iWOB/tachypena, transitioned to 3L NC on 7Lach.   Home medications: Trelegy Ellipta 200 and albuterol inhalers prn, however patient states he takes no medications    Plan   - C/w NC, wean as tolerated  - C/w prednisone 40 mg QD  - C/w duonebs q4h   - C/w Advair inhaler  - C/w Azithromycin 500 mg QD x3 days (4/24 - 4/27)  -Pulm following

## 2025-04-28 VITALS
OXYGEN SATURATION: 94 % | HEART RATE: 83 BPM | DIASTOLIC BLOOD PRESSURE: 84 MMHG | RESPIRATION RATE: 17 BRPM | TEMPERATURE: 98 F | SYSTOLIC BLOOD PRESSURE: 144 MMHG

## 2025-04-28 DIAGNOSIS — I10 ESSENTIAL (PRIMARY) HYPERTENSION: ICD-10-CM

## 2025-04-28 DIAGNOSIS — N40.0 BENIGN PROSTATIC HYPERPLASIA WITHOUT LOWER URINARY TRACT SYMPTOMS: ICD-10-CM

## 2025-04-28 DIAGNOSIS — R59.0 LOCALIZED ENLARGED LYMPH NODES: ICD-10-CM

## 2025-04-28 DIAGNOSIS — Z99.81 DEPENDENCE ON SUPPLEMENTAL OXYGEN: ICD-10-CM

## 2025-04-28 DIAGNOSIS — E03.9 HYPOTHYROIDISM, UNSPECIFIED: ICD-10-CM

## 2025-04-28 DIAGNOSIS — I45.10 UNSPECIFIED RIGHT BUNDLE-BRANCH BLOCK: ICD-10-CM

## 2025-04-28 DIAGNOSIS — Z88.0 ALLERGY STATUS TO PENICILLIN: ICD-10-CM

## 2025-04-28 DIAGNOSIS — L03.115 CELLULITIS OF RIGHT LOWER LIMB: ICD-10-CM

## 2025-04-28 DIAGNOSIS — N17.9 ACUTE KIDNEY FAILURE, UNSPECIFIED: ICD-10-CM

## 2025-04-28 DIAGNOSIS — Z79.890 HORMONE REPLACEMENT THERAPY: ICD-10-CM

## 2025-04-28 DIAGNOSIS — D53.9 NUTRITIONAL ANEMIA, UNSPECIFIED: ICD-10-CM

## 2025-04-28 DIAGNOSIS — J44.9 CHRONIC OBSTRUCTIVE PULMONARY DISEASE, UNSPECIFIED: ICD-10-CM

## 2025-04-28 DIAGNOSIS — Z87.891 PERSONAL HISTORY OF NICOTINE DEPENDENCE: ICD-10-CM

## 2025-04-28 DIAGNOSIS — A41.9 SEPSIS, UNSPECIFIED ORGANISM: ICD-10-CM

## 2025-04-28 DIAGNOSIS — E87.5 HYPERKALEMIA: ICD-10-CM

## 2025-04-28 LAB
ANION GAP SERPL CALC-SCNC: 12 MMOL/L — SIGNIFICANT CHANGE UP (ref 5–17)
BASOPHILS # BLD AUTO: 0.01 K/UL — SIGNIFICANT CHANGE UP (ref 0–0.2)
BASOPHILS NFR BLD AUTO: 0.1 % — SIGNIFICANT CHANGE UP (ref 0–2)
BUN SERPL-MCNC: 36 MG/DL — HIGH (ref 7–23)
CALCIUM SERPL-MCNC: 8.9 MG/DL — SIGNIFICANT CHANGE UP (ref 8.4–10.5)
CHLORIDE SERPL-SCNC: 95 MMOL/L — LOW (ref 96–108)
CO2 SERPL-SCNC: 27 MMOL/L — SIGNIFICANT CHANGE UP (ref 22–31)
CREAT SERPL-MCNC: 1.3 MG/DL — SIGNIFICANT CHANGE UP (ref 0.5–1.3)
EGFR: 55 ML/MIN/1.73M2 — LOW
EGFR: 55 ML/MIN/1.73M2 — LOW
EOSINOPHIL # BLD AUTO: 0.01 K/UL — SIGNIFICANT CHANGE UP (ref 0–0.5)
EOSINOPHIL NFR BLD AUTO: 0.1 % — SIGNIFICANT CHANGE UP (ref 0–6)
GLUCOSE SERPL-MCNC: 88 MG/DL — SIGNIFICANT CHANGE UP (ref 70–99)
HCT VFR BLD CALC: 36.5 % — LOW (ref 39–50)
HGB BLD-MCNC: 11.9 G/DL — LOW (ref 13–17)
IMM GRANULOCYTES NFR BLD AUTO: 0.5 % — SIGNIFICANT CHANGE UP (ref 0–0.9)
LYMPHOCYTES # BLD AUTO: 1.43 K/UL — SIGNIFICANT CHANGE UP (ref 1–3.3)
LYMPHOCYTES # BLD AUTO: 15.2 % — SIGNIFICANT CHANGE UP (ref 13–44)
MAGNESIUM SERPL-MCNC: 1.8 MG/DL — SIGNIFICANT CHANGE UP (ref 1.6–2.6)
MCHC RBC-ENTMCNC: 32.1 PG — SIGNIFICANT CHANGE UP (ref 27–34)
MCHC RBC-ENTMCNC: 32.6 G/DL — SIGNIFICANT CHANGE UP (ref 32–36)
MCV RBC AUTO: 98.4 FL — SIGNIFICANT CHANGE UP (ref 80–100)
MONOCYTES # BLD AUTO: 1.06 K/UL — HIGH (ref 0–0.9)
MONOCYTES NFR BLD AUTO: 11.3 % — SIGNIFICANT CHANGE UP (ref 2–14)
NEUTROPHILS # BLD AUTO: 6.86 K/UL — SIGNIFICANT CHANGE UP (ref 1.8–7.4)
NEUTROPHILS NFR BLD AUTO: 72.8 % — SIGNIFICANT CHANGE UP (ref 43–77)
NRBC BLD AUTO-RTO: 0 /100 WBCS — SIGNIFICANT CHANGE UP (ref 0–0)
PHOSPHATE SERPL-MCNC: 3.8 MG/DL — SIGNIFICANT CHANGE UP (ref 2.5–4.5)
PLATELET # BLD AUTO: 205 K/UL — SIGNIFICANT CHANGE UP (ref 150–400)
POTASSIUM SERPL-MCNC: 3.9 MMOL/L — SIGNIFICANT CHANGE UP (ref 3.5–5.3)
POTASSIUM SERPL-SCNC: 3.9 MMOL/L — SIGNIFICANT CHANGE UP (ref 3.5–5.3)
RBC # BLD: 3.71 M/UL — LOW (ref 4.2–5.8)
RBC # FLD: 15 % — HIGH (ref 10.3–14.5)
SODIUM SERPL-SCNC: 134 MMOL/L — LOW (ref 135–145)
WBC # BLD: 9.42 K/UL — SIGNIFICANT CHANGE UP (ref 3.8–10.5)
WBC # FLD AUTO: 9.42 K/UL — SIGNIFICANT CHANGE UP (ref 3.8–10.5)

## 2025-04-28 PROCEDURE — 93970 EXTREMITY STUDY: CPT

## 2025-04-28 PROCEDURE — 85027 COMPLETE CBC AUTOMATED: CPT

## 2025-04-28 PROCEDURE — 83735 ASSAY OF MAGNESIUM: CPT

## 2025-04-28 PROCEDURE — 93306 TTE W/DOPPLER COMPLETE: CPT

## 2025-04-28 PROCEDURE — 83880 ASSAY OF NATRIURETIC PEPTIDE: CPT

## 2025-04-28 PROCEDURE — 99291 CRITICAL CARE FIRST HOUR: CPT | Mod: 25

## 2025-04-28 PROCEDURE — 97161 PT EVAL LOW COMPLEX 20 MIN: CPT

## 2025-04-28 PROCEDURE — 83605 ASSAY OF LACTIC ACID: CPT

## 2025-04-28 PROCEDURE — 96365 THER/PROPH/DIAG IV INF INIT: CPT

## 2025-04-28 PROCEDURE — 87040 BLOOD CULTURE FOR BACTERIA: CPT

## 2025-04-28 PROCEDURE — 87186 SC STD MICRODIL/AGAR DIL: CPT

## 2025-04-28 PROCEDURE — 71045 X-RAY EXAM CHEST 1 VIEW: CPT

## 2025-04-28 PROCEDURE — 86900 BLOOD TYPING SEROLOGIC ABO: CPT

## 2025-04-28 PROCEDURE — 86850 RBC ANTIBODY SCREEN: CPT

## 2025-04-28 PROCEDURE — 99233 SBSQ HOSP IP/OBS HIGH 50: CPT | Mod: GC

## 2025-04-28 PROCEDURE — 84100 ASSAY OF PHOSPHORUS: CPT

## 2025-04-28 PROCEDURE — 80202 ASSAY OF VANCOMYCIN: CPT

## 2025-04-28 PROCEDURE — 87077 CULTURE AEROBIC IDENTIFY: CPT

## 2025-04-28 PROCEDURE — 93925 LOWER EXTREMITY STUDY: CPT

## 2025-04-28 PROCEDURE — 96375 TX/PRO/DX INJ NEW DRUG ADDON: CPT

## 2025-04-28 PROCEDURE — 85652 RBC SED RATE AUTOMATED: CPT

## 2025-04-28 PROCEDURE — 82803 BLOOD GASES ANY COMBINATION: CPT

## 2025-04-28 PROCEDURE — 71275 CT ANGIOGRAPHY CHEST: CPT | Mod: MC

## 2025-04-28 PROCEDURE — 85730 THROMBOPLASTIN TIME PARTIAL: CPT

## 2025-04-28 PROCEDURE — 84484 ASSAY OF TROPONIN QUANT: CPT

## 2025-04-28 PROCEDURE — 85610 PROTHROMBIN TIME: CPT

## 2025-04-28 PROCEDURE — 85025 COMPLETE CBC W/AUTO DIFF WBC: CPT

## 2025-04-28 PROCEDURE — 80048 BASIC METABOLIC PNL TOTAL CA: CPT

## 2025-04-28 PROCEDURE — 87070 CULTURE OTHR SPECIMN AEROBIC: CPT

## 2025-04-28 PROCEDURE — 96368 THER/DIAG CONCURRENT INF: CPT

## 2025-04-28 PROCEDURE — 73701 CT LOWER EXTREMITY W/DYE: CPT | Mod: MC

## 2025-04-28 PROCEDURE — 36415 COLL VENOUS BLD VENIPUNCTURE: CPT

## 2025-04-28 PROCEDURE — 80053 COMPREHEN METABOLIC PANEL: CPT

## 2025-04-28 PROCEDURE — 94640 AIRWAY INHALATION TREATMENT: CPT

## 2025-04-28 PROCEDURE — 99232 SBSQ HOSP IP/OBS MODERATE 35: CPT

## 2025-04-28 PROCEDURE — 73590 X-RAY EXAM OF LOWER LEG: CPT

## 2025-04-28 PROCEDURE — 86140 C-REACTIVE PROTEIN: CPT

## 2025-04-28 PROCEDURE — 87075 CULTR BACTERIA EXCEPT BLOOD: CPT

## 2025-04-28 PROCEDURE — 82140 ASSAY OF AMMONIA: CPT

## 2025-04-28 PROCEDURE — 86901 BLOOD TYPING SEROLOGIC RH(D): CPT

## 2025-04-28 PROCEDURE — 87637 SARSCOV2&INF A&B&RSV AMP PRB: CPT

## 2025-04-28 PROCEDURE — 84443 ASSAY THYROID STIM HORMONE: CPT

## 2025-04-28 PROCEDURE — 70450 CT HEAD/BRAIN W/O DYE: CPT | Mod: MC

## 2025-04-28 RX ORDER — AZITHROMYCIN 250 MG
500 CAPSULE ORAL
Refills: 0 | Status: DISCONTINUED | OUTPATIENT
Start: 2025-04-28 | End: 2025-04-28

## 2025-04-28 RX ORDER — CEFTRIAXONE 500 MG/1
1000 INJECTION, POWDER, FOR SOLUTION INTRAMUSCULAR; INTRAVENOUS EVERY 24 HOURS
Refills: 0 | Status: DISCONTINUED | OUTPATIENT
Start: 2025-04-28 | End: 2025-04-28

## 2025-04-28 RX ORDER — LINEZOLID 2 MG/ML
1 INJECTION, SOLUTION INTRAVENOUS
Qty: 10 | Refills: 0
Start: 2025-04-28 | End: 2025-05-02

## 2025-04-28 RX ORDER — HALOPERIDOL 10 MG/1
5 TABLET ORAL ONCE
Refills: 0 | Status: DISCONTINUED | OUTPATIENT
Start: 2025-04-28 | End: 2025-04-28

## 2025-04-28 RX ADMIN — CEFTRIAXONE 100 MILLIGRAM(S): 500 INJECTION, POWDER, FOR SOLUTION INTRAMUSCULAR; INTRAVENOUS at 13:18

## 2025-04-28 RX ADMIN — Medication 166.67 MILLIGRAM(S): at 07:01

## 2025-04-28 RX ADMIN — IPRATROPIUM BROMIDE AND ALBUTEROL SULFATE 3 MILLILITER(S): .5; 2.5 SOLUTION RESPIRATORY (INHALATION) at 06:10

## 2025-04-28 RX ADMIN — Medication 1 DOSE(S): at 06:10

## 2025-04-28 RX ADMIN — FUROSEMIDE 40 MILLIGRAM(S): 10 INJECTION INTRAMUSCULAR; INTRAVENOUS at 06:15

## 2025-04-28 RX ADMIN — ENOXAPARIN SODIUM 40 MILLIGRAM(S): 100 INJECTION SUBCUTANEOUS at 09:20

## 2025-04-28 RX ADMIN — LOSARTAN POTASSIUM 50 MILLIGRAM(S): 100 TABLET, FILM COATED ORAL at 06:15

## 2025-04-28 RX ADMIN — Medication 4 MILLILITER(S): at 13:39

## 2025-04-28 RX ADMIN — IPRATROPIUM BROMIDE AND ALBUTEROL SULFATE 3 MILLILITER(S): .5; 2.5 SOLUTION RESPIRATORY (INHALATION) at 13:18

## 2025-04-28 RX ADMIN — Medication 4 MILLILITER(S): at 06:10

## 2025-04-28 RX ADMIN — Medication 500 MILLIGRAM(S): at 09:20

## 2025-04-28 RX ADMIN — OXYCODONE HYDROCHLORIDE 5 MILLIGRAM(S): 30 TABLET ORAL at 04:06

## 2025-04-28 RX ADMIN — PREDNISONE 40 MILLIGRAM(S): 20 TABLET ORAL at 09:20

## 2025-04-28 NOTE — PROGRESS NOTE ADULT - SUBJECTIVE AND OBJECTIVE BOX
DAILY PROGRESS NOTE    S:  Patient dressing changed bedside. Pockets packed with iodoform gauze, no new purulence expressed. Patient denies pain(unless when dressing is being changed)/numbness/weakness in the extremity.    O:     T(C): 36.6 (04-28-25 @ 06:12), Max: 36.8 (04-27-25 @ 18:00)  HR: 96 (04-28-25 @ 06:12) (77 - 96)  BP: 168/92 (04-28-25 @ 06:12) (129/75 - 168/92)  RR: 19 (04-28-25 @ 06:12) (18 - 19)  SpO2: 94% (04-28-25 @ 06:12) (93% - 96%)    Physical Exam:   General: NAD, AOX3  CV: RRR  Pulm: No respiratory distress  Abd: soft, nt,ND  Extremity: RLE wound with some fibrinous exudate but no purulence. Minimal granulation tissue. Circumferential undermining pockets packed with iodoform dressing.   Vascular: Palp DP/PT      Labs:     LABS:  cret                        10.1   9.32  )-----------( 179      ( 27 Apr 2025 05:30 )             31.3     04-27    139  |  95[L]  |  35[H]  ----------------------------<  104[H]  3.6   |  31  |  1.37[H]    Ca    8.3[L]      27 Apr 2025 05:30  Phos  4.7     04-27  Mg     1.8     04-27

## 2025-04-28 NOTE — PROGRESS NOTE ADULT - SUBJECTIVE AND OBJECTIVE BOX
INTERVAL EVENTS: No o/n events. Denies CP, dyspnea, palpitations, presyncope, syncope, f/c/n/v. Patient stated his breathing is "the usual." Says his legs are still swollen. Patient would like to walk around more.    REVIEW OF SYSTEMS:  10 systems reviewed and negative unless otherwise stated.      PHYSICAL EXAM:  GENERAL: No acute distress, sitting in chair  CHEST/LUNG: Clear to auscultation bilaterally  HEART:  No murmurs, S1+s2 , no JVP  ABDOMEN: Soft, Nontender  EXTREMITIES: Warm and well perfused. bilateral leg edema R>L (improved)  NEUROLOGY: AAOx3    LABS:                        11.9   9.42  )-----------( 205      ( 28 Apr 2025 08:46 )             36.5     04-28    134[L]  |  95[L]  |  36[H]  ----------------------------<  88  3.9   |  27  |  1.30    Ca    8.9      28 Apr 2025 08:46  Phos  3.8     04-28  Mg     1.8     04-28        Radiographic Imaging, ECG, echocardiography personally reviewed.

## 2025-04-28 NOTE — PROGRESS NOTE ADULT - TIME BILLING
As above
Review of hospital course, labs, vitals and medical records  Bedside exam and patient interview   Discussion of plan with housestaff and consultants   Documenting the encounter
Review of hospital course, labs, vitals and medical records  Bedside exam and patient interview   Discussion of plan with housestaff and consultants   Documenting the encounter

## 2025-04-28 NOTE — PROGRESS NOTE ADULT - PROBLEM SELECTOR PLAN 1
Suspected to be i/s/o COPD exacerbation, medication noncompliance. Initially presenting with SOB on 3LNC, placed on BiPAP for iWOB/tachypena, transitioned to 3L NC on 7Lach.   Home medications: Trelegy Ellipta 200 and albuterol inhalers prn, however patient states he takes no medications    Plan   - C/w NC, wean as tolerated  - C/w prednisone 40 mg QD  - C/w duonebs q4h   - C/w Advair inhaler  -Pulm following Suspected to be i/s/o COPD exacerbation, medication noncompliance. Initially presenting with SOB on 3LNC, placed on BiPAP for iWOB/tachypena, transitioned to 3L NC on 7Lach.   Home medications: Trelegy Ellipta 200 and albuterol inhalers prn, however patient states he takes no medications    Plan   - C/w NC, wean as tolerated  - will begin azithromycin EOD per pulm   - C/w prednisone 40 mg QD  - C/w duonebs q4h   - C/w Advair inhaler  -Pulm following

## 2025-04-28 NOTE — PROGRESS NOTE ADULT - PROBLEM SELECTOR PLAN 3
RLE non-healing ulcer and cellulitis with mild fluctuance and no drainage, initially admitted 4/12-4/14 for treatment with cefepime and vancomycin, de-escalated to cefazolin, and planned for discharge on cefalexin, however, patient left AMA and did not continue antibiotics at home.   RLE XR (4/24): with soft tissue gas.   B/l LE venous dopplers: negative for DVT  B/l LE arterial dopplers: soft tissue swelling near R foot  S/p debridement w/ Vascular (4/24) with findings concerning for wound that tracks deep with purulence.  S/p Vanc 1250mg (4/24 5AM) x1 in ED    wound culture + for staph aureus as well as phuralibacter     Plan   - c/w > Vancomycin 1250mg q24h for MRSA coverage i/s/o purulence  - Will follow up wound sensitivities, consider ID consult once sensitivities return  - wound care per vascular (pack cavity, WTD gauze and kerlix dressing) RLE non-healing ulcer and cellulitis with mild fluctuance and no drainage, initially admitted 4/12-4/14 for treatment with cefepime and vancomycin, de-escalated to cefazolin, and planned for discharge on cefalexin, however, patient left AMA and did not continue antibiotics at home.   RLE XR (4/24): with soft tissue gas.   B/l LE venous dopplers: negative for DVT  B/l LE arterial dopplers: soft tissue swelling near R foot  S/p debridement w/ Vascular (4/24) with findings concerning for wound that tracks deep with purulence.  S/p Vanc 1250mg (4/24 5AM) x1 in ED    wound culture + for staph aureus as well as phuralibacter     Plan   - c/w > Vancomycin 1250mg q24h for MRSA coverage i/s/o purulence  - will dc with linezolid   - wound care per vascular (pack cavity, WTD gauze and kerlix dressing). per vascular, may place wound vacc tomorrow

## 2025-04-28 NOTE — PROGRESS NOTE ADULT - PROBLEM SELECTOR PLAN 4
Presented with pro-BNP 72367, up from 5575 in March 2025. CXR with pulmonary vascular congestion.   TTE 4/24: EF 40-45%, no WMAs, mild LV hypertrophy, mild LV diastolic dysfunction, normal RV size and systolic fxn. Estimated PASP 40.  No previous ECHO for comparison  Patient does not appear to be volume overloaded at this time, currently stable on 3L NC    - Strict I/Os  - c/w PO lasix 40mg QD   - On GDMT per cardiology, will f/u with outpatient cardiologist upon dc

## 2025-04-28 NOTE — PROGRESS NOTE ADULT - ATTENDING COMMENTS
Patient is a 79 yo Male former smoker (~60 pack years) with PMH of Emphysema, Prior mass like consolidation s/p biopsy, multiple hospital admissions for COPD exacerbation HTN, recently admitted with RLE cellulitis 4/12-4/13 after which he left AMA now readmitted with COPD exacerbation w/ increased [place] and cough, with RLE cellulitis, s/p debridement with Vascular, found to have HFrEF for which Cardiology was consulted    Review of studies:  - TTE 4/24/2025: EF 40-45%, no WMAs, mild LV hypertrophy, mild LV diastolic dysfunction, normal RV size and systolic fxn. Estimated PASP 40.  - EKG 4/24/2025:  sinus rhythm with PACs, RBBB, mild LA enlargement  - ekg 4/12/2025: HR 94 sinus rhythm with PACs, RBBB    Home meds: ketoconazole 2% cream, mupirocin 2% ointment, trelegy ellipta 200, albuterol inhaler, trazadone 50 qhs prn sleep, amlodipine 10mg, hctz 12.5mg    # HFrEF     - Patient is a poor Historian. He reports known Prior Hx of LV dysfunction however could not recall who diagnosed him with Heart Failure, or where.   - He reports following with an Internist whose name he could not recall  - Prior to his hospitalization for cellulitis he reported living independently with good functional status without significant exertional symptoms  - Echo this hospitalization revealed mildly reduced LV function with Grade 1 DD and no significant valvular heart disease  - Clinically, patient is warm, well perfused and compensated. JVP is around 6 cm and lower extremity swelling overall has improved, Currently 1+ for lower shins down. RLE is wrapped.  - At this time, would maintain on Lasix 40 mg po Qd in lieu of HCTZ  - Given LV Dysfunction, would permanently maintain off Norvasc and continue Losartan 50mg qD with strict holding parameters  - Will defer BB therapy for now given reactive airways and recurrent COPD exacerbations  - Given active infection in patient with Hx of AMA and poor follow up, will Defer ischemic evaluation to outpatient setting.  - A1C if 5 and TSH is WNL. Please add on LIpid profile to am labs to able to intiiate Statin therapy  - Vascular following for evaluation of Venous statis wound in RLE with cellulitis.   - Please Start anticipating outpatient follow up at [facility] office with Dr. Ridge Wheeler within 2 weeks of DC  - Cardiology will continue to follow with you, please call with any questions .
80-year-old male with a PMHx of HTN, COPD and recent admission for RLE cellulitis (discharged on Keflex) who presented COPD exacerbation, briefly admitted to telemetry for NIPPV, now stable for RMF.       Plan:    -complete 5-day course of Prednisone and 3-day course of Azithromycin   -pulmonology consulted, recommend Duonebs and discharge with LABA/LAMA along with Azithromycin 3x/week  -cardiology consulted, continue with Losartan 50mg daily and PO Lasix, outpatient follow up for ischemic evaluation    -vascular surgery consulted, s/p debridement x 2, WCx with MRSA and Pluralibacter, tentative plan for wound vac placement     -continue with Vancomycin and CTX  -CM consult     Rest of plan as per resident note.
80-year-old male with a PMHx of HTN, COPD and recent admission for RLE cellulitis (discharged on Keflex) who presented COPD exacerbation, briefly admitted to telemetry for NIPPV, now stable for RMF.      Plan:    -continue with Prednisone 40mg daily to complete 5-day course   -continue with Azithromycin 500mg daily to complete 3-day course   -continue with Advair and Duonebs    -wean 02 as tolerated, obtain ambulatory pulse ox (uses PRN 02 at home)  -pulmonology consulted   -vascular surgery consulted, wound likely 2/2 venous stasis, s/p bedside debridement, follow up wound Cx and CT-RLE  -continue with Vancomycin pending Cx data    -cardiology consult given new HF    Rest of plan as per resident note.
80M w/ PMhx of HTN, COPD not on home O2 with recent admission to [facility] for RLE cellulitis 4/12-4/13 and left AMA presented to [facility] 4/24 after he was found with altered mental status and shortness of breath, admitted to [facility] for COPD exacerbation and RLE cellulitis, s/p debridement with Vascular, now stable for management on RMF.     -c/w dounebs q4hrs, prednisone 40mg x5d    -wean O2 as tolerated    -deep RLE wound s/p bedside debridement by vascular +purulent discharge. XRAY w/o evidence of OM   -f/up blood cx, wound cx?   -c/w vancomycin    -serial exams. Consider ID    -wound care.

## 2025-04-28 NOTE — PROGRESS NOTE ADULT - SUBJECTIVE AND OBJECTIVE BOX
incomplete note    INTERVAL HPI/OVERNIGHT EVENTS: charis     SUBJECTIVE: Patient seen and examined at bedside, resting comfortably in bed, and does not appear to be in any acute distress. Patient has no complaints. States he became short of breath when walking yesterday.     Vital Signs Last 24 Hrs  T(C): 36.7 (28 Apr 2025 12:19), Max: 36.8 (27 Apr 2025 18:00)  T(F): 98.1 (28 Apr 2025 12:19), Max: 98.3 (27 Apr 2025 18:00)  HR: 83 (28 Apr 2025 12:19) (77 - 96)  BP: 144/84 (28 Apr 2025 12:19) (129/75 - 168/92)  BP(mean): 105 (27 Apr 2025 21:24) (93 - 105)  RR: 17 (28 Apr 2025 12:19) (17 - 19)  SpO2: 94% (28 Apr 2025 12:19) (94% - 96%)    Parameters below as of 28 Apr 2025 12:19  Patient On (Oxygen Delivery Method): nasal cannula  O2 Flow (L/min): 2      PHYSICAL EXAM:  General: in no acute distress  Eyes: EOMI intact bilaterally. Anicteric sclerae, moist conjunctivae  HENT: Moist mucous membranes  Neck: Trachea midline, supple  Lungs: Rhonchi throughout all lungs fields   Cardiovascular: RRR. No murmurs, rubs, or gallops  Abdomen: Soft, non-tender non-distended; No rebound or guarding  Extremities: No edema in L leg. . 2+ edema to R leg. 1+ edema to right leg Wound wrapped in ace bandage.   Neurological: Alert and oriented x2-3.   Skin: Warm and dry. No obvious rash     LABS:                        11.9   9.42  )-----------( 205      ( 28 Apr 2025 08:46 )             36.5     04-28    134[L]  |  95[L]  |  36[H]  ----------------------------<  88  3.9   |  27  |  1.30    Ca    8.9      28 Apr 2025 08:46  Phos  3.8     04-28  Mg     1.8     04-28

## 2025-04-28 NOTE — PROGRESS NOTE ADULT - PROVIDER SPECIALTY LIST ADULT
Cardiology
Vascular Surgery
Pulmonology
Internal Medicine

## 2025-04-28 NOTE — CHART NOTE - NSCHARTNOTEFT_GEN_A_CORE
I was called to bedside at approximately 5PM due to patient becoming agitated and demanding to leave AMA. I sat down with the patient in the hallway to try and understand why he wanted to leave all of a sudden. He states that he feels like he is being held against his will and remained here over the weekend expecting he would leave by now. He stated that he needed to get back to his apartment and did not want to remain here any longer. I explained ot the patient that he was nearing a medically safe discharge and explained that we were still currently treating the wound on his leg which vascular wanted to place a wound vac on. I also explained that he was continuing to receive IV antibiotics while he was here but we had a plan for a PO regimen which he was going to receive at bedside. I also explained that we were actively managing his COPD as well and that we had final recommendations from Pulm to better control his symptoms to avoid repeat hospitalizations down the line due to this. Patient stated he understood but was adamant on leaving. I explained that the risks of leaving without a safe dispo plan in place included worsening infection that could require amputation, potential bloodstream infections, and death. I also explained that without the proper COPD medications/inhalers, he risked getting short of breath, falling, hitting his head, and possible death as well. When I tried to have him explain back to me what we were doing for him and the risks of leaving he became very agitated and refused to cooperate hwile attempting to leave the room once again.    A code grey was called to direct the patient back to his room to better assess whether he had capacity to leave. With the assistance of the charge nurse, patient was able to verbalize that he understood he had an ongoing infection in his leg that could worsen if he left prematurely. He understood that without proper care, his COPD and breathing would also be negatively effected leading to possible falls and death. When asked what the patient would do if he could not take care of these medical issues on his own, he stated he would call EMS and go back to the hospital. I explained that he had done that multiple times in the past and by leaving early and not fully treating his issues, he would never get ahead of these problems and likely return soon. He stated he understood but did not want to be forced to stay here against his will.    Patient had communicated with his neighbor and niece who had set up the apartment for him the day prior. Called his neighbor, Roberto, who states she did not speak to him today but the patient's niece did in fact go to the apartment yesterday to clean everything and set it up for his return. I explained to the patient that if any point he noticed fevers, chills, worsening dyspnea, drainage from his leg wound, palpitations, chest pain, that he should return to the ED for medical assessment. He stated he understood. Patient was deemed to have capacity and AMA form was signed. I was called to bedside at approximately 5PM due to patient becoming agitated and demanding to leave AMA. I sat down with the patient in the hallway to try and understand why he wanted to leave all of a sudden. He states that he feels like he is being held against his will and remained here over the weekend expecting he would leave by now. He stated that he needed to get back to his apartment and did not want to remain here any longer. I explained ot the patient that he was nearing a medically safe discharge and explained that we were still currently treating the wound on his leg which vascular wanted to place a wound vac on. I also explained that he was continuing to receive IV antibiotics while he was here but we had a plan for a PO regimen which he was going to receive at bedside. I also explained that we were actively managing his COPD as well and that we had final recommendations from Pulm to better control his symptoms to avoid repeat hospitalizations down the line due to this. Patient stated he understood but was adamant on leaving. I explained that the risks of leaving without a safe dispo plan in place included worsening infection that could require amputation, potential bloodstream infections, and death. I also explained that without the proper COPD medications/inhalers, he risked getting short of breath, falling, hitting his head, and possible death as well. When I tried to have him explain back to me what we were doing for him and the risks of leaving he became very agitated and refused to cooperate hwile attempting to leave the room once again.    A code grey was called to direct the patient back to his room to better assess whether he had capacity to leave. With the assistance of the charge nurse, patient was able to verbalize that he understood he had an ongoing infection in his leg that could worsen if he left prematurely. He understood that without proper care, his COPD and breathing would also be negatively effected leading to possible falls and death. When asked what the patient would do if he could not take care of these medical issues on his own, he stated he would call EMS and go back to the hospital. I explained that he had done that multiple times in the past and by leaving early and not fully treating his issues, he would never get ahead of these problems and likely return soon. He stated he understood but did not want to be forced to stay here against his will.    Patient had communicated with his neighbor and niece who had set up the apartment for him the day prior. Called his neighbor, Roberto, who states she did not speak to him today but the patient's niece did in fact go to the apartment yesterday to clean everything and set it up for his return. I explained to the patient that if any point he noticed fevers, chills, worsening dyspnea, drainage from his leg wound, palpitations, chest pain, that he should return to the ED for medical assessment. He stated he understood. Patient was AOx3 to person, place, and time and with the information provided above, deemed to have capacity and AMA form was signed. I was called to bedside at approximately 5PM due to patient becoming agitated and demanding to leave AMA. I sat down with the patient in the hallway to try and understand why he wanted to leave all of a sudden. He states that he feels like he is being held against his will and remained here over the weekend expecting he would leave by now. He stated that he needed to get back to his apartment and did not want to remain here any longer. I explained ot the patient that he was nearing a medically safe discharge and explained that we were still currently treating the wound on his leg which vascular wanted to place a wound vac on. I also explained that he was continuing to receive IV antibiotics while he was here but we had a plan for a PO regimen which he was going to receive at bedside. I also explained that we were actively managing his COPD as well and that we had final recommendations from Pulm to better control his symptoms to avoid repeat hospitalizations down the line due to this. Patient stated he understood but was adamant on leaving. I explained that the risks of leaving without a safe dispo plan in place included worsening infection that could require amputation, potential bloodstream infections, and death. I also explained that without the proper COPD medications/inhalers, he risked getting short of breath, falling, hitting his head, and possible death as well. When I tried to have him explain back to me what we were doing for him and the risks of leaving he became very agitated and refused to cooperate while attempting to leave the room once again.    A code grey was called to direct the patient back to his room to better assess whether he had capacity to leave. With the assistance of the charge nurse, patient was able to verbalize that he understood he had an ongoing infection in his leg that could worsen if he left prematurely. He understood that without proper care, his COPD and breathing would also be negatively effected leading to possible falls and death. When asked what the patient would do if he could not take care of these medical issues on his own, he stated he would call EMS and go back to the hospital. I explained that he had done that multiple times in the past and by leaving early and not fully treating his issues, he would never get ahead of these problems and likely return soon. He stated he understood but did not want to be forced to stay here against his will.    Patient had communicated with his neighbor and niece who had set up the apartment for him the day prior. Called his neighbor, Roberto, who states she did not speak to him today but the patient's niece did in fact go to the apartment yesterday to clean everything and set it up for his return. I explained to the patient that if any point he noticed fevers, chills, worsening dyspnea, drainage from his leg wound, palpitations, chest pain, that he should return to the ED for medical assessment. He stated he understood. Patient was AOx3 to person, place, and time and with the information provided above, deemed to have capacity and AMA form was signed. I was called to bedside at approximately 5PM due to patient becoming agitated and demanding to leave AMA. I sat down with the patient in the hallway to try and understand why he wanted to leave all of a sudden. He states that he feels like he is being held against his will and remained here over the weekend expecting he would leave by now. He stated that he needed to get back to his apartment and did not want to remain here any longer. I explained ot the patient that he was nearing a medically safe discharge and explained that we were still currently treating the wound on his leg which vascular wanted to place a wound vac on. I also explained that he was continuing to receive IV antibiotics while he was here but we had a plan for a PO regimen which he was going to receive at bedside. I also explained that we were actively managing his COPD as well and that we had final recommendations from Pulm to better control his symptoms to avoid repeat hospitalizations down the line due to this. Patient stated he understood but was adamant on leaving. I explained that the risks of leaving without a safe dispo plan in place included worsening infection that could require amputation, potential bloodstream infections, and death. I also explained that without the proper COPD medications/inhalers, he risked getting short of breath, falling, hitting his head, and possible death as well. When I tried to have him explain back to me what we were doing for him and the risks of leaving he became very agitated and refused to cooperate while attempting to leave the room once again.    A code grey was called to direct the patient back to his room to better assess whether he had capacity to leave. With the assistance of the charge nurse, patient was able to verbalize why he was hospitalized and his current medical problems.  He stated he had an ongoing infection in his leg that could worsen if he left prematurely. He stated that without proper wound care, they could end up needing to amputate his leg at some point but he would not let it get to that point without seeking medical care prior. He said the pharmacy sent up oral antibiotics which he would take instead of the IV antibiotics given here. He stated that without proper care, his COPD and breathing would also be negatively effected leading to possible falls and death. He said he had oxygen he uses as needed at home and worst case, could do that until he was able to seek medical care. When asked what the patient would do if he could not take care of these medical issues on his own, he stated he would call EMS and go back to the hospital. I explained that he had done that multiple times in the past and by leaving early and not fully treating his issues, he would never get ahead of these problems and likely return soon. He stated he understood but did not want to be forced to stay here against his will.    Patient had communicated with his neighbor and niece who had set up the apartment for him the day prior. Called his neighbor, Roberto, who states she did not speak to him today but the patient's niece did in fact go to the apartment yesterday to clean everything and set it up for his return. I explained to the patient that if any point he noticed fevers, chills, worsening dyspnea, drainage from his leg wound, palpitations, chest pain, that he should return to the ED for medical assessment. He stated he understood. Patient was AOx3 to person, place, and time and with the information provided above, deemed to have capacity and AMA form was signed. I was called to bedside at approximately 5PM due to patient becoming agitated and demanding to leave AMA. I sat down with the patient in the hallway to try and understand why he wanted to leave all of a sudden. He stated that he felt like he was being held against his will and remained here over the weekend expecting he would leave by now. He stated that he needed to get back to his apartment and did not want to stay here any longer. I explained to the patient that he was nearing a medically safe discharge and explained that we were still currently treating the wound on his leg which vascular wanted to place a wound vac on. I also explained that he was continuing to receive IV antibiotics while he was here but we had a plan for a PO regimen which he was going to receive at bedside. I also explained that we were actively managing his COPD as well and that we had final recommendations from Pulm to better control his symptoms to avoid repeat hospitalizations down the line due to this. Patient stated he understood but was adamant on leaving. I explained that the risks of leaving without a safe dispo plan in place included worsening infection that could require amputation, potential bloodstream infections, and death. I also explained that without the proper COPD medications/inhalers, he risked getting short of breath, falling, hitting his head, and possible death as well. When I tried to have him explain back to me what we were doing for him and the risks of leaving he became very agitated and refused to cooperate while attempting to leave the room once again.    A code grey was called to direct the patient back to his room to better assess whether he had capacity to leave. With the assistance of the charge nurse, patient was able to verbalize why he was hospitalized and his current medical problems.  He stated he had an ongoing infection in his leg that could worsen if he left prematurely. He stated that without proper wound care, they could end up needing to amputate his leg at some point but he would not let it get to that point without seeking medical care prior. He said the pharmacy sent up oral antibiotics which he would take instead of the IV antibiotics given here. He stated that without proper care, his COPD and breathing would also be negatively effected leading to possible falls and death. He said he had oxygen he uses as needed at home and would seek medical care if his breathing worsened. When asked what the patient would do if he could not take care of these medical issues on his own, he stated he would call EMS and go back to the hospital. I explained that he had done this multiple times in the past and by leaving early and not fully treating his issues, he would never get ahead of these problems and likely return soon. He stated he understood but did not want to be forced to stay here against his will.    Patient had communicated with his neighbor and niece who had set up the apartment for him the day prior. Called his neighbor, Roberto, who states she did not speak to him today but the patient's niece did in fact go to the apartment yesterday to clean everything and set it up for his return. I explained to the patient that if any point he noticed fevers, chills, worsening dyspnea, drainage from his leg wound, palpitations, chest pain, that he should return to the ED for medical assessment. He stated he understood. Patient was AOx3 to person, place, and time and with the information provided above, deemed to have capacity and an AMA form was signed.

## 2025-04-28 NOTE — PROGRESS NOTE ADULT - PROBLEM SELECTOR PLAN 5
Hx of HTN, not currently on any meds, previously on amlodipine 10 mg QD last picked up 8/29/24, HCTZ 12.5mg QD last picked up 5/15/23.   - manaagement with GDMT as above Hx of HTN, not currently on any meds, previously on amlodipine 10 mg QD last picked up 8/29/24, HCTZ 12.5mg QD last picked up 5/15/23.    Plan   - losartan 50 mg qd started per cards

## 2025-04-28 NOTE — PROGRESS NOTE ADULT - PROBLEM SELECTOR PLAN 6
Cr 1.23, GFR 59 on admission, appears to be at baseline. Slight bump today to 1.47. likely iso losartan and lasix reintroduction      Plan   - gentle fluids tomorrow if Cr continues to rise   - CTM BMP Cr 1.23, GFR 59 on admission, appears to be at baseline. slight increase in Cr but likely iso losartan and lasix reintroduction      Plan   - CTM BMP

## 2025-04-28 NOTE — PROGRESS NOTE ADULT - PROBLEM SELECTOR PLAN 7
Fluids: caution with fluids given new HFrEF. none today   Diet: Regular  DVT: Lovenox 40mg q24h  Dispo: RMF

## 2025-04-28 NOTE — PROGRESS NOTE ADULT - ASSESSMENT
80M w/ PMhx of HTN, COPD not on home O2 with recent admission to [facility] for RLE cellulitis 4/12-4/13 and left AMA presented to [facility] 4/24 after he was found with altered mental status and shortness of breath, admitted to [facility] for COPD exacerbation and RLE cellulitis, s/p debridement with Vascular, now stable for management on RMF.

## 2025-04-28 NOTE — PROGRESS NOTE ADULT - ASSESSMENT
A/P: 80y YO Male with PMhx of HTN, COPD not on home O2 with recent admission to [facility] for RLE cellulitis 4/12-4/13 and left AMA presented to [facility] 4/24 after he was found with altered mental status and shortness of breath by a neighbor, admitted to medicine for COPD exacerbation and RLE cellulitis/ulcer management.  Patient denies rest pain/claudication/prev hx of peripheral interventions. Patient believes he developed the ulcer 1 month ago secondary to a traumatic event. He cannot recall any hx of previous ulcers/wounds on his extremities. Patient states he also feels his left leg is swollen but denies pain anywhere other than the site of ulceration/numbness/weakness. Vascular surgery consulted for evaluation of wound. No evidence of ischemia at this time, wound likely 2/2 venous stasis. CT scan 4/25 showed subcutaneous gas likely introduced during debridement but not abscess. Patient now s/p wound debridement x2 4/24 and 4/25.    Recommendations:  - Daily WTD dressing changes with packings. Once wound packets start to approximate, can consider vac dressing.   - C/W IV ABX  - pain control  - ID Consult  - Discussed with Chief and Attending  - Call [phone] with any questions or concerns
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
## 2025-04-30 ENCOUNTER — TRANSCRIPTION ENCOUNTER (OUTPATIENT)
Age: 80
End: 2025-04-30

## 2025-04-30 ENCOUNTER — INPATIENT (INPATIENT)
Facility: HOSPITAL | Age: 80
LOS: 2 days | Discharge: NURSING, REHAB OR LONG-TERM CARE | DRG: 189 | End: 2025-05-03
Attending: STUDENT IN AN ORGANIZED HEALTH CARE EDUCATION/TRAINING PROGRAM | Admitting: INTERNAL MEDICINE
Payer: MEDICARE

## 2025-04-30 VITALS
HEIGHT: 69 IN | RESPIRATION RATE: 20 BRPM | HEART RATE: 97 BPM | DIASTOLIC BLOOD PRESSURE: 68 MMHG | SYSTOLIC BLOOD PRESSURE: 130 MMHG | TEMPERATURE: 98 F | OXYGEN SATURATION: 99 %

## 2025-04-30 DIAGNOSIS — Z71.89 OTHER SPECIFIED COUNSELING: ICD-10-CM

## 2025-04-30 DIAGNOSIS — I10 ESSENTIAL (PRIMARY) HYPERTENSION: ICD-10-CM

## 2025-04-30 DIAGNOSIS — J96.01 ACUTE RESPIRATORY FAILURE WITH HYPOXIA: ICD-10-CM

## 2025-04-30 DIAGNOSIS — R79.89 OTHER SPECIFIED ABNORMAL FINDINGS OF BLOOD CHEMISTRY: ICD-10-CM

## 2025-04-30 DIAGNOSIS — J44.9 CHRONIC OBSTRUCTIVE PULMONARY DISEASE, UNSPECIFIED: ICD-10-CM

## 2025-04-30 DIAGNOSIS — L03.90 CELLULITIS, UNSPECIFIED: ICD-10-CM

## 2025-04-30 DIAGNOSIS — N17.9 ACUTE KIDNEY FAILURE, UNSPECIFIED: ICD-10-CM

## 2025-04-30 DIAGNOSIS — I50.20 UNSPECIFIED SYSTOLIC (CONGESTIVE) HEART FAILURE: ICD-10-CM

## 2025-04-30 DIAGNOSIS — Z90.49 ACQUIRED ABSENCE OF OTHER SPECIFIED PARTS OF DIGESTIVE TRACT: Chronic | ICD-10-CM

## 2025-04-30 DIAGNOSIS — Z51.5 ENCOUNTER FOR PALLIATIVE CARE: ICD-10-CM

## 2025-04-30 DIAGNOSIS — Z29.9 ENCOUNTER FOR PROPHYLACTIC MEASURES, UNSPECIFIED: ICD-10-CM

## 2025-04-30 DIAGNOSIS — J44.1 CHRONIC OBSTRUCTIVE PULMONARY DISEASE WITH (ACUTE) EXACERBATION: ICD-10-CM

## 2025-04-30 LAB
ALBUMIN SERPL ELPH-MCNC: 3.3 G/DL — LOW (ref 3.4–5)
ALP SERPL-CCNC: 87 U/L — SIGNIFICANT CHANGE UP (ref 40–120)
ALT FLD-CCNC: 43 U/L — HIGH (ref 12–42)
ANION GAP SERPL CALC-SCNC: 14 MMOL/L — SIGNIFICANT CHANGE UP (ref 5–17)
ANION GAP SERPL CALC-SCNC: 6 MMOL/L — LOW (ref 9–16)
APPEARANCE UR: CLEAR — SIGNIFICANT CHANGE UP
AST SERPL-CCNC: 39 U/L — HIGH (ref 15–37)
BASOPHILS # BLD AUTO: 0.02 K/UL — SIGNIFICANT CHANGE UP (ref 0–0.2)
BASOPHILS NFR BLD AUTO: 0.2 % — SIGNIFICANT CHANGE UP (ref 0–2)
BILIRUB SERPL-MCNC: 1.3 MG/DL — HIGH (ref 0.2–1.2)
BILIRUB UR-MCNC: NEGATIVE — SIGNIFICANT CHANGE UP
BUN SERPL-MCNC: 41 MG/DL — HIGH (ref 7–23)
BUN SERPL-MCNC: 44 MG/DL — HIGH (ref 7–23)
CALCIUM SERPL-MCNC: 8.7 MG/DL — SIGNIFICANT CHANGE UP (ref 8.4–10.5)
CALCIUM SERPL-MCNC: 9 MG/DL — SIGNIFICANT CHANGE UP (ref 8.5–10.5)
CHLORIDE SERPL-SCNC: 101 MMOL/L — SIGNIFICANT CHANGE UP (ref 96–108)
CHLORIDE SERPL-SCNC: 97 MMOL/L — SIGNIFICANT CHANGE UP (ref 96–108)
CK MB CFR SERPL CALC: 4.7 NG/ML — SIGNIFICANT CHANGE UP (ref 0–6.7)
CK SERPL-CCNC: 49 U/L — SIGNIFICANT CHANGE UP (ref 30–200)
CO2 SERPL-SCNC: 23 MMOL/L — SIGNIFICANT CHANGE UP (ref 22–31)
CO2 SERPL-SCNC: 32 MMOL/L — HIGH (ref 22–31)
COLOR SPEC: YELLOW — SIGNIFICANT CHANGE UP
CREAT ?TM UR-MCNC: 113 MG/DL — SIGNIFICANT CHANGE UP
CREAT SERPL-MCNC: 1.31 MG/DL — HIGH (ref 0.5–1.3)
CREAT SERPL-MCNC: 1.58 MG/DL — HIGH (ref 0.5–1.3)
CRP SERPL-MCNC: 19.9 MG/L — HIGH (ref 0.5–3)
CULTURE RESULTS: ABNORMAL
DIFF PNL FLD: NEGATIVE — SIGNIFICANT CHANGE UP
EGFR: 44 ML/MIN/1.73M2 — LOW
EGFR: 44 ML/MIN/1.73M2 — LOW
EGFR: 55 ML/MIN/1.73M2 — LOW
EGFR: 55 ML/MIN/1.73M2 — LOW
EOSINOPHIL # BLD AUTO: 0.07 K/UL — SIGNIFICANT CHANGE UP (ref 0–0.5)
EOSINOPHIL NFR BLD AUTO: 0.8 % — SIGNIFICANT CHANGE UP (ref 0–6)
GLUCOSE SERPL-MCNC: 106 MG/DL — HIGH (ref 70–99)
GLUCOSE SERPL-MCNC: 203 MG/DL — HIGH (ref 70–99)
GLUCOSE UR QL: NEGATIVE MG/DL — SIGNIFICANT CHANGE UP
HCT VFR BLD CALC: 40.9 % — SIGNIFICANT CHANGE UP (ref 39–50)
HGB BLD-MCNC: 13.3 G/DL — SIGNIFICANT CHANGE UP (ref 13–17)
IMM GRANULOCYTES # BLD AUTO: 0.03 K/UL — SIGNIFICANT CHANGE UP (ref 0–0.07)
IMM GRANULOCYTES NFR BLD AUTO: 0.4 % — SIGNIFICANT CHANGE UP (ref 0–0.9)
KETONES UR-MCNC: ABNORMAL MG/DL
LACTATE BLDV-MCNC: 1.6 MMOL/L — SIGNIFICANT CHANGE UP (ref 0.5–2)
LEUKOCYTE ESTERASE UR-ACNC: NEGATIVE — SIGNIFICANT CHANGE UP
LYMPHOCYTES # BLD AUTO: 1.27 K/UL — SIGNIFICANT CHANGE UP (ref 1–3.3)
LYMPHOCYTES NFR BLD AUTO: 15 % — SIGNIFICANT CHANGE UP (ref 13–44)
MCHC RBC-ENTMCNC: 31.4 PG — SIGNIFICANT CHANGE UP (ref 27–34)
MCHC RBC-ENTMCNC: 32.5 G/DL — SIGNIFICANT CHANGE UP (ref 32–36)
MCV RBC AUTO: 96.5 FL — SIGNIFICANT CHANGE UP (ref 80–100)
MONOCYTES # BLD AUTO: 0.92 K/UL — HIGH (ref 0–0.9)
MONOCYTES NFR BLD AUTO: 10.9 % — SIGNIFICANT CHANGE UP (ref 2–14)
NEUTROPHILS # BLD AUTO: 6.13 K/UL — SIGNIFICANT CHANGE UP (ref 1.8–7.4)
NEUTROPHILS NFR BLD AUTO: 72.7 % — SIGNIFICANT CHANGE UP (ref 43–77)
NITRITE UR-MCNC: NEGATIVE — SIGNIFICANT CHANGE UP
NRBC # BLD AUTO: 0 K/UL — SIGNIFICANT CHANGE UP (ref 0–0)
NRBC # FLD: 0 K/UL — SIGNIFICANT CHANGE UP (ref 0–0)
NRBC BLD AUTO-RTO: 0 /100 WBCS — SIGNIFICANT CHANGE UP (ref 0–0)
NT-PROBNP SERPL-SCNC: 5940 PG/ML — HIGH
ORGANISM # SPEC MICROSCOPIC CNT: ABNORMAL
ORGANISM # SPEC MICROSCOPIC CNT: ABNORMAL
ORGANISM # SPEC MICROSCOPIC CNT: SIGNIFICANT CHANGE UP
OSMOLALITY UR: 724 MOSM/KG — SIGNIFICANT CHANGE UP (ref 300–900)
PCO2 BLDV: 61 MMHG — HIGH (ref 42–55)
PH BLDV: 7.37 — SIGNIFICANT CHANGE UP (ref 7.32–7.43)
PH UR: 5.5 — SIGNIFICANT CHANGE UP (ref 5–8)
PLATELET # BLD AUTO: 234 K/UL — SIGNIFICANT CHANGE UP (ref 150–400)
PMV BLD: 10.7 FL — SIGNIFICANT CHANGE UP (ref 7–13)
PO2 BLDV: <35 MMHG — SIGNIFICANT CHANGE UP (ref 25–45)
POTASSIUM SERPL-MCNC: 3.9 MMOL/L — SIGNIFICANT CHANGE UP (ref 3.5–5.3)
POTASSIUM SERPL-MCNC: 4.4 MMOL/L — SIGNIFICANT CHANGE UP (ref 3.5–5.3)
POTASSIUM SERPL-SCNC: 3.9 MMOL/L — SIGNIFICANT CHANGE UP (ref 3.5–5.3)
POTASSIUM SERPL-SCNC: 4.4 MMOL/L — SIGNIFICANT CHANGE UP (ref 3.5–5.3)
POTASSIUM UR-SCNC: 64 MMOL/L — SIGNIFICANT CHANGE UP
PROT ?TM UR-MCNC: 52 MG/DL — HIGH (ref 0–12)
PROT SERPL-MCNC: 6.8 G/DL — SIGNIFICANT CHANGE UP (ref 6.4–8.2)
PROT UR-MCNC: 100 MG/DL
PROT/CREAT UR-RTO: 0.5 RATIO — HIGH (ref 0–0.2)
RAPID RVP RESULT: SIGNIFICANT CHANGE UP
RBC # BLD: 4.24 M/UL — SIGNIFICANT CHANGE UP (ref 4.2–5.8)
RBC # FLD: 15.1 % — HIGH (ref 10.3–14.5)
SAO2 % BLDV: 30.6 % — LOW (ref 67–88)
SARS-COV-2 RNA SPEC QL NAA+PROBE: SIGNIFICANT CHANGE UP
SODIUM SERPL-SCNC: 134 MMOL/L — LOW (ref 135–145)
SODIUM SERPL-SCNC: 139 MMOL/L — SIGNIFICANT CHANGE UP (ref 132–145)
SODIUM UR-SCNC: <20 MMOL/L — SIGNIFICANT CHANGE UP
SP GR SPEC: 1.03 — SIGNIFICANT CHANGE UP (ref 1–1.03)
SPECIMEN SOURCE: SIGNIFICANT CHANGE UP
TROPONIN I, HIGH SENSITIVITY RESULT: 89.3 NG/L — HIGH
TROPONIN T, HIGH SENSITIVITY RESULT: 47 NG/L — SIGNIFICANT CHANGE UP (ref 0–51)
UROBILINOGEN FLD QL: 0.2 MG/DL — SIGNIFICANT CHANGE UP (ref 0.2–1)
UUN UR-MCNC: 1442 MG/DL — SIGNIFICANT CHANGE UP
WBC # BLD: 8.44 K/UL — SIGNIFICANT CHANGE UP (ref 3.8–10.5)
WBC # FLD AUTO: 8.44 K/UL — SIGNIFICANT CHANGE UP (ref 3.8–10.5)

## 2025-04-30 PROCEDURE — 99497 ADVNCD CARE PLAN 30 MIN: CPT | Mod: 25

## 2025-04-30 PROCEDURE — 99223 1ST HOSP IP/OBS HIGH 75: CPT

## 2025-04-30 PROCEDURE — 99223 1ST HOSP IP/OBS HIGH 75: CPT | Mod: GC

## 2025-04-30 PROCEDURE — 71045 X-RAY EXAM CHEST 1 VIEW: CPT | Mod: 26

## 2025-04-30 PROCEDURE — 99285 EMERGENCY DEPT VISIT HI MDM: CPT | Mod: FS

## 2025-04-30 PROCEDURE — 99221 1ST HOSP IP/OBS SF/LOW 40: CPT

## 2025-04-30 RX ORDER — IPRATROPIUM BROMIDE AND ALBUTEROL SULFATE .5; 2.5 MG/3ML; MG/3ML
3 SOLUTION RESPIRATORY (INHALATION) ONCE
Refills: 0 | Status: COMPLETED | OUTPATIENT
Start: 2025-04-30 | End: 2025-04-30

## 2025-04-30 RX ORDER — IPRATROPIUM BROMIDE AND ALBUTEROL SULFATE .5; 2.5 MG/3ML; MG/3ML
3 SOLUTION RESPIRATORY (INHALATION) EVERY 4 HOURS
Refills: 0 | Status: DISCONTINUED | OUTPATIENT
Start: 2025-04-30 | End: 2025-04-30

## 2025-04-30 RX ORDER — TIOTROPIUM BROMIDE AND OLODATEROL 3.124; 2.736 UG/1; UG/1
2 SPRAY, METERED RESPIRATORY (INHALATION) DAILY
Refills: 0 | Status: DISCONTINUED | OUTPATIENT
Start: 2025-04-30 | End: 2025-05-03

## 2025-04-30 RX ORDER — VANCOMYCIN HCL IN 5 % DEXTROSE 1.5G/250ML
1000 PLASTIC BAG, INJECTION (ML) INTRAVENOUS ONCE
Refills: 0 | Status: COMPLETED | OUTPATIENT
Start: 2025-04-30 | End: 2025-04-30

## 2025-04-30 RX ORDER — AZITHROMYCIN 250 MG
500 CAPSULE ORAL
Refills: 0 | Status: DISCONTINUED | OUTPATIENT
Start: 2025-04-30 | End: 2025-05-03

## 2025-04-30 RX ORDER — TIOTROPIUM BROMIDE INHALATION SPRAY 3.12 UG/1
2 SPRAY, METERED RESPIRATORY (INHALATION) DAILY
Refills: 0 | Status: DISCONTINUED | OUTPATIENT
Start: 2025-04-30 | End: 2025-04-30

## 2025-04-30 RX ORDER — LOSARTAN POTASSIUM 100 MG/1
50 TABLET, FILM COATED ORAL EVERY 24 HOURS
Refills: 0 | Status: DISCONTINUED | OUTPATIENT
Start: 2025-05-01 | End: 2025-05-03

## 2025-04-30 RX ORDER — LOSARTAN POTASSIUM 100 MG/1
50 TABLET, FILM COATED ORAL DAILY
Refills: 0 | Status: DISCONTINUED | OUTPATIENT
Start: 2025-04-30 | End: 2025-04-30

## 2025-04-30 RX ORDER — HEPARIN SODIUM 1000 [USP'U]/ML
5000 INJECTION INTRAVENOUS; SUBCUTANEOUS EVERY 8 HOURS
Refills: 0 | Status: DISCONTINUED | OUTPATIENT
Start: 2025-04-30 | End: 2025-05-03

## 2025-04-30 RX ORDER — ATORVASTATIN CALCIUM 80 MG/1
80 TABLET, FILM COATED ORAL AT BEDTIME
Refills: 0 | Status: DISCONTINUED | OUTPATIENT
Start: 2025-04-30 | End: 2025-05-03

## 2025-04-30 RX ORDER — CEFEPIME 2 G/20ML
1000 INJECTION, POWDER, FOR SOLUTION INTRAVENOUS ONCE
Refills: 0 | Status: COMPLETED | OUTPATIENT
Start: 2025-04-30 | End: 2025-04-30

## 2025-04-30 RX ORDER — SULFAMETHOXAZOLE/TRIMETHOPRIM 800-160 MG
1 TABLET ORAL EVERY 12 HOURS
Refills: 0 | Status: DISCONTINUED | OUTPATIENT
Start: 2025-04-30 | End: 2025-05-01

## 2025-04-30 RX ORDER — PREDNISONE 20 MG/1
40 TABLET ORAL EVERY 24 HOURS
Refills: 0 | Status: DISCONTINUED | OUTPATIENT
Start: 2025-04-30 | End: 2025-04-30

## 2025-04-30 RX ORDER — ACETAMINOPHEN 500 MG/5ML
975 LIQUID (ML) ORAL ONCE
Refills: 0 | Status: COMPLETED | OUTPATIENT
Start: 2025-04-30 | End: 2025-04-30

## 2025-04-30 RX ORDER — SODIUM CHLORIDE 9 G/1000ML
1000 INJECTION, SOLUTION INTRAVENOUS
Refills: 0 | Status: DISCONTINUED | OUTPATIENT
Start: 2025-04-30 | End: 2025-04-30

## 2025-04-30 RX ORDER — IPRATROPIUM BROMIDE AND ALBUTEROL SULFATE .5; 2.5 MG/3ML; MG/3ML
3 SOLUTION RESPIRATORY (INHALATION) EVERY 8 HOURS
Refills: 0 | Status: DISCONTINUED | OUTPATIENT
Start: 2025-04-30 | End: 2025-05-03

## 2025-04-30 RX ORDER — METHYLPREDNISOLONE ACETATE 80 MG/ML
125 INJECTION, SUSPENSION INTRA-ARTICULAR; INTRALESIONAL; INTRAMUSCULAR; SOFT TISSUE ONCE
Refills: 0 | Status: COMPLETED | OUTPATIENT
Start: 2025-04-30 | End: 2025-04-30

## 2025-04-30 RX ORDER — MELATONIN 5 MG
5 TABLET ORAL AT BEDTIME
Refills: 0 | Status: DISCONTINUED | OUTPATIENT
Start: 2025-04-30 | End: 2025-05-03

## 2025-04-30 RX ORDER — AZITHROMYCIN 250 MG
500 CAPSULE ORAL ONCE
Refills: 0 | Status: COMPLETED | OUTPATIENT
Start: 2025-04-30 | End: 2025-04-30

## 2025-04-30 RX ADMIN — ATORVASTATIN CALCIUM 80 MILLIGRAM(S): 80 TABLET, FILM COATED ORAL at 22:19

## 2025-04-30 RX ADMIN — Medication 250 MILLIGRAM(S): at 02:05

## 2025-04-30 RX ADMIN — CEFEPIME 100 MILLIGRAM(S): 2 INJECTION, POWDER, FOR SOLUTION INTRAVENOUS at 01:38

## 2025-04-30 RX ADMIN — Medication 4 MILLILITER(S): at 22:20

## 2025-04-30 RX ADMIN — Medication 1 DOSE(S): at 17:11

## 2025-04-30 RX ADMIN — SODIUM CHLORIDE 200 MILLILITER(S): 9 INJECTION, SOLUTION INTRAVENOUS at 03:37

## 2025-04-30 RX ADMIN — IPRATROPIUM BROMIDE AND ALBUTEROL SULFATE 3 MILLILITER(S): .5; 2.5 SOLUTION RESPIRATORY (INHALATION) at 17:12

## 2025-04-30 RX ADMIN — IPRATROPIUM BROMIDE AND ALBUTEROL SULFATE 3 MILLILITER(S): .5; 2.5 SOLUTION RESPIRATORY (INHALATION) at 22:20

## 2025-04-30 RX ADMIN — HEPARIN SODIUM 5000 UNIT(S): 1000 INJECTION INTRAVENOUS; SUBCUTANEOUS at 22:19

## 2025-04-30 RX ADMIN — HEPARIN SODIUM 5000 UNIT(S): 1000 INJECTION INTRAVENOUS; SUBCUTANEOUS at 13:12

## 2025-04-30 RX ADMIN — METHYLPREDNISOLONE ACETATE 125 MILLIGRAM(S): 80 INJECTION, SUSPENSION INTRA-ARTICULAR; INTRALESIONAL; INTRAMUSCULAR; SOFT TISSUE at 01:38

## 2025-04-30 RX ADMIN — IPRATROPIUM BROMIDE AND ALBUTEROL SULFATE 3 MILLILITER(S): .5; 2.5 SOLUTION RESPIRATORY (INHALATION) at 09:32

## 2025-04-30 RX ADMIN — Medication 4 MILLILITER(S): at 13:12

## 2025-04-30 RX ADMIN — IPRATROPIUM BROMIDE AND ALBUTEROL SULFATE 3 MILLILITER(S): .5; 2.5 SOLUTION RESPIRATORY (INHALATION) at 01:38

## 2025-04-30 RX ADMIN — TIOTROPIUM BROMIDE INHALATION SPRAY 2 PUFF(S): 3.12 SPRAY, METERED RESPIRATORY (INHALATION) at 13:10

## 2025-04-30 RX ADMIN — Medication 1 TABLET(S): at 22:19

## 2025-04-30 RX ADMIN — IPRATROPIUM BROMIDE AND ALBUTEROL SULFATE 3 MILLILITER(S): .5; 2.5 SOLUTION RESPIRATORY (INHALATION) at 13:12

## 2025-04-30 RX ADMIN — Medication 975 MILLIGRAM(S): at 01:36

## 2025-04-30 RX ADMIN — Medication 255 MILLIGRAM(S): at 03:36

## 2025-04-30 RX ADMIN — LOSARTAN POTASSIUM 50 MILLIGRAM(S): 100 TABLET, FILM COATED ORAL at 09:32

## 2025-04-30 RX ADMIN — Medication 5 MILLIGRAM(S): at 22:19

## 2025-04-30 NOTE — DISCHARGE NOTE NURSING/CASE MANAGEMENT/SOCIAL WORK - PATIENT PORTAL LINK FT
You can access the FollowMyHealth Patient Portal offered by [facility] by registering at the following website: [email or web address]. By joining [facility]’s FollowMyHealth portal, you will also be able to view your health information using other applications (apps) compatible with our system.

## 2025-04-30 NOTE — H&P ADULT - TIME BILLING
coordination of care; preparing to see Pt.; interviewing Pt.; examining Pt.; reviewing labs and images; documentation in [place]; d/w Medicine resident on rounds

## 2025-04-30 NOTE — CONSULT NOTE ADULT - SUBJECTIVE AND OBJECTIVE BOX
[facility] Geriatrics and Palliative Care  Quin Romero, Geriatrics & Palliative Care NP  Contact Info: Call [phone] (HEAL Line) or message on Microsoft Teams    HPI:  79 yo M w/ PMHx of HTN, COPD not on home O2 with recent admission to [facility] for RLE cellulitis 4/12-4/13 and left AMA, readmitted the following day for COPD/CHF exacerbation with hypoxic respiratory distress, signed out AMA again on 4/28/25, BIBA for worsening SOB, hypoxia and wheezing. Per EMS, found hypoxic to 80% on RA and placed on NRB in the field, w/ O2 sat improving to high 90s. Pt states he felt he couldn't breath, +increased cough w/ continued sputum production (clear/whitish). Also endorsed decreased PO intake.   Reported chills & body ahces to ED provider, however now denying. States he was not able to obtain inhalers after leaving AMA.    (30 Apr 2025 08:48)    Hospital course, primary team, and consultant notes reviewed. Patient seen and examined. Sitting up in bed, NAD. Feels tired and weak. Endorses generalized pain to RLE that limits his ability to ambulate. Otherwise denies any new or worsening symptoms/concerns. Comprehensive ROS as noted below, collateral obtained from chart/team/family. Exploration of GOC documented as below.    PAST MEDICAL & SURGICAL HISTORY:  Kidney stone  HTN (hypertension)  COPD (chronic obstructive pulmonary disease)  History of BPH  History of cholecystectomy    FAMILY HISTORY:   Reviewed; no history of     in mother or father    Opiate Naive (Y/N):   iStop reviewed (Y/N): Yes.   No Rx found on iStop review (Ref#:           Items that are not checked are not present  PSYCHOSOCIAL ASSESSMENT:  - Significant other/partner: [  ]  Children: [  ]  - Living Situation: Home [  x] Long term care [  ]  Rehab[  ]  Other[  ]  - Support system: strong[  ] adequate[ x ] inadequate[  ]  - Jain/Spiritual practice: No  - Role of organized Buddhist important [  ] some [  ] unable to assess dt pt mentation [  ]  - Coping: well[ x ]  with difficulty[  ]  poor coping[  ]  unable to assess dt pt mentation [  ]    ADVANCE DIRECTIVES:    - MOLST[  ]     - Living Will [  ]     DECISION MAKER(s):  - Health Care Proxy(s) [  ]  Surrogate(s)[  ] Guardian[  ]           Name(s)/Phone Number(s):     BASELINE (I)ADLs (prior to admission):  - [place]:  Total[ x ] Moderate[  ] Dependent[  ]    Allergies    penicillin (Unknown)    Intolerances        Medications:      MEDICATIONS  (STANDING):  albuterol/ipratropium for Nebulization 3 milliLiter(s) Nebulizer every 4 hours  atorvastatin 80 milliGRAM(s) Oral at bedtime  fluticasone propionate/ salmeterol 100-50 MICROgram(s) Diskus 1 Dose(s) Inhalation two times a day  heparin   Injectable 5000 Unit(s) SubCutaneous every 8 hours  losartan 50 milliGRAM(s) Oral daily  sodium chloride 3%  Inhalation 4 milliLiter(s) Inhalation every 8 hours  tiotropium 2.5 MICROgram(s) Inhaler 2 Puff(s) Inhalation daily  trimethoprim  160 mG/sulfamethoxazole 800 mG 1 Tablet(s) Oral every 12 hours    MEDICATIONS  (PRN):      24 hour PRN use:    acetaminophen     Tablet ..   975 milliGRAM(s) Oral (04-30-25 @ 01:36)        PRESENT SYMPTOMS:   [ ] Patient unable to self report   Source if other than patient:  [ ]Family   [ ]Team     Pain [ x ]  Location :      Right shin, calf   Quality: pressure, ache   Radiation: sometimes into R foot  Timing: intermittent  Aggravating factors: when standing, ambulating, bearing weight   Current score (0-10 scale): 2/10  Improves with:  moderate relief with tylenol     If [  ], pt denies symptom.   Dyspnea:         [  ] comfortable on NC  Anxiety:           [  ]  Difficulty sleeping: [  ]  Fatigue:           [ x ]  Nausea:           [  ]  Loss of appetite:     [  ]  Dysphagia: [  ]  Constipation:   [  ]        Other Symptoms: occasionally with cough     All other review of systems negative [ x ]    Current Palliative Performance Scale/Karnofsky Score:   60  %  Preadmit Karnofsky: 60    %          PEx:  General: well nourished older man sitting up in bed NAD  oriented x   3  lethargic, tired appearing verbal  Behavioral: Calm, pleasant   HEENT:  No temporal wasting,  No dry mouth  RESP: Reg rhythm, No  tachypnea/labored breathing, diminished bilat bases  CV:  No  tachycardia  GI: soft non distended non tender    MUSK: weakness x4,  RLE wrapped in bandage c/d/i. ambulatory with assistance  SKIN:  Poor skin turgor, Pallor  NEURO:  No deficits, cognitive impairment, encephalopathic dsyphagia dysarthria paresis    T(C): 36.8 (04-30-25 @ 05:31), Max: 36.8 (04-30-25 @ 05:31)  HR: 89 (04-30-25 @ 06:35) (83 - 98)  BP: 159/93 (04-30-25 @ 06:35) (127/72 - 159/93)  RR: 18 (04-30-25 @ 06:35) (16 - 20)  SpO2: 100% (04-30-25 @ 06:35) (93% - 100%)  Wt(kg): --    Labs:    CBC:                        13.3   8.44  )-----------( 234      ( 30 Apr 2025 01:09 )             40.9     CMP:    04-30    134[L]  |  97  |  41[H]  ----------------------------<  203[H]  4.4   |  23  |  1.31[H]    Ca    8.7      30 Apr 2025 10:00    TPro  6.8  /  Alb  3.3[L]  /  TBili  1.3[H]  /  DBili  x   /  AST  39[H]  /  ALT  43[H]  /  AlkPhos  87  04-30       Urinalysis Basic - ( 30 Apr 2025 10:00 )    Color: x / Appearance: x / SG: x / pH: x  Gluc: 203 mg/dL / Ketone: x  / Bili: x / Urobili: x   Blood: x / Protein: x / Nitrite: x   Leuk Esterase: x / RBC: x / WBC x   Sq Epi: x / Non Sq Epi: x / Bacteria: x        RADIOLOGY & ADDITIONAL STUDIES:  Imaging:  Reviewed  < from: Xray Chest 1 View AP/PA (04.30.25 @ 01:32) >  INTERPRETATION:  Chest one view    HISTORY: Shortness of breath    IMPRESSION:    Lungs clear. No pneumothorax. Unremarkable cardiac silhouette. No acute   bone abnormality. Vascular calcification thoracic aorta.      < from: CT Lower Extremity w/ IV Cont, Right (04.25.25 @ 14:29) >  COMPARISON:  Lower extremity arterial and venous Dopplers dated   4/24/2025. Right tibia and fibula x-rays dated 4/24/2025.    FINDINGS:    OSSEOUS STRUCTURES    Fractures:  None.    RIGHT KNEE JOINTS    Joint Space(s):  Maintained.    Effusion:  None.    RIGHT ANKLE/FOOT JOINTS    Joint Space(s):  Maintained.    MYOTENDINOUS STRUCTURES  Mild muscle atrophy.  Patellar and calcaneal enthesophytes.    NEUROVASCULAR STRUCTURES  Mild atherosclerotic ossifications.    OTHER SOFT TISSUES  Anterolateral soft tissue wound again seen at the level of the mid to   proximal tibia and fibula extending to the underlying muscle fascia of   the anterior compartment. Wound extends superiorly along the muscle   fascia superiorly over approximately 3.5 cm with several gas locules. No   discrete abscess.  Moderate to severe subcutaneous edema with distal predominance.    IMPRESSION:  1.  Anterolateral soft tissue wound again seen at the level of the mid to   proximal tibia and fibula extending to the underlying muscle fascia of   the anterior compartment.  2.  Wound extends approximately 3.5 cm superiorly along the muscle fascia   with several gas locules.  3.  Moderate to severe subcutaneous edema without discrete abscess.      < from: VA Duplex Lower Extrem Arterial, Bilat (04.24.25 @ 14:36) >    FINDINGS:    RIGHT LOWER EXTREMITY:    Plaque Description: Several foci of calcified plaquepresent.    Waveform Morphology: Triphasic and biphasic waveforms are visualized.    Stenosis/Occlusion: None    Peak systolic velocities of the RIGHT lower extremity arteries are as   follows:    Common femoral artery: 64 cm/s  Deep femoral artery: 42 cm/s  Superficial femoral artery:  Proximal: 81 cm/s, Mid: 65 cm/s, Distal: 68   cm/s  Popliteal artery: 54 cm/s  Posterior tibial artery: 77 cm/s  Peroneal artery: Proximal: 37 cm/s  Anterior tibial artery: 48 cm/s  Dorsalis pedis artery: 77 cm/s    Soft tissue edema was visualized at the right foot near the area of   concern.    LEFT LOWER EXTREMITY:    Plaque Description: Several foci of calcified plaque present.    Waveform Morphology: Triphasic across all vessels except the deep femoral   artery, distal portion of the superficial femoral artery, popliteal, and   dorsalis pedis artery which demonstrated biphasic waveforms.    Stenosis/Occlusion: Patent    Peak systolic velocities of the LEFT lower extremity arteries are as   follows:    Common femoral artery: 82 cm/s  Deep femoral artery: 46 cm/s  Superficial femoral artery: Proximal: 95 cm/s, Mid: 76 cm/s, Distal: 45   cm/s  Popliteal artery: 50 cm/s  Posterior tibial artery: 83 cm/s  Peroneal artery: 46 cm/s  Anterior tibial artery: 64 cm/s  Dorsalis pedis artery: 57 cm/s    IMPRESSION:    1. Atherosclerotic changes bilaterally with no focal hemodynamically   significant stenosis.    2. Soft tissue swelling near right foot.        GOC discussion:  [facility] Geriatrics and Palliative Care  Quin Romero, Geriatrics & Palliative Care NP  Contact Info: Call [phone] (HEAL Line) or message on Microsoft Teams    HPI:  79 yo M w/ PMHx of HTN, COPD not on home O2 with recent admission to [facility] for RLE cellulitis 4/12-4/13 and left AMA, readmitted the following day for COPD/CHF exacerbation with hypoxic respiratory distress, signed out AMA again on 4/28/25, BIBA for worsening SOB, hypoxia and wheezing. Per EMS, found hypoxic to 80% on RA and placed on NRB in the field, w/ O2 sat improving to high 90s. Pt states he felt he couldn't breath, +increased cough w/ continued sputum production (clear/whitish). Also endorsed decreased PO intake.   Reported chills & body ahces to ED provider, however now denying. States he was not able to obtain inhalers after leaving AMA.    (30 Apr 2025 08:48)    Hospital course, primary team, and consultant notes reviewed. Patient seen and examined. Sitting up in bed, NAD. Feels tired and weak. Endorses generalized pain to RLE that limits his ability to ambulate. Otherwise denies any new or worsening symptoms/concerns. Comprehensive ROS as noted below, collateral obtained from chart/team/family. Exploration of GOC documented as below.    PAST MEDICAL & SURGICAL HISTORY:  Kidney stone  HTN (hypertension)  COPD (chronic obstructive pulmonary disease)  History of BPH  History of cholecystectomy    FAMILY HISTORY:  Reviewed; no history of     in mother or father    Opiate Naive (Y/N):   iStop reviewed (Y/N): Yes.   No Rx found on iStop review (Ref#:           Items that are not checked are not present  PSYCHOSOCIAL ASSESSMENT:  - Significant other/partner: [  ]  Children: [  ]  - Living Situation: Home [  x] Long term care [  ]  Rehab[  ]  Other[  ]  - Support system: strong[  ] adequate[ x ] inadequate[  ]  - Latter day/Spiritual practice: No  - Role of organized Christian important [  ] some [  ] unable to assess dt pt mentation [  ]  - Coping: well[ x ]  with difficulty[  ]  poor coping[  ]  unable to assess dt pt mentation [  ]    ADVANCE DIRECTIVES:    - MOLST[  ]     - Living Will [  ]     DECISION MAKER(s):  - Health Care Proxy(s) [  ]  Surrogate(s)[  ] Guardian[  ]           Name(s)/Phone Number(s):     BASELINE (I)ADLs (prior to admission):  - [place]:  Total[ x ] Moderate[  ] Dependent[  ]    Allergies    penicillin (Unknown)    Intolerances        Medications:      MEDICATIONS  (STANDING):  albuterol/ipratropium for Nebulization 3 milliLiter(s) Nebulizer every 4 hours  atorvastatin 80 milliGRAM(s) Oral at bedtime  fluticasone propionate/ salmeterol 100-50 MICROgram(s) Diskus 1 Dose(s) Inhalation two times a day  heparin   Injectable 5000 Unit(s) SubCutaneous every 8 hours  losartan 50 milliGRAM(s) Oral daily  sodium chloride 3%  Inhalation 4 milliLiter(s) Inhalation every 8 hours  tiotropium 2.5 MICROgram(s) Inhaler 2 Puff(s) Inhalation daily  trimethoprim  160 mG/sulfamethoxazole 800 mG 1 Tablet(s) Oral every 12 hours    MEDICATIONS  (PRN):      24 hour PRN use:    acetaminophen     Tablet ..   975 milliGRAM(s) Oral (04-30-25 @ 01:36)        PRESENT SYMPTOMS:   [ ] Patient unable to self report   Source if other than patient:  [ ]Family   [ ]Team     Pain [ x ]  Location :      Right shin, calf   Quality: pressure, ache   Radiation: sometimes into R foot  Timing: intermittent  Aggravating factors: when standing, ambulating, bearing weight   Current score (0-10 scale): 2/10  Improves with:  moderate relief with tylenol     If [  ], pt denies symptom.   Dyspnea:         [  ] comfortable on NC  Anxiety:           [  ]  Difficulty sleeping: [  ]  Fatigue:           [ x ]  Nausea:           [  ]  Loss of appetite:     [  ]  Dysphagia: [  ]  Constipation:   [  ]        Other Symptoms: occasionally with cough     All other review of systems negative [ x ]    Current Palliative Performance Scale/Karnofsky Score:   60  %  Preadmit Karnofsky: 60    %          PEx:  General: well nourished older man sitting up in bed NAD  oriented x   3  lethargic, tired appearing verbal  Behavioral: Calm, pleasant   HEENT:  No temporal wasting,  No dry mouth  RESP: Reg rhythm, No  tachypnea/labored breathing, diminished bilat bases  CV:  No  tachycardia  GI: soft non distended non tender    MUSK: weakness x4,  RLE wrapped in bandage c/d/i. ambulatory with assistance  SKIN:  Poor skin turgor, Pallor  NEURO:  No deficits, cognitive impairment, encephalopathic dsyphagia dysarthria paresis    T(C): 36.8 (04-30-25 @ 05:31), Max: 36.8 (04-30-25 @ 05:31)  HR: 89 (04-30-25 @ 06:35) (83 - 98)  BP: 159/93 (04-30-25 @ 06:35) (127/72 - 159/93)  RR: 18 (04-30-25 @ 06:35) (16 - 20)  SpO2: 100% (04-30-25 @ 06:35) (93% - 100%)  Wt(kg): --    Labs:    CBC:                        13.3   8.44  )-----------( 234      ( 30 Apr 2025 01:09 )             40.9     CMP:    04-30    134[L]  |  97  |  41[H]  ----------------------------<  203[H]  4.4   |  23  |  1.31[H]    Ca    8.7      30 Apr 2025 10:00    TPro  6.8  /  Alb  3.3[L]  /  TBili  1.3[H]  /  DBili  x   /  AST  39[H]  /  ALT  43[H]  /  AlkPhos  87  04-30       Urinalysis Basic - ( 30 Apr 2025 10:00 )    Color: x / Appearance: x / SG: x / pH: x  Gluc: 203 mg/dL / Ketone: x  / Bili: x / Urobili: x   Blood: x / Protein: x / Nitrite: x   Leuk Esterase: x / RBC: x / WBC x   Sq Epi: x / Non Sq Epi: x / Bacteria: x        RADIOLOGY & ADDITIONAL STUDIES:  Imaging:  Reviewed  < from: Xray Chest 1 View AP/PA (04.30.25 @ 01:32) >  INTERPRETATION:  Chest one view    HISTORY: Shortness of breath    IMPRESSION:    Lungs clear. No pneumothorax. Unremarkable cardiac silhouette. No acute   bone abnormality. Vascular calcification thoracic aorta.      < from: CT Lower Extremity w/ IV Cont, Right (04.25.25 @ 14:29) >  COMPARISON:  Lower extremity arterial and venous Dopplers dated   4/24/2025. Right tibia and fibula x-rays dated 4/24/2025.    FINDINGS:    OSSEOUS STRUCTURES    Fractures:  None.    RIGHT KNEE JOINTS    Joint Space(s):  Maintained.    Effusion:  None.    RIGHT ANKLE/FOOT JOINTS    Joint Space(s):  Maintained.    MYOTENDINOUS STRUCTURES  Mild muscle atrophy.  Patellar and calcaneal enthesophytes.    NEUROVASCULAR STRUCTURES  Mild atherosclerotic ossifications.    OTHER SOFT TISSUES  Anterolateral soft tissue wound again seen at the level of the mid to   proximal tibia and fibula extending to the underlying muscle fascia of   the anterior compartment. Wound extends superiorly along the muscle   fascia superiorly over approximately 3.5 cm with several gas locules. No   discrete abscess.  Moderate to severe subcutaneous edema with distal predominance.    IMPRESSION:  1.  Anterolateral soft tissue wound again seen at the level of the mid to   proximal tibia and fibula extending to the underlying muscle fascia of   the anterior compartment.  2.  Wound extends approximately 3.5 cm superiorly along the muscle fascia   with several gas locules.  3.  Moderate to severe subcutaneous edema without discrete abscess.      < from: VA Duplex Lower Extrem Arterial, Bilat (04.24.25 @ 14:36) >    FINDINGS:    RIGHT LOWER EXTREMITY:    Plaque Description: Several foci of calcified plaquepresent.    Waveform Morphology: Triphasic and biphasic waveforms are visualized.    Stenosis/Occlusion: None    Peak systolic velocities of the RIGHT lower extremity arteries are as   follows:    Common femoral artery: 64 cm/s  Deep femoral artery: 42 cm/s  Superficial femoral artery:  Proximal: 81 cm/s, Mid: 65 cm/s, Distal: 68   cm/s  Popliteal artery: 54 cm/s  Posterior tibial artery: 77 cm/s  Peroneal artery: Proximal: 37 cm/s  Anterior tibial artery: 48 cm/s  Dorsalis pedis artery: 77 cm/s    Soft tissue edema was visualized at the right foot near the area of   concern.    LEFT LOWER EXTREMITY:    Plaque Description: Several foci of calcified plaque present.    Waveform Morphology: Triphasic across all vessels except the deep femoral   artery, distal portion of the superficial femoral artery, popliteal, and   dorsalis pedis artery which demonstrated biphasic waveforms.    Stenosis/Occlusion: Patent    Peak systolic velocities of the LEFT lower extremity arteries are as   follows:    Common femoral artery: 82 cm/s  Deep femoral artery: 46 cm/s  Superficial femoral artery: Proximal: 95 cm/s, Mid: 76 cm/s, Distal: 45   cm/s  Popliteal artery: 50 cm/s  Posterior tibial artery: 83 cm/s  Peroneal artery: 46 cm/s  Anterior tibial artery: 64 cm/s  Dorsalis pedis artery: 57 cm/s    IMPRESSION:    1. Atherosclerotic changes bilaterally with no focal hemodynamically   significant stenosis.    2. Soft tissue swelling near right foot.        GOC discussion:

## 2025-04-30 NOTE — CONSULT NOTE ADULT - SUBJECTIVE AND OBJECTIVE BOX
PULMONARY SERVICE INITIAL CONSULT NOTE    HPI:  79 yo M w/ PMHx of HTN, COPD not on home O2 with recent admission to [facility] for RLE cellulitis - and left AMA, readmitted the following day for COPD/CHF exacerbation with hypoxic respiratory distress, signed out AMA again on 25, BIBA for worsening SOB, hypoxia and wheezing. Per EMS, found hypoxic to 80% on RA and placed on NRB in the field, w/ O2 sat improving to high 90s. Pt states he felt he couldn't breath, +increased cough w/ continued sputum production (clear/whitish). Also endorsed decreased PO intake.   Reported chills & body ahces to ED provider, however now denying. States he was not able to obtain inhalers after leaving AMA.     Says felt short of breath after returning home without inhalers or nebulizers. Currently feels much better and breathing at baseline. Recently smoked pack of cigarettes     Vitals: T98.2F. HR 90s, /68, initially on 8L NRB -> 3L NC   Labs: WBC 8.44, Hb 13.3, K 3.9, BUN/Cr 44/1.58, Trop 89.3, ProBNP 5940, Lactate 1.6.   CXR: Lungs clear. No pneumothorax. Unremarkable cardiac silhouette. No acute bone abnormality. Vascular calcification thoracic aorta.  Interventions: Acetaminophen 975mg, Solumedrol 125mg, Vanc 1g, Cefepime 1g, Azithro 500mg x1, 1L LR, Duoneb 1x   Consults: None   (2025 08:48)      REVIEW OF SYSTEMS:  Constitutional: No fever, weight loss or fatigue  Eyes: No eye pain, visual disturbances, or discharge  ENMT:  No difficulty hearing, tinnitus, vertigo; No sinus or throat pain  Neck: No pain, stiffness or neck swelling  Respiratory: see HPI  Cardiovascular: No chest pain, palpitations, dizziness or leg swelling  Gastrointestinal: No abdominal or epigastric pain. No nausea, vomiting or hematemesis; No diarrhea or constipation. No melena or hematochezia.  Genitourinary: No dysuria, frequency, hematuria or incontinence  Neurological: No headaches, memory loss, loss of strength, numbness or tremors  Skin: No itching, burning, rashes or lesions   Lymph Nodes: No enlarged glands  Endocrine: No heat or cold intolerance; No hair loss  Musculoskeletal: No joint pain or swelling; No muscle, back or extremity pain  Psychiatric: No depression, anxiety, mood swings or difficulty sleeping  Heme/Lymph: No easy bruising or bleeding gums  Allergy and Immunologic: No hives or eczema    PAST MEDICAL & SURGICAL HISTORY:  Kidney stone      HTN (hypertension)      COPD (chronic obstructive pulmonary disease)      History of BPH      History of cholecystectomy          FAMILY HISTORY:      SOCIAL HISTORY:  Smoking Status: [ ] Current, [ ] Former, [ ] Never  Pack Years:    MEDICATIONS:  Pulmonary:  albuterol/ipratropium for Nebulization 3 milliLiter(s) Nebulizer every 4 hours  fluticasone propionate/ salmeterol 100-50 MICROgram(s) Diskus 1 Dose(s) Inhalation two times a day  sodium chloride 3%  Inhalation 4 milliLiter(s) Inhalation every 8 hours  tiotropium 2.5 MICROgram(s) Inhaler 2 Puff(s) Inhalation daily    Antimicrobials:  trimethoprim  160 mG/sulfamethoxazole 800 mG 1 Tablet(s) Oral every 12 hours    Anticoagulants:  heparin   Injectable 5000 Unit(s) SubCutaneous every 8 hours    Onc:    GI/:    Endocrine:  atorvastatin 80 milliGRAM(s) Oral at bedtime    Cardiac:  losartan 50 milliGRAM(s) Oral daily    Other Medications:      Allergies    penicillin (Unknown)    Intolerances        Vital Signs Last 24 Hrs  T(C): 36.8 (2025 16:30), Max: 36.8 (2025 05:31)  T(F): 98.2 (2025 16:30), Max: 98.2 (2025 05:31)  HR: 92 (2025 16:30) (83 - 98)  BP: 125/68 (2025 16:30) (125/68 - 159/93)  BP(mean): --  RR: 18 (2025 16:30) (16 - 20)  SpO2: 100% (2025 16:30) (93% - 100%)    Parameters below as of 2025 16:30  Patient On (Oxygen Delivery Method): room air         @ 07:01  -   @ 16:57  --------------------------------------------------------  IN: 50 mL / OUT: 200 mL / NET: -150 mL          PHYSICAL EXAM:  Constitutional: Sitting in bed comfortably  Head: NC/AT  EENT: MMM  Neck: no appreciable JVD  Respiratory: Rhonchi present bilaterally  Cardiovascular: +S1/S2  Gastrointestinal: soft, NT/ND  Extremities: WWP; no edema, clubbing or cyanosis  Vascular: 2+ radial pulses B/L  Neurological: awake and alert; LR    LABS:      CBC Full  -  ( 2025 01:09 )  WBC Count : 8.44 K/uL  RBC Count : 4.24 M/uL  Hemoglobin : 13.3 g/dL  Hematocrit : 40.9 %  Platelet Count - Automated : 234 K/uL  Mean Cell Volume : 96.5 fl  Mean Cell Hemoglobin : 31.4 pg  Mean Cell Hemoglobin Concentration : 32.5 g/dL  Auto Neutrophil # : 6.13 K/uL  Auto Lymphocyte # : 1.27 K/uL  Auto Monocyte # : 0.92 K/uL  Auto Eosinophil # : 0.07 K/uL  Auto Basophil # : 0.02 K/uL  Auto Neutrophil % : 72.7 %  Auto Lymphocyte % : 15.0 %  Auto Monocyte % : 10.9 %  Auto Eosinophil % : 0.8 %  Auto Basophil % : 0.2 %    0430    134[L]  |  97  |  41[H]  ----------------------------<  203[H]  4.4   |  23  |  1.31[H]    Ca    8.7      2025 10:00    TPro  6.8  /  Alb  3.3[L]  /  TBili  1.3[H]  /  DBili  x   /  AST  39[H]  /  ALT  43[H]  /  AlkPhos  87  04-30          Urinalysis Basic - ( 2025 16:10 )    Color: Yellow / Appearance: Clear / S.026 / pH: x  Gluc: x / Ketone: Trace mg/dL  / Bili: Negative / Urobili: 0.2 mg/dL   Blood: x / Protein: 100 mg/dL / Nitrite: Negative   Leuk Esterase: Negative / RBC: 2 /HPF / WBC 1 /HPF   Sq Epi: x / Non Sq Epi: 1 /HPF / Bacteria: Negative /HPF                RADIOLOGY & ADDITIONAL STUDIES:

## 2025-04-30 NOTE — H&P ADULT - PROBLEM SELECTOR PLAN 3
Hx RLE non-healing ulcer & cellullitis, initially treated 4/12-4/14 adm w/ Cefepime & Vanc de-escalated to Cefazolin, then pt left AMA. Re-adm 4/24 & started on Vanc for MRSA coverage iso purulence, w/ pt again leaving AMA 4/28.   Followed by vascular last adm s/p 2x wound debridements. On adm, pt's wound wrapped but not bleeding, otherwise c/d/i, no surrounding crepitus.   Recent wound culture 4/25 w/ MRSA & Pluralibacter gergoviae   S/p Vanc 1g in ED   - Switch to Bactrim DS 1 tab BID to cover both MRSA/P gergoviae, as pt not acutely septic   - Wound care consult  - Consider ID consult if not improving Hx RLE non-healing ulcer & cellullitis, initially treated 4/12-4/14 adm w/ Cefepime & Vanc de-escalated to Cefazolin, then pt left AMA. Re-adm 4/24 & started on Vanc for MRSA coverage iso purulence, w/ pt again leaving AMA 4/28.   Followed by vascular last adm s/p 2x wound debridements. On adm, pt's wound wrapped but not bleeding, otherwise c/d/i, no surrounding crepitus.   Recent wound culture 4/25 w/ MRSA & Pluralibacter gergoviae   S/p Vanc 1g in ED   - Switch to Bactrim DS 1 tab BID to cover both MRSA/P gergoviae, as pt not acutely septic   - Wound care consult  - Consider re-consulting vascular if wound starts to drain  - Consider ID consult if not improving

## 2025-04-30 NOTE — ED ADULT NURSE NOTE - CHPI ED NUR SYMPTOMS NEG
no body aches/no chest pain/no cough/no diaphoresis/no edema/no fever/no headache/no hemoptysis/no wheezing

## 2025-04-30 NOTE — H&P ADULT - NSHPLABSRESULTS_GEN_ALL_CORE
13.3   8.44  )-----------( 234      ( 30 Apr 2025 01:09 )             40.9       04-30    139  |  101  |  44[H]  ----------------------------<  106[H]  3.9   |  32[H]  |  1.58[H]    Ca    9.0      30 Apr 2025 01:09    TPro  6.8  /  Alb  3.3[L]  /  TBili  1.3[H]  /  DBili  x   /  AST  39[H]  /  ALT  43[H]  /  AlkPhos  87  04-30         CAPILLARY BLOOD GLUCOSE                         Lactate Trend      Urinalysis Basic - ( 30 Apr 2025 01:09 )    Color: x / Appearance: x / SG: x / pH: x  Gluc: 106 mg/dL / Ketone: x  / Bili: x / Urobili: x   Blood: x / Protein: x / Nitrite: x   Leuk Esterase: x / RBC: x / WBC x   Sq Epi: x / Non Sq Epi: x / Bacteria: x          Culture Results:   Moderate Methicillin Resistant Staphylococcus aureus  Moderate Pluralibacter gergoviae (04-25 @ 08:08)  Culture Results:   No growth at 5 days (04-24 @ 03:45)  Culture Results:   No growth at 5 days (04-24 @ 03:45)  Culture Results:   No growth at 5 days (04-12 @ 19:11)  Culture Results:   No growth at 5 days (04-12 @ 19:11)      RADIOLOGY, EKG & ADDITIONAL TESTS: Reviewed.

## 2025-04-30 NOTE — H&P ADULT - ASSESSMENT
79 yo M w/ PMHx of HTN, COPD not on home O2 with recurrent exacerbations iso med nonadherence - recently left AMA after adm for exacerbation, presenting w/ SOB adm for AHRF 2/2 COPD exacerbation. Adm for further mgmt.

## 2025-04-30 NOTE — ED PROVIDER NOTE - OBJECTIVE STATEMENT
81 yo M w/ PMHx of HTN, COPD not on home O2 with recent admission to [facility] for RLE cellulitis 4/12-4/13 and left AMA, readmitted the following day for COPD/CHF exacerbation with hypoxic respiratory distress, signed out AMA again on 4/28/25, BIBA for worsening SOB, hypoxia and wheezing. Per pt, "I was dismissed from the hospital but I couldn't breath tonight." Reports having increased cough with congestion, phlegm 79 yo M w/ PMHx of HTN, COPD not on home O2 with recent admission to [facility] for RLE cellulitis 4/12-4/13 and left AMA, readmitted the following day for COPD/CHF exacerbation with hypoxic respiratory distress, signed out AMA again on 4/28/25, BIBA for worsening SOB, hypoxia and wheezing. Per pt, "I was dismissed from the hospital but I couldn't breath tonight." Reports having increased cough with congestion, phlegm, and wheezing. Found to be hypoxic to 80% on RA on EMS arrival with increased WOB. Placed on NRB for transport with improvement to 100%. Noted feeling chills with body aches, decreased po intake for the past day as well. Denies fever, hemoptysis, CP, orthopnea, palpitations, worsening peripheral edema, stridors, focal weakness, sore throat, tinnitus, HA, dizziness, N/V/D/C, abdominal pain, and change in urinary/bowel function noted

## 2025-04-30 NOTE — H&P ADULT - PROBLEM SELECTOR PLAN 1
#AHRF  P/w AHRF 2/2 COPD exacerbation, similar to prior admissions. BIBA w/ SOB, cough/congestion. Reported chills to Ed provider however now denying. Reports sputum/mucus production. States he was not able to use home inhalers.   S/p Duoneb x1 in ED , Solumedrol 125mg, Azithro 500mg x1  - C/w Azithromycin 500mg qd x2 more days  - C/w Prednisone 40mg qd x4more days   - C/w Duoneb q4h, Advair Inhaler  BID  - Airway clearance w/ Hypersaline 3% TID & Aerobika TID while here  - Pulm consult, were following patient last adm #AHRF  P/w AHRF 2/2 COPD exacerbation, similar to prior admissions. BIBA w/ SOB, cough/congestion. Reported chills to Ed provider however now denying. Reports sputum/mucus production. States he was not able to use home inhalers.   S/p Duoneb x1 in ED , Solumedrol 125mg, Azithro 500mg x1  - Hold off on azithro for now as pt finished 3d course in recent adm & QTc elevated  - C/w Prednisone 40mg qd x4more days   - C/w Duoneb q4h, Advair Inhaler  BID  - Airway clearance w/ Hypersaline 3% TID & Aerobika TID while here  - Pulm consult, were following patient last adm

## 2025-04-30 NOTE — DISCHARGE NOTE NURSING/CASE MANAGEMENT/SOCIAL WORK - NSDCPEFALRISK_GEN_ALL_CORE
For information on Fall & Injury Prevention, visit: [email or web address] OR  [email or web address] OR  https://www.cdc.gov/steadi/patient.html

## 2025-04-30 NOTE — CONSULT NOTE ADULT - CONVERSATION DETAILS
Met with patient at bedside. Introduced the role of palliative medicine for ACP, GOC, and support.     Patient demonstrates an understanding of his COPD diagnosis and its progressive nature. He acknowledges his increased risk for lung infections and potential complications requiring hospitalization.      Reviewed disease trajectory of COPD and explained signs and symptoms of advanced disease including: severe [place] or at rest, increased visits to the ED/hospitalizations for breathing complications or respiratory infections, ongoing weightloss and pulmonary hypertension, and right sided heart failure.    What is most important to him is maintaining functional independence and performing activities of daily living. He expressed concern about becoming a burden to others if/when he loses his self-sufficiency in the future.     He prioritizes comfort, but is willing to consider high-risk or invasive procedures, contingent upon discussion about the risks and benefits. His current goals are to extend life, hospitalization as needed, and pursue CPR/intubation if necessary. However, he stated that living trach to vent in a facility would be unacceptable due to its impact on his QOL.    Pt has not completed HCP. Explained role and circumstances when form would come into effect. Form completed and placed in physical chart; pt electing his sister Tatyana Rodas #[phone].

## 2025-04-30 NOTE — ED ADULT NURSE NOTE - NSFALLUNIVINTERV_ED_ALL_ED
Bed/Stretcher in lowest position, wheels locked, appropriate side rails in place/Call bell, personal items and telephone in reach/Instruct patient to call for assistance before getting out of bed/chair/stretcher/Non-slip footwear applied when patient is off stretcher/[place] to call system/Physically safe environment - no spills, clutter or unnecessary equipment/Purposeful proactive rounding/Room/bathroom lighting operational, light cord in reach

## 2025-04-30 NOTE — CONSULT NOTE ADULT - CONSULT REASON
COPD
Palliative consulted to discuss goals of care and treatment preferences in the setting of COPD/serious illness

## 2025-04-30 NOTE — H&P ADULT - PROBLEM SELECTOR PLAN 7
F: s/p 1L LR, caution w/ further fluids given HFrEF  E: Replete PRN  N: DASH  P: Heparin subcu  Dispo: [facility], PT Consult placed Hx HTN, previously on amlodipine, switched to GDMT last adm  - c/w Losartan 50mg qd  - Holding Lasix as above

## 2025-04-30 NOTE — ED ADULT TRIAGE NOTE - NS ED TRIAGE AVPU SCALE
Alert-The patient is alert, awake and responds to voice. The patient is oriented to time, place, and person. The triage nurse is able to obtain subjective information. Family hx:  Mother 29 yo DM;  x3 and gallbladder removal no issues  Father 32 yo A/W no pmh; no psh  Brothers- 2yo, 8 yo (dental extractions no issues)- no pmh; no psh    MGM 48 A/W no pmh s/p lap janey, no issues  MGF ? age unknown history  PGM ? age DM/ HTN   PGF  ? age from a MI 3yo M with PMH significant for HIE (hypoxic ischemic encephalopathy), cerebral palsy, speech delay, possible hearing loss and amoxicillin allergy. He required G-tube placement at one month of age. Since 2019, he has been off all Gtube feeds and PO feeds a regular diet. Aubrey also has a h/o seziures and was maintained on Phenobarbital for two years following his intial seizure in the  period. Phenobarbital was discontinued more than one year ago. Mother denies any seizure activity since.  Now scheduled for sedated ABR.     No h/o anesthetic or surgical complications with prior procedures.    Denies any recent acute illness in the past two weeks. MOC states child's vaccinations are UTD, denies vaccinations within the last 2 weeks. MOC advised not get vaccines before procedure and up to 2 post procedure.     Not vaccinated for COVID; Denies recent international travel, recent illness and sick contact with COVID in the last 3 weeks 4 year old male child PMH significant for HIE (hypoxic ischemic encephalopathy), cerebral palsy, seizure DO, (no recent activity), speech delay, hearing loss and H/O G-tube placement at one month of age, currently only used for medication administration. Presents to PST scheduled for Botox Injections with Dr. Donovan on 11/22/22. Last injection in 2019. 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 Did well post op. Denies H/O anesthesia or surgical complications with most recent and prior procedures. Denies any recent acute illness in the past two weeks.      NICU x 3 months requiring intubation x 1 week    Family hx:  Mother 29 yo DM;  x3 and gallbladder removal no issues  Father 32 yo A/W no pmh; no psh  Brothers- 2yo, 10 yo (dental extractions no issues)- no pmh; no psh    MGM 48 A/W no pmh s/p lap janey, no issues  MGF ? age unknown history  PGM ? age h/o DM/ HTN   PGF  ? age from a MI MOC states child's vaccinations are UTD, denies vaccinations within the last 2 weeks. MOC advised not get vaccines before procedure and up to 2 weeks post procedure.     Not vaccinated for COVID; Denies recent international travel, recent illness and sick contact with COVID in the last 3 weeks NICU x 3 months requiring intubation x 1 week    Family hx:  Mother 29 yo DM;  x3 and gallbladder removal no issues  Father 32 yo A/W no pmh; no psh  Brothers- 2yo, 8 yo (dental extractions no issues)- no pmh     MGM 48 A/W no pmh s/p lap janey, no issues  MGF ? age unknown history  PGM ? age h/o DM/ HTN   PGF  ? 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 Presents to PST scheduled for Botox Injections with Dr. Donovan on 11/22/22. Last injection in 2019[place] 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 Did well post op. Denies H/O anesthesia or surgical complications with most recent and prior procedures. Denies any recent acute illness in the past two weeks.

## 2025-04-30 NOTE — CONSULT NOTE ADULT - PROBLEM SELECTOR RECOMMENDATION 3
TTE 4/25 ef 40-45%, normal RV function   - comorbidity supports limited life expectancy and long term prognosis

## 2025-04-30 NOTE — H&P ADULT - ATTENDING COMMENTS
Pt. seen and examined by me earlier today; I have read Dr. Peterson's H&P, I agree w/ her findings and plan of care as documented; chart from previous admission reviewed; labs and images reviewed; CXR clear; Pulm consulted, cont. COPD mgmt per recs; wean off O2 as tolerated and assess need for portable home O2; sCr at baseline, OK to resume ARB; monitor volume status, hold Lasix for now; troponin downtrending from last admission; cont. PO TMP-SMX for RLE wound cellulitis; wound care per Vascular recs last admission; elevate leg; may need to reconsult Vascular this admission, pending clinical progress; PT/OT, CM/SW consults

## 2025-04-30 NOTE — H&P ADULT - PROBLEM SELECTOR PLAN 4
P/w  BUN/Cr 44/1.58, likely pre-renal iso elevated BUN & pt reports poor PO intake since leaving hospital 4/28.   s/p 1L LR   Likely iso recent GDMT initiation   Plan:   - F/u urine studies  - Cards consult for continuation of GDMT   - Caution w/ fluids iso HFrEF  - Trend BUN/Cr, renally dose, avoid nephrotoxic agents P/w Trop 89, ECG read w/ bifascular block iso known RBBB. Upon review, ECG grossly unchanged from prior ECG from 4/24, pt not endorsing chest pain or dyspnea on exertion.   Trop down from prior adm w/ Trop 117. Also iso MONA on CKD.   - F/u 10AM cardiac enzymes to trend  - Repeat ECG  - Low c/f ACS given pt denying chest pain, and pt's dyspnea/presentation more consistent w/ COPD exacerbation

## 2025-04-30 NOTE — H&P ADULT - NSHPSOCIALHISTORY_GEN_ALL_CORE
previously smoked 1 pack/day since age 17, stopped 4 years ago  denies alcohol or recreational drug use  lives in 3rd floor walk up [facility]

## 2025-04-30 NOTE — CONSULT NOTE ADULT - ASSESSMENT
80M w/ PMhx of HTN, COPD not on home O2 with recent admission to [facility] for RLE cellulitis 4/12-4/13 and left AMA presented to [facility] 4/24 after he was found with altered mental status and shortness of breath by a neighbor, admitted for LLE cellulitis and ACOPDE necessitating BiPAP initially. Pulm consulted given recent COPD admission and now presenting with shortness of breath      Here now with recurrent shortness of breath after recently leaving AMA. Suspect likely in setting of non-adherence to medications as did not have them upon discharge at home and recently re-started smoking cigarettes again. Shortness of breath when examined at baseline with rhonchi noted on lung auscultation. Otherwise non-toxic and with good air movement. Suspect dyspnea was provoked in setting of limited access to inhalers and reinitiation of smoking, currently not in AECOPD.     DATA REVIEWED:  ***  TTE 4/24/25: LV dysfunction 40 - 45%, grade 1 DD, RV normal size/function, LA dilation. Ascending aorta dilation. PASP 40  CTA pulm 4/24/25 IMPRESSION: No pulmonary embolism. Previously seen masslike encasement of the right hilum with ipsilateral   subcarinal and paratracheal lymphadenopathy is no longer seen. Extensive diffuse emphysema. Improvement of previously noted mediastinal adenopathy. Small right greater than left pleural effusion.    #COPD   #Severe emphysema  #Endobronchial debris in ASHA  #Active nicotine use    Plan:  - Should be resumed on LABA/LAMA inhaler (can try anoro or stiolto-please see what is covered by his insurance)  - Please resume azithromycin TIW  - Continue airway clearance duonebs TID->hypersaline 3% TID -> aerobika TID while here, encourage it on discharge w/ aerobika device please ensure all devices at home  - Would benefit from f/u with our pulmonologists; PFTs/6MWT; immunizations for RSV, flu, shingles, pneumococcus; pulmonary rehab. He should see Dr. Bledsoe at his [facility] at [address]  - No indication for steroids at this time

## 2025-04-30 NOTE — ED PROVIDER NOTE - PHYSICAL EXAMINATION
Gen - WDWN M, tachypneic appearing with increased WOB, purse lip breathing, speaking in fragmented sentences   Skin - warm, dry, 8x4cm irregular demarcated wound with +slough, purulent dc, and erythema to the RLE lateral mid calf region, +surrounding induration and warmth, TTP, circumferential   HEENT - AT/NC, PERRL, no nasal discharge, airway patent, neck supple and FROM  CV - S1S2, R/R/R  Resp - tachypneic appearing, purse lip breathing with accessory muscle use, +rhonchi and wheezing b/l, no focal rales   GI - soft, ND, NT, no CVAT b/l   MS - w/w/p, wound to the RLE per skin section, compartment soft, +Symmetric pulses b/l, calve supple, No midline spinal tenderness or step off on palpation  Neuro - AxO to person and place, intermittent forgetfulness, but redirectable, no focal neuro deficits, ambulatory with assistance

## 2025-04-30 NOTE — CONSULT NOTE ADULT - PROBLEM SELECTOR RECOMMENDATION 5
.  - Full code  - sister Tatyana Rodas #[phone] is HCP    In addition to the E/M visit, an advance care planning meeting was performed. Start time: 1230; End time:100 ; Total time: 30 min. A in-person meeting to discuss advance care planning was held today regarding:  Bryan Jackson    Primary decision maker:  Patient is able to participate in decision making;  Alternate/surrogate:  Tatyana   . Discussed advance directives including, but not limited to, healthcare proxy and code status, as well as disease trajectory, patient's values/goals, and health care options that are available for end of life care. Decision regarding code status: FULL CODE; Documentation completed today: HCP GOC note

## 2025-04-30 NOTE — CONSULT NOTE ADULT - PROBLEM SELECTOR RECOMMENDATION 4
.  - cw care, abx per primary team   - recommend low threshold to schedule tylenol 650 mg PO a8 for three days to maximize comfort  - fall precautions

## 2025-04-30 NOTE — H&P ADULT - PROBLEM SELECTOR PLAN 6
Hx HTN, previously on amlodipine, switched to GDMT last adm  - c/w Losartan 50mg qd  - Holding Lasix as above Hx HFrEF w/ EF 40-45% (4/24/25). Seen by cardiology last adm & started on Losartan 50mg qd, Lasix 40mg qd.   Deferred BB therapy given reactive airways/recurrent COPD exac   TTE 4/24: EF 40-45%, no WMAs, mild LV hypertrophy, mild LV diastolic dysfunction, normal RV size and systolic fxn. Estimated PASP 40.  Pt not volume overloaded at this time, proBNP 5940 (from 52951 on last adm)   - C/w Losartan 50mg qd   - Hold Lasix 40qd at this time iso MONA on CKD & not acutely volume overloaded  - Atorvastatin 80mg qhs started last adm, continue   - Cards consult as above Hx HFrEF w/ EF 40-45% (4/24/25). Seen by cardiology last adm & started on Losartan 50mg qd, Lasix 40mg qd.   Deferred BB therapy given reactive airways/recurrent COPD exac   TTE 4/24: EF 40-45%, no WMAs, mild LV hypertrophy, mild LV diastolic dysfunction, normal RV size and systolic fxn. Estimated PASP 40.  Pt not volume overloaded at this time, proBNP 5940 (from 02490 on last adm)   Discussed w/ cardiology if any plan for further GDMT initiation inpatient - no further plan for now, no need for re-consult inpatient, pt to follow-up outpatient as previously rec'd.   - C/w Losartan 50mg qd   - Hold Lasix 40qd at this time iso MONA on CKD & not acutely volume overloaded  - Atorvastatin 80mg qhs started last adm, continue

## 2025-04-30 NOTE — H&P ADULT - HISTORY OF PRESENT ILLNESS
81 yo M w/ PMHx of HTN, COPD not on home O2 with recent admission to [facility] for RLE cellulitis 4/12-4/13 and left AMA, readmitted the following day for COPD/CHF exacerbation with hypoxic respiratory distress, signed out AMA again on 4/28/25, BIBA for worsening SOB, hypoxia and wheezing. Pt states he felt he couldn't breath, +increased cough w/ continued sputum production (clear/whitish). Also endorsed decreased PO intake.   Reported chills & body ahces to ED provider, however now denying. States he was not able to obtain inhalers after leaving AMA.     Vitals: T98.2F. HR 90s, /68, initially on 8L NRB -> 3L NC   Labs: WBC 8.44, Hb 13.3, K 3.9, BUN/Cr 44/1.58, Trop 89.3, ProBNP 5940, Lactate 1.6.   CXR: Lungs clear. No pneumothorax. Unremarkable cardiac silhouette. No acute bone abnormality. Vascular calcification thoracic aorta.  Interventions: Acetaminophen 975mg, Solumedrol 125mg, Vanc 1g, Cefepime 1g, Azithro 500mg x1, 1L LR, Duoneb 1x   Consults: None   81 yo M w/ PMHx of HTN, COPD not on home O2 with recent admission to [facility] for RLE cellulitis 4/12-4/13 and left AMA, readmitted the following day for COPD/CHF exacerbation with hypoxic respiratory distress, signed out AMA again on 4/28/25, BIBA for worsening SOB, hypoxia and wheezing. Per EMS, found hypoxic to 80% on RA and placed on NRB in the field, w/ O2 sat improving to high 90s. Pt states he felt he couldn't breath, +increased cough w/ continued sputum production (clear/whitish). Also endorsed decreased PO intake.   Reported chills & body ahces to ED provider, however now denying. States he was not able to obtain inhalers after leaving AMA.     Vitals: T98.2F. HR 90s, /68, initially on 8L NRB -> 3L NC   Labs: WBC 8.44, Hb 13.3, K 3.9, BUN/Cr 44/1.58, Trop 89.3, ProBNP 5940, Lactate 1.6.   CXR: Lungs clear. No pneumothorax. Unremarkable cardiac silhouette. No acute bone abnormality. Vascular calcification thoracic aorta.  Interventions: Acetaminophen 975mg, Solumedrol 125mg, Vanc 1g, Cefepime 1g, Azithro 500mg x1, 1L LR, Duoneb 1x   Consults: None

## 2025-04-30 NOTE — H&P ADULT - PROBLEM SELECTOR PLAN 5
Hx HFrEF w/ EF 40-45% (4/24/25). Seen by cardiology last adm & started on Losartan 50mg qd, Lasix 40mg qd.   Deferred BB therapy given reactive airways/recurrent COPD exac   TTE 4/24: EF 40-45%, no WMAs, mild LV hypertrophy, mild LV diastolic dysfunction, normal RV size and systolic fxn. Estimated PASP 40.  Pt not volume overloaded at this time, proBNP 5940 (from 23789 on last adm)   - C/w Losartan 50mg qd   - Hold Lasix 40qd at this time iso MONA on CKD & not acutely volume overloaded  - Atorvastatin 80mg qhs started last adm, continue   - Cards consult as above P/w  BUN/Cr 44/1.58, likely pre-renal iso elevated BUN & pt reports poor PO intake since leaving hospital 4/28.   s/p 1L LR   Likely iso recent GDMT initiation   Plan:   - F/u urine studies  - Cards consult for continuation of GDMT   - Caution w/ fluids iso HFrEF  - Trend BUN/Cr, renally dose, avoid nephrotoxic agents P/w  BUN/Cr 44/1.58, likely pre-renal iso elevated BUN & pt reports poor PO intake since leaving hospital 4/28.   s/p 1L LR   Likely iso recent GDMT initiation   Plan:   - F/u urine studies  - Caution w/ fluids iso HFrEF  - Trend BUN/Cr, renally dose, avoid nephrotoxic agents

## 2025-04-30 NOTE — H&P ADULT - PROBLEM SELECTOR PLAN 8
F: s/p 1L LR, caution w/ further fluids given HFrEF  E: Replete PRN  N: DASH  P: Heparin subcu  Dispo: RMF, PT Consult placed

## 2025-04-30 NOTE — ED PROVIDER NOTE - ATTENDING APP SHARED VISIT CONTRIBUTION OF CARE
80-year-old male with a history of hypertension, COPD, and recent admissions for cellulitis and COPD/CHF exacerbations presented with difficulty breathing. The patient experienced worsening shortness of breath, hypoxia, and wheezing following discharge against medical advice. He reported increased cough, congestion, and use of accessory muscles during breathing. Upon EMS arrival, he was hypoxic at 80% oxygen saturation, improved with non-rebreather mask to 100%. Examination found tachypnea, extensive respiratory distress, and a cellulitis wound on the right lower extremity. Labs showed acidosis, elevated CRP, troponin, and BNP levels, indicating acute on chronic respiratory failure and likely CHF exacerbation. Treatment included BiPAP, azithromycin for pneumonia, and IV antibiotics for MRSA cellulitis. Once stabilized, he was admitted for further management of COPD exacerbation and infected wound at [facility].    I saw and evaluated the patient. I discussed the case with the SILVIO and agree with the findings and helped develop the plan of care as documented in the SILVIO's note. I agree with the findings and plan of care as documented in the SILVIO's note.

## 2025-04-30 NOTE — ED ADULT NURSE NOTE - OBJECTIVE STATEMENT
Pt came in c/o difficulty breathing, chills, and fever x today, Pt reports recent dc from [facility]. Pt presents with a chronic wound with drainage to RLE. Dressing placed on wound. PT on bipap and ccm/o2 monitoring. Pt is speaking in full clear sentences prior to bipap placement. A&Ox4

## 2025-04-30 NOTE — CONSULT NOTE ADULT - PROBLEM SELECTOR RECOMMENDATION 9
.  2/2 COPD exacerbation, similar to prior admissions.  breathing comfortably on nc  - cw care per primary team: steroids, duonebs, advair, aerobika   - Continue with oxygen for comfort  - Elevate head of bed to 30 degrees  - Bedside fan to face  - Incentive spirometry at bedside  - If intense dyspnea, not responding to other methods, can trial hydromorphone 0.2mg IV q3h PRN dyspnea
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
## 2025-04-30 NOTE — CONSULT NOTE ADULT - ATTENDING COMMENTS
Previously seen by pulmonary for COPD exacerbation and mucus debris within airways noted on CT, had recommended 5 days prednisone, LAMA/LABA, nebulizers, and airway clearance devices, pt had significant improvement and was awaiting wound vac for LE wounds when he left AMA. Returned to ED last night because of recurrence of SOB, although notes he does not have ANY inhalers or nebulizers at home. Less suspicious for "recurrent" or repeat COPD exacerbation, just needs access to airway management. Please assist pt in obtaining LAMA/LABA as well as nebulizer therapy to allow for adequate treatment at home. He would benefit form nebulizer + aerobika for airway clearance given rhonchi on exam (without wheezing) and visualized debris in airway on CT from last admission. Would not treat again with prednisone course, monitor off steroids for now. Ambulate to evaluate if pt requires home oxygen given extensive emphysema on CT. No PFTs to review, but may have such severe obstruction that inhalers are not sufficient to deliver medication and nebulizers perhaps better tolerated.

## 2025-04-30 NOTE — H&P ADULT - NSHPPHYSICALEXAM_GEN_ALL_CORE
.  VITAL SIGNS:  T(C): 36.8 (04-30-25 @ 05:31), Max: 36.8 (04-30-25 @ 05:31)  T(F): 98.2 (04-30-25 @ 05:31), Max: 98.2 (04-30-25 @ 05:31)  HR: 89 (04-30-25 @ 06:35) (83 - 98)  BP: 159/93 (04-30-25 @ 06:35) (127/72 - 159/93)  BP(mean): --  RR: 18 (04-30-25 @ 06:35) (16 - 20)  SpO2: 100% (04-30-25 @ 06:35) (93% - 100%)  Wt(kg): --    PHYSICAL EXAM:    Constitutional: NAD, sleeping in bed  HEENT: NC/AT, MMM  Respiratory: Grossly clear bilaterally w/ mild expiratory wheezes in upper air fields, on 3L NC, no resp distress.   Cardiac: +S1/S2; RRR; no M/R/G  Gastrointestinal: soft, NT/ND  Extremities: RLE wrapped w/ kerlix, surrounding skin slightly erythematous. Nontender, no crepitus on palpation. Per RN, 3x3cm nonhealing ulcer w/ eschar, consistent w/ recent adm. No drainage on bandage.   Vascular: 2+ radial & DP pulses  Neurologic: AAOx2 to self and place; CNII-XII grossly intact; no focal deficits

## 2025-04-30 NOTE — DISCHARGE NOTE NURSING/CASE MANAGEMENT/SOCIAL WORK - FINANCIAL ASSISTANCE
[facility] provides services at a reduced cost to those who are determined to be eligible through [facility]’s financial assistance program. Information regarding [facility]’s financial assistance program can be found by going to [email or web address] or by calling [phone].

## 2025-04-30 NOTE — CONSULT NOTE ADULT - PROBLEM SELECTOR RECOMMENDATION 2
.  - Patient does not definitively meet hospice criteria for COPD  - cw supportive care   - On discharge, patient should follow-up outpatient with Geriatrician Dr. Karissa Cespedes at [address] [place] [address]. Please call #[phone] with patient's name, MRN to request an outpatient appointment.

## 2025-04-30 NOTE — ED PROVIDER NOTE - CLINICAL SUMMARY MEDICAL DECISION MAKING FREE TEXT BOX
79 yo M w/ PMHx of HTN, COPD not on home O2 with recent admission to [facility] for RLE cellulitis 4/12-4/13 and left AMA, readmitted the following day for COPD/CHF exacerbation with hypoxic respiratory distress, signed out AMA again on 4/28/25, BIBA for worsening SOB, hypoxia and wheezing.  plan - check labs (including cbc, cmp, mg, crp, cardiac enzyme, blood gas, lactic), CXR, EKG, cardiac monitoring, serial nebs, IV steroids, IV vanco/cefepime/azithro for HCAP and MRSA RLE infected wound, biPAP, supportive care and likely will need re-admission to [facility]     - case discussed with Dr. Molina, hospitalist at [facility], accepted to [facility] for the management of the following conditions   #acute on chronic hypoxic respiratory distress - COPD/CHF exacerbation  #mucus plugging/HCAP   #AoCRI   #MRSA RLE infected wound 79 yo M w/ PMHx of HTN, COPD not on home O2 with recent admission to [facility] for RLE cellulitis 4/12-4/13 and left AMA, readmitted the following day for COPD/CHF exacerbation with hypoxic respiratory distress, signed out AMA again on 4/28/25, BIBA for worsening SOB, hypoxia and wheezing.  plan - check labs (including cbc, cmp, mg, crp, cardiac enzyme, blood gas, lactic), CXR, EKG, cardiac monitoring, serial nebs, IV steroids, IV vanco/cefepime/azithro for HCAP and MRSA RLE infected wound, biPAP, supportive care and likely will need re-admission to [facility]     - labs similar to prior admission, slight uptrending of AoCRI, likely iso volume contraction/decreased po intake; elevated Trop and probnp with similar EKG without ischemic findings or active CP, likely iso CHF exacerbation and low suspicion for ACS    - will hold lasix due to worsening MONA and likely intravascular volume depletion. continue on BiPAP for CHF/COPD exacerbation for now     - pt titrated off bipap given improved respiratory status and decreased wob after meds. placed on trial of 2-3L of O2 via NC with good response and stable saturation.    - case discussed with Dr. Molina, hospitalist at [facility], accepted to [facility] for the management of the following conditions   #acute on chronic hypoxic respiratory distress - COPD/CHF exacerbation  #mucus plugging/HCAP   #AoCRI   #MRSA RLE infected wound

## 2025-04-30 NOTE — ED PROVIDER NOTE - CARE PLAN
Principal Discharge DX:	COPD exacerbation  Secondary Diagnosis:	Acute on chronic respiratory failure with hypoxia  Secondary Diagnosis:	MRSA cellulitis  Secondary Diagnosis:	Infected wound   1

## 2025-04-30 NOTE — CONSULT NOTE ADULT - ASSESSMENT
81 yo M PMHx HTN, COPD not on home O2 with recurrent exacerbations iso med nonadherence,  recently left AMA after adm for exacerbation, represents 4/30 w/ SOB adm for AHRF 2/2 COPD exacerbation. Adm for further mgmt. Palliative consulted to discuss goals of care and treatment preferences in the setting of COPD.     Patient has an appropriate understanding of his illness and its trajectory. He values functional independence and fears becoming a burden. He expresses a preference for life-extending treatments and accepts aggressive interventions, except for tracheostomy and long-term ventilation.  81 yo M PMHx HTN, COPD not on home O2 with recurrent exacerbations iso med nonadherence,  recently left AMA after adm for exacerbation, represents 4/30 w/ SOB adm for AHRF 2/2 COPD exacerbation. Adm for further mgmt. Palliative consulted to discuss goals of care and treatment preferences in the setting of COPD.     Patient is at risk of decline and decompensation.  Advanced age and comorbidities indicate significant frailty and increased vulnerability to further health crises; a critical illness or acute event could be life-threatening. Repeated hospitalizations can be detrimental to QOL.    Patient has an appropriate understanding of his illness and its trajectory. He values functional independence and fears becoming a burden. He expresses a preference for life-extending treatments and accepts aggressive interventions, except for tracheostomy and long-term ventilation.

## 2025-04-30 NOTE — ED ADULT TRIAGE NOTE - CHIEF COMPLAINT QUOTE
BIBA for difficulty breathing that started today, Also c/o body aches and chills for an unknown period of time and dry cough for awhile. Large wound noted to R. calf.

## 2025-04-30 NOTE — PATIENT PROFILE ADULT - FALL HARM RISK - HARM RISK INTERVENTIONS

## 2025-04-30 NOTE — ED ADULT NURSE NOTE - NSHOSCREENINGQ1_ED_ALL_ED
Calling to check on Rx if medication was sent, she states patient came in to be seen and that was the whole point for a medication. She states they use [facility] in [facility]. Told her that Dr. Palla will be in this afternoon and we are still waiting for a response. Please call and advise.    No

## 2025-04-30 NOTE — CONSULT NOTE ADULT - PROBLEM SELECTOR RECOMMENDATION 6
.  - Emotional support provided, questions answered.   Complex medical decision making / symptom management in the setting of COPD exacerbation     - Palliative medicine will sign off.  Please contact Palliative Medicine 24/7 at [phone] if the patient's symptoms and/or goals of care change, need to be readdressed, or if patient is readmitted in the future.     Active Psychosocial Referrals:  [x]Social Work/Case management []PT/OT [ ]Chaplaincy [ ]Hospice  []Patient/Family Support []Holistic RN [x]Massage Therapy [x]Music Therapy []Ethics  Coping: [] well [x] with difficulty [ ] poor coping [] unable to assess  Support system: [] strong [x] adequate [ ] inadequate

## 2025-04-30 NOTE — ED ADULT NURSE NOTE - NS ED NURSE LEVEL OF CONSCIOUSNESS MENTAL STATUS
Chief Complaint   Patient presents with     Consult     menopausal issues       Initial BP (!) 162/98  Pulse 88  Wt (!) 389 lb (176.4 kg)  BMI 52.76 kg/m2 Estimated body mass index is 52.76 kg/(m^2) as calculated from the following:    Height as of 4/20/17: 6' (1.829 m).    Weight as of this encounter: 389 lb (176.4 kg).  Medication Reconciliation: complete   Awake/Alert/Cooperative

## 2025-05-01 ENCOUNTER — APPOINTMENT (OUTPATIENT)
Dept: PULMONOLOGY | Facility: CLINIC | Age: 80
End: 2025-05-01

## 2025-05-01 LAB
ALBUMIN SERPL ELPH-MCNC: 2.9 G/DL — LOW (ref 3.3–5)
ALP SERPL-CCNC: 58 U/L — SIGNIFICANT CHANGE UP (ref 40–120)
ALT FLD-CCNC: 20 U/L — SIGNIFICANT CHANGE UP (ref 10–45)
ANION GAP SERPL CALC-SCNC: 10 MMOL/L — SIGNIFICANT CHANGE UP (ref 5–17)
AST SERPL-CCNC: 16 U/L — SIGNIFICANT CHANGE UP (ref 10–40)
BASOPHILS # BLD AUTO: 0.01 K/UL — SIGNIFICANT CHANGE UP (ref 0–0.2)
BASOPHILS NFR BLD AUTO: 0.1 % — SIGNIFICANT CHANGE UP (ref 0–2)
BILIRUB SERPL-MCNC: 0.6 MG/DL — SIGNIFICANT CHANGE UP (ref 0.2–1.2)
BUN SERPL-MCNC: 46 MG/DL — HIGH (ref 7–23)
CALCIUM SERPL-MCNC: 8.2 MG/DL — LOW (ref 8.4–10.5)
CHLORIDE SERPL-SCNC: 102 MMOL/L — SIGNIFICANT CHANGE UP (ref 96–108)
CO2 SERPL-SCNC: 26 MMOL/L — SIGNIFICANT CHANGE UP (ref 22–31)
CREAT SERPL-MCNC: 1.5 MG/DL — HIGH (ref 0.5–1.3)
EGFR: 46 ML/MIN/1.73M2 — LOW
EGFR: 46 ML/MIN/1.73M2 — LOW
EOSINOPHIL # BLD AUTO: 0.01 K/UL — SIGNIFICANT CHANGE UP (ref 0–0.5)
EOSINOPHIL NFR BLD AUTO: 0.1 % — SIGNIFICANT CHANGE UP (ref 0–6)
GLUCOSE SERPL-MCNC: 114 MG/DL — HIGH (ref 70–99)
GRAM STN FLD: SIGNIFICANT CHANGE UP
HCT VFR BLD CALC: 33 % — LOW (ref 39–50)
HGB BLD-MCNC: 10.9 G/DL — LOW (ref 13–17)
IMM GRANULOCYTES NFR BLD AUTO: 0.3 % — SIGNIFICANT CHANGE UP (ref 0–0.9)
LYMPHOCYTES # BLD AUTO: 1.05 K/UL — SIGNIFICANT CHANGE UP (ref 1–3.3)
LYMPHOCYTES # BLD AUTO: 13.7 % — SIGNIFICANT CHANGE UP (ref 13–44)
MAGNESIUM SERPL-MCNC: 2.1 MG/DL — SIGNIFICANT CHANGE UP (ref 1.6–2.6)
MCHC RBC-ENTMCNC: 32.2 PG — SIGNIFICANT CHANGE UP (ref 27–34)
MCHC RBC-ENTMCNC: 33 G/DL — SIGNIFICANT CHANGE UP (ref 32–36)
MCV RBC AUTO: 97.3 FL — SIGNIFICANT CHANGE UP (ref 80–100)
MONOCYTES # BLD AUTO: 0.76 K/UL — SIGNIFICANT CHANGE UP (ref 0–0.9)
MONOCYTES NFR BLD AUTO: 9.9 % — SIGNIFICANT CHANGE UP (ref 2–14)
NEUTROPHILS # BLD AUTO: 5.8 K/UL — SIGNIFICANT CHANGE UP (ref 1.8–7.4)
NEUTROPHILS NFR BLD AUTO: 75.9 % — SIGNIFICANT CHANGE UP (ref 43–77)
NRBC BLD AUTO-RTO: 0 /100 WBCS — SIGNIFICANT CHANGE UP (ref 0–0)
PHOSPHATE SERPL-MCNC: 4.3 MG/DL — SIGNIFICANT CHANGE UP (ref 2.5–4.5)
PLATELET # BLD AUTO: 174 K/UL — SIGNIFICANT CHANGE UP (ref 150–400)
POTASSIUM SERPL-MCNC: 4.2 MMOL/L — SIGNIFICANT CHANGE UP (ref 3.5–5.3)
POTASSIUM SERPL-SCNC: 4.2 MMOL/L — SIGNIFICANT CHANGE UP (ref 3.5–5.3)
PROT SERPL-MCNC: 5.2 G/DL — LOW (ref 6–8.3)
RBC # BLD: 3.39 M/UL — LOW (ref 4.2–5.8)
RBC # FLD: 15 % — HIGH (ref 10.3–14.5)
SODIUM SERPL-SCNC: 138 MMOL/L — SIGNIFICANT CHANGE UP (ref 135–145)
SPECIMEN SOURCE: SIGNIFICANT CHANGE UP
WBC # BLD: 7.65 K/UL — SIGNIFICANT CHANGE UP (ref 3.8–10.5)
WBC # FLD AUTO: 7.65 K/UL — SIGNIFICANT CHANGE UP (ref 3.8–10.5)

## 2025-05-01 PROCEDURE — 99233 SBSQ HOSP IP/OBS HIGH 50: CPT | Mod: GC

## 2025-05-01 RX ORDER — UMECLIDINIUM BROMIDE AND VILANTEROL TRIFENATATE 62.5; 25 UG/1; UG/1
1 POWDER RESPIRATORY (INHALATION)
Refills: 0
Start: 2025-05-01

## 2025-05-01 RX ORDER — CEPHALEXIN 250 MG/1
500 CAPSULE ORAL EVERY 12 HOURS
Refills: 0 | Status: DISCONTINUED | OUTPATIENT
Start: 2025-05-01 | End: 2025-05-01

## 2025-05-01 RX ORDER — UMECLIDINIUM BROMIDE AND VILANTEROL TRIFENATATE 62.5; 25 UG/1; UG/1
2 POWDER RESPIRATORY (INHALATION)
Qty: 1 | Refills: 0
Start: 2025-05-01 | End: 2025-05-30

## 2025-05-01 RX ORDER — TIOTROPIUM BROMIDE AND OLODATEROL 3.124; 2.736 UG/1; UG/1
2 SPRAY, METERED RESPIRATORY (INHALATION)
Qty: 1 | Refills: 0
Start: 2025-05-01 | End: 2025-05-30

## 2025-05-01 RX ORDER — LINEZOLID 2 MG/ML
600 INJECTION, SOLUTION INTRAVENOUS EVERY 12 HOURS
Refills: 0 | Status: DISCONTINUED | OUTPATIENT
Start: 2025-05-01 | End: 2025-05-01

## 2025-05-01 RX ORDER — SULFAMETHOXAZOLE/TRIMETHOPRIM 800-160 MG
1 TABLET ORAL EVERY 12 HOURS
Refills: 0 | Status: DISCONTINUED | OUTPATIENT
Start: 2025-05-01 | End: 2025-05-03

## 2025-05-01 RX ORDER — UMECLIDINIUM BROMIDE AND VILANTEROL TRIFENATATE 62.5; 25 UG/1; UG/1
1 POWDER RESPIRATORY (INHALATION)
Qty: 1 | Refills: 0
Start: 2025-05-01 | End: 2025-05-30

## 2025-05-01 RX ORDER — FLUTICASONE FUROATE, UMECLIDINIUM BROMIDE AND VILANTEROL TRIFENATATE 100; 62.5; 25 UG/1; UG/1; UG/1
1 POWDER RESPIRATORY (INHALATION)
Refills: 0 | DISCHARGE

## 2025-05-01 RX ORDER — UMECLIDINIUM BROMIDE AND VILANTEROL TRIFENATATE 62.5; 25 UG/1; UG/1
1 POWDER RESPIRATORY (INHALATION)
Qty: 1 | Refills: 0
Start: 2025-05-01

## 2025-05-01 RX ORDER — METRONIDAZOLE 250 MG
500 TABLET ORAL EVERY 12 HOURS
Refills: 0 | Status: DISCONTINUED | OUTPATIENT
Start: 2025-05-01 | End: 2025-05-03

## 2025-05-01 RX ADMIN — HEPARIN SODIUM 5000 UNIT(S): 1000 INJECTION INTRAVENOUS; SUBCUTANEOUS at 06:33

## 2025-05-01 RX ADMIN — Medication 500 MILLIGRAM(S): at 18:49

## 2025-05-01 RX ADMIN — HEPARIN SODIUM 5000 UNIT(S): 1000 INJECTION INTRAVENOUS; SUBCUTANEOUS at 15:52

## 2025-05-01 RX ADMIN — IPRATROPIUM BROMIDE AND ALBUTEROL SULFATE 3 MILLILITER(S): .5; 2.5 SOLUTION RESPIRATORY (INHALATION) at 21:16

## 2025-05-01 RX ADMIN — Medication 5 MILLIGRAM(S): at 23:17

## 2025-05-01 RX ADMIN — TIOTROPIUM BROMIDE AND OLODATEROL 2 PUFF(S): 3.124; 2.736 SPRAY, METERED RESPIRATORY (INHALATION) at 12:42

## 2025-05-01 RX ADMIN — HEPARIN SODIUM 5000 UNIT(S): 1000 INJECTION INTRAVENOUS; SUBCUTANEOUS at 21:15

## 2025-05-01 RX ADMIN — ATORVASTATIN CALCIUM 80 MILLIGRAM(S): 80 TABLET, FILM COATED ORAL at 21:16

## 2025-05-01 RX ADMIN — IPRATROPIUM BROMIDE AND ALBUTEROL SULFATE 3 MILLILITER(S): .5; 2.5 SOLUTION RESPIRATORY (INHALATION) at 15:52

## 2025-05-01 RX ADMIN — Medication 1 TABLET(S): at 21:16

## 2025-05-01 RX ADMIN — Medication 1 TABLET(S): at 09:23

## 2025-05-01 RX ADMIN — Medication 4 MILLILITER(S): at 16:07

## 2025-05-01 RX ADMIN — LOSARTAN POTASSIUM 50 MILLIGRAM(S): 100 TABLET, FILM COATED ORAL at 09:23

## 2025-05-01 RX ADMIN — Medication 500 MILLIGRAM(S): at 10:36

## 2025-05-01 RX ADMIN — Medication 4 MILLILITER(S): at 23:06

## 2025-05-01 RX ADMIN — IPRATROPIUM BROMIDE AND ALBUTEROL SULFATE 3 MILLILITER(S): .5; 2.5 SOLUTION RESPIRATORY (INHALATION) at 06:33

## 2025-05-01 RX ADMIN — Medication 4 MILLILITER(S): at 06:33

## 2025-05-01 NOTE — OCCUPATIONAL THERAPY INITIAL EVALUATION ADULT - MODALITIES TREATMENT COMMENTS
Patient scored a 17/30 on the MOCA indicating significant cognitive impairment. A score of 26/30 is considered normal for pts age and education level. Pt demonstrates deficits in executive function, visuospatial reasoning, attention, delayed recall which may impact patient's ability to manage finances, medication management, appointment management, hot meal prep, household IADL, community level navigation. Please see patient’s paper chart for hard copy of results.

## 2025-05-01 NOTE — PROGRESS NOTE ADULT - PROBLEM SELECTOR PLAN 6
Hx HTN, previously on amlodipine, switched to GDMT last adm  - c/w Losartan 50mg qd  - Holding Lasix as above Hx HTN, previously on amlodipine, switched to GDMT last adm    - c/w Losartan 50mg qd  - Holding Lasix as above

## 2025-05-01 NOTE — PROGRESS NOTE ADULT - SUBJECTIVE AND OBJECTIVE BOX
***Note in progress***    OVERNIGHT EVENTS: NAEO    SUBJECTIVE / INTERVAL HPI: Patient seen and examined at bedside. Patient denying chest pain, SOB, palpitations, cough.     Remaining ROS negative       PHYSICAL EXAM:  General:NAD, appears comfortable    HEENT:  PERRL, anicteric sclera  Cardiovascular:  RRR  Respiratory: CTA B/L  Gastrointestinal: soft, NT/ND; +BSx4  Extremities: no edema to LE  Vascular: 2+ radial pulses  Neurological: AAOx3; no focal deficits  Psychiatric: pleasant mood and affect  Dermatologic: no appreciable wounds or damage to the skin    VITAL SIGNS:  Vital Signs Last 24 Hrs  T(C): 36.4 (01 May 2025 09:09), Max: 36.8 (30 Apr 2025 16:30)  T(F): 97.6 (01 May 2025 09:09), Max: 98.3 (30 Apr 2025 20:46)  HR: 90 (01 May 2025 09:09) (84 - 92)  BP: 131/73 (01 May 2025 09:09) (125/59 - 153/89)  BP(mean): --  RR: 17 (01 May 2025 09:09) (17 - 19)  SpO2: 94% (01 May 2025 09:09) (89% - 100%)    Parameters below as of 01 May 2025 09:09  Patient On (Oxygen Delivery Method): room air          MEDICATIONS:  MEDICATIONS  (STANDING):  albuterol/ipratropium for Nebulization 3 milliLiter(s) Nebulizer every 8 hours  atorvastatin 80 milliGRAM(s) Oral at bedtime  azithromycin   Tablet 500 milliGRAM(s) Oral <User Schedule>  cephalexin 500 milliGRAM(s) Oral every 12 hours  heparin   Injectable 5000 Unit(s) SubCutaneous every 8 hours  linezolid    Tablet 600 milliGRAM(s) Oral every 12 hours  losartan 50 milliGRAM(s) Oral every 24 hours  metroNIDAZOLE    Tablet 500 milliGRAM(s) Oral every 12 hours  sodium chloride 3%  Inhalation 4 milliLiter(s) Inhalation every 8 hours  tiotropium 2.5 MICROgram(s)/olodaterol 2.5 MICROgram(s) (STIOLTO) Inhaler 2 Puff(s) Inhalation daily    MEDICATIONS  (PRN):  melatonin 5 milliGRAM(s) Oral at bedtime PRN Insomnia      ALLERGIES:  Allergies    penicillin (Unknown)    Intolerances        LABS:                        10.9   7.65  )-----------( 174      ( 01 May 2025 05:30 )             33.0     05-01    138  |  102  |  46[H]  ----------------------------<  114[H]  4.2   |  26  |  1.50[H]    Ca    8.2[L]      01 May 2025 05:30  Phos  4.3     05-01  Mg     2.1     05-01    TPro  5.2[L]  /  Alb  2.9[L]  /  TBili  0.6  /  DBili  x   /  AST  16  /  ALT  20  /  AlkPhos  58  05-01      Urinalysis Basic - ( 01 May 2025 05:30 )    Color: x / Appearance: x / SG: x / pH: x  Gluc: 114 mg/dL / Ketone: x  / Bili: x / Urobili: x   Blood: x / Protein: x / Nitrite: x   Leuk Esterase: x / RBC: x / WBC x   Sq Epi: x / Non Sq Epi: x / Bacteria: x      CAPILLARY BLOOD GLUCOSE          RADIOLOGY & ADDITIONAL TESTS: Reviewed. ***Note in progress***    OVERNIGHT EVENTS: NAEO    SUBJECTIVE / INTERVAL HPI: Patient seen and examined at bedside on 4L nasal cannula. Patient denying chest pain, SOB, palpitations, cough. Patient endorses some sensation of vertigo and some focal R sided chest pain with inspiration, but does not seem sure what he is really feeling. He answered "I dont know" to several RoS questions. Asked appropriate questions but could not provide a clear history.    Remaining ROS negative       PHYSICAL EXAM:  General: NAD, appears comfortable, on 4L nasal cannula  HEENT:  PERRL, anicteric sclera  Cardiovascular:  RRR  Respiratory: mild expiratory wheezing  Gastrointestinal: soft, NT/ND; +BSx4  Extremities: no edema to LE, R leg dressed wound without drainage  Vascular: 2+ radial pulses  Neurological: AAOx3; no focal deficits  Psychiatric: pleasant mood and affect  Dermatologic: scattered ecchymoses, crust/scab on LUE, wound as described above    VITAL SIGNS:  Vital Signs Last 24 Hrs  T(C): 36.4 (01 May 2025 09:09), Max: 36.8 (30 Apr 2025 16:30)  T(F): 97.6 (01 May 2025 09:09), Max: 98.3 (30 Apr 2025 20:46)  HR: 90 (01 May 2025 09:09) (84 - 92)  BP: 131/73 (01 May 2025 09:09) (125/59 - 153/89)  BP(mean): --  RR: 17 (01 May 2025 09:09) (17 - 19)  SpO2: 94% (01 May 2025 09:09) (89% - 100%)    Parameters below as of 01 May 2025 09:09  Patient On (Oxygen Delivery Method): room air          MEDICATIONS:  MEDICATIONS  (STANDING):  albuterol/ipratropium for Nebulization 3 milliLiter(s) Nebulizer every 8 hours  atorvastatin 80 milliGRAM(s) Oral at bedtime  azithromycin   Tablet 500 milliGRAM(s) Oral <User Schedule>  cephalexin 500 milliGRAM(s) Oral every 12 hours  heparin   Injectable 5000 Unit(s) SubCutaneous every 8 hours  linezolid    Tablet 600 milliGRAM(s) Oral every 12 hours  losartan 50 milliGRAM(s) Oral every 24 hours  metroNIDAZOLE    Tablet 500 milliGRAM(s) Oral every 12 hours  sodium chloride 3%  Inhalation 4 milliLiter(s) Inhalation every 8 hours  tiotropium 2.5 MICROgram(s)/olodaterol 2.5 MICROgram(s) (STIOLTO) Inhaler 2 Puff(s) Inhalation daily    MEDICATIONS  (PRN):  melatonin 5 milliGRAM(s) Oral at bedtime PRN Insomnia      ALLERGIES:  Allergies    penicillin (Unknown)    Intolerances        LABS:                        10.9   7.65  )-----------( 174      ( 01 May 2025 05:30 )             33.0     05-01    138  |  102  |  46[H]  ----------------------------<  114[H]  4.2   |  26  |  1.50[H]    Ca    8.2[L]      01 May 2025 05:30  Phos  4.3     05-01  Mg     2.1     05-01    TPro  5.2[L]  /  Alb  2.9[L]  /  TBili  0.6  /  DBili  x   /  AST  16  /  ALT  20  /  AlkPhos  58  05-01      Urinalysis Basic - ( 01 May 2025 05:30 )    Color: x / Appearance: x / SG: x / pH: x  Gluc: 114 mg/dL / Ketone: x  / Bili: x / Urobili: x   Blood: x / Protein: x / Nitrite: x   Leuk Esterase: x / RBC: x / WBC x   Sq Epi: x / Non Sq Epi: x / Bacteria: x      CAPILLARY BLOOD GLUCOSE          RADIOLOGY & ADDITIONAL TESTS: Reviewed.   OVERNIGHT EVENTS: NAEO    SUBJECTIVE / INTERVAL HPI: Patient seen and examined at bedside on 4L nasal cannula. Patient denying chest pain, SOB, palpitations, cough. Patient endorses some sensation of vertigo and some focal R sided chest pain with inspiration, but does not seem sure what he is really feeling. He answered "I dont know" to several RoS questions. Asked appropriate questions but could not provide a clear history.    Remaining ROS negative     PHYSICAL EXAM:  General: NAD, appears comfortable, on 4L nasal cannula  HEENT:  PERRL, anicteric sclera  Cardiovascular:  RRR  Respiratory: mild expiratory wheezing  Gastrointestinal: soft, NT/ND; +BSx4  Extremities: no edema to LE, R leg dressed wound without drainage  Vascular: 2+ radial pulses  Neurological: AAOx3; no focal deficits  Psychiatric: pleasant mood and affect  Dermatologic: scattered ecchymoses, crust/scab on LUE, wound as described above    VITAL SIGNS:  Vital Signs Last 24 Hrs  T(C): 36.4 (01 May 2025 09:09), Max: 36.8 (30 Apr 2025 16:30)  T(F): 97.6 (01 May 2025 09:09), Max: 98.3 (30 Apr 2025 20:46)  HR: 90 (01 May 2025 09:09) (84 - 92)  BP: 131/73 (01 May 2025 09:09) (125/59 - 153/89)  BP(mean): --  RR: 17 (01 May 2025 09:09) (17 - 19)  SpO2: 94% (01 May 2025 09:09) (89% - 100%)    Parameters below as of 01 May 2025 09:09  Patient On (Oxygen Delivery Method): room air    MEDICATIONS:  MEDICATIONS  (STANDING):  albuterol/ipratropium for Nebulization 3 milliLiter(s) Nebulizer every 8 hours  atorvastatin 80 milliGRAM(s) Oral at bedtime  azithromycin   Tablet 500 milliGRAM(s) Oral <User Schedule>  cephalexin 500 milliGRAM(s) Oral every 12 hours  heparin   Injectable 5000 Unit(s) SubCutaneous every 8 hours  linezolid    Tablet 600 milliGRAM(s) Oral every 12 hours  losartan 50 milliGRAM(s) Oral every 24 hours  metroNIDAZOLE    Tablet 500 milliGRAM(s) Oral every 12 hours  sodium chloride 3%  Inhalation 4 milliLiter(s) Inhalation every 8 hours  tiotropium 2.5 MICROgram(s)/olodaterol 2.5 MICROgram(s) (STIOLTO) Inhaler 2 Puff(s) Inhalation daily    MEDICATIONS  (PRN):  melatonin 5 milliGRAM(s) Oral at bedtime PRN Insomnia      ALLERGIES:  Allergies  penicillin (Unknown)  Intolerances    LABS:                        10.9   7.65  )-----------( 174      ( 01 May 2025 05:30 )             33.0     05-01    138  |  102  |  46[H]  ----------------------------<  114[H]  4.2   |  26  |  1.50[H]    Ca    8.2[L]      01 May 2025 05:30  Phos  4.3     05-01  Mg     2.1     05-01    TPro  5.2[L]  /  Alb  2.9[L]  /  TBili  0.6  /  DBili  x   /  AST  16  /  ALT  20  /  AlkPhos  58  05-01      Urinalysis Basic - ( 01 May 2025 05:30 )    Color: x / Appearance: x / SG: x / pH: x  Gluc: 114 mg/dL / Ketone: x  / Bili: x / Urobili: x   Blood: x / Protein: x / Nitrite: x   Leuk Esterase: x / RBC: x / WBC x   Sq Epi: x / Non Sq Epi: x / Bacteria: x      CAPILLARY BLOOD GLUCOSE          RADIOLOGY & ADDITIONAL TESTS: Reviewed.

## 2025-05-01 NOTE — PHYSICAL THERAPY INITIAL EVALUATION ADULT - PERTINENT HX OF CURRENT PROBLEM, REHAB EVAL
79 yo M w/ PMHx of HTN, HFrEF, COPD with recurrent exacerbations iso med nonadherence - recently left AMA after adm for exacerbation, presenting w/ SOB admitted for AHRF 2/2 COPD and poor medication adherence.

## 2025-05-01 NOTE — PROGRESS NOTE ADULT - PROBLEM SELECTOR PLAN 1
#AHRF  P/w AHRF 2/2 COPD exacerbation, similar to prior admissions. BIBA w/ SOB, cough/congestion. Reported chills to Ed provider however now denying. Reports sputum/mucus production. States he was not able to use home inhalers.   S/p Duoneb x1 in ED , Solumedrol 125mg, Azithro 500mg x1  - Hold off on azithro for now as pt finished 3d course in recent adm & QTc elevated  - C/w Duoneb q4h, Advair Inhaler BID  - Airway clearance w/ Hypersaline 3% TID & Aerobika TID while here  - Pulm consult recs: airway clearance with duonebs, hyper saline & aeorbika; outpatient pulm (Dr. Bledsoe at [address]); immunizations for RSV, flu, Covid, pneumonia; no steroids  - confirm patient has access to Trelegy at home P/w AHRF 2/2 COPD exacerbation, similar to prior admissions. BIBA w/ SOB, cough/congestion. Reported chills to Ed provider however now denying. Reports sputum/mucus production. States he was not able to use home inhalers.   S/p Duoneb x1 in ED , Solumedrol 125mg, Azithro 500mg x1    - C/w Duoneb q4h, Advair Inhaler BID  - C/w Airway clearance w/ Hypersaline 3% TID & Aerobika TID   - C/w O2 via NC >>> Wean off O2 as tolerated  - Pulm consulted and recs appreciated   - Confirm patient has access to Trelegy at home P/w AHRF 2/2 being off COPD medications. BIBA w/ SOB, cough/congestion. Reported chills to Ed provider however now denying. Reports sputum/mucus production. States he was not able to use home inhalers.   S/p Duoneb x1 in ED , Solumedrol 125mg, Azithro 500mg x1    - C/w Duoneb q4h, Advair Inhaler BID  - C/w Airway clearance w/ Hypersaline 3% TID & Aerobika TID   - C/w O2 via NC >>> Wean off O2 as tolerated  - Pulm consulted and recs appreciated   - Confirm patient has access to Trelegy at home

## 2025-05-01 NOTE — OCCUPATIONAL THERAPY INITIAL EVALUATION ADULT - GENERAL OBSERVATIONS, REHAB EVAL
BISI Laguna aware of intent to eval. Pt received semisupine +NAD +hep lock +2L O2 via NC ; pt left as found +bed alarm +call bell in hand with all lines intact and needs met, RN aware.

## 2025-05-01 NOTE — PROGRESS NOTE ADULT - PROBLEM SELECTOR PLAN 2
Hx RLE non-healing ulcer & cellullitis, initially treated 4/12-4/14 adm w/ Cefepime & Vanc de-escalated to Cefazolin, then pt left AMA. Re-adm 4/24 & started on Vanc for MRSA coverage iso purulence, w/ pt again leaving AMA 4/28.   Followed by vascular last adm s/p 2x wound debridements. On adm, pt's wound wrapped but not bleeding, otherwise c/d/i, no surrounding crepitus.   Recent wound culture 4/25 w/ MRSA & Pluralibacter gergoviae   S/p Vanc 1g, cefepime 1g, azithromycin 500mg  - keflex 500mg q12h, linezolid 600mg q12h, metronidazole 500mg q12h to cover both MRSA/P gergoviae, as pt not acutely septic   - Wound care consult  - Consider re-consulting vascular if wound starts to drain  - ID consult if not improving Hx RLE non-healing ulcer & cellullitis, initially treated 4/12-4/14 adm w/ Cefepime & Vanc de-escalated to Cefazolin, then pt left AMA. Re-adm 4/24 & started on Vanc for MRSA coverage iso purulence, w/ pt again leaving AMA 4/28.   Followed by vascular last adm s/p 2x wound debridements. On adm, pt's wound wrapped but not bleeding, otherwise c/d/i, no surrounding crepitus.   Recent wound culture 4/25 w/ MRSA & Pluralibacter gergoviae & Anaerobes   S/p Vanc 1g, cefepime 1g, azithromycin 500mg    - Started on         Keflex 500mg q12 (5/1 - )         Linezolid 600mg q12h (5/1- )          Metronidazole 500mg q12h (5/1- ) to cover bugs in wound culture   - Wound care consult  - ID consult if not improving

## 2025-05-01 NOTE — PROGRESS NOTE ADULT - SUBJECTIVE AND OBJECTIVE BOX
PULMONARY CONSULT SERVICE FOLLOW-UP NOTE    INTERVAL HPI:  Reviewed chart and overnight events; patient seen and examined at bedside. Feels well.   No new complaints. Discussed ACT, demonstrated PEF device usage.     MEDICATIONS:  Pulmonary:  albuterol/ipratropium for Nebulization 3 milliLiter(s) Nebulizer every 8 hours  sodium chloride 3%  Inhalation 4 milliLiter(s) Inhalation every 8 hours  tiotropium 2.5 MICROgram(s)/olodaterol 2.5 MICROgram(s) (STIOLTO) Inhaler 2 Puff(s) Inhalation daily    Antimicrobials:  azithromycin   Tablet 500 milliGRAM(s) Oral <User Schedule>  metroNIDAZOLE    Tablet 500 milliGRAM(s) Oral every 12 hours  trimethoprim  160 mG/sulfamethoxazole 800 mG 1 Tablet(s) Oral every 12 hours    Anticoagulants:  heparin   Injectable 5000 Unit(s) SubCutaneous every 8 hours    Cardiac:  losartan 50 milliGRAM(s) Oral every 24 hours      Allergies    penicillin (Unknown)    Intolerances        Vital Signs Last 24 Hrs  T(C): 36.4 (01 May 2025 15:42), Max: 37 (01 May 2025 13:21)  T(F): 97.6 (01 May 2025 15:42), Max: 98.6 (01 May 2025 13:21)  HR: 93 (01 May 2025 15:42) (79 - 99)  BP: 151/83 (01 May 2025 15:42) (125/59 - 165/62)  BP(mean): --  RR: 17 (01 May 2025 15:42) (17 - 18)  SpO2: 98% (01 May 2025 15:42) (84% - 98%)    Parameters below as of 01 May 2025 15:42  Patient On (Oxygen Delivery Method): nasal cannula        04-30 @ 07:01 - 05-01 @ 07:00  --------------------------------------------------------  IN: 50 mL / OUT: 400 mL / NET: -350 mL    05-01 @ 07:01 - 05-01 @ 19:41  --------------------------------------------------------  IN: 750 mL / OUT: 0 mL / NET: 750 mL          PHYSICAL EXAM:  Constitutional: NAD  HEENT: NC/AT; PERRL, anicteric sclera; MMM  Neck: supple  Cardiovascular: +S1/S2, RRR  Respiratory: CTA B/L; no W/R/R  Gastrointestinal: soft, NT/ND  Extremities: WWP; no edema, clubbing or cyanosis  Vascular: 2+ radial pulses B/L  Neurological: awake and alert; LR    LABS:      CBC Full  -  ( 01 May 2025 05:30 )  WBC Count : 7.65 K/uL  RBC Count : 3.39 M/uL  Hemoglobin : 10.9 g/dL  Hematocrit : 33.0 %  Platelet Count - Automated : 174 K/uL  Mean Cell Volume : 97.3 fl  Mean Cell Hemoglobin : 32.2 pg  Mean Cell Hemoglobin Concentration : 33.0 g/dL  Auto Neutrophil # : x  Auto Lymphocyte # : x  Auto Monocyte # : x  Auto Eosinophil # : x  Auto Basophil # : x  Auto Neutrophil % : x  Auto Lymphocyte % : x  Auto Monocyte % : x  Auto Eosinophil % : x  Auto Basophil % : x    05-01    138  |  102  |  46[H]  ----------------------------<  114[H]  4.2   |  26  |  1.50[H]    Ca    8.2[L]      01 May 2025 05:30  Phos  4.3     05-01  Mg     2.1     05-01    TPro  5.2[L]  /  Alb  2.9[L]  /  TBili  0.6  /  DBili  x   /  AST  16  /  ALT  20  /  AlkPhos  58  05-01          Urinalysis Basic - ( 01 May 2025 05:30 )    Color: x / Appearance: x / SG: x / pH: x  Gluc: 114 mg/dL / Ketone: x  / Bili: x / Urobili: x   Blood: x / Protein: x / Nitrite: x   Leuk Esterase: x / RBC: x / WBC x   Sq Epi: x / Non Sq Epi: x / Bacteria: x                RADIOLOGY & ADDITIONAL STUDIES:

## 2025-05-01 NOTE — PROGRESS NOTE ADULT - PROBLEM SELECTOR PLAN 4
P/w  BUN/Cr 44/1.58, likely pre-renal iso elevated BUN & pt reports poor PO intake since leaving hospital 4/28.   s/p 1L LR   Likely iso recent GDMT initiation   Plan:   - F/u urine studies  - Caution w/ fluids iso HFrEF  - Trend BUN/Cr, renally dose, avoid nephrotoxic agents  - BUN: 44 -> 41 -> 46  - Cr: 1.58 -> 1.31 -> 1.50 P/w  BUN/Cr 44/1.58, likely pre-renal iso elevated BUN & pt reports poor PO intake since leaving hospital 4/28.   s/p 1L LR   Likely iso recent GDMT initiation   Discontinued Bactrim 5/1    Plan:   - Caution w/ fluids iso HFrEF  - Trend BUN/Cr, renally dose, avoid nephrotoxic agents

## 2025-05-01 NOTE — PHYSICAL THERAPY INITIAL EVALUATION ADULT - ADDITIONAL COMMENTS
Pt lives alone in a 3rd floor walk up apartment. He reports being independent at baseline for functional mobility and ADLs without use of AD. Pt has a walk in shower and does not own any DME. Pt states that he has home oxygen which he uses "as needed."

## 2025-05-01 NOTE — OCCUPATIONAL THERAPY INITIAL EVALUATION ADULT - PERTINENT HX OF CURRENT PROBLEM, REHAB EVAL
81 yo M w/ PMHx of HTN, HFrEF, COPD with recurrent exacerbations iso med nonadherence - recently left AMA after adm for exacerbation, presenting w/ SOB admitted for AHRF 2/2 COPD and poor medication adherence.

## 2025-05-01 NOTE — OCCUPATIONAL THERAPY INITIAL EVALUATION ADULT - DIAGNOSIS, OT EVAL
Pt presents to [facility] with COPD exacerbation. OT consulted 2/2 dec health maintenance, dec functional cognition impacting ADL and IADL performance.

## 2025-05-01 NOTE — PHYSICAL THERAPY INITIAL EVALUATION ADULT - GENERAL OBSERVATIONS, REHAB EVAL
Pt received semi supine in bed in NAD, +4L O2 via NC, +HEP lock, +R LE gauze wrap c/d/i, +bed alarm, covering RN Miladys cleared Pt for PT treatment session. Pt is AO x 3 (self, place, situation) and is agreeable to participate in PT session.

## 2025-05-01 NOTE — PROGRESS NOTE ADULT - PROBLEM SELECTOR PLAN 3
P/w Trop 89, ECG read w/ bifascular block iso known RBBB. Upon review, ECG grossly unchanged from prior ECG from 4/24, pt not endorsing chest pain or dyspnea on exertion.   Trop down from prior adm w/ Trop 117. Also iso MONA on CKD.   - F/u 10AM cardiac enzymes to trend  - Repeat ECG  - Low c/f ACS given pt denying chest pain, and pt's dyspnea/presentation more consistent w/ COPD P/w Trop 89, ECG read w/ bifascular block iso known RBBB. Upon review, ECG grossly unchanged from prior ECG from 4/24, pt not endorsing chest pain or dyspnea on exertion.   Trop down from prior adm w/ Trop 117. Also iso MONA on CKD.   Trops downtrending     - Low c/f ACS given pt denying chest pain, and pt's dyspnea/presentation more consistent w/ COPD

## 2025-05-01 NOTE — PROGRESS NOTE ADULT - PROBLEM SELECTOR PLAN 5
Hx HFrEF w/ EF 40-45% (4/24/25). Seen by cardiology last adm & started on Losartan 50mg qd, Lasix 40mg qd.   Deferred BB therapy given reactive airways/recurrent COPD exac   TTE 4/24: EF 40-45%, no WMAs, mild LV hypertrophy, mild LV diastolic dysfunction, normal RV size and systolic fxn. Estimated PASP 40.  Pt not volume overloaded at this time, proBNP 5940 (from [id number] on last adm)   Discussed w/ cardiology if any plan for further GDMT initiation inpatient - no further plan for now, no need for re-consult inpatient, pt to follow-up outpatient as previously rec'd.   - C/w Losartan 50mg qd   - Hold Lasix 40qd at this time iso MONA on CKD & not acutely volume overloaded  - Atorvastatin 80mg qhs started last adm, continue Hx HFrEF w/ EF 40-45% (4/24/25). Seen by cardiology last adm & started on Losartan 50mg qd, Lasix 40mg qd.   Deferred BB therapy given reactive airways/recurrent COPD exac   TTE 4/24: EF 40-45%, no WMAs, mild LV hypertrophy, mild LV diastolic dysfunction, normal RV size and systolic fxn. Estimated PASP 40.  Pt not volume overloaded at this time, proBNP 5940 (from 15901 on last adm)   Discussed w/ cardiology if any plan for further GDMT initiation inpatient - no further plan for now, no need for re-consult inpatient, pt to follow-up outpatient as previously rec'd.     - C/w Losartan 50mg qd   - Hold Lasix 40qd at this time iso MONA on CKD & not acutely volume overloaded  - Atorvastatin 80mg qhs

## 2025-05-01 NOTE — OCCUPATIONAL THERAPY INITIAL EVALUATION ADULT - ADDITIONAL COMMENTS
Pt r handed and does not wear glasses. Pt lives alone in third floor walkup apartment. Pt states he is associated with the pharmacy across the street from where he lives however cannot state how many medications he takes daily, names of meds, reasons for meds. Pt states he hasn't grocery shopped "in a long time", that he used to go to the [facility] for meals but does go anymore, does not cook. Pt reports sometimes he eats out. Pt states he has home oxygen; questionable compliance. Pt does not own cell phone; has land line. Pt able to generate "911" as emergency response. Pt states his neighbors help him out and bring him to the hospital as needed. Pt does not state any additional ways in which his neighbors offer him care.

## 2025-05-01 NOTE — PHYSICAL THERAPY INITIAL EVALUATION ADULT - GAIT DISTANCE, PT EVAL
20ft within room; further ambulation deferred due to Pt report of dizziness--BP stable. Pt demos O2 desaturation to 84% while ambulating on 4L O2 via NC--BISI Laguna and MD Mendez aware

## 2025-05-01 NOTE — OCCUPATIONAL THERAPY INITIAL EVALUATION ADULT - HOME MANAGEMENT SKILLS, PREVIOUS LEVEL OF FUNCTION, OT EVAL
independent Review of Systems    Constitutional: (-) fever   Eyes/ENT: (-) vision changes  Cardiovascular: (+) chest pain, (-) syncope (-) palpitations  Respiratory: (-) cough, (-) shortness of breath  Gastrointestinal: (-) vomiting, (-) diarrhea (-) abdominal pain  Genitourinary:  (-) dysuria   Musculoskeletal: (-) neck pain, (-) back pain, (-) leg pain/swelling  Integumentary: (-) rash, (-) edema  Neurological: (-) headache, (-) confusion  Hematologic: (-) easy bruising

## 2025-05-02 ENCOUNTER — TRANSCRIPTION ENCOUNTER (OUTPATIENT)
Age: 80
End: 2025-05-02

## 2025-05-02 DIAGNOSIS — T14.8XXA OTHER INJURY OF UNSPECIFIED BODY REGION, INITIAL ENCOUNTER: ICD-10-CM

## 2025-05-02 DIAGNOSIS — L03.90 CELLULITIS, UNSPECIFIED: ICD-10-CM

## 2025-05-02 DIAGNOSIS — J96.21 ACUTE AND CHRONIC RESPIRATORY FAILURE WITH HYPOXIA: ICD-10-CM

## 2025-05-02 LAB
ANION GAP SERPL CALC-SCNC: 5 MMOL/L — SIGNIFICANT CHANGE UP (ref 5–17)
BASOPHILS # BLD AUTO: 0.02 K/UL — SIGNIFICANT CHANGE UP (ref 0–0.2)
BASOPHILS NFR BLD AUTO: 0.3 % — SIGNIFICANT CHANGE UP (ref 0–2)
BUN SERPL-MCNC: 40 MG/DL — HIGH (ref 7–23)
CALCIUM SERPL-MCNC: 8.1 MG/DL — LOW (ref 8.4–10.5)
CHLORIDE SERPL-SCNC: 104 MMOL/L — SIGNIFICANT CHANGE UP (ref 96–108)
CO2 SERPL-SCNC: 30 MMOL/L — SIGNIFICANT CHANGE UP (ref 22–31)
CREAT SERPL-MCNC: 1.6 MG/DL — HIGH (ref 0.5–1.3)
CULTURE RESULTS: SIGNIFICANT CHANGE UP
EGFR: 43 ML/MIN/1.73M2 — LOW
EGFR: 43 ML/MIN/1.73M2 — LOW
EOSINOPHIL # BLD AUTO: 0.24 K/UL — SIGNIFICANT CHANGE UP (ref 0–0.5)
EOSINOPHIL NFR BLD AUTO: 3.7 % — SIGNIFICANT CHANGE UP (ref 0–6)
GLUCOSE SERPL-MCNC: 100 MG/DL — HIGH (ref 70–99)
HCT VFR BLD CALC: 32.4 % — LOW (ref 39–50)
HGB BLD-MCNC: 10.5 G/DL — LOW (ref 13–17)
IMM GRANULOCYTES NFR BLD AUTO: 0.5 % — SIGNIFICANT CHANGE UP (ref 0–0.9)
LYMPHOCYTES # BLD AUTO: 1.15 K/UL — SIGNIFICANT CHANGE UP (ref 1–3.3)
LYMPHOCYTES # BLD AUTO: 17.6 % — SIGNIFICANT CHANGE UP (ref 13–44)
MAGNESIUM SERPL-MCNC: 2 MG/DL — SIGNIFICANT CHANGE UP (ref 1.6–2.6)
MCHC RBC-ENTMCNC: 30.8 PG — SIGNIFICANT CHANGE UP (ref 27–34)
MCHC RBC-ENTMCNC: 32.4 G/DL — SIGNIFICANT CHANGE UP (ref 32–36)
MCV RBC AUTO: 95 FL — SIGNIFICANT CHANGE UP (ref 80–100)
MONOCYTES # BLD AUTO: 0.65 K/UL — SIGNIFICANT CHANGE UP (ref 0–0.9)
MONOCYTES NFR BLD AUTO: 10 % — SIGNIFICANT CHANGE UP (ref 2–14)
NEUTROPHILS # BLD AUTO: 4.44 K/UL — SIGNIFICANT CHANGE UP (ref 1.8–7.4)
NEUTROPHILS NFR BLD AUTO: 67.9 % — SIGNIFICANT CHANGE UP (ref 43–77)
NRBC BLD AUTO-RTO: 0 /100 WBCS — SIGNIFICANT CHANGE UP (ref 0–0)
PHOSPHATE SERPL-MCNC: 3.9 MG/DL — SIGNIFICANT CHANGE UP (ref 2.5–4.5)
PLATELET # BLD AUTO: 174 K/UL — SIGNIFICANT CHANGE UP (ref 150–400)
POTASSIUM SERPL-MCNC: 3.9 MMOL/L — SIGNIFICANT CHANGE UP (ref 3.5–5.3)
POTASSIUM SERPL-SCNC: 3.9 MMOL/L — SIGNIFICANT CHANGE UP (ref 3.5–5.3)
RBC # BLD: 3.41 M/UL — LOW (ref 4.2–5.8)
RBC # FLD: 15.3 % — HIGH (ref 10.3–14.5)
SODIUM SERPL-SCNC: 139 MMOL/L — SIGNIFICANT CHANGE UP (ref 135–145)
SPECIMEN SOURCE: SIGNIFICANT CHANGE UP
WBC # BLD: 6.53 K/UL — SIGNIFICANT CHANGE UP (ref 3.8–10.5)
WBC # FLD AUTO: 6.53 K/UL — SIGNIFICANT CHANGE UP (ref 3.8–10.5)

## 2025-05-02 PROCEDURE — 99233 SBSQ HOSP IP/OBS HIGH 50: CPT | Mod: GC

## 2025-05-02 RX ORDER — SALMETEROL XINAFOATE 50 MCG
1 BLISTER, WITH INHALATION DEVICE INHALATION
Qty: 1 | Refills: 0
Start: 2025-05-02 | End: 2025-05-31

## 2025-05-02 RX ORDER — TIOTROPIUM BROMIDE INHALATION SPRAY 3.12 UG/1
2 SPRAY, METERED RESPIRATORY (INHALATION)
Qty: 1 | Refills: 0
Start: 2025-05-02 | End: 2025-05-31

## 2025-05-02 RX ORDER — OLODATEROL RESPIMAT INHALATION SPRAY 2.5 UG/1
2 SPRAY, METERED RESPIRATORY (INHALATION)
Qty: 1 | Refills: 0
Start: 2025-05-02 | End: 2025-05-31

## 2025-05-02 RX ORDER — TIOTROPIUM BROMIDE AND OLODATEROL 3.124; 2.736 UG/1; UG/1
2 SPRAY, METERED RESPIRATORY (INHALATION)
Qty: 1 | Refills: 0
Start: 2025-05-02 | End: 2025-05-31

## 2025-05-02 RX ORDER — FUROSEMIDE 10 MG/ML
40 INJECTION INTRAMUSCULAR; INTRAVENOUS ONCE
Refills: 0 | Status: COMPLETED | OUTPATIENT
Start: 2025-05-02 | End: 2025-05-02

## 2025-05-02 RX ORDER — UMECLIDINIUM BROMIDE AND VILANTEROL TRIFENATATE 62.5; 25 UG/1; UG/1
1 POWDER RESPIRATORY (INHALATION)
Qty: 1 | Refills: 0
Start: 2025-05-02 | End: 2025-05-31

## 2025-05-02 RX ORDER — UMECLIDINIUM 62.5 UG/1
1 AEROSOL, POWDER ORAL
Qty: 1 | Refills: 0
Start: 2025-05-02 | End: 2025-05-31

## 2025-05-02 RX ADMIN — HEPARIN SODIUM 5000 UNIT(S): 1000 INJECTION INTRAVENOUS; SUBCUTANEOUS at 22:25

## 2025-05-02 RX ADMIN — Medication 4 MILLILITER(S): at 07:10

## 2025-05-02 RX ADMIN — IPRATROPIUM BROMIDE AND ALBUTEROL SULFATE 3 MILLILITER(S): .5; 2.5 SOLUTION RESPIRATORY (INHALATION) at 15:42

## 2025-05-02 RX ADMIN — IPRATROPIUM BROMIDE AND ALBUTEROL SULFATE 3 MILLILITER(S): .5; 2.5 SOLUTION RESPIRATORY (INHALATION) at 22:25

## 2025-05-02 RX ADMIN — Medication 500 MILLIGRAM(S): at 18:15

## 2025-05-02 RX ADMIN — Medication 500 MILLIGRAM(S): at 05:37

## 2025-05-02 RX ADMIN — TIOTROPIUM BROMIDE AND OLODATEROL 2 PUFF(S): 3.124; 2.736 SPRAY, METERED RESPIRATORY (INHALATION) at 11:40

## 2025-05-02 RX ADMIN — HEPARIN SODIUM 5000 UNIT(S): 1000 INJECTION INTRAVENOUS; SUBCUTANEOUS at 15:42

## 2025-05-02 RX ADMIN — Medication 4 MILLILITER(S): at 16:10

## 2025-05-02 RX ADMIN — LOSARTAN POTASSIUM 50 MILLIGRAM(S): 100 TABLET, FILM COATED ORAL at 09:36

## 2025-05-02 RX ADMIN — IPRATROPIUM BROMIDE AND ALBUTEROL SULFATE 3 MILLILITER(S): .5; 2.5 SOLUTION RESPIRATORY (INHALATION) at 05:36

## 2025-05-02 RX ADMIN — Medication 5 MILLIGRAM(S): at 22:27

## 2025-05-02 RX ADMIN — Medication 4 MILLILITER(S): at 23:06

## 2025-05-02 RX ADMIN — FUROSEMIDE 40 MILLIGRAM(S): 10 INJECTION INTRAMUSCULAR; INTRAVENOUS at 15:42

## 2025-05-02 RX ADMIN — Medication 1 TABLET(S): at 09:36

## 2025-05-02 RX ADMIN — HEPARIN SODIUM 5000 UNIT(S): 1000 INJECTION INTRAVENOUS; SUBCUTANEOUS at 06:13

## 2025-05-02 RX ADMIN — Medication 1 TABLET(S): at 22:25

## 2025-05-02 RX ADMIN — ATORVASTATIN CALCIUM 80 MILLIGRAM(S): 80 TABLET, FILM COATED ORAL at 22:25

## 2025-05-02 NOTE — PROGRESS NOTE ADULT - PROBLEM SELECTOR PLAN 3
P/w Trop 89, ECG read w/ bifascular block iso known RBBB. Upon review, ECG grossly unchanged from prior ECG from 4/24, pt not endorsing chest pain or dyspnea on exertion.   Trop down from prior adm w/ Trop 117. Also iso MONA on CKD.   Trops downtrending     - Low c/f ACS given pt denying chest pain, and pt's dyspnea/presentation more consistent w/ COPD

## 2025-05-02 NOTE — DISCHARGE NOTE PROVIDER - NSDCFUSCHEDAPPT_GEN_ALL_CORE_FT
Ridge Wheeler  [facility]  HEARTVASC [address]  Scheduled Appointment: 05/07/2025    Jaz Tobar  [facility]  PULMMED [address]  Scheduled Appointment: 05/09/2025

## 2025-05-02 NOTE — PROGRESS NOTE ADULT - SUBJECTIVE AND OBJECTIVE BOX
***Note in progress***    OVERNIGHT EVENTS: NAEO    SUBJECTIVE / INTERVAL HPI: Patient seen and examined at bedside. Patient denying chest pain, SOB, palpitations, cough.     Remaining ROS negative       PHYSICAL EXAM:  General:NAD, appears comfortable    HEENT:  PERRL, anicteric sclera  Cardiovascular:  RRR  Respiratory: CTA B/L  Gastrointestinal: soft, NT/ND; +BSx4  Extremities: no edema to LE  Vascular: 2+ radial pulses  Neurological: AAOx3; no focal deficits  Psychiatric: pleasant mood and affect  Dermatologic: no appreciable wounds or damage to the skin    VITAL SIGNS:  Vital Signs Last 24 Hrs  T(C): 36.6 (02 May 2025 09:15), Max: 37 (01 May 2025 13:21)  T(F): 97.9 (02 May 2025 09:15), Max: 98.6 (01 May 2025 13:21)  HR: 81 (02 May 2025 09:15) (79 - 99)  BP: 142/89 (02 May 2025 09:15) (132/74 - 165/62)  BP(mean): 90 (02 May 2025 05:00) (90 - 90)  RR: 16 (02 May 2025 09:15) (16 - 18)  SpO2: 97% (02 May 2025 09:15) (84% - 98%)    Parameters below as of 02 May 2025 09:15  Patient On (Oxygen Delivery Method): nasal cannula  O2 Flow (L/min): 4        MEDICATIONS:  MEDICATIONS  (STANDING):  albuterol/ipratropium for Nebulization 3 milliLiter(s) Nebulizer every 8 hours  atorvastatin 80 milliGRAM(s) Oral at bedtime  azithromycin   Tablet 500 milliGRAM(s) Oral <User Schedule>  heparin   Injectable 5000 Unit(s) SubCutaneous every 8 hours  losartan 50 milliGRAM(s) Oral every 24 hours  metroNIDAZOLE    Tablet 500 milliGRAM(s) Oral every 12 hours  sodium chloride 3%  Inhalation 4 milliLiter(s) Inhalation every 8 hours  tiotropium 2.5 MICROgram(s)/olodaterol 2.5 MICROgram(s) (STIOLTO) Inhaler 2 Puff(s) Inhalation daily  trimethoprim  160 mG/sulfamethoxazole 800 mG 1 Tablet(s) Oral every 12 hours    MEDICATIONS  (PRN):  melatonin 5 milliGRAM(s) Oral at bedtime PRN Insomnia      ALLERGIES:  Allergies    penicillin (Unknown)    Intolerances        LABS:                        10.5   6.53  )-----------( 174      ( 02 May 2025 06:36 )             32.4     05-02    139  |  104  |  40[H]  ----------------------------<  100[H]  3.9   |  30  |  1.60[H]    Ca    8.1[L]      02 May 2025 06:36  Phos  3.9     05-02  Mg     2.0     05-02    TPro  5.2[L]  /  Alb  2.9[L]  /  TBili  0.6  /  DBili  x   /  AST  16  /  ALT  20  /  AlkPhos  58  05-01      Urinalysis Basic - ( 02 May 2025 06:36 )    Color: x / Appearance: x / SG: x / pH: x  Gluc: 100 mg/dL / Ketone: x  / Bili: x / Urobili: x   Blood: x / Protein: x / Nitrite: x   Leuk Esterase: x / RBC: x / WBC x   Sq Epi: x / Non Sq Epi: x / Bacteria: x      CAPILLARY BLOOD GLUCOSE          RADIOLOGY & ADDITIONAL TESTS: Reviewed. ***Note in progress***    OVERNIGHT EVENTS: NAEO    SUBJECTIVE / INTERVAL HPI: Patient seen and examined at bedside on 4L nasal cannula and getting his saline nebulizer. Patient denying chest pain, SOB, palpitations, cough, pain, asks how long he will need to stay. Alert and oriented to person, place, time, and situation.    Remaining ROS negative       PHYSICAL EXAM:  General: NAD, appears comfortable    HEENT:  PERRL, anicteric sclera  Cardiovascular:  RRR  Respiratory: slight wheezing on expiration  Gastrointestinal: soft, NT/ND; +BSx4  Extremities: 2+ pitting edema, wound on RLE  Vascular: 2+ radial pulses  Neurological: AAOx3; no focal deficits  Psychiatric: pleasant mood and affect  Dermatologic: scattered ecchymoses, crust/scab on LUE, wound or RLE as above    VITAL SIGNS:  Vital Signs Last 24 Hrs  T(C): 36.6 (02 May 2025 09:15), Max: 37 (01 May 2025 13:21)  T(F): 97.9 (02 May 2025 09:15), Max: 98.6 (01 May 2025 13:21)  HR: 81 (02 May 2025 09:15) (79 - 99)  BP: 142/89 (02 May 2025 09:15) (132/74 - 165/62)  BP(mean): 90 (02 May 2025 05:00) (90 - 90)  RR: 16 (02 May 2025 09:15) (16 - 18)  SpO2: 97% (02 May 2025 09:15) (84% - 98%)    Parameters below as of 02 May 2025 09:15  Patient On (Oxygen Delivery Method): nasal cannula  O2 Flow (L/min): 4        MEDICATIONS:  MEDICATIONS  (STANDING):  albuterol/ipratropium for Nebulization 3 milliLiter(s) Nebulizer every 8 hours  atorvastatin 80 milliGRAM(s) Oral at bedtime  azithromycin   Tablet 500 milliGRAM(s) Oral <User Schedule>  heparin   Injectable 5000 Unit(s) SubCutaneous every 8 hours  losartan 50 milliGRAM(s) Oral every 24 hours  metroNIDAZOLE    Tablet 500 milliGRAM(s) Oral every 12 hours  sodium chloride 3%  Inhalation 4 milliLiter(s) Inhalation every 8 hours  tiotropium 2.5 MICROgram(s)/olodaterol 2.5 MICROgram(s) (STIOLTO) Inhaler 2 Puff(s) Inhalation daily  trimethoprim  160 mG/sulfamethoxazole 800 mG 1 Tablet(s) Oral every 12 hours    MEDICATIONS  (PRN):  melatonin 5 milliGRAM(s) Oral at bedtime PRN Insomnia      ALLERGIES:  Allergies    penicillin (Unknown)    Intolerances        LABS:                        10.5   6.53  )-----------( 174      ( 02 May 2025 06:36 )             32.4     05-02    139  |  104  |  40[H]  ----------------------------<  100[H]  3.9   |  30  |  1.60[H]    Ca    8.1[L]      02 May 2025 06:36  Phos  3.9     05-02  Mg     2.0     05-02    TPro  5.2[L]  /  Alb  2.9[L]  /  TBili  0.6  /  DBili  x   /  AST  16  /  ALT  20  /  AlkPhos  58  05-01      Urinalysis Basic - ( 02 May 2025 06:36 )    Color: x / Appearance: x / SG: x / pH: x  Gluc: 100 mg/dL / Ketone: x  / Bili: x / Urobili: x   Blood: x / Protein: x / Nitrite: x   Leuk Esterase: x / RBC: x / WBC x   Sq Epi: x / Non Sq Epi: x / Bacteria: x      CAPILLARY BLOOD GLUCOSE          RADIOLOGY & ADDITIONAL TESTS: Reviewed.   OVERNIGHT EVENTS: NAEO    SUBJECTIVE / INTERVAL HPI: Patient seen and examined at bedside on 4L nasal cannula and getting his saline nebulizer. Patient denying chest pain, SOB, palpitations, cough, pain, asks how long he will need to stay. Alert and oriented to person, place, time, and situation.    Remaining ROS negative       PHYSICAL EXAM:  General: NAD, appears comfortable    HEENT:  PERRL, anicteric sclera  Cardiovascular:  RRR  Respiratory: slight wheezing on expiration  Gastrointestinal: soft, NT/ND; +BSx4  Extremities: 2+ pitting edema, wound on RLE  Vascular: 2+ radial pulses  Neurological: AAOx3; no focal deficits  Psychiatric: pleasant mood and affect  Dermatologic: scattered ecchymoses, crust/scab on LUE, wound or RLE as above    VITAL SIGNS:  Vital Signs Last 24 Hrs  T(C): 36.6 (02 May 2025 09:15), Max: 37 (01 May 2025 13:21)  T(F): 97.9 (02 May 2025 09:15), Max: 98.6 (01 May 2025 13:21)  HR: 81 (02 May 2025 09:15) (79 - 99)  BP: 142/89 (02 May 2025 09:15) (132/74 - 165/62)  BP(mean): 90 (02 May 2025 05:00) (90 - 90)  RR: 16 (02 May 2025 09:15) (16 - 18)  SpO2: 97% (02 May 2025 09:15) (84% - 98%)    Parameters below as of 02 May 2025 09:15  Patient On (Oxygen Delivery Method): nasal cannula  O2 Flow (L/min): 4    MEDICATIONS:  MEDICATIONS  (STANDING):  albuterol/ipratropium for Nebulization 3 milliLiter(s) Nebulizer every 8 hours  atorvastatin 80 milliGRAM(s) Oral at bedtime  azithromycin   Tablet 500 milliGRAM(s) Oral <User Schedule>  heparin   Injectable 5000 Unit(s) SubCutaneous every 8 hours  losartan 50 milliGRAM(s) Oral every 24 hours  metroNIDAZOLE    Tablet 500 milliGRAM(s) Oral every 12 hours  sodium chloride 3%  Inhalation 4 milliLiter(s) Inhalation every 8 hours  tiotropium 2.5 MICROgram(s)/olodaterol 2.5 MICROgram(s) (STIOLTO) Inhaler 2 Puff(s) Inhalation daily  trimethoprim  160 mG/sulfamethoxazole 800 mG 1 Tablet(s) Oral every 12 hours    MEDICATIONS  (PRN):  melatonin 5 milliGRAM(s) Oral at bedtime PRN Insomnia      ALLERGIES:  Allergies    penicillin (Unknown)    Intolerances        LABS:                        10.5   6.53  )-----------( 174      ( 02 May 2025 06:36 )             32.4     05-02    139  |  104  |  40[H]  ----------------------------<  100[H]  3.9   |  30  |  1.60[H]    Ca    8.1[L]      02 May 2025 06:36  Phos  3.9     05-02  Mg     2.0     05-02    TPro  5.2[L]  /  Alb  2.9[L]  /  TBili  0.6  /  DBili  x   /  AST  16  /  ALT  20  /  AlkPhos  58  05-01      Urinalysis Basic - ( 02 May 2025 06:36 )    Color: x / Appearance: x / SG: x / pH: x  Gluc: 100 mg/dL / Ketone: x  / Bili: x / Urobili: x   Blood: x / Protein: x / Nitrite: x   Leuk Esterase: x / RBC: x / WBC x   Sq Epi: x / Non Sq Epi: x / Bacteria: x      CAPILLARY BLOOD GLUCOSE          RADIOLOGY & ADDITIONAL TESTS: Reviewed.

## 2025-05-02 NOTE — DISCHARGE NOTE PROVIDER - ATTENDING DISCHARGE PHYSICAL EXAMINATION:
80 YOM with PMH of cognitive impairment, HTN, COPD, CKD2 (baseline 1.2-1.3), chronic HFmrEF, recent admissions for for acute hypoxic respiratory failure and RLE SSTI (started as infected ulcer incompletely treated due to AMA/non-adherence c/b deeper infection s/p debridement with vascular x2) who presented from home for inability to care for self, found to have acute on chronic hypoxic respiratory failure in setting of medication non-adherence, admitted for further management.     Plan  - WC per prior vascular recommendations, no purulence or overt signs of sepsis  - given incomplete course for SSTI, will plan for 7-day course of PO abx  ---WC: MRSA, Pluralibacter gergovia, anaerobes (Alistipes-like sp, Fingegoldia magnna)  ---TMP-SMX for MRSA and P gergovia, metronidazole for anaerobes  - evaluated by pulm for optimization of COPD medications and airway clearance    Dispo: TIN for wound care

## 2025-05-02 NOTE — PROGRESS NOTE ADULT - SUBJECTIVE AND OBJECTIVE BOX
PULMONARY CONSULT SERVICE FOLLOW-UP NOTE    INTERVAL HPI:  Reviewed chart and overnight events; patient seen and examined at bedside.  Says his breathing is satisfactory, no dyspnea/feeling of breathlessness.   Cough improving     MEDICATIONS:  Pulmonary:  albuterol/ipratropium for Nebulization 3 milliLiter(s) Nebulizer every 8 hours  sodium chloride 3%  Inhalation 4 milliLiter(s) Inhalation every 8 hours  tiotropium 2.5 MICROgram(s)/olodaterol 2.5 MICROgram(s) (STIOLTO) Inhaler 2 Puff(s) Inhalation daily    Antimicrobials:  azithromycin   Tablet 500 milliGRAM(s) Oral <User Schedule>  metroNIDAZOLE    Tablet 500 milliGRAM(s) Oral every 12 hours  trimethoprim  160 mG/sulfamethoxazole 800 mG 1 Tablet(s) Oral every 12 hours    Anticoagulants:  heparin   Injectable 5000 Unit(s) SubCutaneous every 8 hours    Cardiac:  losartan 50 milliGRAM(s) Oral every 24 hours      Allergies    penicillin (Unknown)    Intolerances        Vital Signs Last 24 Hrs  T(C): 36.9 (02 May 2025 20:08), Max: 36.9 (02 May 2025 16:09)  T(F): 98.5 (02 May 2025 20:08), Max: 98.5 (02 May 2025 20:08)  HR: 92 (02 May 2025 20:08) (81 - 96)  BP: 130/80 (02 May 2025 20:08) (130/80 - 154/84)  BP(mean): 90 (02 May 2025 05:00) (90 - 90)  RR: 18 (02 May 2025 20:08) (16 - 18)  SpO2: 95% (02 May 2025 20:08) (87% - 98%)    Parameters below as of 02 May 2025 20:08  Patient On (Oxygen Delivery Method): room air        05-01 @ 07:01  -  05-02 @ 07:00  --------------------------------------------------------  IN: 750 mL / OUT: 0 mL / NET: 750 mL    05-02 @ 07:01  -  05-02 @ 21:31  --------------------------------------------------------  IN: 780 mL / OUT: 0 mL / NET: 780 mL          PHYSICAL EXAM:  Constitutional: NAD  HEENT: NC/AT; PERRL, anicteric sclera; MMM  Neck: supple  Cardiovascular: +S1/S2, RRR  Respiratory: CTA B/L; no W/R/R  Gastrointestinal: soft, NT/ND  Extremities: WWP; no edema, clubbing or cyanosis  Vascular: 2+ radial pulses B/L  Neurological: awake and alert; LR    LABS:      CBC Full  -  ( 02 May 2025 06:36 )  WBC Count : 6.53 K/uL  RBC Count : 3.41 M/uL  Hemoglobin : 10.5 g/dL  Hematocrit : 32.4 %  Platelet Count - Automated : 174 K/uL  Mean Cell Volume : 95.0 fl  Mean Cell Hemoglobin : 30.8 pg  Mean Cell Hemoglobin Concentration : 32.4 g/dL  Auto Neutrophil # : x  Auto Lymphocyte # : x  Auto Monocyte # : x  Auto Eosinophil # : x  Auto Basophil # : x  Auto Neutrophil % : x  Auto Lymphocyte % : x  Auto Monocyte % : x  Auto Eosinophil % : x  Auto Basophil % : x    05-02    139  |  104  |  40[H]  ----------------------------<  100[H]  3.9   |  30  |  1.60[H]    Ca    8.1[L]      02 May 2025 06:36  Phos  3.9     05-02  Mg     2.0     05-02    TPro  5.2[L]  /  Alb  2.9[L]  /  TBili  0.6  /  DBili  x   /  AST  16  /  ALT  20  /  AlkPhos  58  05-01          Urinalysis Basic - ( 02 May 2025 06:36 )    Color: x / Appearance: x / SG: x / pH: x  Gluc: 100 mg/dL / Ketone: x  / Bili: x / Urobili: x   Blood: x / Protein: x / Nitrite: x   Leuk Esterase: x / RBC: x / WBC x   Sq Epi: x / Non Sq Epi: x / Bacteria: x                RADIOLOGY & ADDITIONAL STUDIES:

## 2025-05-02 NOTE — PROGRESS NOTE ADULT - PROBLEM SELECTOR PLAN 2
Hx RLE non-healing ulcer & cellullitis, initially treated 4/12-4/14 adm w/ Cefepime & Vanc de-escalated to Cefazolin, then pt left AMA. Re-adm 4/24 & started on Vanc for MRSA coverage iso purulence, w/ pt again leaving AMA 4/28.   Followed by vascular last adm s/p 2x wound debridements. On adm, pt's wound wrapped but not bleeding, otherwise c/d/i, no surrounding crepitus.   Recent wound culture 4/25 w/ MRSA & Pluralibacter gergoviae & Anaerobes   S/p Vanc 1g, cefepime 1g, azithromycin 500mg    - Started on         TMP//800mg q12 (5/1 - )         Metronidazole 500mg q12h (5/1- ) to cover bugs in wound culture   - Wound care consult -> SNF for wound care?  - ID consult if not improving Hx RLE non-healing ulcer & cellullitis, initially treated 4/12-4/14 adm w/ Cefepime & Vanc de-escalated to Cefazolin, then pt left AMA. Re-adm 4/24 & started on Vanc for MRSA coverage iso purulence, w/ pt again leaving AMA 4/28.   Followed by vascular last adm s/p 2x wound debridements. On adm, pt's wound wrapped but not bleeding, otherwise c/d/i, no surrounding crepitus.   Recent wound culture 4/25 w/ MRSA & Pluralibacter gergoviae & Anaerobes   S/p Vanc 1g, cefepime 1g, azithromycin 500mg    - Started on         TMP//800mg q12 (5/1 - )         Metronidazole 500mg q12h (5/1- ) to cover bugs in wound culture   - Wound care consult -> SNF for wound care vs wound care outpatient  - ID consult if not improving

## 2025-05-02 NOTE — PROGRESS NOTE ADULT - ATTENDING COMMENTS
Patient with long standing non compliance and significant COPD wth exacerbations. No need for antibiotics at this time and will continue at least LAMA/LABA therapy may need true triple but will hold ICS as he does exacerbation frequently and unclear if it Is from pneumonias. Will continue azithromycin TID with airway clearance. Reduce dose of hypertonic saline. All other labs and imaging reviewed and high risk for worsening.
80 YOM with PMH of cognitive impairment, HTN, COPD, CKD2 (baseline 1.2-1.3), chronic HFmrEF, recent admissions for for acute hypoxic respiratory failure and RLE SSTI (started as infected ulcer incompletely treated due to AMA/non-adherence c/b deeper infection s/p debridement with vascular x2) who presents acute on chronic hypoxic respiratory failure in setting of medication non-adherence and inability to care for self, admitted for further management.     Labs and imaging reviewed    Problem List  #Acute Hypoxic Respiratory Failure  #COPD, chronic, not in acute exacerbation  #Cellulitis POA  #Type 2 MI  #Chronic systolic heart failure  #CKD Stage II  #Cognitive Decline  #Anemia, normocytic  #Hypocalcemia    Plan:  -Continue Bactrim for now, if Cr uptrends again tomorrow would transition to PO Linezolid instead  -Restarted home inhalers, will need to work with Insurance formulary to continue treatment here  -Given ulcerative wound would benefit from SNF for wound care as would not have support at home to manage  -OT Rec'd TIN    Plan for SNF
80 YOM with PMH of cognitive impairment, HTN, COPD, CKD2 (baseline 1.2-1.3), chronic HFmrEF, recent admissions for for acute hypoxic respiratory failure and RLE SSTI (started as infected ulcer incompletely treated due to AMA/non-adherence c/b deeper infection s/p debridement with vascular x2) who presents from home for inability to care for self, found to have acute on chronic hypoxic respiratory failure in setting of medication non-adherence, admitted for further management.     Plan  - WC per prior vascular recommendations, no purulence or overt signs of sepsis  - given incomplete course for SSTI, will plan for 7-day course of PO abx  ---WC: MRSA, Pluralibacter gergovia, anaerobes (Alistipes-like sp, Fingegoldia magnna)  ---TMP-SMX for MRSA and P gergovia, metronidazole for anaerobes  ---if creat cont to rise, switch TMP-SMX to cefadroxil/cephalexin and linezolid   - pulm following, appreciate recommendations, optimize COPD medications and airway clearance  - PT/OT, patient states unable to care for self, confirmed with family, may be limited by cognitive impairment     Dispo: dual plan TIN vs home with services

## 2025-05-02 NOTE — DISCHARGE NOTE PROVIDER - HOSPITAL COURSE
#Discharge: do not delete    Patient is __ yo M/F with past medical history of _____, presented with _____, found to have _____      Problem List/Main Diagnoses:     New medications/therapies:   New lines/hardware:  Labs to be followed outpatient:   Exam to be followed outpatient:     Discharge plan: discharge to ______    Physical Exam Upon Discharge:     #Discharge: do not delete    Assessment:  80 YOM with PMH of cognitive impairment, HTN, COPD, CKD2 (baseline 1.2-1.3), chronic HFmrEF, recent admissions for for acute hypoxic respiratory failure and RLE SSTI (started as infected ulcer incompletely treated due to AMA/non-adherence c/b deeper infection s/p debridement with vascular x2) who presents acute on chronic hypoxic respiratory failure in setting of medication non-adherence and inability to care for self, admitted for further management.     Problem List/Main Diagnoses:     #Acute respiratory failure with hypoxia.   P/w AHRF 2/2 being off COPD medications. BIBA w/ SOB, cough/congestion. Reported chills to Ed provider however now denying. Reports sputum/mucus production. States he was not able to use home inhalers.   S/p Duoneb x1 in ED , Solumedrol 125mg, Azithro 500mg x1  - C/w Duoneb q4h, Advair Inhaler BID  - C/w Airway clearance w/ Hypersaline 3% TID & Aerobika TID   - C/w O2 via NC >>> Wean off O2 as tolerated  - Pulm consulted and recs appreciated   - LAMA/LABA available to patient from insurance's formulary.    #Cellulitis.   Hx RLE non-healing ulcer & cellullitis, initially treated 4/12-4/14 adm w/ Cefepime & Vanc de-escalated to Cefazolin, then pt left AMA. Re-adm 4/24 & started on Vanc for MRSA coverage iso purulence, w/ pt again leaving AMA 4/28.   Followed by vascular last adm s/p 2x wound debridements. On adm, pt's wound wrapped but not bleeding, otherwise c/d/i, no surrounding crepitus.   Recent wound culture 4/25 w/ MRSA & Pluralibacter gergoviae & Anaerobes   S/p Vanc 1g, cefepime 1g, azithromycin 500mg  - Started on         TMP//800mg q12 (5/1 - )         Metronidazole 500mg q12h (5/1- ) to cover bugs in wound culture   - Wound care consult -> SNF for wound care vs wound care outpatient    #Elevated troponin.   P/w Trop 89, ECG read w/ bifascular block iso known RBBB. Upon review, ECG grossly unchanged from prior ECG from 4/24, pt not endorsing chest pain or dyspnea on exertion.   Trop down from prior adm w/ Trop 117. Also iso MONA on CKD.   Trops downtrending   - Low c/f ACS given pt denying chest pain, and pt's dyspnea/presentation more consistent w/ COPD.    #Acute kidney injury superimposed on CKD.   P/w  BUN/Cr 44/1.58, likely pre-renal iso elevated BUN & pt reports poor PO intake since leaving hospital 4/28.   s/p 1L LR   Likely iso recent GDMT initiation   Cr 1.60 5/2, consistent with effect of trimethoprim  - Caution w/ fluids iso HFrEF  - Trend BUN/Cr, renally dose, avoid nephrotoxic agents.    #HFrEF (heart failure with reduced ejection fraction).   Hx HFrEF w/ EF 40-45% (4/24/25). Seen by cardiology last adm & started on Losartan 50mg qd, Lasix 40mg qd.   Deferred BB therapy given reactive airways/recurrent COPD exac   TTE 4/24: EF 40-45%, no WMAs, mild LV hypertrophy, mild LV diastolic dysfunction, normal RV size and systolic fxn. Estimated PASP 40.  Pt not volume overloaded at this time, proBNP 5940 (from 21991 on last adm)   Discussed w/ cardiology if any plan for further GDMT initiation inpatient - no further plan for now, no need for re-consult inpatient, pt to follow-up outpatient as previously rec'd.   - C/w Losartan 50mg qd   - Hold Lasix 40qd at this time iso MONA on CKD & not acutely volume overloaded  - Atorvastatin 80mg qhs.    #HTN (hypertension).   Hx HTN, previously on amlodipine, switched to GDMT last adm  - c/w Losartan 50mg qd  - Holding Lasix as above.    New medications/therapies:   New lines/hardware:  Labs to be followed outpatient:   Exam to be followed outpatient:     Discharge plan: discharge to ______    Physical Exam Upon Discharge:  General: NAD, appears comfortable    HEENT:  PERRL, anicteric sclera  Cardiovascular:  RRR  Respiratory: slight wheezing on expiration  Gastrointestinal: soft, NT/ND; +BSx4  Extremities: 2+ pitting edema, wound on RLE  Vascular: 2+ radial pulses  Neurological: AAOx3, forgetful; no focal deficits  Psychiatric: pleasant mood and affect  Dermatologic: scattered ecchymoses, crust/scab on LUE, wound or RLE as above   #Discharge: do not delete    Assessment:  80 YOM with PMH of cognitive impairment, HTN, COPD, CKD2 (baseline 1.2-1.3), chronic HFmrEF, recent admissions for for acute hypoxic respiratory failure and RLE SSTI (started as infected ulcer incompletely treated due to AMA/non-adherence c/b deeper infection s/p debridement with vascular x2) who presents acute on chronic hypoxic respiratory failure in setting of medication non-adherence and inability to care for self, admitted for further management. Placed on 4L O2 via NC. Pulm consulted and recs appreciated. Started on LAMA/LABA without ICS as less likely COPD exacerbation. More likely endobronchial debris. Start on airway clearance therapy with Aerobica. PT recommended TIN. Pt is medically stable dn optimized for discharge to [facility] ([place] with Pulmonology Follow up.     Problem List/Main Diagnoses:     #Acute respiratory failure with hypoxia- Improving   P/w AHRF 2/2 being off COPD medications. BIBA w/ SOB, cough/congestion. Reported chills to Ed provider however now denying. Reports sputum/mucus production. States he was not able to use home inhalers.   S/p Duoneb x1 in ED , Solumedrol 125mg, Azithro 500mg x1  - C/w Duoneb q4h, Stioloto inhaler 2 puffs QD  - C/w Airway clearance w/ Hypersaline 3% TID & Aerobika TID   - C/w Azithromycin 500mg TIW(Mon/wed/Fri)    #Cellulitis  Hx RLE non-healing ulcer & cellullitis, initially treated 4/12-4/14 adm w/ Cefepime & Vanc de-escalated to Cefazolin, then pt left AMA. Re-adm 4/24 & started on Vanc for MRSA coverage iso purulence, w/ pt again leaving AMA 4/28.   Followed by vascular last adm s/p 2x wound debridements. On adm, pt's wound wrapped but not bleeding, otherwise c/d/i, no surrounding crepitus.   Recent wound culture 4/25 w/ MRSA & Pluralibacter gergoviae & Anaerobes   S/p Vanc 1g, cefepime 1g, azithromycin 500mg  -C/w TMP//800mg q12 (5/1 - 5/7 ) and Metronidazole 500mg q12h (5/1- 5/7)       #Elevated troponin-resolved   P/w Trop 89, ECG read w/ bifascular block iso known RBBB. Upon review, ECG grossly unchanged from prior ECG from 4/24, pt not endorsing chest pain or dyspnea on exertion.   Trop down from prior adm w/ Trop 117. Also iso MONA on CKD.   Trops downtrending   Low c/f ACS given pt denying chest pain, and pt's dyspnea/presentation more consistent w/ COPD.    #Acute kidney injury superimposed on CKD-Stable   P/w  BUN/Cr 44/1.58, likely pre-renal iso elevated BUN & pt reports poor PO intake since leaving hospital 4/28.   s/p 1L LR   Likely iso recent GDMT initiation   Cr 1.60 5/2, consistent with effect of trimethoprim  Cr on discharge Cr 1.53    #HFrEF (heart failure with reduced ejection fraction).   Hx HFrEF w/ EF 40-45% (4/24/25). Seen by cardiology last adm & started on Losartan 50mg qd, Lasix 40mg qd.   Deferred BB therapy given reactive airways/recurrent COPD exac   TTE 4/24: EF 40-45%, no WMAs, mild LV hypertrophy, mild LV diastolic dysfunction, normal RV size and systolic fxn. Estimated PASP 40.  Pt not volume overloaded at this time, proBNP 5940 (from 15677 on last adm)   Discussed w/ cardiology if any plan for further GDMT initiation inpatient - no further plan for now, no need for re-consult inpatient, pt to follow-up outpatient as previously rec'd.   - C/w Losartan 50mg qd   - C/w Lasix 40qd  - C/w Atorvastatin 80mg QHS    #HTN (hypertension)  Hx HTN, previously on amlodipine, switched to GDMT last adm  - c/w Losartan 50mg qd    New medications/therapies: Stiolto Inhaler 2 Puffs Daily, metronidazole 500mg Q12hr, Bactrim DS 1 tab BID  New lines/hardware: None  F/u outpatient with Pulmonology     Discharge plan: discharge to [facility]    Physical Exam Upon Discharge:  General: NAD, appears comfortable  on 3L O2 via NC  HEENT:  PERRL, anicteric sclera  Cardiovascular:  RRR  Respiratory: Clear to auscultation   Gastrointestinal: soft, NT/ND; +BSx4  Extremities: 2+ pitting edema, wound on RLE  Vascular: 2+ radial pulses  Neurological: AAOx3, forgetful; no focal deficits  Psychiatric: pleasant mood and affect  Dermatologic: scattered ecchymoses, crust/scab on LUE, wound or RLE as above

## 2025-05-02 NOTE — DISCHARGE NOTE PROVIDER - CARE PROVIDERS DIRECT ADDRESSES
,DirectAddress_Unknown ,DirectAddress_Unknown,[email or web address]

## 2025-05-02 NOTE — PROGRESS NOTE ADULT - PROBLEM SELECTOR PLAN 6
Hx HTN, previously on amlodipine, switched to GDMT last adm    - c/w Losartan 50mg qd  - Holding Lasix as above

## 2025-05-02 NOTE — DISCHARGE NOTE PROVIDER - NSDCMRMEDTOKEN_GEN_ALL_CORE_FT
Albuterol (Eqv-ProAir HFA) 90 mcg/inh inhalation aerosol: 2 puff(s) inhaled every 6 hours as needed for  shortness of breath and/or wheezing  Anoro Ellipta 62.5 mcg-25 mcg/inh inhalation powder: 1 dose(s) inhaled once a day  Lasix 40 mg oral tablet: 1 tab(s) orally once a day  losartan 50 mg oral tablet: 1 tab(s) orally once a day  Stiolto Respimat 10 ACT 2.5 mcg-2.5 mcg/inh inhalation aerosol: 2 puff(s) inhaled once a day   atorvastatin 80 mg oral tablet: 1 tab(s) orally once a day (at bedtime)  azithromycin 500 mg oral tablet: 1 tab(s) orally Monday, Wednesday, and Friday  ipratropium-albuterol 0.5 mg-2.5 mg/3 mL inhalation solution: 3 milliliter(s) inhaled every 8 hours  Lasix 40 mg oral tablet: 1 tab(s) orally once a day  losartan 50 mg oral tablet: 1 tab(s) orally once a day  metroNIDAZOLE 500 mg oral tablet: 1 tab(s) orally every 12 hours Last day May 7th  sodium chloride 3% inhalation solution: 4 milliliter(s) inhaled every 8 hours  sulfamethoxazole-trimethoprim 800 mg-160 mg oral tablet: 1 tab(s) orally every 12 hours Last day May 7th  tiotropium-olodaterol 2.5 mcg-2.5 mcg/inh inhalation aerosol: 2 puff(s) inhaled once a day

## 2025-05-02 NOTE — PROGRESS NOTE ADULT - PROBLEM SELECTOR PLAN 4
P/w  BUN/Cr 44/1.58, likely pre-renal iso elevated BUN & pt reports poor PO intake since leaving hospital 4/28.   s/p 1L LR   Likely iso recent GDMT initiation   Cr 1.60 5/2, consistent with effect of trimethoprim    Plan:   - Caution w/ fluids iso HFrEF  - Trend BUN/Cr, renally dose, avoid nephrotoxic agents

## 2025-05-02 NOTE — PROGRESS NOTE ADULT - PROBLEM SELECTOR PLAN 5
Hx HFrEF w/ EF 40-45% (4/24/25). Seen by cardiology last adm & started on Losartan 50mg qd, Lasix 40mg qd.   Deferred BB therapy given reactive airways/recurrent COPD exac   TTE 4/24: EF 40-45%, no WMAs, mild LV hypertrophy, mild LV diastolic dysfunction, normal RV size and systolic fxn. Estimated PASP 40.  Pt not volume overloaded at this time, proBNP 5940 (from 27247 on last adm)   Discussed w/ cardiology if any plan for further GDMT initiation inpatient - no further plan for now, no need for re-consult inpatient, pt to follow-up outpatient as previously rec'd.     - C/w Losartan 50mg qd   - Hold Lasix 40qd at this time iso MONA on CKD & not acutely volume overloaded  - Atorvastatin 80mg qhs

## 2025-05-02 NOTE — DISCHARGE NOTE PROVIDER - NSDCFUADDAPPT_GEN_ALL_CORE_FT
Ridge Wheeler  [facility]  HEARTVASC [address]  Scheduled Appointment: 05/07/2025    Jaz Tobar  [facility]  PULMMED [address]  Scheduled Appointment: 05/09/2025

## 2025-05-02 NOTE — DISCHARGE NOTE PROVIDER - CARE PROVIDER_API CALL
Jose Carlos Bledsoe  Pulmonary Disease  [address] [place], [place] [address]  Phone: [phone]  Fax: [phone]  Follow Up Time: 2 weeks   Jaz Tobar  Pulmonary Disease  [address] [place] [address] [address]  Phone: [phone]  Fax: [phone]  Scheduled Appointment: 05/09/2025 09:30 AM    Ridge Wheeler  Cardiovascular Disease  [address] [place] [address], [address]  [place] [address]  Phone: [phone]  Fax: [phone]  Scheduled Appointment: 05/07/2025 01:00 PM

## 2025-05-02 NOTE — DISCHARGE NOTE PROVIDER - PROVIDER TOKENS
PROVIDER:[TOKEN:[[id number]],FOLLOWUP:[2 weeks]] PROVIDER:[TOKEN:[[id number]],SCHEDULEDAPPT:[05/09/2025],SCHEDULEDAPPTTIME:[09:30 AM]],PROVIDER:[TOKEN:[[id number]],SCHEDULEDAPPT:[05/07/2025],SCHEDULEDAPPTTIME:[01:00 PM]]

## 2025-05-02 NOTE — DISCHARGE NOTE PROVIDER - NSDCCPCAREPLAN_GEN_ALL_CORE_FT
PRINCIPAL DISCHARGE DIAGNOSIS  Diagnosis: Acute on chronic respiratory failure with hypoxia  Assessment and Plan of Treatment: You came to the hospital and were found to have a lower than normal amount of oxygen circulating in your blood (low O2 saturation). You were given inhaled medication in the emergency room to help with your breathing in addition to supplemental oxygen. You were also given antibiotics to protect your lungs from infection. You were admitted to the hospital to continue to receive the treatment to help with your breathing, including medication that works to open the airways in your lungs and supplemental oxygen via nasal cannula.  You can help prevent problems with your breathing in the future by continuing to take your medications as directed, cutting back on smoking, and getting immunizations against pneumonia, Covid, RSV, and the flu every year.  It is very important for you to take your medications as instructed when you are discharged from the hospital. Please also be sure to follow up with the doctors for your lungs (pulmonologist) and your heart (cardiologist).      SECONDARY DISCHARGE DIAGNOSES  Diagnosis: MRSA cellulitis  Assessment and Plan of Treatment:     Diagnosis: COPD exacerbation  Assessment and Plan of Treatment:      PRINCIPAL DISCHARGE DIAGNOSIS  Diagnosis: Acute on chronic respiratory failure with hypoxia  Assessment and Plan of Treatment: You came to the hospital and were found to have a lower than normal amount of oxygen circulating in your blood (low O2 saturation). You were given inhaled medication in the emergency room to help with your breathing in addition to supplemental oxygen. You were also given antibiotics to protect your lungs from infection. You were admitted to the hospital to continue to receive the treatment to help with your breathing, including medication that works to open the airways in your lungs and supplemental oxygen via nasal cannula.  You can help prevent problems with your breathing in the future by continuing to take your medications as directed, cutting back on smoking, and getting immunizations against pneumonia, Covid, RSV, and the flu every year.  It is very important for you to take your medications as instructed when you are discharged from the hospital. Please also be sure to follow up with the doctors for your lungs (pulmonologist) and your heart (cardiologist).  ------------------------------------------------------------  -C/w Nebulizer treatment and aerobica device for airway clearance   -C/w Stiolto inhaler 2 puffs daily  -F/u with Pulmonology      SECONDARY DISCHARGE DIAGNOSES  Diagnosis: MRSA cellulitis  Assessment and Plan of Treatment: Take the prescribed antibiotic medicine you are given as directed until it is gone. Take it even if you feel better. It treats the infection and stops it from returning. Not taking all the medicine can make future infections hard to treat. Keep the infected area clean. When possible, raise the infected area above the level of your heart. This helps keep swelling down. Apply clean bandages as advised. Wash your hands often to prevent spreading the infection. In the future, wash your hands before and after you touch cuts, scratches, or bandages. This will help prevent infection.   Call your doctor or returnt o the emergency room or call 911 immediately if you have any of the following: Difficulty or pain when moving the joints above or below the infected area, Discharge or pus draining from the area, rever of 100.4°F (38°C) or higher, or as directed by your healthcare provider, pain that gets worse in or around the infected site, redness that gets worse in or around the infected area, particularly if the area of redness expands to a wider area, shaking chills, swelling of the infected area, vomiting.  ----------------------------------------------------------  -C/w Metronidazole 500mg Q12hr end date May 7th  -C/w Bactrim DS 1 tab Q 12hour end date May 7th  -Wound care for RLE wound: WTD dressing with packing, change daily    Diagnosis: COPD exacerbation  Assessment and Plan of Treatment:

## 2025-05-02 NOTE — PROGRESS NOTE ADULT - PROBLEM SELECTOR PLAN 1
P/w AHRF 2/2 being off COPD medications. BIBA w/ SOB, cough/congestion. Reported chills to Ed provider however now denying. Reports sputum/mucus production. States he was not able to use home inhalers.   S/p Duoneb x1 in ED , Solumedrol 125mg, Azithro 500mg x1    - C/w Duoneb q4h, Advair Inhaler BID  - C/w Airway clearance w/ Hypersaline 3% TID & Aerobika TID   - C/w O2 via NC >>> Wean off O2 as tolerated  - Pulm consulted and recs appreciated   - Find LAMA/LABA available to patient from insurance's formulary P/w AHRF 2/2 being off COPD medications. BIBA w/ SOB, cough/congestion. Reported chills to Ed provider however now denying. Reports sputum/mucus production. States he was not able to use home inhalers.   S/p Duoneb x1 in ED , Solumedrol 125mg, Azithro 500mg x1    - C/w Duoneb q4h, Stiolto Inhaler 2 puffs QD  - C/w Airway clearance w/ Hypersaline 3% TID & Aerobika TID   - C/w O2 via NC >>> Wean off O2 as tolerated  - Pulm consulted and recs appreciated   - Find LAMA/LABA available to patient from insurance's formulary

## 2025-05-02 NOTE — DISCHARGE NOTE PROVIDER - NSDCHC_MEDRECSTATUS_GEN_ALL_CORE
Admission Reconciliation is Not Complete  Discharge Reconciliation is Not Complete Unable to assess Admission Reconciliation is Completed  Discharge Reconciliation is Completed

## 2025-05-02 NOTE — PROGRESS NOTE ADULT - TIME BILLING
Bedside exam and interview   Reviewed vitals, labs   Discussed patient's plan of care with housestaff   Documentation of encounter    Time documented on encounter excludes teaching and separately reported services
chart review, patient interview/exam, review of labs/imaging, management of medical conditions, counseling/educating patient, documentation; excludes teaching and separately reported services

## 2025-05-03 VITALS
RESPIRATION RATE: 18 BRPM | OXYGEN SATURATION: 99 % | TEMPERATURE: 99 F | HEART RATE: 82 BPM | SYSTOLIC BLOOD PRESSURE: 128 MMHG | DIASTOLIC BLOOD PRESSURE: 70 MMHG

## 2025-05-03 LAB
ANION GAP SERPL CALC-SCNC: 5 MMOL/L — SIGNIFICANT CHANGE UP (ref 5–17)
BUN SERPL-MCNC: 32 MG/DL — HIGH (ref 7–23)
CALCIUM SERPL-MCNC: 8.2 MG/DL — LOW (ref 8.4–10.5)
CHLORIDE SERPL-SCNC: 104 MMOL/L — SIGNIFICANT CHANGE UP (ref 96–108)
CO2 SERPL-SCNC: 31 MMOL/L — SIGNIFICANT CHANGE UP (ref 22–31)
CREAT SERPL-MCNC: 1.53 MG/DL — HIGH (ref 0.5–1.3)
EGFR: 45 ML/MIN/1.73M2 — LOW
EGFR: 45 ML/MIN/1.73M2 — LOW
GLUCOSE SERPL-MCNC: 107 MG/DL — HIGH (ref 70–99)
MAGNESIUM SERPL-MCNC: 2 MG/DL — SIGNIFICANT CHANGE UP (ref 1.6–2.6)
PHOSPHATE SERPL-MCNC: 3.2 MG/DL — SIGNIFICANT CHANGE UP (ref 2.5–4.5)
POTASSIUM SERPL-MCNC: 4.3 MMOL/L — SIGNIFICANT CHANGE UP (ref 3.5–5.3)
POTASSIUM SERPL-SCNC: 4.3 MMOL/L — SIGNIFICANT CHANGE UP (ref 3.5–5.3)
SODIUM SERPL-SCNC: 140 MMOL/L — SIGNIFICANT CHANGE UP (ref 135–145)

## 2025-05-03 PROCEDURE — 87040 BLOOD CULTURE FOR BACTERIA: CPT

## 2025-05-03 PROCEDURE — 97161 PT EVAL LOW COMPLEX 20 MIN: CPT

## 2025-05-03 PROCEDURE — 82570 ASSAY OF URINE CREATININE: CPT

## 2025-05-03 PROCEDURE — 36415 COLL VENOUS BLD VENIPUNCTURE: CPT

## 2025-05-03 PROCEDURE — 86140 C-REACTIVE PROTEIN: CPT

## 2025-05-03 PROCEDURE — 80053 COMPREHEN METABOLIC PANEL: CPT

## 2025-05-03 PROCEDURE — 84484 ASSAY OF TROPONIN QUANT: CPT

## 2025-05-03 PROCEDURE — 94640 AIRWAY INHALATION TREATMENT: CPT

## 2025-05-03 PROCEDURE — 85025 COMPLETE CBC W/AUTO DIFF WBC: CPT

## 2025-05-03 PROCEDURE — 96374 THER/PROPH/DIAG INJ IV PUSH: CPT

## 2025-05-03 PROCEDURE — 99233 SBSQ HOSP IP/OBS HIGH 50: CPT | Mod: GC

## 2025-05-03 PROCEDURE — 80048 BASIC METABOLIC PNL TOTAL CA: CPT

## 2025-05-03 PROCEDURE — 81001 URINALYSIS AUTO W/SCOPE: CPT

## 2025-05-03 PROCEDURE — 84300 ASSAY OF URINE SODIUM: CPT

## 2025-05-03 PROCEDURE — 99285 EMERGENCY DEPT VISIT HI MDM: CPT | Mod: 25

## 2025-05-03 PROCEDURE — 0225U NFCT DS DNA&RNA 21 SARSCOV2: CPT

## 2025-05-03 PROCEDURE — 83935 ASSAY OF URINE OSMOLALITY: CPT

## 2025-05-03 PROCEDURE — 97116 GAIT TRAINING THERAPY: CPT

## 2025-05-03 PROCEDURE — 82553 CREATINE MB FRACTION: CPT

## 2025-05-03 PROCEDURE — 83880 ASSAY OF NATRIURETIC PEPTIDE: CPT

## 2025-05-03 PROCEDURE — 71045 X-RAY EXAM CHEST 1 VIEW: CPT

## 2025-05-03 PROCEDURE — 84100 ASSAY OF PHOSPHORUS: CPT

## 2025-05-03 PROCEDURE — 84540 ASSAY OF URINE/UREA-N: CPT

## 2025-05-03 PROCEDURE — 83735 ASSAY OF MAGNESIUM: CPT

## 2025-05-03 PROCEDURE — 82803 BLOOD GASES ANY COMBINATION: CPT

## 2025-05-03 PROCEDURE — 97165 OT EVAL LOW COMPLEX 30 MIN: CPT

## 2025-05-03 PROCEDURE — 87070 CULTURE OTHR SPECIMN AEROBIC: CPT

## 2025-05-03 PROCEDURE — 84133 ASSAY OF URINE POTASSIUM: CPT

## 2025-05-03 PROCEDURE — 82550 ASSAY OF CK (CPK): CPT

## 2025-05-03 PROCEDURE — 87205 SMEAR GRAM STAIN: CPT

## 2025-05-03 PROCEDURE — 84156 ASSAY OF PROTEIN URINE: CPT

## 2025-05-03 PROCEDURE — 96375 TX/PRO/DX INJ NEW DRUG ADDON: CPT

## 2025-05-03 PROCEDURE — 83605 ASSAY OF LACTIC ACID: CPT

## 2025-05-03 RX ORDER — SULFAMETHOXAZOLE/TRIMETHOPRIM 800-160 MG
1 TABLET ORAL
Qty: 0 | Refills: 0 | DISCHARGE
Start: 2025-05-03

## 2025-05-03 RX ORDER — TIOTROPIUM BROMIDE AND OLODATEROL 3.124; 2.736 UG/1; UG/1
2 SPRAY, METERED RESPIRATORY (INHALATION)
Qty: 0 | Refills: 0 | DISCHARGE
Start: 2025-05-03

## 2025-05-03 RX ORDER — METRONIDAZOLE 250 MG
1 TABLET ORAL
Qty: 0 | Refills: 0 | DISCHARGE
Start: 2025-05-03

## 2025-05-03 RX ORDER — AZITHROMYCIN 250 MG
1 CAPSULE ORAL
Qty: 0 | Refills: 0 | DISCHARGE
Start: 2025-05-03

## 2025-05-03 RX ORDER — ALBUTEROL SULFATE 2.5 MG/3ML
2 VIAL, NEBULIZER (ML) INHALATION
Refills: 0 | DISCHARGE

## 2025-05-03 RX ORDER — ATORVASTATIN CALCIUM 80 MG/1
1 TABLET, FILM COATED ORAL
Qty: 0 | Refills: 0 | DISCHARGE
Start: 2025-05-03

## 2025-05-03 RX ORDER — IPRATROPIUM BROMIDE AND ALBUTEROL SULFATE .5; 2.5 MG/3ML; MG/3ML
3 SOLUTION RESPIRATORY (INHALATION)
Qty: 0 | Refills: 0 | DISCHARGE
Start: 2025-05-03

## 2025-05-03 RX ADMIN — Medication 4 MILLILITER(S): at 07:35

## 2025-05-03 RX ADMIN — Medication 1 TABLET(S): at 09:15

## 2025-05-03 RX ADMIN — HEPARIN SODIUM 5000 UNIT(S): 1000 INJECTION INTRAVENOUS; SUBCUTANEOUS at 06:36

## 2025-05-03 RX ADMIN — LOSARTAN POTASSIUM 50 MILLIGRAM(S): 100 TABLET, FILM COATED ORAL at 09:14

## 2025-05-03 RX ADMIN — Medication 500 MILLIGRAM(S): at 06:36

## 2025-05-03 RX ADMIN — IPRATROPIUM BROMIDE AND ALBUTEROL SULFATE 3 MILLILITER(S): .5; 2.5 SOLUTION RESPIRATORY (INHALATION) at 06:36

## 2025-05-03 NOTE — PROGRESS NOTE ADULT - PROBLEM SELECTOR PLAN 1
P/w AHRF 2/2 being off COPD medications. BIBA w/ SOB, cough/congestion. Reported chills to Ed provider however now denying. Reports sputum/mucus production. States he was not able to use home inhalers.   S/p Duoneb x1 in ED , Solumedrol 125mg, Azithro 500mg x1    - C/w Duoneb q4h, Stiolto Inhaler 2 puffs QD  - C/w Airway clearance w/ Hypersaline 3% TID & Aerobika TID   - C/w O2 via NC >>> Wean off O2 as tolerated  - Pulm consulted and recs appreciated   - Find LAMA/LABA available to patient from insurance's formulary

## 2025-05-03 NOTE — PROGRESS NOTE ADULT - PROBLEM SELECTOR PLAN 2
Hx RLE non-healing ulcer & cellullitis, initially treated 4/12-4/14 adm w/ Cefepime & Vanc de-escalated to Cefazolin, then pt left AMA. Re-adm 4/24 & started on Vanc for MRSA coverage iso purulence, w/ pt again leaving AMA 4/28.   Followed by vascular last adm s/p 2x wound debridements. On adm, pt's wound wrapped but not bleeding, otherwise c/d/i, no surrounding crepitus.   Recent wound culture 4/25 w/ MRSA & Pluralibacter gergoviae & Anaerobes   S/p Vanc 1g, cefepime 1g, azithromycin 500mg    - Started on         TMP//800mg q12 (5/1 - )         Metronidazole 500mg q12h (5/1- ) to cover bugs in wound culture   - Wound care consult -> SNF for wound care vs wound care outpatient  - ID consult if not improving

## 2025-05-03 NOTE — PROGRESS NOTE ADULT - PROBLEM SELECTOR PLAN 7
F: s/p 1L LR, caution w/ further fluids given HFrEF  E: Replete PRN  N: DASH  P: Heparin subcu  Dispo: RMF, PT Consult placed
F: s/p 1L LR, caution w/ further fluids given HFrEF  E: Replete PRN  N: DASH  P: Heparin subcu  Dispo: RMF, SNF for wound care
F: s/p 1L LR, caution w/ further fluids given HFrEF  E: Replete PRN  N: DASH  P: Heparin subcu  Dispo: RMF, SNF for wound care

## 2025-05-03 NOTE — PROGRESS NOTE ADULT - ASSESSMENT
80M w/ PMhx of HTN, COPD not on home O2 with recent admission to [facility] for RLE cellulitis 4/12-4/13 and left AMA presented to [facility] 4/24 after he was found with altered mental status and shortness of breath by a neighbor, admitted for LLE cellulitis and ACOPDE necessitating BiPAP initially. Pulm consulted given recent COPD admission and now presenting with shortness of breath      Here now with recurrent shortness of breath after recently leaving AMA. Suspect likely in setting of non-adherence to medications as did not have them upon discharge at home and recently re-started smoking cigarettes again. Shortness of breath when examined at baseline with rhonchi noted on lung auscultation. Otherwise non-toxic and with good air movement. Suspect dyspnea was provoked in setting of limited access to inhalers and reinitiation of smoking, currently not in AECOPD.     DATA REVIEWED:  ***  TTE 4/24/25: LV dysfunction 40 - 45%, grade 1 DD, RV normal size/function, LA dilation. Ascending aorta dilation. PASP 40  CTA pulm 4/24/25 IMPRESSION: No pulmonary embolism. Previously seen masslike encasement of the right hilum with ipsilateral   subcarinal and paratracheal lymphadenopathy is no longer seen. Extensive diffuse emphysema. Improvement of previously noted mediastinal adenopathy. Small right greater than left pleural effusion.    #COPD   #Severe emphysema  #Endobronchial debris in ASHA  #Active nicotine use    Plan:  - please provide new ACT PEF device, unable to locate in room  - Continue airway clearance duonebs TID->hypersaline 3% TID -> aerobika TID while here, encourage it on discharge w/ aerobika device please ensure all devices at home  - hold handheld inhalers while on duonebs  - upon discharge, start LAMA/LABA, recommend anoro elipta for ease of use, but ultimately depends on insurance coverage  - Would benefit from f/u with our pulmonologists; PFTs/6MWT; immunizations for RSV, flu, shingles, pneumococcus; pulmonary rehab. [facility] [address]  - No indication for steroids at this time    Thank you for this consult.  Patient seen, evaluated and disucssed with PCCM attending.    Pulmonary team will sign off.   Please call, page or place new consult with any new questions.     Ronny Hu MD  PCCM Fellow  
80M w/ PMhx of HTN, COPD not on home O2 with recent admission to [facility] for RLE cellulitis 4/12-4/13 and left AMA presented to [facility] 4/24 after he was found with altered mental status and shortness of breath by a neighbor, admitted for LLE cellulitis and ACOPDE necessitating BiPAP initially. Pulm consulted given recent COPD admission and now presenting with shortness of breath      Here now with recurrent shortness of breath after recently leaving AMA. Suspect likely in setting of non-adherence to medications as did not have them upon discharge at home and recently re-started smoking cigarettes again. Shortness of breath when examined at baseline with rhonchi noted on lung auscultation. Otherwise non-toxic and with good air movement. Suspect dyspnea was provoked in setting of limited access to inhalers and reinitiation of smoking, currently not in AECOPD.     DATA REVIEWED:  ***  TTE 4/24/25: LV dysfunction 40 - 45%, grade 1 DD, RV normal size/function, LA dilation. Ascending aorta dilation. PASP 40  CTA pulm 4/24/25 IMPRESSION: No pulmonary embolism. Previously seen masslike encasement of the right hilum with ipsilateral   subcarinal and paratracheal lymphadenopathy is no longer seen. Extensive diffuse emphysema. Improvement of previously noted mediastinal adenopathy. Small right greater than left pleural effusion.    #COPD   #Severe emphysema  #Endobronchial debris in ASHA  #Active nicotine use    Patient has recurrent COPD exacerbations, which qualifies him for triple therapy, however it is likely due to lack of access to inhalers and adherence challenges.   For now will continue with LAMA-LABA, and ensure appropriate inhaler based on insurance sent on discharge such as anoro elipta or stiolto respimat.     Plan:  - can continue with LAMA-LABA therapy,  - continue with azithromycin TIW  - Continue airway clearance duonebs TID->hypersaline 3% TID -> aerobika TID while here, encourage it on discharge w/ aerobika or similar device please ensure all devices at home  - Would benefit from f/u with our pulmonologists; PFTs/6MWT; immunizations for RSV, flu, shingles, pneumococcus; pulmonary rehab. He should see Dr. Bledsoe at his [facility] at [address]  - No indication for steroids at this time    Patient was seen, evaluated and discussed with PCCM attending.    Ronny Hu MD  PCCM Fellow, PGY-5    Please page pulmonary if there are any questions with above recommendations. 
80 YOM with PMH of cognitive impairment, HTN, COPD, CKD2 (baseline 1.2-1.3), chronic HFmrEF, recent admissions for for acute hypoxic respiratory failure and RLE SSTI (started as infected ulcer incompletely treated due to AMA/non-adherence c/b deeper infection s/p debridement with vascular x2) who presents acute on chronic hypoxic respiratory failure in setting of medication non-adherence and inability to care for self, admitted for further management. 
79 yo M w/ PMHx of HTN, HFrEF, COPD with recurrent exacerbations iso med nonadherence - recently left AMA after adm for exacerbation, presenting w/ SOB admitted for AHRF 2/2 COPD and poor medication adherence.
80 YOM with PMH of cognitive impairment, HTN, COPD, CKD2 (baseline 1.2-1.3), chronic HFmrEF, recent admissions for for acute hypoxic respiratory failure and RLE SSTI (started as infected ulcer incompletely treated due to AMA/non-adherence c/b deeper infection s/p debridement with vascular x2) who presents acute on chronic hypoxic respiratory failure in setting of medication non-adherence and inability to care for self, admitted for further management.

## 2025-05-03 NOTE — PROGRESS NOTE ADULT - SUBJECTIVE AND OBJECTIVE BOX
OVERNIGHT EVENTS: NAEO    SUBJECTIVE / INTERVAL HPI: Patient seen and examined at bedside. Pt offers no c/o any sort. Patient denying chest pain, SOB, palpitations, cough.     Remaining ROS negative       PHYSICAL EXAM:  General: NAD, appears comfortable on 3L O2 via NC  HEENT:  PERRL, anicteric sclera  Cardiovascular:  RRR  Respiratory: slight wheezing on expiration  Gastrointestinal: soft, NT/ND; +BSx4  Extremities: 2+ pitting edema, wound on RLE  Vascular: 2+ radial pulses  Neurological: AAOx3; no focal deficits  Dermatologic: scattered ecchymoses, crust/scab on LUE, wound or RLE as above    VITAL SIGNS:  Vital Signs Last 24 Hrs  T(C): 37 (03 May 2025 12:15), Max: 37 (03 May 2025 12:15)  T(F): 98.6 (03 May 2025 12:15), Max: 98.6 (03 May 2025 12:15)  HR: 82 (03 May 2025 12:15) (82 - 98)  BP: 128/70 (03 May 2025 12:15) (128/70 - 145/64)  BP(mean): --  RR: 18 (03 May 2025 12:15) (17 - 18)  SpO2: 99% (03 May 2025 12:15) (93% - 99%)    Parameters below as of 03 May 2025 12:15  Patient On (Oxygen Delivery Method): nasal cannula          MEDICATIONS:  MEDICATIONS  (STANDING):    MEDICATIONS  (PRN):      ALLERGIES:  Allergies    penicillin (Unknown)    Intolerances        LABS:                        10.5   6.53  )-----------( 174      ( 02 May 2025 06:36 )             32.4     05-03    140  |  104  |  32[H]  ----------------------------<  107[H]  4.3   |  31  |  1.53[H]    Ca    8.2[L]      03 May 2025 10:53  Phos  3.2     05-03  Mg     2.0     05-03        Urinalysis Basic - ( 03 May 2025 10:53 )    Color: x / Appearance: x / SG: x / pH: x  Gluc: 107 mg/dL / Ketone: x  / Bili: x / Urobili: x   Blood: x / Protein: x / Nitrite: x   Leuk Esterase: x / RBC: x / WBC x   Sq Epi: x / Non Sq Epi: x / Bacteria: x      CAPILLARY BLOOD GLUCOSE          RADIOLOGY & ADDITIONAL TESTS: Reviewed.

## 2025-05-03 NOTE — PROGRESS NOTE ADULT - PROBLEM SELECTOR PLAN 5
Hx HFrEF w/ EF 40-45% (4/24/25). Seen by cardiology last adm & started on Losartan 50mg qd, Lasix 40mg qd.   Deferred BB therapy given reactive airways/recurrent COPD exac   TTE 4/24: EF 40-45%, no WMAs, mild LV hypertrophy, mild LV diastolic dysfunction, normal RV size and systolic fxn. Estimated PASP 40.  Pt not volume overloaded at this time, proBNP 5940 (from 34703 on last adm)   Discussed w/ cardiology if any plan for further GDMT initiation inpatient - no further plan for now, no need for re-consult inpatient, pt to follow-up outpatient as previously rec'd.     - C/w Losartan 50mg qd   - Hold Lasix 40qd at this time iso MONA on CKD & not acutely volume overloaded  - Atorvastatin 80mg qhs

## 2025-05-05 DIAGNOSIS — Z79.51 LONG TERM (CURRENT) USE OF INHALED STEROIDS: ICD-10-CM

## 2025-05-05 DIAGNOSIS — Z79.02 LONG TERM (CURRENT) USE OF ANTITHROMBOTICS/ANTIPLATELETS: ICD-10-CM

## 2025-05-05 DIAGNOSIS — Z90.49 ACQUIRED ABSENCE OF OTHER SPECIFIED PARTS OF DIGESTIVE TRACT: ICD-10-CM

## 2025-05-05 DIAGNOSIS — I50.20 UNSPECIFIED SYSTOLIC (CONGESTIVE) HEART FAILURE: ICD-10-CM

## 2025-05-05 DIAGNOSIS — Z91.148 PATIENT'S OTHER NONCOMPLIANCE WITH MEDICATION REGIMEN FOR OTHER REASON: ICD-10-CM

## 2025-05-05 DIAGNOSIS — J44.1 CHRONIC OBSTRUCTIVE PULMONARY DISEASE WITH (ACUTE) EXACERBATION: ICD-10-CM

## 2025-05-05 DIAGNOSIS — I87.8 OTHER SPECIFIED DISORDERS OF VEINS: ICD-10-CM

## 2025-05-05 DIAGNOSIS — J43.9 EMPHYSEMA, UNSPECIFIED: ICD-10-CM

## 2025-05-05 DIAGNOSIS — L03.115 CELLULITIS OF RIGHT LOWER LIMB: ICD-10-CM

## 2025-05-05 DIAGNOSIS — G93.41 METABOLIC ENCEPHALOPATHY: ICD-10-CM

## 2025-05-05 DIAGNOSIS — R06.02 SHORTNESS OF BREATH: ICD-10-CM

## 2025-05-05 DIAGNOSIS — N40.0 BENIGN PROSTATIC HYPERPLASIA WITHOUT LOWER URINARY TRACT SYMPTOMS: ICD-10-CM

## 2025-05-05 DIAGNOSIS — L97.918 NON-PRESSURE CHRONIC ULCER OF UNSPECIFIED PART OF RIGHT LOWER LEG WITH OTHER SPECIFIED SEVERITY: ICD-10-CM

## 2025-05-05 DIAGNOSIS — Z88.0 ALLERGY STATUS TO PENICILLIN: ICD-10-CM

## 2025-05-05 DIAGNOSIS — Z53.29 PROCEDURE AND TREATMENT NOT CARRIED OUT BECAUSE OF PATIENT'S DECISION FOR OTHER REASONS: ICD-10-CM

## 2025-05-05 DIAGNOSIS — I96 GANGRENE, NOT ELSEWHERE CLASSIFIED: ICD-10-CM

## 2025-05-05 DIAGNOSIS — Z87.891 PERSONAL HISTORY OF NICOTINE DEPENDENCE: ICD-10-CM

## 2025-05-05 DIAGNOSIS — B95.62 METHICILLIN RESISTANT STAPHYLOCOCCUS AUREUS INFECTION AS THE CAUSE OF DISEASES CLASSIFIED ELSEWHERE: ICD-10-CM

## 2025-05-05 DIAGNOSIS — E87.70 FLUID OVERLOAD, UNSPECIFIED: ICD-10-CM

## 2025-05-05 DIAGNOSIS — N18.9 CHRONIC KIDNEY DISEASE, UNSPECIFIED: ICD-10-CM

## 2025-05-05 DIAGNOSIS — J96.01 ACUTE RESPIRATORY FAILURE WITH HYPOXIA: ICD-10-CM

## 2025-05-05 DIAGNOSIS — R45.1 RESTLESSNESS AND AGITATION: ICD-10-CM

## 2025-05-05 DIAGNOSIS — I13.0 HYPERTENSIVE HEART AND CHRONIC KIDNEY DISEASE WITH HEART FAILURE AND STAGE 1 THROUGH STAGE 4 CHRONIC KIDNEY DISEASE, OR UNSPECIFIED CHRONIC KIDNEY DISEASE: ICD-10-CM

## 2025-05-07 ENCOUNTER — EMERGENCY (EMERGENCY)
Facility: HOSPITAL | Age: 80
LOS: 1 days | End: 2025-05-07
Attending: EMERGENCY MEDICINE | Admitting: EMERGENCY MEDICINE
Payer: MEDICARE

## 2025-05-07 ENCOUNTER — APPOINTMENT (OUTPATIENT)
Dept: HEART AND VASCULAR | Facility: CLINIC | Age: 80
End: 2025-05-07

## 2025-05-07 VITALS
HEART RATE: 98 BPM | TEMPERATURE: 98 F | SYSTOLIC BLOOD PRESSURE: 156 MMHG | OXYGEN SATURATION: 96 % | DIASTOLIC BLOOD PRESSURE: 86 MMHG | RESPIRATION RATE: 18 BRPM

## 2025-05-07 VITALS
TEMPERATURE: 98 F | SYSTOLIC BLOOD PRESSURE: 159 MMHG | HEART RATE: 103 BPM | RESPIRATION RATE: 18 BRPM | DIASTOLIC BLOOD PRESSURE: 94 MMHG | HEIGHT: 69 IN | OXYGEN SATURATION: 93 %

## 2025-05-07 DIAGNOSIS — J96.21 ACUTE AND CHRONIC RESPIRATORY FAILURE WITH HYPOXIA: ICD-10-CM

## 2025-05-07 DIAGNOSIS — J44.9 CHRONIC OBSTRUCTIVE PULMONARY DISEASE, UNSPECIFIED: ICD-10-CM

## 2025-05-07 DIAGNOSIS — T50.995A ADVERSE EFFECT OF OTHER DRUGS, MEDICAMENTS AND BIOLOGICAL SUBSTANCES, INITIAL ENCOUNTER: ICD-10-CM

## 2025-05-07 DIAGNOSIS — N18.2 CHRONIC KIDNEY DISEASE, STAGE 2 (MILD): ICD-10-CM

## 2025-05-07 DIAGNOSIS — I50.22 CHRONIC SYSTOLIC (CONGESTIVE) HEART FAILURE: ICD-10-CM

## 2025-05-07 DIAGNOSIS — Z88.0 ALLERGY STATUS TO PENICILLIN: ICD-10-CM

## 2025-05-07 DIAGNOSIS — Z53.29 PROCEDURE AND TREATMENT NOT CARRIED OUT BECAUSE OF PATIENT'S DECISION FOR OTHER REASONS: ICD-10-CM

## 2025-05-07 DIAGNOSIS — Z90.49 ACQUIRED ABSENCE OF OTHER SPECIFIED PARTS OF DIGESTIVE TRACT: Chronic | ICD-10-CM

## 2025-05-07 LAB
ALBUMIN SERPL ELPH-MCNC: 3.5 G/DL — SIGNIFICANT CHANGE UP (ref 3.3–5)
ALP SERPL-CCNC: 71 U/L — SIGNIFICANT CHANGE UP (ref 40–120)
ALT FLD-CCNC: 45 U/L — SIGNIFICANT CHANGE UP (ref 10–45)
ANION GAP SERPL CALC-SCNC: 14 MMOL/L — SIGNIFICANT CHANGE UP (ref 5–17)
APPEARANCE UR: CLEAR — SIGNIFICANT CHANGE UP
AST SERPL-CCNC: 48 U/L — HIGH (ref 10–40)
BASOPHILS # BLD AUTO: 0.02 K/UL — SIGNIFICANT CHANGE UP (ref 0–0.2)
BASOPHILS NFR BLD AUTO: 0.2 % — SIGNIFICANT CHANGE UP (ref 0–2)
BILIRUB SERPL-MCNC: 0.6 MG/DL — SIGNIFICANT CHANGE UP (ref 0.2–1.2)
BILIRUB UR-MCNC: NEGATIVE — SIGNIFICANT CHANGE UP
BUN SERPL-MCNC: 26 MG/DL — HIGH (ref 7–23)
CALCIUM SERPL-MCNC: 9 MG/DL — SIGNIFICANT CHANGE UP (ref 8.4–10.5)
CHLORIDE SERPL-SCNC: 103 MMOL/L — SIGNIFICANT CHANGE UP (ref 96–108)
CO2 SERPL-SCNC: 24 MMOL/L — SIGNIFICANT CHANGE UP (ref 22–31)
COLOR SPEC: YELLOW — SIGNIFICANT CHANGE UP
CREAT SERPL-MCNC: 1.68 MG/DL — HIGH (ref 0.5–1.3)
DIFF PNL FLD: NEGATIVE — SIGNIFICANT CHANGE UP
EGFR: 41 ML/MIN/1.73M2 — LOW
EGFR: 41 ML/MIN/1.73M2 — LOW
EOSINOPHIL # BLD AUTO: 0.04 K/UL — SIGNIFICANT CHANGE UP (ref 0–0.5)
EOSINOPHIL NFR BLD AUTO: 0.5 % — SIGNIFICANT CHANGE UP (ref 0–6)
GLUCOSE SERPL-MCNC: 107 MG/DL — HIGH (ref 70–99)
GLUCOSE UR QL: NEGATIVE MG/DL — SIGNIFICANT CHANGE UP
HCT VFR BLD CALC: 35 % — LOW (ref 39–50)
HGB BLD-MCNC: 11.5 G/DL — LOW (ref 13–17)
IMM GRANULOCYTES NFR BLD AUTO: 0.3 % — SIGNIFICANT CHANGE UP (ref 0–0.9)
KETONES UR-MCNC: NEGATIVE MG/DL — SIGNIFICANT CHANGE UP
LEUKOCYTE ESTERASE UR-ACNC: NEGATIVE — SIGNIFICANT CHANGE UP
LYMPHOCYTES # BLD AUTO: 0.71 K/UL — LOW (ref 1–3.3)
LYMPHOCYTES # BLD AUTO: 8.2 % — LOW (ref 13–44)
MCHC RBC-ENTMCNC: 31.9 PG — SIGNIFICANT CHANGE UP (ref 27–34)
MCHC RBC-ENTMCNC: 32.9 G/DL — SIGNIFICANT CHANGE UP (ref 32–36)
MCV RBC AUTO: 97.2 FL — SIGNIFICANT CHANGE UP (ref 80–100)
MONOCYTES # BLD AUTO: 0.42 K/UL — SIGNIFICANT CHANGE UP (ref 0–0.9)
MONOCYTES NFR BLD AUTO: 4.8 % — SIGNIFICANT CHANGE UP (ref 2–14)
NEUTROPHILS # BLD AUTO: 7.49 K/UL — HIGH (ref 1.8–7.4)
NEUTROPHILS NFR BLD AUTO: 86 % — HIGH (ref 43–77)
NITRITE UR-MCNC: NEGATIVE — SIGNIFICANT CHANGE UP
NRBC BLD AUTO-RTO: 0 /100 WBCS — SIGNIFICANT CHANGE UP (ref 0–0)
PH UR: 6.5 — SIGNIFICANT CHANGE UP (ref 5–8)
PLATELET # BLD AUTO: 148 K/UL — LOW (ref 150–400)
POTASSIUM SERPL-MCNC: 4.9 MMOL/L — SIGNIFICANT CHANGE UP (ref 3.5–5.3)
POTASSIUM SERPL-SCNC: 4.9 MMOL/L — SIGNIFICANT CHANGE UP (ref 3.5–5.3)
PROT SERPL-MCNC: 6.1 G/DL — SIGNIFICANT CHANGE UP (ref 6–8.3)
PROT UR-MCNC: 30 MG/DL
RBC # BLD: 3.6 M/UL — LOW (ref 4.2–5.8)
RBC # FLD: 15.7 % — HIGH (ref 10.3–14.5)
SODIUM SERPL-SCNC: 141 MMOL/L — SIGNIFICANT CHANGE UP (ref 135–145)
SP GR SPEC: 1.01 — SIGNIFICANT CHANGE UP (ref 1–1.03)
UROBILINOGEN FLD QL: 0.2 MG/DL — SIGNIFICANT CHANGE UP (ref 0.2–1)
WBC # BLD: 8.71 K/UL — SIGNIFICANT CHANGE UP (ref 3.8–10.5)
WBC # FLD AUTO: 8.71 K/UL — SIGNIFICANT CHANGE UP (ref 3.8–10.5)

## 2025-05-07 PROCEDURE — 99283 EMERGENCY DEPT VISIT LOW MDM: CPT

## 2025-05-07 PROCEDURE — 85025 COMPLETE CBC W/AUTO DIFF WBC: CPT

## 2025-05-07 PROCEDURE — 82962 GLUCOSE BLOOD TEST: CPT

## 2025-05-07 PROCEDURE — 81001 URINALYSIS AUTO W/SCOPE: CPT

## 2025-05-07 PROCEDURE — 99284 EMERGENCY DEPT VISIT MOD MDM: CPT

## 2025-05-07 PROCEDURE — 80053 COMPREHEN METABOLIC PANEL: CPT

## 2025-05-07 PROCEDURE — 36415 COLL VENOUS BLD VENIPUNCTURE: CPT

## 2025-05-07 RX ORDER — METRONIDAZOLE 250 MG
500 TABLET ORAL ONCE
Refills: 0 | Status: COMPLETED | OUTPATIENT
Start: 2025-05-07 | End: 2025-05-07

## 2025-05-07 RX ORDER — PREDNISONE 20 MG/1
2 TABLET ORAL
Qty: 8 | Refills: 0
Start: 2025-05-07 | End: 2025-05-10

## 2025-05-07 RX ORDER — ATORVASTATIN CALCIUM 80 MG/1
1 TABLET, FILM COATED ORAL
Qty: 30 | Refills: 0
Start: 2025-05-07 | End: 2025-06-05

## 2025-05-07 RX ORDER — FUROSEMIDE 10 MG/ML
1 INJECTION INTRAMUSCULAR; INTRAVENOUS
Qty: 30 | Refills: 0
Start: 2025-05-07 | End: 2025-06-05

## 2025-05-07 RX ORDER — AZITHROMYCIN 250 MG
1 CAPSULE ORAL
Qty: 13 | Refills: 0
Start: 2025-05-07 | End: 2025-06-05

## 2025-05-07 RX ORDER — PREDNISONE 20 MG/1
40 TABLET ORAL ONCE
Refills: 0 | Status: COMPLETED | OUTPATIENT
Start: 2025-05-07 | End: 2025-05-07

## 2025-05-07 RX ORDER — LOSARTAN POTASSIUM 100 MG/1
1 TABLET, FILM COATED ORAL
Qty: 30 | Refills: 0
Start: 2025-05-07 | End: 2025-06-05

## 2025-05-07 RX ORDER — FUROSEMIDE 10 MG/ML
1 INJECTION INTRAMUSCULAR; INTRAVENOUS
Refills: 0 | DISCHARGE

## 2025-05-07 RX ORDER — LOSARTAN POTASSIUM 100 MG/1
1 TABLET, FILM COATED ORAL
Refills: 0 | DISCHARGE

## 2025-05-07 RX ORDER — IPRATROPIUM BROMIDE AND ALBUTEROL SULFATE .5; 2.5 MG/3ML; MG/3ML
3 SOLUTION RESPIRATORY (INHALATION)
Qty: 270 | Refills: 0
Start: 2025-05-07 | End: 2025-06-05

## 2025-05-07 RX ORDER — TIOTROPIUM BROMIDE AND OLODATEROL 3.124; 2.736 UG/1; UG/1
2 SPRAY, METERED RESPIRATORY (INHALATION)
Qty: 1 | Refills: 0
Start: 2025-05-07 | End: 2025-06-05

## 2025-05-07 RX ADMIN — Medication 500 MILLIGRAM(S): at 19:26

## 2025-05-07 RX ADMIN — PREDNISONE 40 MILLIGRAM(S): 20 TABLET ORAL at 19:27

## 2025-05-07 NOTE — CHART NOTE - NSCHARTNOTEFT_GEN_A_CORE
Pt to the ED from [facility] with agitation. Pt discharged to [facility] from [facility] on 5/3. Pt arrived at [facility]'s ED alert & calm. Communication with [place] liaison (Sundar) & the admissions coordinator, pt can return to facility when medically stable for discharge. ED attending aware. CM remains available.

## 2025-05-09 ENCOUNTER — APPOINTMENT (OUTPATIENT)
Dept: PULMONOLOGY | Facility: CLINIC | Age: 80
End: 2025-05-09

## 2025-05-11 ENCOUNTER — EMERGENCY (EMERGENCY)
Facility: HOSPITAL | Age: 80
LOS: 1 days | End: 2025-05-11
Attending: EMERGENCY MEDICINE | Admitting: STUDENT IN AN ORGANIZED HEALTH CARE EDUCATION/TRAINING PROGRAM
Payer: MEDICARE

## 2025-05-11 VITALS
RESPIRATION RATE: 16 BRPM | OXYGEN SATURATION: 97 % | HEART RATE: 83 BPM | DIASTOLIC BLOOD PRESSURE: 103 MMHG | SYSTOLIC BLOOD PRESSURE: 172 MMHG | WEIGHT: 179.9 LBS | TEMPERATURE: 99 F | HEIGHT: 69 IN

## 2025-05-11 DIAGNOSIS — I45.2 BIFASCICULAR BLOCK: ICD-10-CM

## 2025-05-11 DIAGNOSIS — I50.20 UNSPECIFIED SYSTOLIC (CONGESTIVE) HEART FAILURE: ICD-10-CM

## 2025-05-11 DIAGNOSIS — Z90.49 ACQUIRED ABSENCE OF OTHER SPECIFIED PARTS OF DIGESTIVE TRACT: Chronic | ICD-10-CM

## 2025-05-11 DIAGNOSIS — I13.0 HYPERTENSIVE HEART AND CHRONIC KIDNEY DISEASE WITH HEART FAILURE AND STAGE 1 THROUGH STAGE 4 CHRONIC KIDNEY DISEASE, OR UNSPECIFIED CHRONIC KIDNEY DISEASE: ICD-10-CM

## 2025-05-11 DIAGNOSIS — L03.115 CELLULITIS OF RIGHT LOWER LIMB: ICD-10-CM

## 2025-05-11 DIAGNOSIS — J44.9 CHRONIC OBSTRUCTIVE PULMONARY DISEASE, UNSPECIFIED: ICD-10-CM

## 2025-05-11 DIAGNOSIS — B95.62 METHICILLIN RESISTANT STAPHYLOCOCCUS AUREUS INFECTION AS THE CAUSE OF DISEASES CLASSIFIED ELSEWHERE: ICD-10-CM

## 2025-05-11 DIAGNOSIS — N18.2 CHRONIC KIDNEY DISEASE, STAGE 2 (MILD): ICD-10-CM

## 2025-05-11 DIAGNOSIS — E78.5 HYPERLIPIDEMIA, UNSPECIFIED: ICD-10-CM

## 2025-05-11 DIAGNOSIS — J96.01 ACUTE RESPIRATORY FAILURE WITH HYPOXIA: ICD-10-CM

## 2025-05-11 DIAGNOSIS — I50.22 CHRONIC SYSTOLIC (CONGESTIVE) HEART FAILURE: ICD-10-CM

## 2025-05-11 DIAGNOSIS — D63.1 ANEMIA IN CHRONIC KIDNEY DISEASE: ICD-10-CM

## 2025-05-11 DIAGNOSIS — F10.10 ALCOHOL ABUSE, UNCOMPLICATED: ICD-10-CM

## 2025-05-11 DIAGNOSIS — I45.10 UNSPECIFIED RIGHT BUNDLE-BRANCH BLOCK: ICD-10-CM

## 2025-05-11 DIAGNOSIS — R79.89 OTHER SPECIFIED ABNORMAL FINDINGS OF BLOOD CHEMISTRY: ICD-10-CM

## 2025-05-11 DIAGNOSIS — F17.210 NICOTINE DEPENDENCE, CIGARETTES, UNCOMPLICATED: ICD-10-CM

## 2025-05-11 DIAGNOSIS — I10 ESSENTIAL (PRIMARY) HYPERTENSION: ICD-10-CM

## 2025-05-11 DIAGNOSIS — L97.919 NON-PRESSURE CHRONIC ULCER OF UNSPECIFIED PART OF RIGHT LOWER LEG WITH UNSPECIFIED SEVERITY: ICD-10-CM

## 2025-05-11 DIAGNOSIS — I12.9 HYPERTENSIVE CHRONIC KIDNEY DISEASE WITH STAGE 1 THROUGH STAGE 4 CHRONIC KIDNEY DISEASE, OR UNSPECIFIED CHRONIC KIDNEY DISEASE: ICD-10-CM

## 2025-05-11 DIAGNOSIS — Z51.89 ENCOUNTER FOR OTHER SPECIFIED AFTERCARE: ICD-10-CM

## 2025-05-11 DIAGNOSIS — E83.51 HYPOCALCEMIA: ICD-10-CM

## 2025-05-11 DIAGNOSIS — R62.7 ADULT FAILURE TO THRIVE: ICD-10-CM

## 2025-05-11 DIAGNOSIS — Z88.0 ALLERGY STATUS TO PENICILLIN: ICD-10-CM

## 2025-05-11 DIAGNOSIS — J44.1 CHRONIC OBSTRUCTIVE PULMONARY DISEASE WITH (ACUTE) EXACERBATION: ICD-10-CM

## 2025-05-11 DIAGNOSIS — Z11.52 ENCOUNTER FOR SCREENING FOR COVID-19: ICD-10-CM

## 2025-05-11 DIAGNOSIS — N17.9 ACUTE KIDNEY FAILURE, UNSPECIFIED: ICD-10-CM

## 2025-05-11 DIAGNOSIS — J43.9 EMPHYSEMA, UNSPECIFIED: ICD-10-CM

## 2025-05-11 DIAGNOSIS — Z91.199 PATIENT'S NONCOMPLIANCE WITH OTHER MEDICAL TREATMENT AND REGIMEN DUE TO UNSPECIFIED REASON: ICD-10-CM

## 2025-05-11 DIAGNOSIS — Z29.9 ENCOUNTER FOR PROPHYLACTIC MEASURES, UNSPECIFIED: ICD-10-CM

## 2025-05-11 LAB
ALBUMIN SERPL ELPH-MCNC: 3.2 G/DL — LOW (ref 3.4–5)
ALP SERPL-CCNC: 78 U/L — SIGNIFICANT CHANGE UP (ref 40–120)
ALT FLD-CCNC: 35 U/L — SIGNIFICANT CHANGE UP (ref 12–42)
ANION GAP SERPL CALC-SCNC: 5 MMOL/L — LOW (ref 9–16)
AST SERPL-CCNC: 46 U/L — HIGH (ref 15–37)
BASOPHILS # BLD AUTO: 0.02 K/UL — SIGNIFICANT CHANGE UP (ref 0–0.2)
BASOPHILS NFR BLD AUTO: 0.2 % — SIGNIFICANT CHANGE UP (ref 0–2)
BILIRUB SERPL-MCNC: 1 MG/DL — SIGNIFICANT CHANGE UP (ref 0.2–1.2)
BUN SERPL-MCNC: 32 MG/DL — HIGH (ref 7–23)
CALCIUM SERPL-MCNC: 9 MG/DL — SIGNIFICANT CHANGE UP (ref 8.5–10.5)
CHLORIDE SERPL-SCNC: 105 MMOL/L — SIGNIFICANT CHANGE UP (ref 96–108)
CO2 SERPL-SCNC: 28 MMOL/L — SIGNIFICANT CHANGE UP (ref 22–31)
CREAT SERPL-MCNC: 1.38 MG/DL — HIGH (ref 0.5–1.3)
CRP SERPL-MCNC: 1.3 MG/L — SIGNIFICANT CHANGE UP (ref 0.5–3)
EGFR: 51 ML/MIN/1.73M2 — LOW
EGFR: 51 ML/MIN/1.73M2 — LOW
EOSINOPHIL # BLD AUTO: 0.02 K/UL — SIGNIFICANT CHANGE UP (ref 0–0.5)
EOSINOPHIL NFR BLD AUTO: 0.2 % — SIGNIFICANT CHANGE UP (ref 0–6)
GLUCOSE SERPL-MCNC: 105 MG/DL — HIGH (ref 70–99)
HCT VFR BLD CALC: 39.3 % — SIGNIFICANT CHANGE UP (ref 39–50)
HGB BLD-MCNC: 12.6 G/DL — LOW (ref 13–17)
IMM GRANULOCYTES # BLD AUTO: 0.03 K/UL — SIGNIFICANT CHANGE UP (ref 0–0.07)
IMM GRANULOCYTES NFR BLD AUTO: 0.3 % — SIGNIFICANT CHANGE UP (ref 0–0.9)
LYMPHOCYTES # BLD AUTO: 1.5 K/UL — SIGNIFICANT CHANGE UP (ref 1–3.3)
LYMPHOCYTES NFR BLD AUTO: 14.3 % — SIGNIFICANT CHANGE UP (ref 13–44)
MCHC RBC-ENTMCNC: 30.8 PG — SIGNIFICANT CHANGE UP (ref 27–34)
MCHC RBC-ENTMCNC: 32.1 G/DL — SIGNIFICANT CHANGE UP (ref 32–36)
MCV RBC AUTO: 96.1 FL — SIGNIFICANT CHANGE UP (ref 80–100)
MONOCYTES # BLD AUTO: 0.74 K/UL — SIGNIFICANT CHANGE UP (ref 0–0.9)
MONOCYTES NFR BLD AUTO: 7.1 % — SIGNIFICANT CHANGE UP (ref 2–14)
NEUTROPHILS # BLD AUTO: 8.18 K/UL — HIGH (ref 1.8–7.4)
NEUTROPHILS NFR BLD AUTO: 77.9 % — HIGH (ref 43–77)
NRBC # BLD AUTO: 0 K/UL — SIGNIFICANT CHANGE UP (ref 0–0)
NRBC # FLD: 0 K/UL — SIGNIFICANT CHANGE UP (ref 0–0)
NRBC BLD AUTO-RTO: 0 /100 WBCS — SIGNIFICANT CHANGE UP (ref 0–0)
PLATELET # BLD AUTO: 153 K/UL — SIGNIFICANT CHANGE UP (ref 150–400)
PMV BLD: 11.2 FL — SIGNIFICANT CHANGE UP (ref 7–13)
POTASSIUM SERPL-MCNC: 5.1 MMOL/L — SIGNIFICANT CHANGE UP (ref 3.5–5.3)
POTASSIUM SERPL-SCNC: 5.1 MMOL/L — SIGNIFICANT CHANGE UP (ref 3.5–5.3)
PROT SERPL-MCNC: 7.1 G/DL — SIGNIFICANT CHANGE UP (ref 6.4–8.2)
RBC # BLD: 4.09 M/UL — LOW (ref 4.2–5.8)
RBC # FLD: 15.5 % — HIGH (ref 10.3–14.5)
SODIUM SERPL-SCNC: 138 MMOL/L — SIGNIFICANT CHANGE UP (ref 132–145)
WBC # BLD: 10.49 K/UL — SIGNIFICANT CHANGE UP (ref 3.8–10.5)
WBC # FLD AUTO: 10.49 K/UL — SIGNIFICANT CHANGE UP (ref 3.8–10.5)

## 2025-05-11 PROCEDURE — 99285 EMERGENCY DEPT VISIT HI MDM: CPT

## 2025-05-11 PROCEDURE — 73590 X-RAY EXAM OF LOWER LEG: CPT | Mod: 26,RT

## 2025-05-11 PROCEDURE — 99223 1ST HOSP IP/OBS HIGH 75: CPT | Mod: GC

## 2025-05-11 RX ORDER — FUROSEMIDE 10 MG/ML
40 INJECTION INTRAMUSCULAR; INTRAVENOUS EVERY 24 HOURS
Refills: 0 | Status: DISCONTINUED | OUTPATIENT
Start: 2025-05-12 | End: 2025-05-12

## 2025-05-11 RX ORDER — HEPARIN SODIUM 1000 [USP'U]/ML
5000 INJECTION INTRAVENOUS; SUBCUTANEOUS EVERY 8 HOURS
Refills: 0 | Status: DISCONTINUED | OUTPATIENT
Start: 2025-05-11 | End: 2025-05-12

## 2025-05-11 RX ORDER — TIOTROPIUM BROMIDE AND OLODATEROL 3.124; 2.736 UG/1; UG/1
2 SPRAY, METERED RESPIRATORY (INHALATION) DAILY
Refills: 0 | Status: DISCONTINUED | OUTPATIENT
Start: 2025-05-11 | End: 2025-05-12

## 2025-05-11 RX ORDER — FOLIC ACID 1 MG/1
1 TABLET ORAL DAILY
Refills: 0 | Status: DISCONTINUED | OUTPATIENT
Start: 2025-05-11 | End: 2025-05-12

## 2025-05-11 RX ORDER — ATORVASTATIN CALCIUM 80 MG/1
80 TABLET, FILM COATED ORAL AT BEDTIME
Refills: 0 | Status: DISCONTINUED | OUTPATIENT
Start: 2025-05-11 | End: 2025-05-12

## 2025-05-11 RX ORDER — LOSARTAN POTASSIUM 100 MG/1
50 TABLET, FILM COATED ORAL ONCE
Refills: 0 | Status: COMPLETED | OUTPATIENT
Start: 2025-05-11 | End: 2025-05-11

## 2025-05-11 RX ORDER — ACETAMINOPHEN 500 MG/5ML
650 LIQUID (ML) ORAL ONCE
Refills: 0 | Status: COMPLETED | OUTPATIENT
Start: 2025-05-11 | End: 2025-05-11

## 2025-05-11 RX ORDER — AZITHROMYCIN 250 MG
500 CAPSULE ORAL
Refills: 0 | Status: DISCONTINUED | OUTPATIENT
Start: 2025-05-11 | End: 2025-05-12

## 2025-05-11 RX ORDER — IPRATROPIUM BROMIDE AND ALBUTEROL SULFATE .5; 2.5 MG/3ML; MG/3ML
3 SOLUTION RESPIRATORY (INHALATION) EVERY 4 HOURS
Refills: 0 | Status: DISCONTINUED | OUTPATIENT
Start: 2025-05-11 | End: 2025-05-12

## 2025-05-11 RX ORDER — LOSARTAN POTASSIUM 100 MG/1
50 TABLET, FILM COATED ORAL EVERY 24 HOURS
Refills: 0 | Status: DISCONTINUED | OUTPATIENT
Start: 2025-05-12 | End: 2025-05-12

## 2025-05-11 RX ADMIN — Medication 650 MILLIGRAM(S): at 15:44

## 2025-05-11 RX ADMIN — LOSARTAN POTASSIUM 50 MILLIGRAM(S): 100 TABLET, FILM COATED ORAL at 23:36

## 2025-05-11 RX ADMIN — IPRATROPIUM BROMIDE AND ALBUTEROL SULFATE 3 MILLILITER(S): .5; 2.5 SOLUTION RESPIRATORY (INHALATION) at 23:36

## 2025-05-11 NOTE — H&P ADULT - PROBLEM SELECTOR PLAN 3
On admission hypertensive. States he did not take his medications this AM. Previous admission had began GDMT.   Home medication: Losartan 50 mg and Lasix 40 mg qd    Plan:   - Start Losartan 50 mg daily   - Start Lasix 40 mg daily Reports daily glass of vodka. Has never had withdrawal. Last drink yesterday 5/10.     Plan:   - Monitor CIWA score, 1 mg Ativan IVP for CIWA > 8  - High dose thiamine, 500 mg q8hr x5 days   - Folic acid daily

## 2025-05-11 NOTE — H&P ADULT - PROBLEM SELECTOR PLAN 2
Arrived to [facility], unclear how patient got there and does not recall prior admissions. States he does have a neighbor who has helped him. He ambulates and completes ADLs on his own. States he needs help.     Plan:   - PT consult for re-admission to [facility]  - Nutrition consult   - Wound care Arrived to [facility], unclear how patient got there and does not recall prior admissions. States he does have a neighbor who has helped him. He ambulates and completes ADLs on his own. States he needs help.     Plan:   - PT consult for re-admission to [facility]  - Nutrition consult   - Wound care  - Alcohol use as stated below Arrived to [facility], unclear how patient got there and does not recall prior admissions. States he does have a neighbor who has helped him. He ambulates and completes ADLs on his own. States he needs help.     Plan:   - PT consult for re-admission to [facility]  - Nutrition consult   - Wound care  - Consider formal capacity evaluation via psych given hx of AMA   - Alcohol use as stated below Arrived to [facility], unclear how patient got there and does not recall prior admissions. States he does have a neighbor who has helped him. He ambulates and completes ADLs on his own. States he needs help.     Plan:   - PT consult for re-admission to [facility]  - Nutrition consult   - Wound care  - Consider formal capacity evaluation via psych given hx of multiple AMA   - Alcohol use as stated below

## 2025-05-11 NOTE — H&P ADULT - NSHPLABSRESULTS_GEN_ALL_CORE
.  LABS:                         12.6   10.49 )-----------( 153      ( 11 May 2025 14:46 )             39.3     05-11    138  |  105  |  32[H]  ----------------------------<  105[H]  5.1   |  28  |  1.38[H]    Ca    9.0      11 May 2025 14:46    TPro  7.1  /  Alb  3.2[L]  /  TBili  1.0  /  DBili  x   /  AST  46[H]  /  ALT  35  /  AlkPhos  78  05-11      Urinalysis Basic - ( 11 May 2025 14:46 )    Color: x / Appearance: x / SG: x / pH: x  Gluc: 105 mg/dL / Ketone: x  / Bili: x / Urobili: x   Blood: x / Protein: x / Nitrite: x   Leuk Esterase: x / RBC: x / WBC x   Sq Epi: x / Non Sq Epi: x / Bacteria: x                RADIOLOGY, EKG & ADDITIONAL TESTS: Reviewed.

## 2025-05-11 NOTE — ED PROVIDER NOTE - PHYSICAL EXAMINATION
verbal instruction
Constitutional: awake and alert, in no acute distress  HEENT: head normocephalic and atraumatic. moist mucous membranes  Eyes: extraocular movements intact, normal conjunctiva  Neck: supple, normal ROM  Cardiovascular: regular rate   Pulmonary: no respiratory distress  Gastrointestinal: abdomen flat and nondistended  Skin: large deep chronic wound to R lower leg, clean wound edges, granulation tissue noted, no discharge or foul smell  Musculoskeletal: no edema, no deformity  Neurological: oriented x2  Psychiatric: calm and cooperative

## 2025-05-11 NOTE — ED ADULT TRIAGE NOTE - CHIEF COMPLAINT QUOTE
Pt BIBA from home, pt with chronic wound to RLE. Pt is answering all questions appropriately but does not recall calling ems and states he hasn't been fully compliant with medications. When asked if there are any medical sx pt states "I can feel something around the wound." Pt hypertensive in triage.

## 2025-05-11 NOTE — H&P ADULT - PROBLEM SELECTOR PLAN 7
F: as needed  E: replete as needed  N: renal diet   DVT ppx: heparin subq     Dispo --> [facility] Previous admission, TTE 4/24: EF 40-45%, no WMAs, mild LV hypertrophy, mild LV diastolic dysfunction, normal RV size and systolic fxn. Estimated PASP 40. On exam, does not appear to be volume overloaded. Previous admission 4/28, per cardiology no further GDMT initiation inpatient, but to follow up outpatient.   Home meds: Losartan 50mg qd, Lasix 40mg qd. BB deferred given reactive airways/recurrent COPD.    Plan:   - Discuss with cardiology patients difficult home situation and how best to initiate full GDMT   - Continue with home medications  - Follow up BNP On admission Cr 1.38, around patients baseline.     Plan:   - Daily BMP  - Renally dose meds

## 2025-05-11 NOTE — H&P ADULT - NSHPPHYSICALEXAM_GEN_ALL_CORE
General: in no acute distress, resting in bed, NC  Eyes: EOMI intact bilaterally. Anicteric sclerae, moist conjunctivae  HENT: Moist mucous membranes  Neck: Trachea midline, supple  Lungs: CTA B/L. No wheezes, rales, or rhonchi  Cardiovascular: RRR. No murmurs, rubs, or gallops  Abdomen: Soft, non-tender non-distended; No rebound or guarding  Extremities: RLE with deep ulcer, clean borders without erythema, warmth or pus. +macular rash on LE; 1+ pitting edema RLE > LLE  MSK: No midline bony tenderness. No CVA tenderness bilaterally  Neurological: Alert and oriented x3  Skin: Warm and dry. No obvious rash

## 2025-05-11 NOTE — H&P ADULT - PROBLEM SELECTOR PLAN 1
Hx of RLE non-healing ulcer with previous cellulitis, initially treated 4/12 - 4/14 on Cefepime and Vancomycin, de-escalated to Cefazolin and then left AMA. Re-admitted on 4/24 and started on Vancomycin d/t purulence s/p x2 wound debridements, left AMA 4/28. Re-admitted 4/30 - 5/3 treated with Bactrim (5/1 - 5/7) and Flagyl 500 mg q12hr (5/1 -5/7), left AMA 5/3. On arrival, wound is covered by gauze, RLE deep ulcer with clean borders, no purulence, no cellulitis, but with tenderness to palpation.   - Afebrile, nontoxic appearing, and no leukocytosis, Inflammatory markers wnl.   - 4/25 wound culture MRSA and Pluralibacter gergoviae and Anaerobes     Plan:  -   - Monitor off abx for now   - PT consult   - Wound care consult Hx of RLE non-healing ulcer with previous cellulitis, initially treated 4/12 - 4/14 on Cefepime and Vancomycin, de-escalated to Cefazolin and then left AMA. Re-admitted on 4/24 and started on Vancomycin d/t purulence s/p x2 wound debridements, left AMA 4/28. Re-admitted 4/30 - 5/3 treated with Bactrim (5/1 - 5/7) and Flagyl 500 mg q12hr (5/1 -5/7), left AMA 5/3. On arrival, wound is covered by gauze, RLE deep ulcer with clean borders, no purulence, no cellulitis, but with tenderness to palpation.   - Afebrile, nontoxic appearing, and no leukocytosis, Inflammatory markers wnl.   - 4/25 wound culture MRSA and Pluralibacter gergoviae and Anaerobes     Plan:  - Monitor off abx for now   - PT consult   - Wound care consult

## 2025-05-11 NOTE — H&P ADULT - PROBLEM SELECTOR PLAN 6
Previous admission, TTE 4/24: EF 40-45%, no WMAs, mild LV hypertrophy, mild LV diastolic dysfunction, normal RV size and systolic fxn. Estimated PASP 40. On exam, does not appear to be volume overloaded. Previous admission 4/28, per cardiology no further GDMT initiation inpatient, but to follow up outpatient.   Home meds: Losartan 50mg qd, Lasix 40mg qd. BB deferred given reactive airways/recurrent COPD.    Plan:   - Discuss with cardiology patients difficult home situation and how best to initiate full GDMT   - Continue with home medications  - Follow up BNP On admission Cr 1.38, around patients baseline.     Plan:   - Daily BMP  - Renally dose meds Hx of recurrent COPD exacerbations. Previously discharged with 3-4 L NC at home. Current saturating 98% on 2L NC, no wheezing on exam. Reports chronic SOB.   Per previous pulm note, discharge on LABA/LAMA (anora elipta), and airway clearance with hypertonic saline TID, aerobika TID and duonebs TID, azithromycin eod     Plan:   - C/w Duoneb q4h, Stiolto Inhaler 2 puffs QD  - C/w Airway clearance w/ Hypersaline 3% TID & Aerobika TID   - Upon discharge recommend anoro elipta for ease of us, find under insurance coverage  - Discuss with pulm safe discharge options given patients difficulty with follow up

## 2025-05-11 NOTE — H&P ADULT - NSHPSOCIALHISTORY_GEN_ALL_CORE
Still smoking 1/2 pack day, since age 17   Reports 1 glass of vodka daily, last was yesterday   Lives alone, completes ADLs independently   Does not use anything for assistance to ambulate

## 2025-05-11 NOTE — H&P ADULT - PROBLEM SELECTOR PLAN 4
Hx of recurrent COPD exacerbations. Previously discharged with 3-4 L NC at home. Current saturating 98% on 2L NC, no wheezing on exam. Reports chronic SOB.     Plan:   - Hx of recurrent COPD exacerbations. Previously discharged with 3-4 L NC at home. Current saturating 98% on 2L NC, no wheezing on exam. Reports chronic SOB.   Per previous pulm note, discharge on LABA/LAMA (anora elipta), and airway clearance with hypertonic saline TID, aerobika TID and duonebs TID, azithromycin eod     Plan:   - C/w Duoneb q4h, Stiolto Inhaler 2 puffs QD  - C/w Airway clearance w/ Hypersaline 3% TID & Aerobika TID   - Upon discharge recommend anoro elipta for ease of us, find under insurance coverage  - Discuss with pulm safe discharge options given patients difficulty with follow up On admission hypertensive. States he did not take his medications this AM. Previous admission had began GDMT.   Home medication: Losartan 50 mg and Lasix 40 mg qd    Plan:   - Start Losartan 50 mg daily   - Start Lasix 40 mg daily

## 2025-05-11 NOTE — PHARMACOTHERAPY INTERVENTION NOTE - COMMENTS
Medication reconciliation attempted, patient does not recollect his medications as he stated " he is confused". Called [facility] to clarify and updated medication list. As per pharmacy, patient has medications ready for  from May 7th, 2025.    Thank you!

## 2025-05-11 NOTE — H&P ADULT - HISTORY OF PRESENT ILLNESS
Patient with chronic right lower extremity wound presents for wound check.  Patient states he does not recall calling EMS.  States his neighbor may have called EMS.  States he does not have a plan at home for wound care of his wound.  States he would like to be placed somewhere where someone can help him with his wound care.  Lives alone.    ED Course:   Vitals: T 98.7 HR 83 /103 --> 150/69 RR 16 Sp02 97% RA   Labs:  Hgb 12.6 BUN/Cr 32/1.38 Albumin 3.2 AST 46  Imaging: NA   Interventions: 650 mg PO Tylenol  80M PMHx of cognitive impairment, HTN, COPD (on 3-4L NC at home), CKD2 (baseline Cr 1.2-1.3), HFrEF, recent admissions for AHRF 2/2 COPD and RLE SSTI (started as infected ulcer incompletely treated due to AMA/ nonadherence s/p debridement by vascular x2, admitted for management of LE wound and inability to care for himself. Patient is a poor historian, and does not remember his previous admissions. States his right lower extremity wound has worsened, and is painful with pus coming from wound. Does not remember his medications.     Per chart review, patient was seen at [facility] 5/7 due to "uncontrolled behavior" per [facility] paperwork without other information. Noted to have rash on his legs, unclear etiology, not painful or itchy. At the time, patient refused to return to [facility]. Spoke with patients brother in law, CM and SW and patient was discharged home with return instructions and follow up plan.     ED Course:   Vitals: T 98.7 HR 83 /103 --> 150/69 RR 16 Sp02 97% RA   Labs:  Hgb 12.6 BUN/Cr 32/1.38 Albumin 3.2 AST 46  Imaging: NA   Interventions: 650 mg PO Tylenol  80M PMHx of cognitive impairment, HTN, COPD (on 3-4L NC at home), CKD2 (baseline Cr 1.2-1.3), HFrEF, recent admissions for AHRF 2/2 COPD and RLE SSTI (started as infected ulcer incompletely treated due to AMA/ nonadherence s/p debridement by vascular x2, admitted for management of LE wound and inability to care for himself. Patient is a poor historian, and does not remember his previous admissions. States his right lower extremity wound has worsened, and is painful with pus coming from wound. Does not remember his medications.     Per chart review, patient was seen at [facility] 5/7 due to "uncontrolled behavior" per [facility] paperwork without other information. Noted to have rash on his legs, unclear etiology, not painful or itchy. At the time, patient refused to return to [facility]. Spoke with patients brother in law, CM and SW and patient was discharged home with return instructions and follow up plan.     ED Course:   Vitals: T 98.7 HR 83 /103 --> 150/69 RR 16 Sp02 97% RA   Labs:  Hgb 12.6 BUN/Cr 32/1.38 Albumin 3.2 AST 46  Imaging: Xray of Right Tibia/Fibula, pending final read   Interventions: 650 mg PO Tylenol

## 2025-05-11 NOTE — H&P ADULT - PROBLEM SELECTOR PROBLEM 7
Prophylactic measure HFrEF (heart failure with reduced ejection fraction) Stage 2 chronic kidney disease

## 2025-05-11 NOTE — ED PROVIDER NOTE - OBJECTIVE STATEMENT
Patient with chronic right lower extremity wound presents for wound check.  Patient states he does not recall calling EMS.  States his neighbor may have called EMS.  States he does not have a plan at home for wound care of his wound.  States he would like to be placed somewhere where someone can help him with his wound care.  Lives alone.

## 2025-05-11 NOTE — ED ADULT NURSE NOTE - OBJECTIVE STATEMENT
Pt BIBA from home, pt with chronic wound to RLE. Pt admits in not being fully compliant with medications. Denies fevers at home, states noted some drainage from wound over the past couple of days. +large open wound to RLE with small amount of clear drainage. GCS 15.

## 2025-05-11 NOTE — H&P ADULT - ASSESSMENT
80M PMHx of cognitive impairment, HTN, COPD (on 3-4L NC at home), CKD2 (baseline Cr 1.2-1.3), HFrEF, recent admissions for AHRF 2/2 COPD and RLE SSTI (started as infected ulcer incompletely treated due to AMA/ nonadherence s/p debridement by vascular x2, admitted for management of LE wound and inability to care for himself.

## 2025-05-11 NOTE — H&P ADULT - ATTENDING COMMENTS
80M PMHx of cognitive impairment, HTN, COPD (on 3-4L NC at home), CKD2 (baseline Cr 1.2-1.3), HFrEF, recent admissions for AHRF 2/2 COPD and RLE SSTI (started as infected ulcer incompletely treated due to AMA/ nonadherence s/p debridement by vascular x2, admitted for management of LE wound and inability to care for himself.     Plan   -monitor off abx  -wound care   -vascular consult   -PT/SW  -c/w home meds  -Strict I/OS 80M PMHx of cognitive impairment, HTN, COPD (on 3-4L NC at home), CKD2 (baseline Cr 1.2-1.3), HFrEF, recent admissions for AHRF 2/2 COPD and RLE SSTI (started as infected ulcer incompletely treated due to AMA/ nonadherence s/p debridement by vascular x2, admitted for management of LE wound and inability to care for himself.     Plan   -monitor off abx  -wound care   -vascular consult   -psych consult in am   -PT/SW  -c/w home meds  -Strict I/OS

## 2025-05-11 NOTE — H&P ADULT - PROBLEM SELECTOR PLAN 5
On admission Cr 1.38, around patients baseline.     Plan:   - Daily BMP  - Renally dose meds Hx of recurrent COPD exacerbations. Previously discharged with 3-4 L NC at home. Current saturating 98% on 2L NC, no wheezing on exam. Reports chronic SOB.   Per previous pulm note, discharge on LABA/LAMA (anora elipta), and airway clearance with hypertonic saline TID, aerobika TID and duonebs TID, azithromycin eod     Plan:   - C/w Duoneb q4h, Stiolto Inhaler 2 puffs QD  - C/w Airway clearance w/ Hypersaline 3% TID & Aerobika TID   - Upon discharge recommend anoro elipta for ease of us, find under insurance coverage  - Discuss with pulm safe discharge options given patients difficulty with follow up - Continue with atorvastatin 80 mg daily  - F/u AM lipid panel

## 2025-05-11 NOTE — ED ADULT NURSE NOTE - NSFALLRISKINTERV_ED_ALL_ED

## 2025-05-11 NOTE — H&P ADULT - PROBLEM SELECTOR PROBLEM 6
HFrEF (heart failure with reduced ejection fraction) Stage 2 chronic kidney disease Chronic obstructive pulmonary disease (COPD)

## 2025-05-11 NOTE — H&P ADULT - PROBLEM SELECTOR PLAN 8
F: as needed  E: replete as needed  N: renal diet   DVT ppx: heparin subq     Dispo --> [facility] Previous admission, TTE 4/24: EF 40-45%, no WMAs, mild LV hypertrophy, mild LV diastolic dysfunction, normal RV size and systolic fxn. Estimated PASP 40. On exam, does not appear to be volume overloaded. Previous admission 4/28, per cardiology no further GDMT initiation inpatient, but to follow up outpatient.   Home meds: Losartan 50mg qd, Lasix 40mg qd. BB deferred given reactive airways/recurrent COPD.    Plan:   - Discuss with cardiology patients difficult home situation and how best to initiate full GDMT   - Continue with home medications  - Follow up BNP

## 2025-05-11 NOTE — ED PROVIDER NOTE - CLINICAL SUMMARY MEDICAL DECISION MAKING FREE TEXT BOX
Pt w hx of chronic RLE wound BIBEMS for unclear reasons.  Pt is poor historian, A&Ox2, lives alone.  Wound appears chronic without acute infection.  Pt is amenable to admission for TIN placement.  Will admit to [facility] for placement.

## 2025-05-12 ENCOUNTER — TRANSCRIPTION ENCOUNTER (OUTPATIENT)
Age: 80
End: 2025-05-12

## 2025-05-12 VITALS
SYSTOLIC BLOOD PRESSURE: 140 MMHG | HEART RATE: 93 BPM | OXYGEN SATURATION: 93 % | TEMPERATURE: 98 F | RESPIRATION RATE: 18 BRPM | DIASTOLIC BLOOD PRESSURE: 79 MMHG

## 2025-05-12 LAB
ALBUMIN SERPL ELPH-MCNC: 3.3 G/DL — SIGNIFICANT CHANGE UP (ref 3.3–5)
ALP SERPL-CCNC: 69 U/L — SIGNIFICANT CHANGE UP (ref 40–120)
ALT FLD-CCNC: 18 U/L — SIGNIFICANT CHANGE UP (ref 10–45)
ANION GAP SERPL CALC-SCNC: 9 MMOL/L — SIGNIFICANT CHANGE UP (ref 5–17)
AST SERPL-CCNC: 15 U/L — SIGNIFICANT CHANGE UP (ref 10–40)
BASOPHILS # BLD AUTO: 0.03 K/UL — SIGNIFICANT CHANGE UP (ref 0–0.2)
BASOPHILS NFR BLD AUTO: 0.3 % — SIGNIFICANT CHANGE UP (ref 0–2)
BILIRUB SERPL-MCNC: 0.7 MG/DL — SIGNIFICANT CHANGE UP (ref 0.2–1.2)
BUN SERPL-MCNC: 32 MG/DL — HIGH (ref 7–23)
CALCIUM SERPL-MCNC: 8.7 MG/DL — SIGNIFICANT CHANGE UP (ref 8.4–10.5)
CHLORIDE SERPL-SCNC: 109 MMOL/L — HIGH (ref 96–108)
CHOLEST SERPL-MCNC: 155 MG/DL — SIGNIFICANT CHANGE UP
CO2 SERPL-SCNC: 24 MMOL/L — SIGNIFICANT CHANGE UP (ref 22–31)
CREAT SERPL-MCNC: 1.35 MG/DL — HIGH (ref 0.5–1.3)
CRP SERPL-MCNC: <3 MG/L — SIGNIFICANT CHANGE UP (ref 0–4)
EGFR: 53 ML/MIN/1.73M2 — LOW
EGFR: 53 ML/MIN/1.73M2 — LOW
EOSINOPHIL # BLD AUTO: 0.11 K/UL — SIGNIFICANT CHANGE UP (ref 0–0.5)
EOSINOPHIL NFR BLD AUTO: 1.2 % — SIGNIFICANT CHANGE UP (ref 0–6)
ERYTHROCYTE [SEDIMENTATION RATE] IN BLOOD: 8 MM/HR — SIGNIFICANT CHANGE UP
GLUCOSE SERPL-MCNC: 98 MG/DL — SIGNIFICANT CHANGE UP (ref 70–99)
HCT VFR BLD CALC: 35.4 % — LOW (ref 39–50)
HDLC SERPL-MCNC: 81 MG/DL — SIGNIFICANT CHANGE UP
HGB BLD-MCNC: 11.6 G/DL — LOW (ref 13–17)
IMM GRANULOCYTES NFR BLD AUTO: 0.3 % — SIGNIFICANT CHANGE UP (ref 0–0.9)
LDLC SERPL-MCNC: 55 MG/DL — SIGNIFICANT CHANGE UP
LIPID PNL WITH DIRECT LDL SERPL: 55 MG/DL — SIGNIFICANT CHANGE UP
LYMPHOCYTES # BLD AUTO: 1.49 K/UL — SIGNIFICANT CHANGE UP (ref 1–3.3)
LYMPHOCYTES # BLD AUTO: 16.9 % — SIGNIFICANT CHANGE UP (ref 13–44)
MAGNESIUM SERPL-MCNC: 2 MG/DL — SIGNIFICANT CHANGE UP (ref 1.6–2.6)
MCHC RBC-ENTMCNC: 32.1 PG — SIGNIFICANT CHANGE UP (ref 27–34)
MCHC RBC-ENTMCNC: 32.8 G/DL — SIGNIFICANT CHANGE UP (ref 32–36)
MCV RBC AUTO: 98.1 FL — SIGNIFICANT CHANGE UP (ref 80–100)
MONOCYTES # BLD AUTO: 0.51 K/UL — SIGNIFICANT CHANGE UP (ref 0–0.9)
MONOCYTES NFR BLD AUTO: 5.8 % — SIGNIFICANT CHANGE UP (ref 2–14)
NEUTROPHILS # BLD AUTO: 6.64 K/UL — SIGNIFICANT CHANGE UP (ref 1.8–7.4)
NEUTROPHILS NFR BLD AUTO: 75.5 % — SIGNIFICANT CHANGE UP (ref 43–77)
NONHDLC SERPL-MCNC: 74 MG/DL — SIGNIFICANT CHANGE UP
NRBC BLD AUTO-RTO: 0 /100 WBCS — SIGNIFICANT CHANGE UP (ref 0–0)
NT-PROBNP SERPL-SCNC: 7450 PG/ML — HIGH (ref 0–300)
PHOSPHATE SERPL-MCNC: 3.5 MG/DL — SIGNIFICANT CHANGE UP (ref 2.5–4.5)
PLATELET # BLD AUTO: 138 K/UL — LOW (ref 150–400)
POTASSIUM SERPL-MCNC: 4 MMOL/L — SIGNIFICANT CHANGE UP (ref 3.5–5.3)
POTASSIUM SERPL-SCNC: 4 MMOL/L — SIGNIFICANT CHANGE UP (ref 3.5–5.3)
PROT SERPL-MCNC: 5.7 G/DL — LOW (ref 6–8.3)
RBC # BLD: 3.61 M/UL — LOW (ref 4.2–5.8)
RBC # FLD: 15.6 % — HIGH (ref 10.3–14.5)
SODIUM SERPL-SCNC: 142 MMOL/L — SIGNIFICANT CHANGE UP (ref 135–145)
TRIGL SERPL-MCNC: 105 MG/DL — SIGNIFICANT CHANGE UP
WBC # BLD: 8.81 K/UL — SIGNIFICANT CHANGE UP (ref 3.8–10.5)
WBC # FLD AUTO: 8.81 K/UL — SIGNIFICANT CHANGE UP (ref 3.8–10.5)

## 2025-05-12 PROCEDURE — 80061 LIPID PANEL: CPT

## 2025-05-12 PROCEDURE — 96372 THER/PROPH/DIAG INJ SC/IM: CPT

## 2025-05-12 PROCEDURE — 36415 COLL VENOUS BLD VENIPUNCTURE: CPT

## 2025-05-12 PROCEDURE — 94640 AIRWAY INHALATION TREATMENT: CPT

## 2025-05-12 PROCEDURE — 85652 RBC SED RATE AUTOMATED: CPT

## 2025-05-12 PROCEDURE — G0378: CPT

## 2025-05-12 PROCEDURE — 83735 ASSAY OF MAGNESIUM: CPT

## 2025-05-12 PROCEDURE — 86140 C-REACTIVE PROTEIN: CPT

## 2025-05-12 PROCEDURE — 83880 ASSAY OF NATRIURETIC PEPTIDE: CPT

## 2025-05-12 PROCEDURE — 80053 COMPREHEN METABOLIC PANEL: CPT

## 2025-05-12 PROCEDURE — 99285 EMERGENCY DEPT VISIT HI MDM: CPT | Mod: 25

## 2025-05-12 PROCEDURE — 84100 ASSAY OF PHOSPHORUS: CPT

## 2025-05-12 PROCEDURE — 73590 X-RAY EXAM OF LOWER LEG: CPT

## 2025-05-12 PROCEDURE — 87040 BLOOD CULTURE FOR BACTERIA: CPT

## 2025-05-12 PROCEDURE — 36000 PLACE NEEDLE IN VEIN: CPT

## 2025-05-12 PROCEDURE — 85025 COMPLETE CBC W/AUTO DIFF WBC: CPT

## 2025-05-12 PROCEDURE — 99233 SBSQ HOSP IP/OBS HIGH 50: CPT | Mod: GC

## 2025-05-12 RX ORDER — FLUTICASONE FUROATE, UMECLIDINIUM BROMIDE AND VILANTEROL TRIFENATATE 100; 62.5; 25 UG/1; UG/1; UG/1
1 POWDER RESPIRATORY (INHALATION)
Refills: 0 | DISCHARGE

## 2025-05-12 RX ORDER — PREDNISONE 20 MG/1
1 TABLET ORAL
Refills: 0 | DISCHARGE

## 2025-05-12 RX ORDER — BUPROPION HYDROBROMIDE 522 MG/1
1 TABLET, EXTENDED RELEASE ORAL
Refills: 0 | DISCHARGE

## 2025-05-12 RX ORDER — TIOTROPIUM BROMIDE AND OLODATEROL 3.124; 2.736 UG/1; UG/1
2 SPRAY, METERED RESPIRATORY (INHALATION)
Qty: 1 | Refills: 0
Start: 2025-05-12 | End: 2025-06-10

## 2025-05-12 RX ORDER — TRAZODONE HCL 100 MG
1 TABLET ORAL
Refills: 0 | DISCHARGE

## 2025-05-12 RX ORDER — UMECLIDINIUM BROMIDE AND VILANTEROL TRIFENATATE 62.5; 25 UG/1; UG/1
1 POWDER RESPIRATORY (INHALATION)
Qty: 1 | Refills: 1
Start: 2025-05-12 | End: 2025-07-10

## 2025-05-12 RX ORDER — AZITHROMYCIN 250 MG
1 CAPSULE ORAL
Refills: 0 | DISCHARGE

## 2025-05-12 RX ORDER — UMECLIDINIUM BROMIDE AND VILANTEROL TRIFENATATE 62.5; 25 UG/1; UG/1
1 POWDER RESPIRATORY (INHALATION)
Qty: 1 | Refills: 0
Start: 2025-05-12 | End: 2025-06-10

## 2025-05-12 RX ADMIN — IPRATROPIUM BROMIDE AND ALBUTEROL SULFATE 3 MILLILITER(S): .5; 2.5 SOLUTION RESPIRATORY (INHALATION) at 06:26

## 2025-05-12 RX ADMIN — IPRATROPIUM BROMIDE AND ALBUTEROL SULFATE 3 MILLILITER(S): .5; 2.5 SOLUTION RESPIRATORY (INHALATION) at 09:17

## 2025-05-12 RX ADMIN — Medication 4 MILLILITER(S): at 13:49

## 2025-05-12 RX ADMIN — IPRATROPIUM BROMIDE AND ALBUTEROL SULFATE 3 MILLILITER(S): .5; 2.5 SOLUTION RESPIRATORY (INHALATION) at 02:02

## 2025-05-12 RX ADMIN — Medication 4 MILLILITER(S): at 06:26

## 2025-05-12 RX ADMIN — IPRATROPIUM BROMIDE AND ALBUTEROL SULFATE 3 MILLILITER(S): .5; 2.5 SOLUTION RESPIRATORY (INHALATION) at 13:50

## 2025-05-12 RX ADMIN — FOLIC ACID 1 MILLIGRAM(S): 1 TABLET ORAL at 11:51

## 2025-05-12 RX ADMIN — HEPARIN SODIUM 5000 UNIT(S): 1000 INJECTION INTRAVENOUS; SUBCUTANEOUS at 06:26

## 2025-05-12 RX ADMIN — HEPARIN SODIUM 5000 UNIT(S): 1000 INJECTION INTRAVENOUS; SUBCUTANEOUS at 13:50

## 2025-05-12 RX ADMIN — LOSARTAN POTASSIUM 50 MILLIGRAM(S): 100 TABLET, FILM COATED ORAL at 11:55

## 2025-05-12 RX ADMIN — TIOTROPIUM BROMIDE AND OLODATEROL 2 PUFF(S): 3.124; 2.736 SPRAY, METERED RESPIRATORY (INHALATION) at 11:51

## 2025-05-12 RX ADMIN — Medication 500 MILLIGRAM(S): at 09:17

## 2025-05-12 RX ADMIN — FUROSEMIDE 40 MILLIGRAM(S): 10 INJECTION INTRAMUSCULAR; INTRAVENOUS at 09:17

## 2025-05-12 NOTE — PROGRESS NOTE ADULT - PROBLEM SELECTOR PLAN 2
Arrived to [facility], unclear how patient got there and does not recall prior admissions. States he does have a neighbor who has helped him. He ambulates and completes ADLs on his own. States he needs help.     Plan:   - PT consult for re-admission to [facility]  - Nutrition consult   - Wound care  - Consider formal capacity evaluation via psych given hx of multiple AMA   - Alcohol use as stated below

## 2025-05-12 NOTE — DISCHARGE NOTE PROVIDER - NSDCMRMEDTOKEN_GEN_ALL_CORE_FT
atorvastatin 80 mg oral tablet: 1 tab(s) orally once a day (at bedtime)  azithromycin 500 mg oral tablet: 1 tab(s) orally 3 times a week  Lasix 40 mg oral tablet: 1 tab(s) orally once a day  losartan 50 mg oral tablet: 1 tab(s) orally once a day  Trelegy Ellipta 200 mcg-62.5 mcg-25 mcg/inh inhalation powder: 1 puff(s) inhaled once a day last picked up 12/2024   atorvastatin 80 mg oral tablet: 1 tab(s) orally once a day (at bedtime)  azithromycin 500 mg oral tablet: 1 tab(s) orally 3 times a week  Lasix 40 mg oral tablet: 1 tab(s) orally once a day  losartan 50 mg oral tablet: 1 tab(s) orally once a day  Stiolto Respimat 60 ACT 2.5 mcg-2.5 mcg/inh inhalation aerosol: 2 inhaled once a day

## 2025-05-12 NOTE — PROGRESS NOTE ADULT - SUBJECTIVE AND OBJECTIVE BOX
Progress Note  INCOMPLETE NOTE    INTERVAL EVENTS:   No acute events overnight.    SUBJECTIVE:   Patient seen and examined at bedside. Condition largely unchanged from yesterday. No acute complaints at this time.    ROS:  Negative unless otherwise stated above.    PHYSICAL EXAM:  General: Alert and oriented x 3. No acute distress.   HEENT: Moist mucous membranes. Anicteric. No cervical lymphadenopathy.  Cardiovascular: Regular rate and rhythm. No murmur. Normal JVP.  Lungs: Clear to auscultation bilaterally. No accessory muscle use.  Abdomen: Soft, non-tender and non-distended. No palpable masses.  Extremities: No edema. Non-tender. Warm.  Skin: No rashes or lesions.   Neurologic: No apparent focal neurological deficits. CN II-XII grossly intact, but not individually tested.  Psychiatric: Cooperative. Appropriate mood and affect.    VITAL SIGNS:  Vital Signs Last 24 Hrs  T(C): 36.4 (12 May 2025 06:33), Max: 37.1 (11 May 2025 13:27)  T(F): 97.6 (12 May 2025 06:33), Max: 98.7 (11 May 2025 13:27)  HR: 108 (12 May 2025 09:15) (79 - 108)  BP: 164/84 (12 May 2025 09:15) (150/69 - 179/95)  BP(mean): 114 (12 May 2025 09:15) (114 - 114)  RR: 18 (12 May 2025 06:33) (16 - 18)  SpO2: 95% (12 May 2025 06:33) (95% - 98%)    Parameters below as of 12 May 2025 06:33  Patient On (Oxygen Delivery Method): room air      INPATIENT MEDICATIONS:   MEDICATIONS  (STANDING):  albuterol/ipratropium for Nebulization 3 milliLiter(s) Nebulizer every 4 hours  atorvastatin 80 milliGRAM(s) Oral at bedtime  azithromycin   Tablet 500 milliGRAM(s) Oral <User Schedule>  folic acid 1 milliGRAM(s) Oral daily  furosemide    Tablet 40 milliGRAM(s) Oral every 24 hours  heparin   Injectable 5000 Unit(s) SubCutaneous every 8 hours  losartan 50 milliGRAM(s) Oral every 24 hours  sodium chloride 3%  Inhalation 4 milliLiter(s) Inhalation every 8 hours  thiamine IVPB 500 milliGRAM(s) IV Intermittent every 8 hours  tiotropium 2.5 MICROgram(s)/olodaterol 2.5 MICROgram(s) (STIOLTO) Inhaler 2 Puff(s) Inhalation daily    MEDICATIONS  (PRN):    HOME MEDICATIONS  atorvastatin 80 mg oral tablet: 1 tab(s) orally once a day (at bedtime)  buPROPion 150 mg/24 hours (XL) oral tablet, extended release: 1 tab(s) orally once a day Last picked up October 2024  Lasix 40 mg oral tablet: 1 tab(s) orally once a day  losartan 50 mg oral tablet: 1 tab(s) orally once a day  traZODone 50 mg oral tablet: 1 tab(s) orally once a day last picked up october 2024  Trelegy Ellipta 200 mcg-62.5 mcg-25 mcg/inh inhalation powder: 1 puff(s) inhaled once a day last picked up 12/2024    ALLERGIES:  Allergies    penicillin (Unknown)    Intolerances    LABS:                       11.6   8.81  )-----------( 138      ( 12 May 2025 06:40 )             35.4     05-12    142  |  109[H]  |  32[H]  ----------------------------<  98  4.0   |  24  |  1.35[H]    Ca    8.7      12 May 2025 06:40  Phos  3.5     05-12  Mg     2.0     05-12    TPro  5.7[L]  /  Alb  3.3  /  TBili  0.7  /  DBili  x   /  AST  15  /  ALT  18  /  AlkPhos  69  05-12      Urinalysis Basic - ( 12 May 2025 06:40 )    Color: x / Appearance: x / SG: x / pH: x  Gluc: 98 mg/dL / Ketone: x  / Bili: x / Urobili: x   Blood: x / Protein: x / Nitrite: x   Leuk Esterase: x / RBC: x / WBC x   Sq Epi: x / Non Sq Epi: x / Bacteria: x      CAPILLARY BLOOD GLUCOSE        RADIOLOGY & ADDITIONAL TESTS: Reviewed.

## 2025-05-12 NOTE — PROGRESS NOTE ADULT - PROBLEM SELECTOR PLAN 3
Reports daily glass of vodka. Has never had withdrawal. Last drink yesterday 5/10.     Plan:   - Monitor CIWA score, 1 mg Ativan IVP for CIWA > 8  - High dose thiamine, 500 mg q8hr x5 days   - Folic acid daily

## 2025-05-12 NOTE — PATIENT PROFILE ADULT - FALL HARM RISK - HARM RISK INTERVENTIONS

## 2025-05-12 NOTE — DISCHARGE NOTE PROVIDER - NSDCCPCAREPLAN_GEN_ALL_CORE_FT
PRINCIPAL DISCHARGE DIAGNOSIS  Diagnosis: Visit for wound check  Assessment and Plan of Treatment: You came for your right wound, which did not look infected. Your labs also indicated no signs of infection. We will try to set up wound care with our social workers. In the meantime, please see a primary care physician; we have already set up a schedule for you.     PRINCIPAL DISCHARGE DIAGNOSIS  Diagnosis: Visit for wound check  Assessment and Plan of Treatment: You came for your right wound, which did not look infected. Your labs also indicated no signs of infection. We will try to set up wound care with our social workers. In the meantime, please see a primary care physician; we have already set up a schedule for you.      SECONDARY DISCHARGE DIAGNOSES  Diagnosis: Chronic obstructive pulmonary disease (COPD)  Assessment and Plan of Treatment: We have ordered a new inhaler for you called Stiolto. Please  from [facility] and use 2 puffs daily. Please see your pulmonologist within 2 weeks of this discharge to manage your COPD.

## 2025-05-12 NOTE — PROGRESS NOTE ADULT - PROBLEM SELECTOR PLAN 4
On admission hypertensive. States he did not take his medications this AM. Previous admission had began GDMT.   Home medication: Losartan 50 mg and Lasix 40 mg qd    Plan:   - Start Losartan 50 mg daily   - Start Lasix 40 mg daily

## 2025-05-12 NOTE — DISCHARGE NOTE NURSING/CASE MANAGEMENT/SOCIAL WORK - NSCORESITESY/N_GEN_A_CORE_RD
"    No

## 2025-05-12 NOTE — PROGRESS NOTE ADULT - PROBLEM SELECTOR PLAN 1
Hx of RLE non-healing ulcer with previous cellulitis, initially treated 4/12 - 4/14 on Cefepime and Vancomycin, de-escalated to Cefazolin and then left AMA. Re-admitted on 4/24 and started on Vancomycin d/t purulence s/p x2 wound debridements, left AMA 4/28. Re-admitted 4/30 - 5/3 treated with Bactrim (5/1 - 5/7) and Flagyl 500 mg q12hr (5/1 -5/7), left AMA 5/3. On arrival, wound is covered by gauze, RLE deep ulcer with clean borders, no purulence, no cellulitis, but with tenderness to palpation.   - Afebrile, nontoxic appearing, and no leukocytosis, Inflammatory markers wnl.   - 4/25 wound culture MRSA and Pluralibacter gergoviae and Anaerobes     Plan:  - Monitor off abx for now   - PT consult   - Wound care consult

## 2025-05-12 NOTE — CONSULT NOTE ADULT - ASSESSMENT
I M    80M PMHx of cognitive impairment, HTN, COPD (on 3-4L NC at home), CKD2 (baseline Cr 1.2-1.3), HFrEF, recent admissions for AHRF 2/2 COPD and RLE SSTI (started as infected ulcer incompletely treated due to AMA/ nonadherence s/p debridement by vascular x2, admitted for management of LE wound and inability to care for himself.       Problem/Plan - 1:  ·  Problem: Ulcer of right lower extremity.   ·  Plan: Hx of RLE non-healing ulcer with previous cellulitis, initially treated 4/12 - 4/14 on Cefepime and Vancomycin, de-escalated to Cefazolin and then left AMA. Re-admitted on 4/24 and started on Vancomycin d/t purulence s/p x2 wound debridements, left AMA 4/28. Re-admitted 4/30 - 5/3 treated with Bactrim (5/1 - 5/7) and Flagyl 500 mg q12hr (5/1 -5/7), left AMA 5/3. On arrival, wound is covered by gauze, RLE deep ulcer with clean borders, no purulence, no cellulitis, but with tenderness to palpation.   - Afebrile, nontoxic appearing, and no leukocytosis, Inflammatory markers wnl.   - 4/25 wound culture MRSA and Pluralibacter gergoviae and Anaerobes     Plan:  - Monitor off abx for now   - PT consult   - Wound care consult.    Problem/Plan - 2:  ·  Problem: Adult failure to thrive.   ·  Plan: Arrived to [facility], unclear how patient got there and does not recall prior admissions. States he does have a neighbor who has helped him. He ambulates and completes ADLs on his own. States he needs help.     Plan:   - PT consult for re-admission to [facility]  - Nutrition consult   - Wound care  - Consider formal capacity evaluation via psych given hx of multiple AMA   - Alcohol use as stated below.    Problem/Plan - 3:  ·  Problem: Alcohol abuse, daily use.   ·  Plan: Reports daily glass of vodka. Has never had withdrawal. Last drink yesterday 5/10.     Plan:   - Monitor CIWA score, 1 mg Ativan IVP for CIWA > 8  - High dose thiamine, 500 mg q8hr x5 days   - Folic acid daily.    Problem/Plan - 4:  ·  Problem: HTN (hypertension).   ·  Plan: On admission hypertensive. States he did not take his medications this AM. Previous admission had began GDMT.   Home medication: Losartan 50 mg and Lasix 40 mg qd    Plan:   - Start Losartan 50 mg daily   - Start Lasix 40 mg daily.    Problem/Plan - 5:  ·  Problem: HLD (hyperlipidemia).   ·  Plan: - Continue with atorvastatin 80 mg daily  - F/u AM lipid panel.    Problem/Plan - 6:  ·  Problem: Chronic obstructive pulmonary disease (COPD).   ·  Plan: Hx of recurrent COPD exacerbations. Previously discharged with 3-4 L NC at home. Current saturating 98% on 2L NC, no wheezing on exam. Reports chronic SOB.   Per previous pulm note, discharge on LABA/LAMA (anora elipta), and airway clearance with hypertonic saline TID, aerobika TID and duonebs TID, azithromycin eod     Plan:   - C/w Duoneb q4h, Stiolto Inhaler 2 puffs QD  - C/w Airway clearance w/ Hypersaline 3% TID & Aerobika TID   - Upon discharge recommend anoro elipta for ease of us, find under insurance coverage  - Discuss with pulm safe discharge options given patients difficulty with follow up.    Problem/Plan - 7:  ·  Problem: Stage 2 chronic kidney disease.   ·  Plan: On admission Cr 1.38, around patients baseline.     Plan:   - Daily BMP  - Renally dose meds.    Problem/Plan - 8:  ·  Problem: HFrEF (heart failure with reduced ejection fraction).   ·  Plan: Previous admission, TTE 4/24: EF 40-45%, no WMAs, mild LV hypertrophy, mild LV diastolic dysfunction, normal RV size and systolic fxn. Estimated PASP 40. On exam, does not appear to be volume overloaded. Previous admission 4/28, per cardiology no further GDMT initiation inpatient, but to follow up outpatient.   Home meds: Losartan 50mg qd, Lasix 40mg qd. BB deferred given reactive airways/recurrent COPD.    Plan:   - Discuss with cardiology patients difficult home situation and how best to initiate full GDMT   - Continue with home medications  - Follow up BNP.    Problem/Plan - 9:  ·  Problem: Prophylactic measure.   ·  Plan: F: as needed  E: replete as needed  N: DASH diet   DVT ppx: heparin subq     Dispo --> [facility].

## 2025-05-12 NOTE — PROGRESS NOTE ADULT - PROBLEM SELECTOR PLAN 6
Hx of recurrent COPD exacerbations. Previously discharged with 3-4 L NC at home. Current saturating 98% on 2L NC, no wheezing on exam. Reports chronic SOB.   Per previous pulm note, discharge on LABA/LAMA (anora elipta), and airway clearance with hypertonic saline TID, aerobika TID and duonebs TID, azithromycin eod     Plan:   - C/w Duoneb q4h, Stiolto Inhaler 2 puffs QD  - C/w Airway clearance w/ Hypersaline 3% TID & Aerobika TID   - Upon discharge recommend anoro elipta for ease of us, find under insurance coverage  - Discuss with pulm safe discharge options given patients difficulty with follow up

## 2025-05-12 NOTE — DISCHARGE NOTE PROVIDER - HOSPITAL COURSE
#Discharge: do not delete    80M PMHx of cognitive impairment, HTN, COPD (on 3-4L NC at home), CKD2 (baseline Cr 1.2-1.3), HFrEF, recent admissions for AHRF 2/2 COPD and RLE SSTI (started as infected ulcer incompletely treated due to AMA/ nonadherence s/p debridement by vascular x2, admitted for management of LE wound and inability to care for himself.       Hospital course (by problem; priority based):    Problem/Plan - 1:  ·  Problem: Ulcer of right lower extremity.   ·  Plan: Hx of RLE non-healing ulcer with previous cellulitis, initially treated 4/12 - 4/14 on Cefepime and Vancomycin, de-escalated to Cefazolin and then left AMA. Re-admitted on 4/24 and started on Vancomycin d/t purulence s/p x2 wound debridements, left AMA 4/28. Re-admitted 4/30 - 5/3 treated with Bactrim (5/1 - 5/7) and Flagyl 500 mg q12hr (5/1 -5/7), left AMA 5/3. On arrival, wound is covered by gauze, RLE deep ulcer with clean borders, no purulence, no cellulitis, but with tenderness to palpation.   - Afebrile, nontoxic appearing, and no leukocytosis, Inflammatory markers wnl.   - 4/25 wound culture MRSA and Pluralibacter gergoviae and Anaerobes     Plan:  - Monitor off abx for now   - PT consult   - Wound care consult.    Problem/Plan - 2:  ·  Problem: Adult failure to thrive.   ·  Plan: Arrived to [facility], unclear how patient got there and does not recall prior admissions. States he does have a neighbor who has helped him. He ambulates and completes ADLs on his own. States he needs help.     Plan:   - PT consult for re-admission to [facility]  - Nutrition consult   - Wound care  - Consider formal capacity evaluation via psych given hx of multiple AMA   - Alcohol use as stated below.    Problem/Plan - 3:  ·  Problem: Alcohol abuse, daily use.   ·  Plan: Reports daily glass of vodka. Has never had withdrawal. Last drink yesterday 5/10.     Plan:   - Monitor CIWA score, 1 mg Ativan IVP for CIWA > 8  - High dose thiamine, 500 mg q8hr x5 days   - Folic acid daily.    Problem/Plan - 4:  ·  Problem: HTN (hypertension).   ·  Plan: On admission hypertensive. States he did not take his medications this AM. Previous admission had began GDMT.   Home medication: Losartan 50 mg and Lasix 40 mg qd    Plan:   - Start Losartan 50 mg daily   - Start Lasix 40 mg daily.    Problem/Plan - 5:  ·  Problem: HLD (hyperlipidemia).   ·  Plan: - Continue with atorvastatin 80 mg daily  - F/u AM lipid panel.    Problem/Plan - 6:  ·  Problem: Chronic obstructive pulmonary disease (COPD).   ·  Plan: Hx of recurrent COPD exacerbations. Previously discharged with 3-4 L NC at home. Current saturating 98% on 2L NC, no wheezing on exam. Reports chronic SOB.   Per previous pulm note, discharge on LABA/LAMA (anora elipta), and airway clearance with hypertonic saline TID, aerobika TID and duonebs TID, azithromycin eod     Plan:   - C/w Duoneb q4h, Stiolto Inhaler 2 puffs QD  - C/w Airway clearance w/ Hypersaline 3% TID & Aerobika TID   - Upon discharge recommend anoro elipta for ease of us, find under insurance coverage  - Discuss with pulm safe discharge options given patients difficulty with follow up.    Problem/Plan - 7:  ·  Problem: Stage 2 chronic kidney disease.   ·  Plan: On admission Cr 1.38, around patients baseline.     Plan:   - Daily BMP  - Renally dose meds.    Problem/Plan - 8:  ·  Problem: HFrEF (heart failure with reduced ejection fraction).   ·  Plan: Previous admission, TTE 4/24: EF 40-45%, no WMAs, mild LV hypertrophy, mild LV diastolic dysfunction, normal RV size and systolic fxn. Estimated PASP 40. On exam, does not appear to be volume overloaded. Previous admission 4/28, per cardiology no further GDMT initiation inpatient, but to follow up outpatient.   Home meds: Losartan 50mg qd, Lasix 40mg qd. BB deferred given reactive airways/recurrent COPD.    Plan:   - Discuss with cardiology patients difficult home situation and how best to initiate full GDMT   - Continue with home medications  - Follow up BNP.      Patient was discharged to: (home/TIN/acute rehab/hospice, etc, and with what services – home health PT/RN? Home O2?)    New medications: --  Changes to old medications: --  Medications that were stopped: --    Items to follow up as outpatient:     Physical exam at the time of discharge: AAOx3; NAD; RRR, no murmurs; lungs CLAB; abd NT/ND; extremities wwp, no peripheral edema.     Patient was medically optimized, stable and ready for discharge. Plan of care and return precautions were discussed with the patient who verbally stated understanding. On the day of discharge, the patient was seen and examined. Symptoms improved. Vital signs are stable. Labs and imaging reviewed. Patient is medically optimized and hemodynamically stable. Return precautions discussed, medication teach back done, and importance of physician follow up emphasized. The patient verbalized understanding. detailed exam #Discharge: do not delete    80M PMHx of cognitive impairment, HTN, COPD (on 3-4L NC at home), CKD2 (baseline Cr 1.2-1.3), HFrEF, recent admissions for AHRF 2/2 COPD and RLE SSTI (started as infected ulcer incompletely treated due to AMA/ nonadherence s/p debridement by vascular x2, admitted for management of LE wound and inability to care for himself.       Hospital course (by problem; priority based):    Problem/Plan - 1:  ·  Problem: Ulcer of right lower extremity.   ·  Plan: Hx of RLE non-healing ulcer with previous cellulitis, initially treated 4/12 - 4/14 on Cefepime and Vancomycin, de-escalated to Cefazolin and then left AMA. Re-admitted on 4/24 and started on Vancomycin d/t purulence s/p x2 wound debridements, left AMA 4/28. Re-admitted 4/30 - 5/3 treated with Bactrim (5/1 - 5/7) and Flagyl 500 mg q12hr (5/1 -5/7), left AMA 5/3. On arrival, wound is covered by gauze, RLE deep ulcer with clean borders, no purulence, no cellulitis, but with tenderness to palpation.   - Afebrile, nontoxic appearing, and no leukocytosis, Inflammatory markers wnl.   - 4/25 wound culture MRSA and Pluralibacter gergoviae and Anaerobes     Plan:  - Monitor off abx for now   - PT consult   - Wound care; pt educated on wound care and given supplies.    Problem/Plan - 2:  ·  Problem: Adult failure to thrive.   Arrived to [facility], unclear how patient got there and does not recall prior admissions. States he does have a neighbor who has helped him. He ambulates and completes ADLs on his own. States he needs help.     Problem/Plan - 3:  ·  Problem: Alcohol abuse, daily use.   Reports daily glass of vodka. Has never had withdrawal. Last drink yesterday 5/10. Advised to stop drinking.     Problem/Plan - 4:  ·  Problem: HTN (hypertension).   On admission hypertensive. States he did not take his medications this AM. Previous admission had began GDMT.   Home medication: Losartan 50 mg and Lasix 40 mg qd    Problem/Plan - 5:  ·  Problem: HLD (hyperlipidemia).   - Continue with atorvastatin 80 mg daily    Problem/Plan - 6:  ·  Problem: Chronic obstructive pulmonary disease (COPD).   ·  Plan: Hx of recurrent COPD exacerbations. Previously discharged with 3-4 L NC at home. Current saturating 98% on 2L NC, no wheezing on exam. Reports chronic SOB.   Per previous pulm note, discharge on LABA/LAMA (anora elipta), and airway clearance with hypertonic saline TID, aerobika TID and duonebs TID, azithromycin eod     Plan:   - started on LABA/LAMA: Stiolto Inhaler 2 puffs QD   - Pt to see pulm outpt    Problem/Plan - 7:  ·  Problem: Stage 2 chronic kidney disease.   On admission Cr 1.38, around patients baseline.     Problem/Plan - 8:  ·  Problem: HFrEF (heart failure with reduced ejection fraction).   ·  Plan: Previous admission, TTE 4/24: EF 40-45%, no WMAs, mild LV hypertrophy, mild LV diastolic dysfunction, normal RV size and systolic fxn. Estimated PASP 40. On exam, does not appear to be volume overloaded. Previous admission 4/28, per cardiology no further GDMT initiation inpatient, but to follow up outpatient.   Home meds: Losartan 50mg qd, Lasix 40mg qd. BB deferred given reactive airways/recurrent COPD.  - Continue with home medications      Patient was discharged to: Home    New medications: Stilto  Changes to old medications: --  Medications that were stopped: --    Items to follow up as outpatient: Pulm appt    Physical exam at the time of discharge: AAOx3; NAD; RRR, no murmurs; lungs CLAB; abd NT/ND; RLE wound opened, no purlent drainage.     Patient was medically optimized, stable and ready for discharge. Plan of care and return precautions were discussed with the patient who verbally stated understanding. On the day of discharge, the patient was seen and examined. Symptoms improved. Vital signs are stable. Labs and imaging reviewed. Patient is medically optimized and hemodynamically stable. Return precautions discussed, medication teach back done, and importance of physician follow up emphasized. The patient verbalized understanding.

## 2025-05-12 NOTE — CHART NOTE - NSCHARTNOTEFT_GEN_A_CORE
Spoke with pharmacist at [facility] ([address] [place] [address] / [phone]) to obtain med rec.  Med rec:  -Losartan 50mg qd  -Furosemide 40mg qd  -Atorvastatin 80mg qd  -Azithromycin 500mg qd   -Prednisone 20mg qd   -Mupirocin 2% ointment   -Ketoconazole 2% ointment

## 2025-05-12 NOTE — PROGRESS NOTE ADULT - PROBLEM/PLAN-1
Andrew Mahan,  Thank you for referring Arya to our sports medicine clinic.  On today's x-ray he seems to have a SLAC wrist which was likely from a remote trauma he sustained while intoxicated since he has a history of alcohol abuse.  We have ordered an MRI and have placed a specialty referral for an orthopedic upper extremity specialist to discuss management options moving forward since he has a pretty physical job.  Hope you are well!  L DISPLAY PLAN FREE TEXT

## 2025-05-12 NOTE — DISCHARGE NOTE PROVIDER - ATTENDING DISCHARGE PHYSICAL EXAMINATION:
Gen: NAD, resting in bed comfortably   HEENT: EOMI, PERRL, no scleral icterus  CV: normal S1 and S2  Lungs: CTABL, normal respiratory effort on RA  Ab: soft, NT, ND, normal BS  Ext: RLE with deep ulcer, clean borders without erythema, warmth or pus. +macular rash on LE; 1+ pitting edema RLE > LLE   Neuro: A&O x 3, no focal deficits     80-year-old male with a PMHx of HTN, COPD (on 3-4L home 02), CKD (baseline Cr 1.2 -1.3), HFrEF and multiple recent admissions for RLE cellulitis and COPD exacerbation who presented with difficulty caring for his RLE wound upon recent AMA discharge from [facility].  Low concern for acute infection and now indication for Abx.    Plan:    -monitor off ABx as wound does not appear infected    -continue with LABA/LAMA upon discharge   -pulm and PCP follow up   -CM consult for VNS     Rest of plan as outlined above.

## 2025-05-12 NOTE — CONSULT NOTE ADULT - SUBJECTIVE AND OBJECTIVE BOX
Patient is a 80y old  Male who presents with a chief complaint of LE wound (11 May 2025 20:33)      HPI:  80M PMHx of cognitive impairment, HTN, COPD (on 3-4L NC at home), CKD2 (baseline Cr 1.2-1.3), HFrEF, recent admissions for AHRF 2/2 COPD and RLE SSTI (started as infected ulcer incompletely treated due to AMA/ nonadherence s/p debridement by vascular x2, admitted for management of LE wound and inability to care for himself. Patient is a poor historian, and does not remember his previous admissions. States his right lower extremity wound has worsened, and is painful with pus coming from wound. Does not remember his medications.     Per chart review, patient was seen at [facility] 5/7 due to "uncontrolled behavior" per [facility] paperwork without other information. Noted to have rash on his legs, unclear etiology, not painful or itchy. At the time, patient refused to return to [facility]. Spoke with patients brother in law, CM and SW and patient was discharged home with return instructions and follow up plan.     ED Course:   Vitals: T 98.7 HR 83 /103 --> 150/69 RR 16 Sp02 97% RA   Labs:  Hgb 12.6 BUN/Cr 32/1.38 Albumin 3.2 AST 46  Imaging: Xray of Right Tibia/Fibula, pending final read   Interventions: 650 mg PO Tylenol  (11 May 2025 20:33)    PAST MEDICAL & SURGICAL HISTORY:  Kidney stone      HTN (hypertension)      COPD (chronic obstructive pulmonary disease)      History of BPH      History of cholecystectomy        MEDICATIONS  (STANDING):  albuterol/ipratropium for Nebulization 3 milliLiter(s) Nebulizer every 4 hours  atorvastatin 80 milliGRAM(s) Oral at bedtime  azithromycin   Tablet 500 milliGRAM(s) Oral <User Schedule>  folic acid 1 milliGRAM(s) Oral daily  furosemide    Tablet 40 milliGRAM(s) Oral every 24 hours  heparin   Injectable 5000 Unit(s) SubCutaneous every 8 hours  losartan 50 milliGRAM(s) Oral every 24 hours  sodium chloride 3%  Inhalation 4 milliLiter(s) Inhalation every 8 hours  thiamine IVPB 500 milliGRAM(s) IV Intermittent every 8 hours  tiotropium 2.5 MICROgram(s)/olodaterol 2.5 MICROgram(s) (STIOLTO) Inhaler 2 Puff(s) Inhalation daily    MEDICATIONS  (PRN):          Home Living Status :  lives alone in an elevator accessible apartment building ,   steps to enter ,   ramp access           -  Home Services :  none     hrs x  days/week         -  Family support :          -  Occupation :     Baseline Functional Level Prior to Admission :             - ADL's/ IADL's :  independent , requires partial assistance with          - Ambulatory status prior to admission :   walked with a cane , rolling walker , rollator , no assistive devices          FAMILY HISTORY:      CBC Full  -  ( 12 May 2025 06:40 )  WBC Count : 8.81 K/uL  RBC Count : 3.61 M/uL  Hemoglobin : 11.6 g/dL  Hematocrit : 35.4 %  Platelet Count - Automated : 138 K/uL  Mean Cell Volume : 98.1 fl  Mean Cell Hemoglobin : 32.1 pg  Mean Cell Hemoglobin Concentration : 32.8 g/dL  Auto Neutrophil # : x  Auto Lymphocyte # : x  Auto Monocyte # : x  Auto Eosinophil # : x  Auto Basophil # : x  Auto Neutrophil % : x  Auto Lymphocyte % : x  Auto Monocyte % : x  Auto Eosinophil % : x  Auto Basophil % : x      05-12    142  |  109[H]  |  32[H]  ----------------------------<  98  4.0   |  24  |  1.35[H]    Ca    8.7      12 May 2025 06:40  Phos  3.5     05-12  Mg     2.0     05-12    TPro  5.7[L]  /  Alb  3.3  /  TBili  0.7  /  DBili  x   /  AST  15  /  ALT  18  /  AlkPhos  69  05-12      Urinalysis Basic - ( 12 May 2025 06:40 )    Color: x / Appearance: x / SG: x / pH: x  Gluc: 98 mg/dL / Ketone: x  / Bili: x / Urobili: x   Blood: x / Protein: x / Nitrite: x   Leuk Esterase: x / RBC: x / WBC x   Sq Epi: x / Non Sq Epi: x / Bacteria: x        Radiology :             Review of Systems : per HPI         Vital Signs Last 24 Hrs  T(C): 36.4 (12 May 2025 06:33), Max: 37.1 (11 May 2025 13:27)  T(F): 97.6 (12 May 2025 06:33), Max: 98.7 (11 May 2025 13:27)  HR: 94 (12 May 2025 06:33) (79 - 94)  BP: 153/93 (12 May 2025 06:33) (150/69 - 179/95)  BP(mean): --  RR: 18 (12 May 2025 06:33) (16 - 18)  SpO2: 95% (12 May 2025 06:33) (95% - 98%)    Parameters below as of 12 May 2025 06:33  Patient On (Oxygen Delivery Method): room air            Physical Exam:  on CONTACT MRSA wound isolation ,  80 y o man lying comfortably in semi Yuo's position , awake , alert , no acute complaints     Head: normocephalic , atraumatic    Eyes: PERRLA , EOMI , no nystagmus , sclera anicteric    ENT / FACE: neg nasal discharge , uvula midline , no oropharyngeal erythema / exudate    Neck: supple , negative JVD , negative carotid bruits , no thyromegaly    Chest: CTA bilaterally     Cardiovascular: regular rate and rhythm , neg murmurs / rubs / gallops    Abdomen: soft , non distended , no tenderness to palpation in all 4 quadrants ,  normal bowel sounds     Extremities:  RLE with deep ulcer, clean borders without erythema, warmth or pus. +macular rash on LE; 1+ pitting edema RLE > LLE      Neurologic Exam:     Alert and oriented to person , place , date/year , speech fluent w/o dysarthria      Cranial Nerves:           II:                         pupils equal , round and reactive to light , visual fields intact         III/ IV/VI:             extraocular movements intact , neg nystagmus , neg ptosis        V:                        facial sensation intact , V1-3 normal        VII:                      face symmetric , no droop , normal eye closure and smile        VIII:                     hearing intact to finger rub bilaterally        IX and X:             no hoarseness , gag intact , palate/ uvula rise symmetrically        XI:                       SCM / trapezius strength intact bilateral        XII:                      no tongue deviation    Motor Exam:        > 3+/5 x 4 extremities , without drift     Sensation:         intact to light touch x 4 extremities                            no neglect or extinction on double simultaneous testing    DTR:           biceps/brachioradialis: equal                            patella/ankle: equal          neg Babinski      Coordination:            Finger to Nose:  neg dysmetria bilaterally        Gait:  not tested         PM&R Impression: admitted for admitted for management of LE wound and inability to care for himself    - recent admissions for AHRF 2/2 COPD and RLE SSTI (started as infected ulcer incompletely treated due to AMA/ nonadherence s/p debridement by vascular x2      Recommendations / Plan:       1) Physical / Occupational therapy focusing on therapeutic exercises , equipment evaluation , bed mobility/transfer out of bed evaluation , progressive ambulation with assistive devices prn .    2) Current disposition plan recommendation:    pending functional progress

## 2025-05-12 NOTE — DISCHARGE NOTE PROVIDER - NSDCFUSCHEDAPPT_GEN_ALL_CORE_FT
[facility]  INT[facility] [address]  Scheduled Appointment: 05/27/2025

## 2025-05-12 NOTE — DISCHARGE NOTE NURSING/CASE MANAGEMENT/SOCIAL WORK - PATIENT PORTAL LINK FT
You can access the FollowMyHealth Patient Portal offered by [facility] by registering at the following website: [email or web address]. By joining "[employer]"’s FollowMyHealth portal, you will also be able to view your health information using other applications (apps) compatible with our system.

## 2025-05-12 NOTE — PROGRESS NOTE ADULT - PROBLEM SELECTOR PLAN 8
Previous admission, TTE 4/24: EF 40-45%, no WMAs, mild LV hypertrophy, mild LV diastolic dysfunction, normal RV size and systolic fxn. Estimated PASP 40. On exam, does not appear to be volume overloaded. Previous admission 4/28, per cardiology no further GDMT initiation inpatient, but to follow up outpatient.   Home meds: Losartan 50mg qd, Lasix 40mg qd. BB deferred given reactive airways/recurrent COPD.    Plan:   - Discuss with cardiology patients difficult home situation and how best to initiate full GDMT   - Continue with home medications  - Follow up BNP

## 2025-05-12 NOTE — DISCHARGE NOTE NURSING/CASE MANAGEMENT/SOCIAL WORK - FINANCIAL ASSISTANCE
[facility] provides services at a reduced cost to those who are determined to be eligible through [facility]’s financial assistance program. Information regarding [facility]’s financial assistance program can be found by going to [email or web address] or by calling [phone].

## 2025-05-12 NOTE — DISCHARGE NOTE PROVIDER - CARE PROVIDER_API CALL
Im, Leroy WEBB)  Internal Medicine  [address]  [place] [address]  Phone: [phone]  Fax: [phone]  Scheduled Appointment: 05/27/2025 09:00 AM

## 2025-05-13 ENCOUNTER — TRANSCRIPTION ENCOUNTER (OUTPATIENT)
Age: 80
End: 2025-05-13

## 2025-05-27 ENCOUNTER — APPOINTMENT (OUTPATIENT)
Dept: INTERNAL MEDICINE | Facility: CLINIC | Age: 80
End: 2025-05-27

## 2025-05-30 ENCOUNTER — APPOINTMENT (OUTPATIENT)
Dept: HEART AND VASCULAR | Facility: CLINIC | Age: 80
End: 2025-05-30

## 2025-06-02 ENCOUNTER — TRANSCRIPTION ENCOUNTER (OUTPATIENT)
Age: 80
End: 2025-06-02

## 2025-08-13 ENCOUNTER — INPATIENT (INPATIENT)
Facility: HOSPITAL | Age: 80
LOS: 0 days | Discharge: LEFT BEFORE DISCHARGE | DRG: 189 | End: 2025-08-14
Attending: STUDENT IN AN ORGANIZED HEALTH CARE EDUCATION/TRAINING PROGRAM | Admitting: HOSPITALIST
Payer: MEDICARE

## 2025-08-13 VITALS
RESPIRATION RATE: 18 BRPM | TEMPERATURE: 98 F | SYSTOLIC BLOOD PRESSURE: 161 MMHG | WEIGHT: 160.06 LBS | HEART RATE: 90 BPM | OXYGEN SATURATION: 100 % | DIASTOLIC BLOOD PRESSURE: 110 MMHG

## 2025-08-13 DIAGNOSIS — Z90.49 ACQUIRED ABSENCE OF OTHER SPECIFIED PARTS OF DIGESTIVE TRACT: Chronic | ICD-10-CM

## 2025-08-13 LAB
ADD ON TEST-SPECIMEN IN LAB: SIGNIFICANT CHANGE UP
ALBUMIN SERPL ELPH-MCNC: 3 G/DL — LOW (ref 3.4–5)
ALP SERPL-CCNC: 88 U/L — SIGNIFICANT CHANGE UP (ref 40–120)
ALT FLD-CCNC: 54 U/L — HIGH (ref 12–42)
ANION GAP SERPL CALC-SCNC: 8 MMOL/L — LOW (ref 9–16)
AST SERPL-CCNC: 43 U/L — HIGH (ref 15–37)
BASOPHILS # BLD AUTO: 0.04 K/UL — SIGNIFICANT CHANGE UP (ref 0–0.2)
BASOPHILS NFR BLD AUTO: 0.8 % — SIGNIFICANT CHANGE UP (ref 0–2)
BILIRUB SERPL-MCNC: 1.5 MG/DL — HIGH (ref 0.2–1.2)
BUN SERPL-MCNC: 26 MG/DL — HIGH (ref 7–23)
CALCIUM SERPL-MCNC: 9.2 MG/DL — SIGNIFICANT CHANGE UP (ref 8.5–10.5)
CHLORIDE SERPL-SCNC: 109 MMOL/L — HIGH (ref 96–108)
CO2 SERPL-SCNC: 28 MMOL/L — SIGNIFICANT CHANGE UP (ref 22–31)
CREAT SERPL-MCNC: 1.1 MG/DL — SIGNIFICANT CHANGE UP (ref 0.5–1.3)
EGFR: 67 ML/MIN/1.73M2 — SIGNIFICANT CHANGE UP
EGFR: 67 ML/MIN/1.73M2 — SIGNIFICANT CHANGE UP
EOSINOPHIL # BLD AUTO: 0.03 K/UL — SIGNIFICANT CHANGE UP (ref 0–0.5)
EOSINOPHIL NFR BLD AUTO: 0.6 % — SIGNIFICANT CHANGE UP (ref 0–6)
FLUAV AG NPH QL: SIGNIFICANT CHANGE UP
FLUBV AG NPH QL: SIGNIFICANT CHANGE UP
GLUCOSE SERPL-MCNC: 108 MG/DL — HIGH (ref 70–99)
HCT VFR BLD CALC: 40.9 % — SIGNIFICANT CHANGE UP (ref 39–50)
HGB BLD-MCNC: 13.3 G/DL — SIGNIFICANT CHANGE UP (ref 13–17)
IMM GRANULOCYTES # BLD AUTO: 0.01 K/UL — SIGNIFICANT CHANGE UP (ref 0–0.07)
IMM GRANULOCYTES NFR BLD AUTO: 0.2 % — SIGNIFICANT CHANGE UP (ref 0–0.9)
LYMPHOCYTES # BLD AUTO: 1.05 K/UL — SIGNIFICANT CHANGE UP (ref 1–3.3)
LYMPHOCYTES NFR BLD AUTO: 20.4 % — SIGNIFICANT CHANGE UP (ref 13–44)
MAGNESIUM SERPL-MCNC: 1.8 MG/DL — SIGNIFICANT CHANGE UP (ref 1.6–2.6)
MCHC RBC-ENTMCNC: 31.5 PG — SIGNIFICANT CHANGE UP (ref 27–34)
MCHC RBC-ENTMCNC: 32.5 G/DL — SIGNIFICANT CHANGE UP (ref 32–36)
MCV RBC AUTO: 96.9 FL — SIGNIFICANT CHANGE UP (ref 80–100)
MONOCYTES # BLD AUTO: 0.32 K/UL — SIGNIFICANT CHANGE UP (ref 0–0.9)
MONOCYTES NFR BLD AUTO: 6.2 % — SIGNIFICANT CHANGE UP (ref 2–14)
NEUTROPHILS # BLD AUTO: 3.69 K/UL — SIGNIFICANT CHANGE UP (ref 1.8–7.4)
NEUTROPHILS NFR BLD AUTO: 71.8 % — SIGNIFICANT CHANGE UP (ref 43–77)
NRBC # BLD AUTO: 0 K/UL — SIGNIFICANT CHANGE UP (ref 0–0)
NRBC # FLD: 0 K/UL — SIGNIFICANT CHANGE UP (ref 0–0)
NRBC BLD AUTO-RTO: 0 /100 WBCS — SIGNIFICANT CHANGE UP (ref 0–0)
NT-PROBNP SERPL-SCNC: HIGH PG/ML
PLATELET # BLD AUTO: 145 K/UL — LOW (ref 150–400)
PMV BLD: 10.9 FL — SIGNIFICANT CHANGE UP (ref 7–13)
POTASSIUM SERPL-MCNC: 4.1 MMOL/L — SIGNIFICANT CHANGE UP (ref 3.5–5.3)
POTASSIUM SERPL-SCNC: 4.1 MMOL/L — SIGNIFICANT CHANGE UP (ref 3.5–5.3)
PROT SERPL-MCNC: 6.5 G/DL — SIGNIFICANT CHANGE UP (ref 6.4–8.2)
RBC # BLD: 4.22 M/UL — SIGNIFICANT CHANGE UP (ref 4.2–5.8)
RBC # FLD: 15.5 % — HIGH (ref 10.3–14.5)
RSV RNA NPH QL NAA+NON-PROBE: SIGNIFICANT CHANGE UP
SARS-COV-2 RNA SPEC QL NAA+PROBE: SIGNIFICANT CHANGE UP
SODIUM SERPL-SCNC: 145 MMOL/L — SIGNIFICANT CHANGE UP (ref 132–145)
SOURCE RESPIRATORY: SIGNIFICANT CHANGE UP
TROPONIN I, HIGH SENSITIVITY RESULT: 108.1 NG/L — HIGH
WBC # BLD: 5.14 K/UL — SIGNIFICANT CHANGE UP (ref 3.8–10.5)
WBC # FLD AUTO: 5.14 K/UL — SIGNIFICANT CHANGE UP (ref 3.8–10.5)

## 2025-08-13 PROCEDURE — 85025 COMPLETE CBC W/AUTO DIFF WBC: CPT

## 2025-08-13 PROCEDURE — 84484 ASSAY OF TROPONIN QUANT: CPT

## 2025-08-13 PROCEDURE — 36415 COLL VENOUS BLD VENIPUNCTURE: CPT

## 2025-08-13 PROCEDURE — 99223 1ST HOSP IP/OBS HIGH 75: CPT | Mod: GC

## 2025-08-13 PROCEDURE — 83735 ASSAY OF MAGNESIUM: CPT

## 2025-08-13 PROCEDURE — 71045 X-RAY EXAM CHEST 1 VIEW: CPT | Mod: 26

## 2025-08-13 PROCEDURE — 80053 COMPREHEN METABOLIC PANEL: CPT

## 2025-08-13 PROCEDURE — 87637 SARSCOV2&INF A&B&RSV AMP PRB: CPT

## 2025-08-13 PROCEDURE — 82962 GLUCOSE BLOOD TEST: CPT

## 2025-08-13 PROCEDURE — 83880 ASSAY OF NATRIURETIC PEPTIDE: CPT

## 2025-08-13 PROCEDURE — 99285 EMERGENCY DEPT VISIT HI MDM: CPT

## 2025-08-13 RX ORDER — MIDAZOLAM IN 0.9 % SOD.CHLORID 1 MG/ML
2 PLASTIC BAG, INJECTION (ML) INTRAVENOUS ONCE
Refills: 0 | Status: DISCONTINUED | OUTPATIENT
Start: 2025-08-13 | End: 2025-08-13

## 2025-08-13 RX ORDER — HALOPERIDOL 10 MG/1
3 TABLET ORAL ONCE
Refills: 0 | Status: DISCONTINUED | OUTPATIENT
Start: 2025-08-13 | End: 2025-08-13

## 2025-08-13 RX ORDER — IPRATROPIUM BROMIDE AND ALBUTEROL SULFATE .5; 2.5 MG/3ML; MG/3ML
3 SOLUTION RESPIRATORY (INHALATION) ONCE
Refills: 0 | Status: COMPLETED | OUTPATIENT
Start: 2025-08-13 | End: 2025-08-13

## 2025-08-13 RX ORDER — DIPHENHYDRAMINE HCL 12.5MG/5ML
50 ELIXIR ORAL ONCE
Refills: 0 | Status: COMPLETED | OUTPATIENT
Start: 2025-08-13 | End: 2025-08-13

## 2025-08-13 RX ORDER — ALBUTEROL SULFATE 2.5 MG/3ML
2.5 VIAL, NEBULIZER (ML) INHALATION ONCE
Refills: 0 | Status: COMPLETED | OUTPATIENT
Start: 2025-08-13 | End: 2025-08-13

## 2025-08-13 RX ADMIN — Medication 2 MILLIGRAM(S): at 16:00

## 2025-08-13 RX ADMIN — Medication 2.5 MILLIGRAM(S): at 10:47

## 2025-08-13 RX ADMIN — Medication 50 MILLIGRAM(S): at 16:01

## 2025-08-13 RX ADMIN — IPRATROPIUM BROMIDE AND ALBUTEROL SULFATE 3 MILLILITER(S): .5; 2.5 SOLUTION RESPIRATORY (INHALATION) at 10:47

## 2025-08-14 ENCOUNTER — TRANSCRIPTION ENCOUNTER (OUTPATIENT)
Age: 80
End: 2025-08-14

## 2025-08-14 VITALS
DIASTOLIC BLOOD PRESSURE: 90 MMHG | TEMPERATURE: 98 F | RESPIRATION RATE: 18 BRPM | HEART RATE: 98 BPM | OXYGEN SATURATION: 100 % | SYSTOLIC BLOOD PRESSURE: 142 MMHG

## 2025-08-14 DIAGNOSIS — J44.9 CHRONIC OBSTRUCTIVE PULMONARY DISEASE, UNSPECIFIED: ICD-10-CM

## 2025-08-14 DIAGNOSIS — N18.2 CHRONIC KIDNEY DISEASE, STAGE 2 (MILD): ICD-10-CM

## 2025-08-14 DIAGNOSIS — Z86.79 PERSONAL HISTORY OF OTHER DISEASES OF THE CIRCULATORY SYSTEM: ICD-10-CM

## 2025-08-14 DIAGNOSIS — Z87.09 PERSONAL HISTORY OF OTHER DISEASES OF THE RESPIRATORY SYSTEM: ICD-10-CM

## 2025-08-14 DIAGNOSIS — R65.10 SYSTEMIC INFLAMMATORY RESPONSE SYNDROME (SIRS) OF NON-INFECTIOUS ORIGIN WITHOUT ACUTE ORGAN DYSFUNCTION: ICD-10-CM

## 2025-08-14 DIAGNOSIS — I50.22 CHRONIC SYSTOLIC (CONGESTIVE) HEART FAILURE: ICD-10-CM

## 2025-08-14 DIAGNOSIS — J96.01 ACUTE RESPIRATORY FAILURE WITH HYPOXIA: ICD-10-CM

## 2025-08-14 DIAGNOSIS — I21.A1 MYOCARDIAL INFARCTION TYPE 2: ICD-10-CM

## 2025-08-14 DIAGNOSIS — J44.1 CHRONIC OBSTRUCTIVE PULMONARY DISEASE WITH (ACUTE) EXACERBATION: ICD-10-CM

## 2025-08-14 LAB
ALBUMIN SERPL ELPH-MCNC: 3.4 G/DL — SIGNIFICANT CHANGE UP (ref 3.3–5)
ALP SERPL-CCNC: 81 U/L — SIGNIFICANT CHANGE UP (ref 40–120)
ALT FLD-CCNC: 50 U/L — HIGH (ref 10–45)
ANION GAP SERPL CALC-SCNC: 12 MMOL/L — SIGNIFICANT CHANGE UP (ref 5–17)
APTT BLD: 25.1 SEC — LOW (ref 26.1–36.8)
AST SERPL-CCNC: 41 U/L — HIGH (ref 10–40)
BILIRUB DIRECT SERPL-MCNC: 0.4 MG/DL — HIGH (ref 0–0.3)
BILIRUB SERPL-MCNC: 1.2 MG/DL — SIGNIFICANT CHANGE UP (ref 0.2–1.2)
BUN SERPL-MCNC: 27 MG/DL — HIGH (ref 7–23)
CALCIUM SERPL-MCNC: 9.3 MG/DL — SIGNIFICANT CHANGE UP (ref 8.4–10.5)
CHLORIDE SERPL-SCNC: 106 MMOL/L — SIGNIFICANT CHANGE UP (ref 96–108)
CO2 SERPL-SCNC: 28 MMOL/L — SIGNIFICANT CHANGE UP (ref 22–31)
CREAT SERPL-MCNC: 1.21 MG/DL — SIGNIFICANT CHANGE UP (ref 0.5–1.3)
EGFR: 60 ML/MIN/1.73M2 — SIGNIFICANT CHANGE UP
EGFR: 60 ML/MIN/1.73M2 — SIGNIFICANT CHANGE UP
GLUCOSE SERPL-MCNC: 144 MG/DL — HIGH (ref 70–99)
HCT VFR BLD CALC: 39.8 % — SIGNIFICANT CHANGE UP (ref 39–50)
HGB BLD-MCNC: 12.5 G/DL — LOW (ref 13–17)
INR BLD: 0.95 — SIGNIFICANT CHANGE UP (ref 0.85–1.16)
LEGIONELLA AG UR QL: NEGATIVE — SIGNIFICANT CHANGE UP
MAGNESIUM SERPL-MCNC: 1.9 MG/DL — SIGNIFICANT CHANGE UP (ref 1.6–2.6)
MCHC RBC-ENTMCNC: 31.4 G/DL — LOW (ref 32–36)
MCHC RBC-ENTMCNC: 31.6 PG — SIGNIFICANT CHANGE UP (ref 27–34)
MCV RBC AUTO: 100.5 FL — HIGH (ref 80–100)
NRBC # BLD AUTO: 0 K/UL — SIGNIFICANT CHANGE UP (ref 0–0)
NRBC # FLD: 0 K/UL — SIGNIFICANT CHANGE UP (ref 0–0)
NRBC BLD AUTO-RTO: 0 /100 WBCS — SIGNIFICANT CHANGE UP (ref 0–0)
PHOSPHATE SERPL-MCNC: 4.6 MG/DL — HIGH (ref 2.5–4.5)
PLATELET # BLD AUTO: 143 K/UL — LOW (ref 150–400)
PMV BLD: 11.8 FL — SIGNIFICANT CHANGE UP (ref 7–13)
POTASSIUM SERPL-MCNC: 4.5 MMOL/L — SIGNIFICANT CHANGE UP (ref 3.5–5.3)
POTASSIUM SERPL-SCNC: 4.5 MMOL/L — SIGNIFICANT CHANGE UP (ref 3.5–5.3)
PROT SERPL-MCNC: 5.6 G/DL — LOW (ref 6–8.3)
PROTHROM AB SERPL-ACNC: 11.1 SEC — SIGNIFICANT CHANGE UP (ref 9.9–13.4)
RAPID RVP RESULT: SIGNIFICANT CHANGE UP
RBC # BLD: 3.96 M/UL — LOW (ref 4.2–5.8)
RBC # FLD: 15.2 % — HIGH (ref 10.3–14.5)
S PNEUM AG UR QL: NEGATIVE — SIGNIFICANT CHANGE UP
SARS-COV-2 RNA SPEC QL NAA+PROBE: SIGNIFICANT CHANGE UP
SODIUM SERPL-SCNC: 146 MMOL/L — HIGH (ref 135–145)
TROPONIN T, HIGH SENSITIVITY RESULT: 62 NG/L — CRITICAL HIGH (ref 0–51)
WBC # BLD: 10.62 K/UL — HIGH (ref 3.8–10.5)
WBC # FLD AUTO: 10.62 K/UL — HIGH (ref 3.8–10.5)

## 2025-08-14 PROCEDURE — 99233 SBSQ HOSP IP/OBS HIGH 50: CPT | Mod: GC

## 2025-08-14 PROCEDURE — 87899 AGENT NOS ASSAY W/OPTIC: CPT

## 2025-08-14 PROCEDURE — 80053 COMPREHEN METABOLIC PANEL: CPT

## 2025-08-14 PROCEDURE — 83735 ASSAY OF MAGNESIUM: CPT

## 2025-08-14 PROCEDURE — 36415 COLL VENOUS BLD VENIPUNCTURE: CPT

## 2025-08-14 PROCEDURE — 84484 ASSAY OF TROPONIN QUANT: CPT

## 2025-08-14 PROCEDURE — 84100 ASSAY OF PHOSPHORUS: CPT

## 2025-08-14 PROCEDURE — 94640 AIRWAY INHALATION TREATMENT: CPT

## 2025-08-14 PROCEDURE — 85027 COMPLETE CBC AUTOMATED: CPT

## 2025-08-14 PROCEDURE — 85610 PROTHROMBIN TIME: CPT

## 2025-08-14 PROCEDURE — 85025 COMPLETE CBC W/AUTO DIFF WBC: CPT

## 2025-08-14 PROCEDURE — 71045 X-RAY EXAM CHEST 1 VIEW: CPT

## 2025-08-14 PROCEDURE — 85730 THROMBOPLASTIN TIME PARTIAL: CPT

## 2025-08-14 PROCEDURE — 96374 THER/PROPH/DIAG INJ IV PUSH: CPT

## 2025-08-14 PROCEDURE — 82248 BILIRUBIN DIRECT: CPT

## 2025-08-14 PROCEDURE — 97161 PT EVAL LOW COMPLEX 20 MIN: CPT

## 2025-08-14 PROCEDURE — 0225U NFCT DS DNA&RNA 21 SARSCOV2: CPT

## 2025-08-14 PROCEDURE — 82962 GLUCOSE BLOOD TEST: CPT

## 2025-08-14 PROCEDURE — 99285 EMERGENCY DEPT VISIT HI MDM: CPT | Mod: 25

## 2025-08-14 PROCEDURE — 83880 ASSAY OF NATRIURETIC PEPTIDE: CPT

## 2025-08-14 PROCEDURE — 87637 SARSCOV2&INF A&B&RSV AMP PRB: CPT

## 2025-08-14 RX ORDER — ATORVASTATIN CALCIUM 80 MG/1
80 TABLET, FILM COATED ORAL AT BEDTIME
Refills: 0 | Status: DISCONTINUED | OUTPATIENT
Start: 2025-08-14 | End: 2025-08-14

## 2025-08-14 RX ORDER — AZITHROMYCIN 250 MG
500 CAPSULE ORAL DAILY
Refills: 0 | Status: DISCONTINUED | OUTPATIENT
Start: 2025-08-14 | End: 2025-08-14

## 2025-08-14 RX ORDER — TIOTROPIUM BROMIDE AND OLODATEROL 3.124; 2.736 UG/1; UG/1
2 SPRAY, METERED RESPIRATORY (INHALATION) DAILY
Refills: 0 | Status: DISCONTINUED | OUTPATIENT
Start: 2025-08-14 | End: 2025-08-14

## 2025-08-14 RX ORDER — AZITHROMYCIN 250 MG
500 CAPSULE ORAL EVERY 24 HOURS
Refills: 0 | Status: DISCONTINUED | OUTPATIENT
Start: 2025-08-14 | End: 2025-08-14

## 2025-08-14 RX ORDER — IPRATROPIUM BROMIDE AND ALBUTEROL SULFATE .5; 2.5 MG/3ML; MG/3ML
3 SOLUTION RESPIRATORY (INHALATION)
Qty: 30 | Refills: 0
Start: 2025-08-14

## 2025-08-14 RX ORDER — LOSARTAN POTASSIUM 100 MG/1
1 TABLET, FILM COATED ORAL
Qty: 90 | Refills: 0
Start: 2025-08-14 | End: 2025-11-11

## 2025-08-14 RX ORDER — PREDNISONE 20 MG/1
40 TABLET ORAL DAILY
Refills: 0 | Status: DISCONTINUED | OUTPATIENT
Start: 2025-08-14 | End: 2025-08-14

## 2025-08-14 RX ORDER — FUROSEMIDE 10 MG/ML
40 INJECTION INTRAMUSCULAR; INTRAVENOUS EVERY 24 HOURS
Refills: 0 | Status: DISCONTINUED | OUTPATIENT
Start: 2025-08-14 | End: 2025-08-14

## 2025-08-14 RX ORDER — ATORVASTATIN CALCIUM 80 MG/1
1 TABLET, FILM COATED ORAL
Qty: 60 | Refills: 0
Start: 2025-08-14 | End: 2025-10-12

## 2025-08-14 RX ORDER — IPRATROPIUM BROMIDE AND ALBUTEROL SULFATE .5; 2.5 MG/3ML; MG/3ML
3 SOLUTION RESPIRATORY (INHALATION)
Qty: 1 | Refills: 0
Start: 2025-08-14 | End: 2025-10-12

## 2025-08-14 RX ORDER — B1/B2/B3/B5/B6/B12/VIT C/FOLIC 500-0.5 MG
1 TABLET ORAL DAILY
Refills: 0 | Status: DISCONTINUED | OUTPATIENT
Start: 2025-08-14 | End: 2025-08-14

## 2025-08-14 RX ORDER — LOSARTAN POTASSIUM 100 MG/1
50 TABLET, FILM COATED ORAL EVERY 24 HOURS
Refills: 0 | Status: DISCONTINUED | OUTPATIENT
Start: 2025-08-14 | End: 2025-08-14

## 2025-08-14 RX ORDER — FUROSEMIDE 10 MG/ML
1 INJECTION INTRAMUSCULAR; INTRAVENOUS
Qty: 60 | Refills: 0
Start: 2025-08-14 | End: 2025-10-12

## 2025-08-14 RX ORDER — IPRATROPIUM BROMIDE AND ALBUTEROL SULFATE .5; 2.5 MG/3ML; MG/3ML
3 SOLUTION RESPIRATORY (INHALATION) EVERY 6 HOURS
Refills: 0 | Status: DISCONTINUED | OUTPATIENT
Start: 2025-08-14 | End: 2025-08-14

## 2025-08-14 RX ORDER — ATORVASTATIN CALCIUM 80 MG/1
1 TABLET, FILM COATED ORAL
Qty: 90 | Refills: 0
Start: 2025-08-14 | End: 2025-11-11

## 2025-08-14 RX ORDER — UMECLIDINIUM BROMIDE AND VILANTEROL TRIFENATATE 62.5; 25 UG/1; UG/1
1 POWDER RESPIRATORY (INHALATION)
Qty: 1 | Refills: 0
Start: 2025-08-14 | End: 2025-10-12

## 2025-08-14 RX ORDER — BENZONATATE 100 MG
100 CAPSULE ORAL EVERY 8 HOURS
Refills: 0 | Status: DISCONTINUED | OUTPATIENT
Start: 2025-08-14 | End: 2025-08-14

## 2025-08-14 RX ORDER — TIOTROPIUM BROMIDE AND OLODATEROL 3.124; 2.736 UG/1; UG/1
2 SPRAY, METERED RESPIRATORY (INHALATION)
Qty: 1 | Refills: 0
Start: 2025-08-14 | End: 2025-09-12

## 2025-08-14 RX ORDER — FUROSEMIDE 10 MG/ML
40 INJECTION INTRAMUSCULAR; INTRAVENOUS ONCE
Refills: 0 | Status: COMPLETED | OUTPATIENT
Start: 2025-08-14 | End: 2025-08-14

## 2025-08-14 RX ORDER — HEPARIN SODIUM 1000 [USP'U]/ML
5000 INJECTION INTRAVENOUS; SUBCUTANEOUS EVERY 8 HOURS
Refills: 0 | Status: DISCONTINUED | OUTPATIENT
Start: 2025-08-14 | End: 2025-08-14

## 2025-08-14 RX ORDER — FUROSEMIDE 10 MG/ML
1 INJECTION INTRAMUSCULAR; INTRAVENOUS
Qty: 30 | Refills: 0
Start: 2025-08-14 | End: 2025-09-12

## 2025-08-14 RX ORDER — LOSARTAN POTASSIUM 100 MG/1
1 TABLET, FILM COATED ORAL
Qty: 60 | Refills: 0
Start: 2025-08-14 | End: 2025-10-12

## 2025-08-14 RX ADMIN — Medication 255 MILLIGRAM(S): at 03:12

## 2025-08-14 RX ADMIN — IPRATROPIUM BROMIDE AND ALBUTEROL SULFATE 3 MILLILITER(S): .5; 2.5 SOLUTION RESPIRATORY (INHALATION) at 04:44

## 2025-08-14 RX ADMIN — PREDNISONE 40 MILLIGRAM(S): 20 TABLET ORAL at 07:01

## 2025-08-14 RX ADMIN — HEPARIN SODIUM 5000 UNIT(S): 1000 INJECTION INTRAVENOUS; SUBCUTANEOUS at 07:01

## 2025-08-14 RX ADMIN — FUROSEMIDE 40 MILLIGRAM(S): 10 INJECTION INTRAMUSCULAR; INTRAVENOUS at 03:13

## 2025-08-20 ENCOUNTER — INPATIENT (INPATIENT)
Facility: HOSPITAL | Age: 80
LOS: 1 days | Discharge: HOME HEALTH | DRG: 189 | End: 2025-08-22
Attending: STUDENT IN AN ORGANIZED HEALTH CARE EDUCATION/TRAINING PROGRAM | Admitting: STUDENT IN AN ORGANIZED HEALTH CARE EDUCATION/TRAINING PROGRAM
Payer: MEDICARE

## 2025-08-20 VITALS
OXYGEN SATURATION: 100 % | HEIGHT: 69 IN | DIASTOLIC BLOOD PRESSURE: 126 MMHG | TEMPERATURE: 98 F | RESPIRATION RATE: 26 BRPM | HEART RATE: 109 BPM | SYSTOLIC BLOOD PRESSURE: 203 MMHG | WEIGHT: 179.9 LBS

## 2025-08-20 DIAGNOSIS — J96.01 ACUTE RESPIRATORY FAILURE WITH HYPOXIA: ICD-10-CM

## 2025-08-20 DIAGNOSIS — D69.6 THROMBOCYTOPENIA, UNSPECIFIED: ICD-10-CM

## 2025-08-20 DIAGNOSIS — R74.8 ABNORMAL LEVELS OF OTHER SERUM ENZYMES: ICD-10-CM
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
## 2025-08-20 DIAGNOSIS — Z29.9 ENCOUNTER FOR PROPHYLACTIC MEASURES, UNSPECIFIED: ICD-10-CM

## 2025-08-20 DIAGNOSIS — R65.10 SYSTEMIC INFLAMMATORY RESPONSE SYNDROME (SIRS) OF NON-INFECTIOUS ORIGIN WITHOUT ACUTE ORGAN DYSFUNCTION: ICD-10-CM

## 2025-08-20 DIAGNOSIS — I21.A1 MYOCARDIAL INFARCTION TYPE 2: ICD-10-CM

## 2025-08-20 DIAGNOSIS — I50.42 CHRONIC COMBINED SYSTOLIC (CONGESTIVE) AND DIASTOLIC (CONGESTIVE) HEART FAILURE: ICD-10-CM

## 2025-08-20 DIAGNOSIS — I50.9 HEART FAILURE, UNSPECIFIED: ICD-10-CM

## 2025-08-20 DIAGNOSIS — Z90.49 ACQUIRED ABSENCE OF OTHER SPECIFIED PARTS OF DIGESTIVE TRACT: Chronic | ICD-10-CM

## 2025-08-20 DIAGNOSIS — I16.0 HYPERTENSIVE URGENCY: ICD-10-CM

## 2025-08-20 DIAGNOSIS — I50.22 CHRONIC SYSTOLIC (CONGESTIVE) HEART FAILURE: ICD-10-CM

## 2025-08-20 DIAGNOSIS — N18.2 CHRONIC KIDNEY DISEASE, STAGE 2 (MILD): ICD-10-CM

## 2025-08-20 DIAGNOSIS — J44.1 CHRONIC OBSTRUCTIVE PULMONARY DISEASE WITH (ACUTE) EXACERBATION: ICD-10-CM

## 2025-08-20 LAB
ALBUMIN SERPL ELPH-MCNC: 3.5 G/DL — SIGNIFICANT CHANGE UP (ref 3.4–5)
ALBUMIN SERPL ELPH-MCNC: 3.6 G/DL — SIGNIFICANT CHANGE UP (ref 3.3–5)
ALP SERPL-CCNC: 76 U/L — SIGNIFICANT CHANGE UP (ref 40–120)
ALP SERPL-CCNC: 85 U/L — SIGNIFICANT CHANGE UP (ref 40–120)
ALT FLD-CCNC: 35 U/L — SIGNIFICANT CHANGE UP (ref 10–45)
ALT FLD-CCNC: 57 U/L — HIGH (ref 12–42)
ANION GAP SERPL CALC-SCNC: 15 MMOL/L — SIGNIFICANT CHANGE UP (ref 5–17)
ANION GAP SERPL CALC-SCNC: 8 MMOL/L — LOW (ref 9–16)
APPEARANCE UR: CLEAR — SIGNIFICANT CHANGE UP
AST SERPL-CCNC: 18 U/L — SIGNIFICANT CHANGE UP (ref 10–40)
AST SERPL-CCNC: 26 U/L — SIGNIFICANT CHANGE UP (ref 15–37)
BASOPHILS # BLD AUTO: 0.02 K/UL — SIGNIFICANT CHANGE UP (ref 0–0.2)
BASOPHILS NFR BLD AUTO: 0.3 % — SIGNIFICANT CHANGE UP (ref 0–2)
BILIRUB SERPL-MCNC: 1.2 MG/DL — SIGNIFICANT CHANGE UP (ref 0.2–1.2)
BILIRUB SERPL-MCNC: 1.8 MG/DL — HIGH (ref 0.2–1.2)
BILIRUB UR-MCNC: NEGATIVE — SIGNIFICANT CHANGE UP
BUN SERPL-MCNC: 27 MG/DL — HIGH (ref 7–23)
BUN SERPL-MCNC: 30 MG/DL — HIGH (ref 7–23)
CALCIUM SERPL-MCNC: 9 MG/DL — SIGNIFICANT CHANGE UP (ref 8.4–10.5)
CALCIUM SERPL-MCNC: 9.1 MG/DL — SIGNIFICANT CHANGE UP (ref 8.5–10.5)
CHLORIDE SERPL-SCNC: 102 MMOL/L — SIGNIFICANT CHANGE UP (ref 96–108)
CHLORIDE SERPL-SCNC: 105 MMOL/L — SIGNIFICANT CHANGE UP (ref 96–108)
CO2 SERPL-SCNC: 25 MMOL/L — SIGNIFICANT CHANGE UP (ref 22–31)
CO2 SERPL-SCNC: 34 MMOL/L — HIGH (ref 22–31)
COLOR SPEC: YELLOW — SIGNIFICANT CHANGE UP
CREAT SERPL-MCNC: 1.25 MG/DL — SIGNIFICANT CHANGE UP (ref 0.5–1.3)
CREAT SERPL-MCNC: 1.51 MG/DL — HIGH (ref 0.5–1.3)
DIFF PNL FLD: NEGATIVE — SIGNIFICANT CHANGE UP
EGFR: 46 ML/MIN/1.73M2 — LOW
EGFR: 46 ML/MIN/1.73M2 — LOW
EGFR: 58 ML/MIN/1.73M2 — LOW
EGFR: 58 ML/MIN/1.73M2 — LOW
EOSINOPHIL # BLD AUTO: 0.13 K/UL — SIGNIFICANT CHANGE UP (ref 0–0.5)
EOSINOPHIL NFR BLD AUTO: 1.8 % — SIGNIFICANT CHANGE UP (ref 0–6)
FLUAV AG NPH QL: SIGNIFICANT CHANGE UP
FLUBV AG NPH QL: SIGNIFICANT CHANGE UP
GLUCOSE SERPL-MCNC: 121 MG/DL — HIGH (ref 70–99)
GLUCOSE SERPL-MCNC: 150 MG/DL — HIGH (ref 70–99)
GLUCOSE UR QL: NEGATIVE MG/DL — SIGNIFICANT CHANGE UP
HCT VFR BLD CALC: 42.3 % — SIGNIFICANT CHANGE UP (ref 39–50)
HGB BLD-MCNC: 13.6 G/DL — SIGNIFICANT CHANGE UP (ref 13–17)
IMM GRANULOCYTES # BLD AUTO: 0.01 K/UL — SIGNIFICANT CHANGE UP (ref 0–0.07)
IMM GRANULOCYTES NFR BLD AUTO: 0.1 % — SIGNIFICANT CHANGE UP (ref 0–0.9)
KETONES UR QL: ABNORMAL MG/DL
LACTATE BLDV-MCNC: 1.5 MMOL/L — SIGNIFICANT CHANGE UP (ref 0.5–2)
LACTATE SERPL-SCNC: 1.7 MMOL/L — SIGNIFICANT CHANGE UP (ref 0.5–2)
LEUKOCYTE ESTERASE UR-ACNC: NEGATIVE — SIGNIFICANT CHANGE UP
LIDOCAIN IGE QN: 48 U/L — SIGNIFICANT CHANGE UP (ref 16–77)
LYMPHOCYTES # BLD AUTO: 2.51 K/UL — SIGNIFICANT CHANGE UP (ref 1–3.3)
LYMPHOCYTES NFR BLD AUTO: 34.5 % — SIGNIFICANT CHANGE UP (ref 13–44)
MAGNESIUM SERPL-MCNC: 1.9 MG/DL — SIGNIFICANT CHANGE UP (ref 1.6–2.6)
MCHC RBC-ENTMCNC: 31.3 PG — SIGNIFICANT CHANGE UP (ref 27–34)
MCHC RBC-ENTMCNC: 32.2 G/DL — SIGNIFICANT CHANGE UP (ref 32–36)
MCV RBC AUTO: 97.5 FL — SIGNIFICANT CHANGE UP (ref 80–100)
MONOCYTES # BLD AUTO: 0.62 K/UL — SIGNIFICANT CHANGE UP (ref 0–0.9)
MONOCYTES NFR BLD AUTO: 8.5 % — SIGNIFICANT CHANGE UP (ref 2–14)
NEUTROPHILS # BLD AUTO: 3.99 K/UL — SIGNIFICANT CHANGE UP (ref 1.8–7.4)
NEUTROPHILS NFR BLD AUTO: 54.8 % — SIGNIFICANT CHANGE UP (ref 43–77)
NITRITE UR-MCNC: NEGATIVE — SIGNIFICANT CHANGE UP
NRBC # BLD AUTO: 0 K/UL — SIGNIFICANT CHANGE UP (ref 0–0)
NRBC # FLD: 0 K/UL — SIGNIFICANT CHANGE UP (ref 0–0)
NRBC BLD AUTO-RTO: 0 /100 WBCS — SIGNIFICANT CHANGE UP (ref 0–0)
NT-PROBNP SERPL-SCNC: HIGH PG/ML
PCO2 BLDV: 57 MMHG — HIGH (ref 42–55)
PH BLDV: 7.36 — SIGNIFICANT CHANGE UP (ref 7.32–7.43)
PH UR: 6 — SIGNIFICANT CHANGE UP (ref 5–8)
PLATELET # BLD AUTO: 141 K/UL — LOW (ref 150–400)
PMV BLD: 11.5 FL — SIGNIFICANT CHANGE UP (ref 7–13)
PO2 BLDV: <35 MMHG — SIGNIFICANT CHANGE UP (ref 25–45)
POTASSIUM SERPL-MCNC: 3 MMOL/L — LOW (ref 3.5–5.3)
POTASSIUM SERPL-MCNC: 3.5 MMOL/L — SIGNIFICANT CHANGE UP (ref 3.5–5.3)
POTASSIUM SERPL-SCNC: 3 MMOL/L — LOW (ref 3.5–5.3)
POTASSIUM SERPL-SCNC: 3.5 MMOL/L — SIGNIFICANT CHANGE UP (ref 3.5–5.3)
PROT SERPL-MCNC: 6.2 G/DL — SIGNIFICANT CHANGE UP (ref 6–8.3)
PROT SERPL-MCNC: 7 G/DL — SIGNIFICANT CHANGE UP (ref 6.4–8.2)
PROT UR-MCNC: 300 MG/DL
RBC # BLD: 4.34 M/UL — SIGNIFICANT CHANGE UP (ref 4.2–5.8)
RBC # FLD: 15.6 % — HIGH (ref 10.3–14.5)
RSV RNA NPH QL NAA+NON-PROBE: SIGNIFICANT CHANGE UP
SAO2 % BLDV: 38 % — LOW (ref 67–88)
SARS-COV-2 RNA SPEC QL NAA+PROBE: SIGNIFICANT CHANGE UP
SODIUM SERPL-SCNC: 142 MMOL/L — SIGNIFICANT CHANGE UP (ref 135–145)
SODIUM SERPL-SCNC: 147 MMOL/L — HIGH (ref 132–145)
SOURCE RESPIRATORY: SIGNIFICANT CHANGE UP
SP GR SPEC: 1.02 — SIGNIFICANT CHANGE UP (ref 1–1.03)
TROPONIN I, HIGH SENSITIVITY RESULT: 212.5 NG/L — HIGH
TROPONIN T, HIGH SENSITIVITY RESULT: 75 NG/L — CRITICAL HIGH
UROBILINOGEN FLD QL: 1 MG/DL — SIGNIFICANT CHANGE UP (ref 0.2–1)
WBC # BLD: 7.28 K/UL — SIGNIFICANT CHANGE UP (ref 3.8–10.5)
WBC # FLD AUTO: 7.28 K/UL — SIGNIFICANT CHANGE UP (ref 3.8–10.5)

## 2025-08-20 PROCEDURE — 82803 BLOOD GASES ANY COMBINATION: CPT

## 2025-08-20 PROCEDURE — 36415 COLL VENOUS BLD VENIPUNCTURE: CPT

## 2025-08-20 PROCEDURE — 80053 COMPREHEN METABOLIC PANEL: CPT

## 2025-08-20 PROCEDURE — 99291 CRITICAL CARE FIRST HOUR: CPT

## 2025-08-20 PROCEDURE — 83690 ASSAY OF LIPASE: CPT

## 2025-08-20 PROCEDURE — 84484 ASSAY OF TROPONIN QUANT: CPT

## 2025-08-20 PROCEDURE — 83880 ASSAY OF NATRIURETIC PEPTIDE: CPT

## 2025-08-20 PROCEDURE — 87637 SARSCOV2&INF A&B&RSV AMP PRB: CPT

## 2025-08-20 PROCEDURE — 85025 COMPLETE CBC W/AUTO DIFF WBC: CPT

## 2025-08-20 PROCEDURE — 83605 ASSAY OF LACTIC ACID: CPT

## 2025-08-20 PROCEDURE — 83735 ASSAY OF MAGNESIUM: CPT

## 2025-08-20 PROCEDURE — 71045 X-RAY EXAM CHEST 1 VIEW: CPT | Mod: 26

## 2025-08-20 PROCEDURE — 81001 URINALYSIS AUTO W/SCOPE: CPT

## 2025-08-20 PROCEDURE — 99223 1ST HOSP IP/OBS HIGH 75: CPT

## 2025-08-20 PROCEDURE — 94640 AIRWAY INHALATION TREATMENT: CPT

## 2025-08-20 PROCEDURE — 87040 BLOOD CULTURE FOR BACTERIA: CPT

## 2025-08-20 RX ORDER — DOXYCYCLINE HYCLATE 100 MG
100 TABLET ORAL ONCE
Refills: 0 | Status: COMPLETED | OUTPATIENT
Start: 2025-08-20 | End: 2025-08-20

## 2025-08-20 RX ORDER — MELATONIN 5 MG
3 TABLET ORAL AT BEDTIME
Refills: 0 | Status: DISCONTINUED | OUTPATIENT
Start: 2025-08-20 | End: 2025-08-22

## 2025-08-20 RX ORDER — NIFEDIPINE 30 MG
30 TABLET, EXTENDED RELEASE 24 HR ORAL ONCE
Refills: 0 | Status: DISCONTINUED | OUTPATIENT
Start: 2025-08-20 | End: 2025-08-20

## 2025-08-20 RX ORDER — PREDNISONE 20 MG/1
40 TABLET ORAL EVERY 24 HOURS
Refills: 0 | Status: DISCONTINUED | OUTPATIENT
Start: 2025-08-21 | End: 2025-08-21

## 2025-08-20 RX ORDER — IPRATROPIUM BROMIDE AND ALBUTEROL SULFATE .5; 2.5 MG/3ML; MG/3ML
3 SOLUTION RESPIRATORY (INHALATION) EVERY 6 HOURS
Refills: 0 | Status: DISCONTINUED | OUTPATIENT
Start: 2025-08-20 | End: 2025-08-21

## 2025-08-20 RX ORDER — NICOTINE POLACRILEX 4 MG/1
1 GUM, CHEWING ORAL ONCE
Refills: 0 | Status: COMPLETED | OUTPATIENT
Start: 2025-08-20 | End: 2025-08-20

## 2025-08-20 RX ORDER — DIPHENHYDRAMINE HCL 12.5MG/5ML
25 ELIXIR ORAL ONCE
Refills: 0 | Status: COMPLETED | OUTPATIENT
Start: 2025-08-20 | End: 2025-08-20

## 2025-08-20 RX ORDER — FUROSEMIDE 10 MG/ML
40 INJECTION INTRAMUSCULAR; INTRAVENOUS ONCE
Refills: 0 | Status: COMPLETED | OUTPATIENT
Start: 2025-08-20 | End: 2025-08-20

## 2025-08-20 RX ORDER — MIDAZOLAM IN 0.9 % SOD.CHLORID 1 MG/ML
2 PLASTIC BAG, INJECTION (ML) INTRAVENOUS ONCE
Refills: 0 | Status: DISCONTINUED | OUTPATIENT
Start: 2025-08-20 | End: 2025-08-20

## 2025-08-20 RX ORDER — HEPARIN SODIUM 1000 [USP'U]/ML
5000 INJECTION INTRAVENOUS; SUBCUTANEOUS EVERY 8 HOURS
Refills: 0 | Status: DISCONTINUED | OUTPATIENT
Start: 2025-08-20 | End: 2025-08-22

## 2025-08-20 RX ORDER — PREDNISONE 20 MG/1
50 TABLET ORAL ONCE
Refills: 0 | Status: COMPLETED | OUTPATIENT
Start: 2025-08-20 | End: 2025-08-20

## 2025-08-20 RX ORDER — IPRATROPIUM BROMIDE AND ALBUTEROL SULFATE .5; 2.5 MG/3ML; MG/3ML
3 SOLUTION RESPIRATORY (INHALATION)
Refills: 0 | Status: COMPLETED | OUTPATIENT
Start: 2025-08-20 | End: 2025-08-20

## 2025-08-20 RX ORDER — MAGNESIUM, ALUMINUM HYDROXIDE 200-200 MG
30 TABLET,CHEWABLE ORAL EVERY 4 HOURS
Refills: 0 | Status: DISCONTINUED | OUTPATIENT
Start: 2025-08-20 | End: 2025-08-22

## 2025-08-20 RX ORDER — AZITHROMYCIN 250 MG
500 CAPSULE ORAL ONCE
Refills: 0 | Status: DISCONTINUED | OUTPATIENT
Start: 2025-08-20 | End: 2025-08-20

## 2025-08-20 RX ORDER — DIAZEPAM 5 MG/1
5 TABLET ORAL ONCE
Refills: 0 | Status: DISCONTINUED | OUTPATIENT
Start: 2025-08-20 | End: 2025-08-20

## 2025-08-20 RX ORDER — AZITHROMYCIN 250 MG
500 CAPSULE ORAL EVERY 24 HOURS
Refills: 0 | Status: DISCONTINUED | OUTPATIENT
Start: 2025-08-20 | End: 2025-08-21

## 2025-08-20 RX ORDER — LOSARTAN POTASSIUM 100 MG/1
50 TABLET, FILM COATED ORAL DAILY
Refills: 0 | Status: DISCONTINUED | OUTPATIENT
Start: 2025-08-20 | End: 2025-08-22

## 2025-08-20 RX ORDER — ACETAMINOPHEN 500 MG/5ML
650 LIQUID (ML) ORAL EVERY 6 HOURS
Refills: 0 | Status: DISCONTINUED | OUTPATIENT
Start: 2025-08-20 | End: 2025-08-22

## 2025-08-20 RX ADMIN — IPRATROPIUM BROMIDE AND ALBUTEROL SULFATE 3 MILLILITER(S): .5; 2.5 SOLUTION RESPIRATORY (INHALATION) at 21:24

## 2025-08-20 RX ADMIN — DIAZEPAM 5 MILLIGRAM(S): 5 TABLET ORAL at 11:40

## 2025-08-20 RX ADMIN — Medication 40 MILLIEQUIVALENT(S): at 08:48

## 2025-08-20 RX ADMIN — IPRATROPIUM BROMIDE AND ALBUTEROL SULFATE 3 MILLILITER(S): .5; 2.5 SOLUTION RESPIRATORY (INHALATION) at 08:53

## 2025-08-20 RX ADMIN — IPRATROPIUM BROMIDE AND ALBUTEROL SULFATE 3 MILLILITER(S): .5; 2.5 SOLUTION RESPIRATORY (INHALATION) at 08:47

## 2025-08-20 RX ADMIN — LOSARTAN POTASSIUM 50 MILLIGRAM(S): 100 TABLET, FILM COATED ORAL at 21:55

## 2025-08-20 RX ADMIN — IPRATROPIUM BROMIDE AND ALBUTEROL SULFATE 3 MILLILITER(S): .5; 2.5 SOLUTION RESPIRATORY (INHALATION) at 08:50

## 2025-08-20 RX ADMIN — NICOTINE POLACRILEX 1 PATCH: 4 GUM, CHEWING ORAL at 09:48

## 2025-08-20 RX ADMIN — Medication 40 MILLIEQUIVALENT(S): at 21:24

## 2025-08-20 RX ADMIN — Medication 25 MILLIGRAM(S): at 15:43

## 2025-08-20 RX ADMIN — PREDNISONE 50 MILLIGRAM(S): 20 TABLET ORAL at 11:16

## 2025-08-20 RX ADMIN — Medication 110 MILLIGRAM(S): at 08:39

## 2025-08-20 RX ADMIN — Medication 2 MILLIGRAM(S): at 02:15

## 2025-08-20 RX ADMIN — Medication 100 MILLIEQUIVALENT(S): at 08:47

## 2025-08-20 RX ADMIN — Medication 255 MILLIGRAM(S): at 21:22

## 2025-08-20 RX ADMIN — Medication 2 MILLIGRAM(S): at 11:45

## 2025-08-20 RX ADMIN — Medication 100 MILLIGRAM(S): at 09:30

## 2025-08-20 RX ADMIN — FUROSEMIDE 40 MILLIGRAM(S): 10 INJECTION INTRAMUSCULAR; INTRAVENOUS at 09:48

## 2025-08-20 RX ADMIN — Medication 10 MILLIEQUIVALENT(S): at 09:45

## 2025-08-20 RX ADMIN — HEPARIN SODIUM 5000 UNIT(S): 1000 INJECTION INTRAVENOUS; SUBCUTANEOUS at 21:24

## 2025-08-20 RX ADMIN — Medication 25 MILLIGRAM(S): at 11:06

## 2025-08-20 RX ADMIN — Medication 25 MILLIGRAM(S): at 10:06

## 2025-08-21 DIAGNOSIS — J44.1 CHRONIC OBSTRUCTIVE PULMONARY DISEASE WITH (ACUTE) EXACERBATION: ICD-10-CM

## 2025-08-21 DIAGNOSIS — J96.01 ACUTE RESPIRATORY FAILURE WITH HYPOXIA: ICD-10-CM

## 2025-08-21 DIAGNOSIS — Z79.899 OTHER LONG TERM (CURRENT) DRUG THERAPY: ICD-10-CM

## 2025-08-21 DIAGNOSIS — J44.9 CHRONIC OBSTRUCTIVE PULMONARY DISEASE, UNSPECIFIED: ICD-10-CM

## 2025-08-21 DIAGNOSIS — J18.9 PNEUMONIA, UNSPECIFIED ORGANISM: ICD-10-CM

## 2025-08-21 DIAGNOSIS — Z99.81 DEPENDENCE ON SUPPLEMENTAL OXYGEN: ICD-10-CM

## 2025-08-21 DIAGNOSIS — N40.0 BENIGN PROSTATIC HYPERPLASIA WITHOUT LOWER URINARY TRACT SYMPTOMS: ICD-10-CM

## 2025-08-21 DIAGNOSIS — I50.23 ACUTE ON CHRONIC SYSTOLIC (CONGESTIVE) HEART FAILURE: ICD-10-CM

## 2025-08-21 DIAGNOSIS — Z53.29 PROCEDURE AND TREATMENT NOT CARRIED OUT BECAUSE OF PATIENT'S DECISION FOR OTHER REASONS: ICD-10-CM

## 2025-08-21 DIAGNOSIS — N18.2 CHRONIC KIDNEY DISEASE, STAGE 2 (MILD): ICD-10-CM

## 2025-08-21 LAB
ALBUMIN SERPL ELPH-MCNC: 3 G/DL — LOW (ref 3.3–5)
ALP SERPL-CCNC: 66 U/L — SIGNIFICANT CHANGE UP (ref 40–120)
ALT FLD-CCNC: 26 U/L — SIGNIFICANT CHANGE UP (ref 10–45)
ANION GAP SERPL CALC-SCNC: 9 MMOL/L — SIGNIFICANT CHANGE UP (ref 5–17)
AST SERPL-CCNC: 13 U/L — SIGNIFICANT CHANGE UP (ref 10–40)
BASOPHILS # BLD AUTO: 0 K/UL — SIGNIFICANT CHANGE UP (ref 0–0.2)
BASOPHILS NFR BLD AUTO: 0 % — SIGNIFICANT CHANGE UP (ref 0–2)
BILIRUB SERPL-MCNC: 0.8 MG/DL — SIGNIFICANT CHANGE UP (ref 0.2–1.2)
BUN SERPL-MCNC: 30 MG/DL — HIGH (ref 7–23)
CALCIUM SERPL-MCNC: 8.6 MG/DL — SIGNIFICANT CHANGE UP (ref 8.4–10.5)
CHLORIDE SERPL-SCNC: 104 MMOL/L — SIGNIFICANT CHANGE UP (ref 96–108)
CO2 SERPL-SCNC: 28 MMOL/L — SIGNIFICANT CHANGE UP (ref 22–31)
CREAT SERPL-MCNC: 1.22 MG/DL — SIGNIFICANT CHANGE UP (ref 0.5–1.3)
EGFR: 60 ML/MIN/1.73M2 — SIGNIFICANT CHANGE UP
EGFR: 60 ML/MIN/1.73M2 — SIGNIFICANT CHANGE UP
EOSINOPHIL # BLD AUTO: 0 K/UL — SIGNIFICANT CHANGE UP (ref 0–0.5)
EOSINOPHIL NFR BLD AUTO: 0 % — SIGNIFICANT CHANGE UP (ref 0–6)
GLUCOSE SERPL-MCNC: 128 MG/DL — HIGH (ref 70–99)
HCT VFR BLD CALC: 33.4 % — LOW (ref 39–50)
HGB BLD-MCNC: 10.9 G/DL — LOW (ref 13–17)
IMM GRANULOCYTES # BLD AUTO: 0.01 K/UL — SIGNIFICANT CHANGE UP (ref 0–0.07)
IMM GRANULOCYTES NFR BLD AUTO: 0.1 % — SIGNIFICANT CHANGE UP (ref 0–0.9)
LYMPHOCYTES # BLD AUTO: 0.72 K/UL — LOW (ref 1–3.3)
LYMPHOCYTES NFR BLD AUTO: 10.8 % — LOW (ref 13–44)
MAGNESIUM SERPL-MCNC: 1.8 MG/DL — SIGNIFICANT CHANGE UP (ref 1.6–2.6)
MCHC RBC-ENTMCNC: 31.5 PG — SIGNIFICANT CHANGE UP (ref 27–34)
MCHC RBC-ENTMCNC: 32.6 G/DL — SIGNIFICANT CHANGE UP (ref 32–36)
MCV RBC AUTO: 96.5 FL — SIGNIFICANT CHANGE UP (ref 80–100)
MONOCYTES # BLD AUTO: 0.57 K/UL — SIGNIFICANT CHANGE UP (ref 0–0.9)
MONOCYTES NFR BLD AUTO: 8.5 % — SIGNIFICANT CHANGE UP (ref 2–14)
NEUTROPHILS # BLD AUTO: 5.39 K/UL — SIGNIFICANT CHANGE UP (ref 1.8–7.4)
NEUTROPHILS NFR BLD AUTO: 80.6 % — HIGH (ref 43–77)
NRBC # BLD AUTO: 0 K/UL — SIGNIFICANT CHANGE UP (ref 0–0)
NRBC # FLD: 0 K/UL — SIGNIFICANT CHANGE UP (ref 0–0)
NRBC BLD AUTO-RTO: 0 /100 WBCS — SIGNIFICANT CHANGE UP (ref 0–0)
PHOSPHATE SERPL-MCNC: 3.9 MG/DL — SIGNIFICANT CHANGE UP (ref 2.5–4.5)
PLATELET # BLD AUTO: 116 K/UL — LOW (ref 150–400)
PMV BLD: 12.6 FL — SIGNIFICANT CHANGE UP (ref 7–13)
POTASSIUM SERPL-MCNC: 3.7 MMOL/L — SIGNIFICANT CHANGE UP (ref 3.5–5.3)
POTASSIUM SERPL-SCNC: 3.7 MMOL/L — SIGNIFICANT CHANGE UP (ref 3.5–5.3)
PROT SERPL-MCNC: 5.1 G/DL — LOW (ref 6–8.3)
RBC # BLD: 3.46 M/UL — LOW (ref 4.2–5.8)
RBC # FLD: 15.4 % — HIGH (ref 10.3–14.5)
SODIUM SERPL-SCNC: 141 MMOL/L — SIGNIFICANT CHANGE UP (ref 135–145)
TROPONIN T, HIGH SENSITIVITY RESULT: 68 NG/L — CRITICAL HIGH
TROPONIN T, HIGH SENSITIVITY RESULT: 69 NG/L — CRITICAL HIGH
WBC # BLD: 6.69 K/UL — SIGNIFICANT CHANGE UP (ref 3.8–10.5)
WBC # FLD AUTO: 6.69 K/UL — SIGNIFICANT CHANGE UP (ref 3.8–10.5)

## 2025-08-21 PROCEDURE — 70450 CT HEAD/BRAIN W/O DYE: CPT | Mod: 26

## 2025-08-21 PROCEDURE — 80053 COMPREHEN METABOLIC PANEL: CPT

## 2025-08-21 PROCEDURE — 83880 ASSAY OF NATRIURETIC PEPTIDE: CPT

## 2025-08-21 PROCEDURE — 81001 URINALYSIS AUTO W/SCOPE: CPT

## 2025-08-21 PROCEDURE — 84484 ASSAY OF TROPONIN QUANT: CPT

## 2025-08-21 PROCEDURE — 87637 SARSCOV2&INF A&B&RSV AMP PRB: CPT

## 2025-08-21 PROCEDURE — 36415 COLL VENOUS BLD VENIPUNCTURE: CPT

## 2025-08-21 PROCEDURE — 99233 SBSQ HOSP IP/OBS HIGH 50: CPT | Mod: GC

## 2025-08-21 PROCEDURE — 94640 AIRWAY INHALATION TREATMENT: CPT

## 2025-08-21 PROCEDURE — 93005 ELECTROCARDIOGRAM TRACING: CPT

## 2025-08-21 PROCEDURE — 84100 ASSAY OF PHOSPHORUS: CPT

## 2025-08-21 PROCEDURE — 0225U NFCT DS DNA&RNA 21 SARSCOV2: CPT

## 2025-08-21 PROCEDURE — 93010 ELECTROCARDIOGRAM REPORT: CPT

## 2025-08-21 PROCEDURE — 87040 BLOOD CULTURE FOR BACTERIA: CPT

## 2025-08-21 PROCEDURE — 83605 ASSAY OF LACTIC ACID: CPT

## 2025-08-21 PROCEDURE — 83735 ASSAY OF MAGNESIUM: CPT

## 2025-08-21 PROCEDURE — 83690 ASSAY OF LIPASE: CPT

## 2025-08-21 PROCEDURE — 85025 COMPLETE CBC W/AUTO DIFF WBC: CPT

## 2025-08-21 PROCEDURE — 82803 BLOOD GASES ANY COMBINATION: CPT

## 2025-08-21 RX ORDER — TIOTROPIUM BROMIDE INHALATION SPRAY 3.12 UG/1
2 SPRAY, METERED RESPIRATORY (INHALATION)
Qty: 0 | Refills: 0 | DISCHARGE
Start: 2025-08-21

## 2025-08-21 RX ORDER — ATORVASTATIN CALCIUM 80 MG/1
80 TABLET, FILM COATED ORAL AT BEDTIME
Refills: 0 | Status: DISCONTINUED | OUTPATIENT
Start: 2025-08-21 | End: 2025-08-22

## 2025-08-21 RX ORDER — IPRATROPIUM BROMIDE AND ALBUTEROL SULFATE .5; 2.5 MG/3ML; MG/3ML
3 SOLUTION RESPIRATORY (INHALATION) EVERY 6 HOURS
Refills: 0 | Status: DISCONTINUED | OUTPATIENT
Start: 2025-08-21 | End: 2025-08-22

## 2025-08-21 RX ORDER — TIOTROPIUM BROMIDE INHALATION SPRAY 3.12 UG/1
2 SPRAY, METERED RESPIRATORY (INHALATION) DAILY
Refills: 0 | Status: DISCONTINUED | OUTPATIENT
Start: 2025-08-21 | End: 2025-08-22

## 2025-08-21 RX ORDER — NICOTINE POLACRILEX 4 MG/1
1 GUM, CHEWING ORAL DAILY
Refills: 0 | Status: DISCONTINUED | OUTPATIENT
Start: 2025-08-21 | End: 2025-08-22

## 2025-08-21 RX ORDER — NICOTINE POLACRILEX 4 MG/1
0 GUM, CHEWING ORAL
Qty: 0 | Refills: 0 | DISCHARGE
Start: 2025-08-21

## 2025-08-21 RX ORDER — MAGNESIUM SULFATE 500 MG/ML
2 SYRINGE (ML) INJECTION ONCE
Refills: 0 | Status: COMPLETED | OUTPATIENT
Start: 2025-08-21 | End: 2025-08-21

## 2025-08-21 RX ORDER — BUPROPION HYDROBROMIDE 522 MG/1
150 TABLET, EXTENDED RELEASE ORAL EVERY 24 HOURS
Refills: 0 | Status: DISCONTINUED | OUTPATIENT
Start: 2025-08-21 | End: 2025-08-22

## 2025-08-21 RX ORDER — BUPROPION HYDROBROMIDE 522 MG/1
1 TABLET, EXTENDED RELEASE ORAL
Refills: 0 | DISCHARGE

## 2025-08-21 RX ORDER — IPRATROPIUM BROMIDE AND ALBUTEROL SULFATE .5; 2.5 MG/3ML; MG/3ML
3 SOLUTION RESPIRATORY (INHALATION)
Qty: 0 | Refills: 0 | DISCHARGE
Start: 2025-08-21

## 2025-08-21 RX ADMIN — Medication 40 MILLIEQUIVALENT(S): at 09:16

## 2025-08-21 RX ADMIN — IPRATROPIUM BROMIDE AND ALBUTEROL SULFATE 3 MILLILITER(S): .5; 2.5 SOLUTION RESPIRATORY (INHALATION) at 04:37

## 2025-08-21 RX ADMIN — Medication 25 GRAM(S): at 09:17

## 2025-08-21 RX ADMIN — NICOTINE POLACRILEX 1 PATCH: 4 GUM, CHEWING ORAL at 12:19

## 2025-08-21 RX ADMIN — Medication 1 DOSE(S): at 18:56

## 2025-08-21 RX ADMIN — HEPARIN SODIUM 5000 UNIT(S): 1000 INJECTION INTRAVENOUS; SUBCUTANEOUS at 14:41

## 2025-08-21 RX ADMIN — NICOTINE POLACRILEX 1 PATCH: 4 GUM, CHEWING ORAL at 20:20

## 2025-08-21 RX ADMIN — ATORVASTATIN CALCIUM 80 MILLIGRAM(S): 80 TABLET, FILM COATED ORAL at 21:48

## 2025-08-21 RX ADMIN — IPRATROPIUM BROMIDE AND ALBUTEROL SULFATE 3 MILLILITER(S): .5; 2.5 SOLUTION RESPIRATORY (INHALATION) at 17:04

## 2025-08-21 RX ADMIN — LOSARTAN POTASSIUM 50 MILLIGRAM(S): 100 TABLET, FILM COATED ORAL at 21:48

## 2025-08-21 RX ADMIN — BUPROPION HYDROBROMIDE 150 MILLIGRAM(S): 522 TABLET, EXTENDED RELEASE ORAL at 14:41

## 2025-08-21 RX ADMIN — HEPARIN SODIUM 5000 UNIT(S): 1000 INJECTION INTRAVENOUS; SUBCUTANEOUS at 06:35

## 2025-08-21 RX ADMIN — IPRATROPIUM BROMIDE AND ALBUTEROL SULFATE 3 MILLILITER(S): .5; 2.5 SOLUTION RESPIRATORY (INHALATION) at 09:16

## 2025-08-21 RX ADMIN — TIOTROPIUM BROMIDE INHALATION SPRAY 2 PUFF(S): 3.12 SPRAY, METERED RESPIRATORY (INHALATION) at 14:41

## 2025-08-21 RX ADMIN — HEPARIN SODIUM 5000 UNIT(S): 1000 INJECTION INTRAVENOUS; SUBCUTANEOUS at 21:48

## 2025-08-22 ENCOUNTER — TRANSCRIPTION ENCOUNTER (OUTPATIENT)
Age: 80
End: 2025-08-22

## 2025-08-22 VITALS
HEART RATE: 99 BPM | RESPIRATION RATE: 19 BRPM | SYSTOLIC BLOOD PRESSURE: 151 MMHG | TEMPERATURE: 98 F | DIASTOLIC BLOOD PRESSURE: 97 MMHG | OXYGEN SATURATION: 100 %

## 2025-08-22 PROCEDURE — 0225U NFCT DS DNA&RNA 21 SARSCOV2: CPT

## 2025-08-22 PROCEDURE — 80053 COMPREHEN METABOLIC PANEL: CPT

## 2025-08-22 PROCEDURE — 87040 BLOOD CULTURE FOR BACTERIA: CPT

## 2025-08-22 PROCEDURE — 84100 ASSAY OF PHOSPHORUS: CPT

## 2025-08-22 PROCEDURE — 93005 ELECTROCARDIOGRAM TRACING: CPT

## 2025-08-22 PROCEDURE — 82803 BLOOD GASES ANY COMBINATION: CPT

## 2025-08-22 PROCEDURE — 97166 OT EVAL MOD COMPLEX 45 MIN: CPT

## 2025-08-22 PROCEDURE — 83880 ASSAY OF NATRIURETIC PEPTIDE: CPT

## 2025-08-22 PROCEDURE — 70450 CT HEAD/BRAIN W/O DYE: CPT

## 2025-08-22 PROCEDURE — 83605 ASSAY OF LACTIC ACID: CPT

## 2025-08-22 PROCEDURE — 81001 URINALYSIS AUTO W/SCOPE: CPT

## 2025-08-22 PROCEDURE — 85025 COMPLETE CBC W/AUTO DIFF WBC: CPT

## 2025-08-22 PROCEDURE — 99291 CRITICAL CARE FIRST HOUR: CPT | Mod: 25

## 2025-08-22 PROCEDURE — 36415 COLL VENOUS BLD VENIPUNCTURE: CPT

## 2025-08-22 PROCEDURE — 96374 THER/PROPH/DIAG INJ IV PUSH: CPT

## 2025-08-22 PROCEDURE — 97162 PT EVAL MOD COMPLEX 30 MIN: CPT

## 2025-08-22 PROCEDURE — 94640 AIRWAY INHALATION TREATMENT: CPT

## 2025-08-22 PROCEDURE — 87637 SARSCOV2&INF A&B&RSV AMP PRB: CPT

## 2025-08-22 PROCEDURE — 99239 HOSP IP/OBS DSCHRG MGMT >30: CPT

## 2025-08-22 PROCEDURE — 83690 ASSAY OF LIPASE: CPT

## 2025-08-22 PROCEDURE — 71045 X-RAY EXAM CHEST 1 VIEW: CPT

## 2025-08-22 PROCEDURE — 84484 ASSAY OF TROPONIN QUANT: CPT

## 2025-08-22 PROCEDURE — 83735 ASSAY OF MAGNESIUM: CPT

## 2025-08-22 RX ORDER — TIOTROPIUM BROMIDE AND OLODATEROL 3.124; 2.736 UG/1; UG/1
2 SPRAY, METERED RESPIRATORY (INHALATION)
Qty: 1 | Refills: 0
Start: 2025-08-22 | End: 2025-09-20

## 2025-08-22 RX ORDER — TIOTROPIUM BROMIDE INHALATION SPRAY 3.12 UG/1
2 SPRAY, METERED RESPIRATORY (INHALATION)
Qty: 1 | Refills: 0
Start: 2025-08-22

## 2025-08-22 RX ORDER — IPRATROPIUM BROMIDE AND ALBUTEROL SULFATE .5; 2.5 MG/3ML; MG/3ML
3 SOLUTION RESPIRATORY (INHALATION)
Qty: 1 | Refills: 0
Start: 2025-08-22

## 2025-08-22 RX ADMIN — HEPARIN SODIUM 5000 UNIT(S): 1000 INJECTION INTRAVENOUS; SUBCUTANEOUS at 05:01

## 2025-08-22 RX ADMIN — NICOTINE POLACRILEX 1 PATCH: 4 GUM, CHEWING ORAL at 07:00

## 2025-08-22 RX ADMIN — Medication 1 DOSE(S): at 05:02

## 2025-08-28 ENCOUNTER — APPOINTMENT (OUTPATIENT)
Dept: INTERNAL MEDICINE | Facility: CLINIC | Age: 80
End: 2025-08-28

## 2025-08-29 DIAGNOSIS — F17.210 NICOTINE DEPENDENCE, CIGARETTES, UNCOMPLICATED: ICD-10-CM

## 2025-08-29 DIAGNOSIS — J44.9 CHRONIC OBSTRUCTIVE PULMONARY DISEASE, UNSPECIFIED: ICD-10-CM

## 2025-08-29 DIAGNOSIS — Z91.81 HISTORY OF FALLING: ICD-10-CM

## 2025-08-29 DIAGNOSIS — T50.996A UNDERDOSING OF OTHER DRUGS, MEDICAMENTS AND BIOLOGICAL SUBSTANCES, INITIAL ENCOUNTER: ICD-10-CM

## 2025-08-29 DIAGNOSIS — Z60.2 PROBLEMS RELATED TO LIVING ALONE: ICD-10-CM

## 2025-08-29 DIAGNOSIS — Z88.0 ALLERGY STATUS TO PENICILLIN: ICD-10-CM

## 2025-08-29 DIAGNOSIS — J96.11 CHRONIC RESPIRATORY FAILURE WITH HYPOXIA: ICD-10-CM

## 2025-08-29 DIAGNOSIS — E44.0 MODERATE PROTEIN-CALORIE MALNUTRITION: ICD-10-CM

## 2025-08-29 DIAGNOSIS — J96.12 CHRONIC RESPIRATORY FAILURE WITH HYPERCAPNIA: ICD-10-CM

## 2025-08-29 DIAGNOSIS — Z87.442 PERSONAL HISTORY OF URINARY CALCULI: ICD-10-CM

## 2025-08-29 DIAGNOSIS — I35.1 NONRHEUMATIC AORTIC (VALVE) INSUFFICIENCY: ICD-10-CM

## 2025-08-29 DIAGNOSIS — R65.10 SYSTEMIC INFLAMMATORY RESPONSE SYNDROME (SIRS) OF NON-INFECTIOUS ORIGIN WITHOUT ACUTE ORGAN DYSFUNCTION: ICD-10-CM

## 2025-08-29 DIAGNOSIS — Z99.81 DEPENDENCE ON SUPPLEMENTAL OXYGEN: ICD-10-CM

## 2025-08-29 DIAGNOSIS — N18.2 CHRONIC KIDNEY DISEASE, STAGE 2 (MILD): ICD-10-CM

## 2025-08-29 DIAGNOSIS — I21.A1 MYOCARDIAL INFARCTION TYPE 2: ICD-10-CM

## 2025-08-29 DIAGNOSIS — D69.6 THROMBOCYTOPENIA, UNSPECIFIED: ICD-10-CM

## 2025-08-29 DIAGNOSIS — I50.42 CHRONIC COMBINED SYSTOLIC (CONGESTIVE) AND DIASTOLIC (CONGESTIVE) HEART FAILURE: ICD-10-CM

## 2025-08-29 DIAGNOSIS — I16.0 HYPERTENSIVE URGENCY: ICD-10-CM
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
## 2025-08-29 DIAGNOSIS — R79.89 OTHER SPECIFIED ABNORMAL FINDINGS OF BLOOD CHEMISTRY: ICD-10-CM

## 2025-08-29 SDOH — SOCIAL STABILITY - SOCIAL INSECURITY: PROBLEMS RELATED TO LIVING ALONE: Z60.2

## 2025-09-04 ENCOUNTER — APPOINTMENT (OUTPATIENT)
Dept: PULMONOLOGY | Facility: CLINIC | Age: 80
End: 2025-09-04

## 2025-09-17 ENCOUNTER — APPOINTMENT (OUTPATIENT)
Dept: CARE COORDINATION | Facility: HOME HEALTH | Age: 80
End: 2025-09-17

## 2025-09-17 DIAGNOSIS — Z78.9 OTHER SPECIFIED HEALTH STATUS: ICD-10-CM

## 2025-09-17 DIAGNOSIS — F17.200 NICOTINE DEPENDENCE, UNSPECIFIED, UNCOMPLICATED: ICD-10-CM

## 2025-09-17 DIAGNOSIS — Z60.2 PROBLEMS RELATED TO LIVING ALONE: ICD-10-CM

## 2025-09-17 DIAGNOSIS — J44.1 CHRONIC OBSTRUCTIVE PULMONARY DISEASE WITH (ACUTE) EXACERBATION: ICD-10-CM

## 2025-09-17 DIAGNOSIS — I50.9 HEART FAILURE, UNSPECIFIED: ICD-10-CM

## 2025-09-17 DIAGNOSIS — I10 ESSENTIAL (PRIMARY) HYPERTENSION: ICD-10-CM

## 2025-09-17 SDOH — SOCIAL STABILITY - SOCIAL INSECURITY: PROBLEMS RELATED TO LIVING ALONE: Z60.2

## 2025-09-18 PROBLEM — Z60.2 ELDERLY PERSON LIVING ALONE: Status: ACTIVE | Noted: 2025-09-18

## 2025-09-18 PROBLEM — F17.200 CURRENT EVERY DAY SMOKER: Status: ACTIVE | Noted: 2025-09-18

## 2025-09-18 PROBLEM — Z78.9 CURRENT DRINKER OF ALCOHOL: Status: ACTIVE | Noted: 2025-09-18

## 2025-09-18 PROBLEM — I50.9 CHF (CONGESTIVE HEART FAILURE): Status: ACTIVE | Noted: 2025-09-17

## 2025-09-18 PROBLEM — J44.1 COPD EXACERBATION: Status: ACTIVE | Noted: 2025-09-17

## 2025-09-18 PROBLEM — I10 HYPERTENSION: Status: ACTIVE | Noted: 2025-09-17

## (undated) DEVICE — NDL ASPIRATION VIZISHOT2 22G

## (undated) DEVICE — NDL ASPIRATION VIZISHOT2 25G

## (undated) DEVICE — FORCEP RADIAL JAW PULMONARY BIOPSY 1.8MM OD 2MM DISP